# Patient Record
Sex: FEMALE | Race: WHITE | NOT HISPANIC OR LATINO | Employment: OTHER | URBAN - METROPOLITAN AREA
[De-identification: names, ages, dates, MRNs, and addresses within clinical notes are randomized per-mention and may not be internally consistent; named-entity substitution may affect disease eponyms.]

---

## 2017-01-03 DIAGNOSIS — E03.9 HYPOTHYROIDISM: ICD-10-CM

## 2017-01-03 DIAGNOSIS — R05.9 COUGH: ICD-10-CM

## 2017-01-03 DIAGNOSIS — R60.0 EDEMA OF BOTH LEGS: Primary | ICD-10-CM

## 2017-01-03 NOTE — TELEPHONE ENCOUNTER
fluticasone (FLONASE) 50 MCG/ACT nasal spray    Last Written Prescription Date: 12/2/2015  Last Fill Quantity: 16g,  # refills: 6   Last Office Visit with [facility] or  Health prescribing provider: 5/11/2016                                         Next 5 appointments (look out 90 days)     Feb 15, 2017 11:40 AM   Office Visit with Sneha Sandoval MD   [facility] ([facility] )    [address] [place] [address]  [address]  [place] [address]   [phone]                  levothyroxine (SYNTHROID, LEVOTHROID) 112 MCG tablet       Last Written Prescription Date: 9/7/2016  Last Quantity: 90, # refills: 1  Last Office Visit with [facility] or  Health prescribing provider: 5/11/2016   Next 5 appointments (look out 90 days)     Feb 15, 2017 11:40 AM   Office Visit with Sneha Sandoval MD   [facility] ([facility] )    [address] [place] [address]  [address]  [facility] [facility] [address]   [phone]                   TSH   Date Value Ref Range Status   05/11/2016 2.15 0.40 - 4.00 mU/L Final     furosemide (LASIX) 20 MG tablet      Last Written Prescription Date: 9/8/2016  Last Fill Quantity: 30, # refills: 3  Last Office Visit with [facility] or  Health prescribing provider: 5/11/2016   Next 5 appointments (look out 90 days)     Feb 15, 2017 11:40 AM   Office Visit with Sneha Sandoval MD   [facility] ([facility] )    [address] [place] [address]  [address]  [place] [address]   [phone]                   POTASSIUM   Date Value Ref Range Status   04/28/2016 3.7 mmol/L Final     CREATININE   Date Value Ref Range Status   04/28/2016 0.81 mg/dL Final     BP Readings from Last 3 Encounters:   12/16/16 142/73   12/14/16 174/82   05/11/16 134/74

## 2017-01-04 RX ORDER — LEVOTHYROXINE SODIUM 112 UG/1
112 TABLET ORAL DAILY
Qty: 90 TABLET | Refills: 0 | Status: SHIPPED | OUTPATIENT
Start: 2017-01-04 | End: 2017-06-01

## 2017-01-04 RX ORDER — FLUTICASONE PROPIONATE 50 MCG
1-2 SPRAY, SUSPENSION (ML) NASAL DAILY
Qty: 16 G | Refills: 4 | Status: SHIPPED | OUTPATIENT
Start: 2017-01-04 | End: 2018-08-08

## 2017-01-04 RX ORDER — FUROSEMIDE 20 MG
20 TABLET ORAL DAILY PRN
Qty: 90 TABLET | Refills: 0 | Status: SHIPPED | OUTPATIENT
Start: 2017-01-04 | End: 2020-07-01

## 2017-02-19 ENCOUNTER — MYC MEDICAL ADVICE (OUTPATIENT)
Dept: FAMILY MEDICINE | Facility: CLINIC | Age: 67
End: 2017-02-19

## 2017-02-22 ENCOUNTER — OFFICE VISIT (OUTPATIENT)
Dept: FAMILY MEDICINE | Facility: CLINIC | Age: 67
End: 2017-02-22
Payer: COMMERCIAL

## 2017-02-22 DIAGNOSIS — N90.4 LICHEN SCLEROSUS OF FEMALE GENITALIA: Primary | ICD-10-CM

## 2017-02-22 PROCEDURE — 99213 OFFICE O/P EST LOW 20 MIN: CPT | Performed by: FAMILY MEDICINE

## 2017-02-22 NOTE — PATIENT INSTRUCTIONS
FUTURE APPOINTMENTS  Follow up in 1 year for re-evaluation.    TOPICAL STEROID INSTRUCTIONS  Clobetasol propionate 0.05% ointment.  Continue applying prn.

## 2017-02-22 NOTE — PROGRESS NOTES
"[facility] - PRIMARY CARE SKIN    CC : Lichen sclerosus et atrophicus   SUBJECTIVE:                                                    Marysol Morrell is a 66 year old female who presents to clinic today for follow-up of vaginal lichen sclerosus et atrophicus, which first began in early 2015.     Current treatment : clobetasol propionate 0.05% ointment rarely used prn for control of white skin changes (\"a couple times a month at most\").  Response to treatment : no pain, burning nor discomfort. She only notes white skin changes.  Side effects noted : none    Personal history of skin cancer : NO.  Family history of skin cancer : NO.    Occupation : nurse.    Patient Active Problem List   Diagnosis     Constipation     HTN (hypertension), benign     Hypothyroidism     Osteopenia     Mild major depression (H)     DVT (deep venous thrombosis) (H)     Cholelithiasis     Diverticulitis     Gout     Colon polyps     Schatzki's ring     Ovarian cyst     HCD (health care directive)     GERD (gastroesophageal reflux disease)     CARDIOVASCULAR SCREENING; LDL GOAL LESS THAN 130     Lichen sclerosus of female genitalia       Past Medical History   Diagnosis Date     Cholelithiasis      Colon polyps 2001 and 2006     no polyps 2011     Constipation      Diverticulitis 2010     DVT (deep venous thrombosis) (H)      x3 (one due to OCs, 2 post op)     GERD (gastroesophageal reflux disease)      Gout      History of migraine      HTN (hypertension), benign      Hypothyroidism      hashimoto's thyroiditis     Mild major depression (H)      Osteopenia      Ovarian cyst 2011     Rectocele      Schatzki's ring 2009     EGD      Tibia/fibula fracture 2009     Vasovagal syncope 1966     Vertigo 2000    Past Surgical History   Procedure Laterality Date     C lap,surg,colectomy, partial, w/anast  2011     due to recurrent diverticulitis     Open reduction internal fixation tibia  2009     Hysterectomy, pap no longer indicated  1984 "     with bladder repair     Breast biopsy, core rt/lt  1990     Laparoscopic oophorectomy  2011     with the colon resection     Appendectomy  1954     Tonsillectomy & adenoidectomy  1958     Arthroscopy knee  1983     Excise hawthorne's neuroma foot        Social History   Substance Use Topics     Smoking status: Never Smoker     Smokeless tobacco: Never Used     Alcohol use 0.0 oz/week     0 Standard drinks or equivalent per week      Comment: 0-3 a day. mixed drinks, wine    Family History     Problem (# of Occurrences) Relation (Name,Age of Onset)    C.A.D. (1) Mother: and PE    CANCER (1) Father: gastric ca and type 1 DM           Prescription Medications as of 2/22/2017             levothyroxine (SYNTHROID/LEVOTHROID) 112 MCG tablet Take 1 tablet (112 mcg) by mouth daily    cetirizine (ZYRTEC) 10 MG tablet TAKE 1 TABLET BY MOUTH EVERY EVENING    allopurinol (ZYLOPRIM) 100 MG tablet Take 1 tablet (100 mg) by mouth daily    clobetasol (TEMOVATE) 0.05 % ointment Apply sparingly to affected area twice daily as needed.  Do not apply to face.    Cholecalciferol (VITAMIN D) 2000 UNITS tablet Take 2,000 Units by mouth daily    omeprazole 20 MG tablet Take 1 tablet (20 mg) by mouth daily Take 30-60 minutes before a meal.    losartan (COZAAR) 100 MG tablet TAKE 1 TABLET BY MOUTH DAILY    conjugated estrogens (PREMARIN) vaginal cream Place vaginally twice a week    CRANBERRY 650mg of cranberry po    furosemide (LASIX) 20 MG tablet Take 1 tablet (20 mg) by mouth daily as needed    fluticasone (FLONASE) 50 MCG/ACT spray Spray 1-2 sprays into both nostrils daily    metoprolol (TOPROL-XL) 25 MG 24 hr tablet Take 1 tablet (25 mg) by mouth daily    aspirin 81 MG tablet Take by mouth daily Reported on 2/22/2017    ibuprofen (ADVIL,MOTRIN) 400-800 mg tablet Take 400-800 mg by mouth every 6 hours as needed Reported on 2/22/2017            Allergies   Allergen Reactions     Evista [Raloxifene Hydrochloride] Other (See Comments)      Esophagus irritation      Fosamax Other (See Comments)     Esophagus irritation      Tylenol With Codeine [Acetaminophen-Codeine] GI Disturbance     Vicodin [Hydrocodone-Acetaminophen] GI Disturbance     Pen Vk [Penicillin V] Rash        ROS : 14 point review of systems was negative except the symptoms listed above in the HPI.    This document serves as a record of the services and decisions personally performed and made by Marjorie Sandoval MD. It was created on her behalf by Cristhian Glover, a trained medical scribe.  The creation of this document is based on the scribe's personal observations and the provider's statements to the medical scribe.  Cristhian Glover, February 22, 2017 11:51 AM      OBJECTIVE:                                                    GENERAL: healthy, alert and no distress  SKIN: [place] Skin Type - I.  Groin were examined. The dermatoscope was used to help evaluate pigmented lesions.  Skin Pertinent Findings:  Slight erythema on left introitus. No hypopigmentation.    Diagnostic Test Results:  none           ASSESSMENT:                                                      Encounter Diagnosis   Name Primary?     Lichen sclerosus of female genitalia Yes         PLAN:                                                    Patient Instructions   FUTURE APPOINTMENTS  Follow up in 1 year for re-evaluation.    TOPICAL STEROID INSTRUCTIONS  Clobetasol propionate 0.05% ointment.  Continue applying prn.         PROCEDURES:                                                    None.    TT : 20 minutes.  CT : 15 minutes.      The information in this document, created by the medical scribe for me, accurately reflects the services I personally performed and the decisions made by me. I have reviewed and approved this document for accuracy prior to leaving the patient care area.  Marjorie Sandoval MD February 22, 2017 11:51 AM  [facility] - PRIMARY CARE SKIN

## 2017-02-22 NOTE — MR AVS SNAPSHOT
After Visit Summary   2/22/2017    Marysol Morrell    MRN: [id number]           Patient Information     Date Of Birth          1950        Visit Information        Provider Department      2/22/2017 11:40 AM Sneha Sandoval MD [facility]        Today's Diagnoses     Lichen sclerosus of female genitalia    -  1      Care Instructions    FUTURE APPOINTMENTS  Follow up in 1 year for re-evaluation.    TOPICAL STEROID INSTRUCTIONS  Clobetasol propionate 0.05% ointment.  Continue applying prn.        Follow-ups after your visit        Follow-up notes from your care team     Return in about 1 year (around 2/22/2018).      Who to contact     If you have questions or need follow up information about today's clinic visit or your schedule please contact [facility] - PRIMARY CARE SKIN directly at [phone].  Normal or non-critical lab and imaging results will be communicated to you by MobileReactorhart, letter or phone within 4 business days after the clinic has received the results. If you do not hear from us within 7 days, please contact the clinic through MobileReactorhart or phone. If you have a critical or abnormal lab result, we will notify you by phone as soon as possible.  Submit refill requests through [employer] or call your pharmacy and they will forward the refill request to us. Please allow 3 business days for your refill to be completed.          Additional Information About Your Visit        MyChart Information     [employer] gives you secure access to your electronic health record. If you see a primary care provider, you can also send messages to your care team and make appointments. If you have questions, please call your primary care clinic.  If you do not have a primary care provider, please call [phone] and they will assist you.        Care EveryWhere ID     This is your Care EveryWhere ID. This could be used by other organizations to access your [facility]  medical records  [id number]         Blood Pressure from Last 3 Encounters:   12/16/16 142/73   12/14/16 174/82   05/11/16 134/74    Weight from Last 3 Encounters:   12/16/16 201 lb 9.6 oz (91.4 kg)   05/11/16 216 lb (98 kg)   12/02/15 218 lb (98.9 kg)              Today, you had the following     No orders found for display       Primary Care Provider Office Phone # Fax #    Vernell Sanders PA-C [phone]       [facility] [address]  [facility] [address]        Thank you!     Thank you for choosing [facility] - PRIMARY CARE SKIN  for your care. Our goal is always to provide you with excellent care. Hearing back from our patients is one way we can continue to improve our services. Please take a few minutes to complete the written survey that you may receive in the mail after your visit with us. Thank you!             Your Updated Medication List - Protect others around you: Learn how to safely use, store and throw away your medicines at www.disposemymeds.org.          This list is accurate as of: 2/22/17 11:50 AM.  Always use your most recent med list.                   Brand Name Dispense Instructions for use    allopurinol 100 MG tablet    ZYLOPRIM    90 tablet    Take 1 tablet (100 mg) by mouth daily       aspirin 81 MG tablet      Take by mouth daily Reported on 2/22/2017       cetirizine 10 MG tablet    zyrTEC    90 tablet    TAKE 1 TABLET BY MOUTH EVERY EVENING       clobetasol 0.05 % ointment    TEMOVATE    45 g    Apply sparingly to affected area twice daily as needed.  Do not apply to face.       conjugated estrogens cream    PREMARIN    30 g    Place vaginally twice a week       CRANBERRY      650mg of cranberry po       fluticasone 50 MCG/ACT spray    FLONASE    16 g    Spray 1-2 sprays into both nostrils daily       furosemide 20 MG tablet    LASIX    90 tablet    Take 1 tablet (20 mg) by mouth daily as needed       ibuprofen 400-800 mg tablet    ADVIL,MOTRIN      Take 400-800 mg by mouth every 6 hours as needed Reported on 2/22/2017       levothyroxine 112 MCG tablet    SYNTHROID/LEVOTHROID    90 tablet    Take 1 tablet (112 mcg) by mouth daily       losartan 100 MG tablet    COZAAR    90 tablet    TAKE 1 TABLET BY MOUTH DAILY       metoprolol 25 MG 24 hr tablet    TOPROL-XL    90 tablet    Take 1 tablet (25 mg) by mouth daily       omeprazole 20 MG tablet     90 tablet    Take 1 tablet (20 mg) by mouth daily Take 30-60 minutes before a meal.       vitamin D 2000 UNITS tablet     100 tablet    Take 2,000 Units by mouth daily

## 2017-03-11 ENCOUNTER — TRANSFERRED RECORDS (OUTPATIENT)
Dept: HEALTH INFORMATION MANAGEMENT | Facility: CLINIC | Age: 67
End: 2017-03-11

## 2017-04-21 ENCOUNTER — TELEPHONE (OUTPATIENT)
Dept: FAMILY MEDICINE | Facility: CLINIC | Age: 67
End: 2017-04-21

## 2017-04-21 NOTE — TELEPHONE ENCOUNTER
Reason for call:  Patient reporting a symptom    Symptom or request: loose stools for last few days used koapecktate     Duration (how long have symptoms been present):  Episode last every half hr or so    Have you been treated for this before? Yes    Additional comments:    Phone Number patient can be reached at:  Home number on file [phone] (home)    Best Time:  any    Can we leave a detailed message on this number:  YES    Call taken on 4/21/2017 at 3:38 PM by Karma Villegas

## 2017-04-26 NOTE — TELEPHONE ENCOUNTER
Spoke to patient. She states she is feeling much better now.  Last episode of diarrhea was Friday.   No further symptoms.  Isela Beltre RN

## 2017-04-27 PROBLEM — M1A.9XX0 CHRONIC GOUT WITHOUT TOPHUS, UNSPECIFIED CAUSE, UNSPECIFIED SITE: Status: ACTIVE | Noted: 2017-04-27

## 2017-06-01 ENCOUNTER — RADIANT APPOINTMENT (OUTPATIENT)
Dept: GENERAL RADIOLOGY | Facility: CLINIC | Age: 67
End: 2017-06-01
Attending: PHYSICIAN ASSISTANT
Payer: COMMERCIAL

## 2017-06-01 ENCOUNTER — OFFICE VISIT (OUTPATIENT)
Dept: FAMILY MEDICINE | Facility: CLINIC | Age: 67
End: 2017-06-01
Payer: COMMERCIAL

## 2017-06-01 VITALS
BODY MASS INDEX: 33.07 KG/M2 | HEART RATE: 62 BPM | WEIGHT: 198.5 LBS | OXYGEN SATURATION: 96 % | HEIGHT: 65 IN | TEMPERATURE: 99.2 F | SYSTOLIC BLOOD PRESSURE: 123 MMHG | DIASTOLIC BLOOD PRESSURE: 68 MMHG

## 2017-06-01 DIAGNOSIS — R60.0 BILATERAL LOWER EXTREMITY EDEMA: ICD-10-CM

## 2017-06-01 DIAGNOSIS — G89.29 CHRONIC RIGHT SHOULDER PAIN: ICD-10-CM

## 2017-06-01 DIAGNOSIS — M25.511 CHRONIC RIGHT SHOULDER PAIN: ICD-10-CM

## 2017-06-01 DIAGNOSIS — M79.671 RIGHT FOOT PAIN: ICD-10-CM

## 2017-06-01 DIAGNOSIS — Z23 NEED FOR VACCINATION: ICD-10-CM

## 2017-06-01 DIAGNOSIS — M1A.9XX0 CHRONIC GOUT WITHOUT TOPHUS, UNSPECIFIED CAUSE, UNSPECIFIED SITE: ICD-10-CM

## 2017-06-01 DIAGNOSIS — Z00.00 ENCOUNTER FOR ROUTINE ADULT HEALTH EXAMINATION WITHOUT ABNORMAL FINDINGS: Primary | ICD-10-CM

## 2017-06-01 DIAGNOSIS — E03.9 HYPOTHYROIDISM, UNSPECIFIED TYPE: ICD-10-CM

## 2017-06-01 DIAGNOSIS — I10 HTN (HYPERTENSION), BENIGN: ICD-10-CM

## 2017-06-01 PROCEDURE — G0439 PPPS, SUBSEQ VISIT: HCPCS | Performed by: PHYSICIAN ASSISTANT

## 2017-06-01 PROCEDURE — G0009 ADMIN PNEUMOCOCCAL VACCINE: HCPCS | Performed by: PHYSICIAN ASSISTANT

## 2017-06-01 PROCEDURE — 99212 OFFICE O/P EST SF 10 MIN: CPT | Mod: 25 | Performed by: PHYSICIAN ASSISTANT

## 2017-06-01 PROCEDURE — 90732 PPSV23 VACC 2 YRS+ SUBQ/IM: CPT | Performed by: PHYSICIAN ASSISTANT

## 2017-06-01 PROCEDURE — 73630 X-RAY EXAM OF FOOT: CPT | Mod: RT

## 2017-06-01 RX ORDER — ALLOPURINOL 100 MG/1
100 TABLET ORAL DAILY
Qty: 90 TABLET | Refills: 3 | Status: SHIPPED | OUTPATIENT
Start: 2017-06-01 | End: 2018-08-05

## 2017-06-01 RX ORDER — LEVOTHYROXINE SODIUM 112 UG/1
112 TABLET ORAL DAILY
Qty: 90 TABLET | Refills: 3 | Status: SHIPPED | OUTPATIENT
Start: 2017-06-01 | End: 2018-07-09

## 2017-06-01 RX ORDER — LOSARTAN POTASSIUM 100 MG/1
100 TABLET ORAL DAILY
Qty: 90 TABLET | Refills: 3 | Status: SHIPPED | OUTPATIENT
Start: 2017-06-01 | End: 2018-07-09

## 2017-06-01 NOTE — PATIENT INSTRUCTIONS

## 2017-06-01 NOTE — PROGRESS NOTES
"  SUBJECTIVE:                                                            Marysol Morrell is a 66 year old female who presents for Preventive Visit.  {PVP to remind patient that this is not necessarily a physical exam; physical exam may or may not be done:197888::\"click delete button to remove this line now\"}  {PVP to inform patient that additional E&M charge may apply, if additional problems addressed:294740::\"click delete button to remove this line now\"}  Are you in the first 12 months of your Medicare Part B coverage?  {No Yes:915481::\"No\"}    Healthy Habits:    Do you get at least three servings of calcium containing foods daily (dairy, green leafy vegetables, etc.)? {YES/NO, DAIRY INTAKE:205642::\"yes\"}    Amount of exercise or daily activities, outside of work: {AMOUNT EXERCISE:116508}    Problems taking medications regularly {Yes /No default:258674::\"No\"}    Medication side effects: {Yes /No default.:898801::\"No\"}    Have you had an eye exam in the past two years? {YESNOBLANK:674744}    Do you see a dentist twice per year? {YESNOBLANK:887128}    Do you have sleep apnea, excessive snoring or daytime drowsiness?{YESNOBLANK:205755}    {AWV Cognitive Screenin}    {Outside tests to abstract? :731534}    {additional problems to add:278229}    Reviewed and updated as needed this visit by clinical staff         Reviewed and updated as needed this visit by Provider        Social History   Substance Use Topics     Smoking status: Never Smoker     Smokeless tobacco: Never Used     Alcohol use 0.0 oz/week     0 Standard drinks or equivalent per week      Comment: 0-3 a day. mixed drinks, wine       {ETOH AUDIT:733520}    Today's PHQ-2 Score:   PHQ-2 (  [employer]) 2017   Q1: Little interest or pleasure in doing things 0 -   Q2: Feeling down, depressed or hopeless 0 -   PHQ-2 Score 0 -   Q1: Little interest or pleasure in doing things Not at all Not at all   Q2: Feeling down, depressed or hopeless " "Not at all Not at all   PHQ-2 Score 0 0     {PHQ-2 LOOK IN ASSESSMENTS :575324}  Do you feel safe in your environment - {YES/NO/NA:453960}    Do you have a Health Care Directive?: {HEALTHCARE DIRECTIVE STATUS:818273}    Current providers sharing in care for this patient include:   Patient Care Team:  Vernell Sanders PA-C as PCP - General (Internal Medicine)      Hearing impairment: {NO/YES:506965}    Ability to successfully perform activities of daily living: {YES/NO (MEDICARE):850103::\"Yes, no assistance needed\"}     Fall risk:  {Document Fall Risk in the Assessments Section of the Navigator:276062}    Home safety:  {IPPE SAFETY CONCERNS:600212::\"none identified\"}  {If any of the above assessments are answered yes, consider ordering appropriate referrals:851492::\"click delete button to remove this line now\"}    The following health maintenance items are reviewed in Epic and correct as of today:  Health Maintenance   Topic Date Due     PHQ-9 Q6 MONTHS  06/02/2016     PNEUMOCOCCAL (2 of 2 - PPSV23) 05/11/2017     DEPRESSION ACTION PLAN Q1 YR  05/11/2017     FALL RISK ASSESSMENT  05/11/2017     INFLUENZA VACCINE (SYSTEM ASSIGNED)  09/01/2017     ADVANCE DIRECTIVE PLANNING Q5 YRS  10/24/2017     TETANUS IMMUNIZATION (SYSTEM ASSIGNED)  02/11/2018     MAMMO SCREEN Q2 YR (SYSTEM ASSIGNED)  05/11/2018     LIPID SCREEN Q5 YR FEMALE (SYSTEM ASSIGNED)  05/11/2021     COLONOSCOPY Q5 YR  06/20/2021     DEXA SCAN SCREENING (SYSTEM ASSIGNED)  Completed     HEPATITIS C SCREENING  Completed         {Decision Support:284387}     ROS:  {ROS COMP:161365}    {CHRONICPROBDATA:891420}  OBJECTIVE:                                                            There were no vitals taken for this visit. Estimated body mass index is 33.81 kg/(m^2) as calculated from the following:    Height as of 12/16/16: 5' 4.75\" (1.645 m).    Weight as of 12/16/16: 201 lb 9.6 oz (91.4 kg).  EXAM:   {Exam :960015}    ASSESSMENT / PLAN:                        " "                                    {Dia Picklist:431710}    End of Life Planning:  Patient currently has an advanced directive: { :577351}    COUNSELING:  {Medicare Counselin}    {Blood Pressure - Adult Preventive:586201}    Estimated body mass index is 33.81 kg/(m^2) as calculated from the following:    Height as of 16: 5' 4.75\" (1.645 m).    Weight as of 16: 201 lb 9.6 oz (91.4 kg).  {Weight Management Plan -- Delete if patient has a normal BMI:068739}   reports that she has never smoked. She has never used smokeless tobacco.  {Tobacco Cessation -- Delete if patient is a non-smoker:224308}    Appropriate preventive services were discussed with this patient, including applicable screening as appropriate for cardiovascular disease, diabetes, osteopenia/osteoporosis, and glaucoma.  As appropriate for age/gender, discussed screening for colorectal cancer, prostate cancer, breast cancer, and cervical cancer. Checklist reviewing preventive services available has been given to the patient.    Reviewed patients plan of care and provided an AVS. The {CarePlan:555516} for Marysol meets the Care Plan requirement. This Care Plan has been established and reviewed with the {PATIENT, FAMILY MEMBER, CAREGIVER:452481}.    Counseling Resources:  ATP IV Guidelines  Pooled Cohorts Equation Calculator  Breast Cancer Risk Calculator  FRAX Risk Assessment  ICSI Preventive Guidelines  Dietary Guidelines for Americans,   USDA's MyPlate  ASA Prophylaxis  Lung CA Screening    Vernell Sanders PA-C  [facility]  "

## 2017-06-01 NOTE — PROGRESS NOTES
SUBJECTIVE:                                                            Marysol Morrell is a 66 year old female who presents for Preventive Visit.    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  Colonoscopy and dexa up to date.  She has R shoulder pain x several months that comes and goes.    Are you in the first 12 months of your Medicare coverage?  No    Physical   Annual:     Getting at least 3 servings of Calcium per day::  Yes    Bi-annual eye exam::  Yes    Dental care twice a year::  NO    Sleep apnea or symptoms of sleep apnea::  None    Diet::  Carbohydrate counting    Frequency of exercise::  2-3 days/week    Duration of exercise::  Less than 15 minutes    Taking medications regularly::  Yes    Medication side effects::  None    Additional concerns today::  YES      COGNITIVE SCREEN  1) Repeat 3 items (Banana, Sunrise, Chair)    2) Clock draw: NORMAL  3) 3 item recall: Recalls 3 objects  Results: NORMAL clock, 1-2 items recalled: COGNITIVE IMPAIRMENT LESS LIKELY    Mini-CogTM Copyright S Tala. Licensed by the author for use in [facility]; reprinted with permission (soob@[facility]). All rights reserved.        Reviewed and updated as needed this visit by clinical staff  Allergies  Meds  Carrillo Ferreira         Reviewed and updated as needed this visit by Provider  Carrillo Ferreira        Social History   Substance Use Topics     Smoking status: Never Smoker     Smokeless tobacco: Never Used     Alcohol use 0.0 oz/week     0 Standard drinks or equivalent per week      Comment: 0-3 a day. mixed drinks, wine       The patient does not drink >3 drinks per day nor >7 drinks per week.            Today's PHQ-2 Score:   PHQ-2 ( 1999 Pfizer) 5/30/2017   Q1: Little interest or pleasure in doing things Not at all   Q2:  Feeling down, depressed or hopeless Not at all   PHQ-2 Score 0       Do you feel safe in your environment - Yes    Do you have a Health Care Directive?: Yes: Advance Directive has been received and scanned.    Current providers sharing in care for this patient include:   Patient Care Team:  Vernell Sanders PA-C as PCP - General (Internal Medicine)      Hearing impairment: Yes, Difficulty following a conversation in a noisy restaurant or crowded room.    Ability to successfully perform activities of daily living: Yes, no assistance needed     Fall risk:  Fallen 2 or more times in the past year?: No  Any fall with injury in the past year?: No    Home safety:  none identified      The following health maintenance items are reviewed in Epic and correct as of today:  Health Maintenance   Topic Date Due     PHQ-9 Q6 MONTHS  06/02/2016     DEPRESSION ACTION PLAN Q1 YR  05/11/2017     FALL RISK ASSESSMENT  05/11/2017     INFLUENZA VACCINE (SYSTEM ASSIGNED)  09/01/2017     ADVANCE DIRECTIVE PLANNING Q5 YRS  10/24/2017     TETANUS IMMUNIZATION (SYSTEM ASSIGNED)  02/11/2018     MAMMO SCREEN Q2 YR (SYSTEM ASSIGNED)  05/11/2018     LIPID SCREEN Q5 YR FEMALE (SYSTEM ASSIGNED)  05/11/2021     COLONOSCOPY Q5 YR  06/20/2021     DEXA SCAN SCREENING (SYSTEM ASSIGNED)  Completed     PNEUMOCOCCAL  Completed     HEPATITIS C SCREENING  Completed       Past Medical History:   Diagnosis Date     Cholelithiasis      Colon polyps 2001 and 2006    no polyps 2011     Constipation      Diverticulitis 2010     DVT (deep venous thrombosis) (H)     x3 (one due to OCs, 2 post op)     GERD (gastroesophageal reflux disease)      Gout      History of migraine      HTN (hypertension), benign      Hypothyroidism     hashimoto's thyroiditis     Mild major depression (H)      Osteopenia      Ovarian cyst 2011     Rectocele      Schatzki's ring 2009    EGD      Tibia/fibula fracture 2009     Vasovagal syncope 1966     Vertigo 2000     Past Surgical  History:   Procedure Laterality Date     APPENDECTOMY  1954     ARTHROSCOPY KNEE  1983     BREAST BIOPSY, CORE RT/LT  1990     C LAP,SURG,COLECTOMY, PARTIAL, W/ANAST  2011    due to recurrent diverticulitis     EXCISE GARNETT'S NEUROMA FOOT       HYSTERECTOMY, PAP NO LONGER INDICATED  1984    with bladder repair     LAPAROSCOPIC OOPHORECTOMY  2011    with the colon resection     OPEN REDUCTION INTERNAL FIXATION TIBIA  2009     TONSILLECTOMY & ADENOIDECTOMY  1958     Current Outpatient Prescriptions   Medication Sig Dispense Refill     allopurinol (ZYLOPRIM) 100 MG tablet Take 1 tablet (100 mg) by mouth daily 90 tablet 3     levothyroxine (SYNTHROID/LEVOTHROID) 112 MCG tablet Take 1 tablet (112 mcg) by mouth daily 90 tablet 3     losartan (COZAAR) 100 MG tablet Take 1 tablet (100 mg) by mouth daily 90 tablet 3     furosemide (LASIX) 20 MG tablet Take 1 tablet (20 mg) by mouth daily as needed 90 tablet 0     fluticasone (FLONASE) 50 MCG/ACT spray Spray 1-2 sprays into both nostrils daily 16 g 4     cetirizine (ZYRTEC) 10 MG tablet TAKE 1 TABLET BY MOUTH EVERY EVENING 90 tablet 1     clobetasol (TEMOVATE) 0.05 % ointment Apply sparingly to affected area twice daily as needed.  Do not apply to face. 45 g 1     Cholecalciferol (VITAMIN D) 2000 UNITS tablet Take 2,000 Units by mouth daily 100 tablet 3     omeprazole 20 MG tablet Take 1 tablet (20 mg) by mouth daily Take 30-60 minutes before a meal. 90 tablet 3     conjugated estrogens (PREMARIN) vaginal cream Place vaginally twice a week 30 g 11     CRANBERRY 650mg of cranberry po       ibuprofen (ADVIL,MOTRIN) 400-800 mg tablet Take 400-800 mg by mouth every 6 hours as needed Reported on 2/22/2017         ROS:  Constitutional, HEENT, cardiovascular, pulmonary, GI, , musculoskeletal, neuro, skin, endocrine and psych systems are negative, except as otherwise noted.      OBJECTIVE:                                                            /68 (BP Location: Left arm,  "Patient Position: Chair, Cuff Size: Adult Large)  Pulse 62  Temp 99.2  F (37.3  C) (Oral)  Ht 5' 4.75\" (1.645 m)  Wt 198 lb 8 oz (90 kg)  SpO2 96%  BMI 33.29 kg/m2 Estimated body mass index is 33.29 kg/(m^2) as calculated from the following:    Height as of this encounter: 5' 4.75\" (1.645 m).    Weight as of this encounter: 198 lb 8 oz (90 kg).  EXAM:   GENERAL APPEARANCE: healthy, alert and no distress  EYES: Eyes grossly normal to inspection, PERRL and conjunctivae and sclerae normal  HENT: ear canals and TM's normal, nose and mouth without ulcers or lesions, oropharynx clear and oral mucous membranes moist  NECK: no adenopathy, no asymmetry, masses, or scars and thyroid normal to palpation  RESP: lungs clear to auscultation - no rales, rhonchi or wheezes  BREAST: normal without masses, tenderness or nipple discharge and no palpable axillary masses or adenopathy  CV: regular rate and rhythm, normal S1 S2, no S3 or S4, no murmur, click or rub, no peripheral edema and peripheral pulses strong  ABDOMEN: soft, nontender, no hepatosplenomegaly, no masses and bowel sounds normal  MS: no musculoskeletal defects are noted and gait is age appropriate without ataxia. She is tender over the lateral aspect of the R foot. There is no erythema or eccymosis.  Bilat 1+pitting pedal edema.  SKIN: no suspicious lesions or rashes  NEURO: Normal strength and tone, sensory exam grossly normal, mentation intact and speech normal  PSYCH: mentation appears normal and affect normal/bright    ASSESSMENT / PLAN:                                                            Assessment and Plan:     (Z00.00) Encounter for routine adult health examination without abnormal findings  (primary encounter diagnosis)  Comment: Mammogram is due and number is given for her schedule appt. Colonoscopy is up to date.  DEXA up to date.  Plan: CBC with platelets, Lipid Profile            (E03.9) Hypothyroidism, unspecified type  Comment: stable  Plan: " "levothyroxine (SYNTHROID/LEVOTHROID) 112 MCG         tablet, TSH with free T4 reflex            (I10) HTN (hypertension), benign  Comment: stable  Plan: Comprehensive metabolic panel, losartan         (COZAAR) 100 MG tablet        refilled    (M79.671) Right foot pain  Comment: xray to r/o fx. See ortho if pain persists.  Plan: XR Foot Right G/E 3 Views            (M1A.9XX0) Chronic gout without tophus, unspecified cause, unspecified site  Comment: no recent flares  Plan: allopurinol (ZYLOPRIM) 100 MG tablet, Uric acid            (M25.511,  G89.29) Chronic right shoulder pain  Comment:   Plan: [facility] PT, HAND, AND CHIROPRACTIC REFERRAL            (R60.0) Bilateral lower extremity edema  Comment:   Plan: she is taking lasix. Recd leg elevation and low salt diet. She has tried compression hose but they were too uncomfortable.      (Z23) Need for vaccination  Comment:   Plan: Pneumococcal vaccine 23 valent PPSV23          (Pneumovax) [95544], 1st  Administration          [47067]                End of Life Planning:  Patient currently has an advanced directive: No.  I have verified the patient's ablity to prepare an advanced directive/make health care decisions.  Literature was provided to assist patient in preparing an advanced directive.    COUNSELING:  Reviewed preventive health counseling, as reflected in patient instructions        Estimated body mass index is 33.29 kg/(m^2) as calculated from the following:    Height as of this encounter: 5' 4.75\" (1.645 m).    Weight as of this encounter: 198 lb 8 oz (90 kg).  Weight management plan: discussed   reports that she has never smoked. She has never used smokeless tobacco.      Appropriate preventive services were discussed with this patient, including applicable screening as appropriate for cardiovascular disease, diabetes, osteopenia/osteoporosis, and glaucoma.  As appropriate for age/gender, discussed screening for colorectal cancer, prostate cancer, breast cancer, and " cervical cancer. Checklist reviewing preventive services available has been given to the patient.    Reviewed patients plan of care and provided an AVS. The Basic Care Plan (routine screening as documented in Health Maintenance) for Marysol meets the Care Plan requirement. This Care Plan has been established and reviewed with the Patient.    Counseling Resources:  ATP IV Guidelines  Pooled Cohorts Equation Calculator  Breast Cancer Risk Calculator  FRAX Risk Assessment  ICSI Preventive Guidelines  Dietary Guidelines for Americans, 2010  USDA's MyPlate  ASA Prophylaxis  Lung CA Screening    Vernell Sanders PA-C  [facility]

## 2017-06-01 NOTE — MR AVS SNAPSHOT
After Visit Summary   6/1/2017    Marysol Morrell    MRN: [id number]           Patient Information     Date Of Birth          1950        Visit Information        Provider Department      6/1/2017 4:00 PM Vernell Sanders PA-C [facility]        Today's Diagnoses     Encounter for routine adult health examination without abnormal findings    -  1    Hypothyroidism, unspecified type        HTN (hypertension), benign        Chronic gout without tophus, unspecified cause, unspecified site        Need for prophylactic vaccination against Streptococcus pneumoniae (pneumococcus)        Right foot pain        Chronic right shoulder pain          Care Instructions      Preventive Health Recommendations    Female Ages 65 +    Yearly exam:     See your health care provider every year in order to  o Review health changes.   o Discuss preventive care.    o Review your medicines if your doctor has prescribed any.      You no longer need a yearly Pap test unless you've had an abnormal Pap test in the past 10 years. If you have vaginal symptoms, such as bleeding or discharge, be sure to talk with your provider about a Pap test.      Every 1 to 2 years, have a mammogram.  If you are over 69, talk with your health care provider about whether or not you want to continue having screening mammograms.      Every 10 years, have a colonoscopy. Or, have a yearly FIT test (stool test). These exams will check for colon cancer.       Have a cholesterol test every 5 years, or more often if your doctor advises it.       Have a diabetes test (fasting glucose) every three years. If you are at risk for diabetes, you should have this test more often.       At age 65, have a bone density scan (DEXA) to check for osteoporosis (brittle bone disease).    Shots:    Get a flu shot each year.    Get a tetanus shot every 10 years.    Talk to your doctor about your pneumonia vaccines. There are now two you should receive -  Pneumovax (PPSV 23) and Prevnar (PCV 13).    Talk to your doctor about the shingles vaccine.    Talk to your doctor about the hepatitis B vaccine.    Nutrition:     Eat at least 5 servings of fruits and vegetables each day.      Eat whole-grain bread, whole-wheat pasta and brown rice instead of white grains and rice.      Talk to your provider about Calcium and Vitamin D.     Lifestyle    Exercise at least 150 minutes a week (30 minutes a day, 5 days a week). This will help you control your weight and prevent disease.      Limit alcohol to one drink per day.      No smoking.       Wear sunscreen to prevent skin cancer.       See your dentist twice a year for an exam and cleaning.      See your eye doctor every 1 to 2 years to screen for conditions such as glaucoma, macular degeneration and cataracts.  Preventive Health Recommendations    Female Ages 65 +    Yearly exam:   See your health care provider every year in order to  Review health changes.   Discuss preventive care.    Review your medicines if your doctor has prescribed any.    You no longer need a yearly Pap test unless you've had an abnormal Pap test in the past 10 years. If you have vaginal symptoms, such as bleeding or discharge, be sure to talk with your provider about a Pap test.    Every 1 to 2 years, have a mammogram.  If you are over 69, talk with your health care provider about whether or not you want to continue having screening mammograms.    Every 10 years, have a colonoscopy. Or, have a yearly FIT test (stool test). These exams will check for colon cancer.     Have a cholesterol test every 5 years, or more often if your doctor advises it.     Have a diabetes test (fasting glucose) every three years. If you are at risk for diabetes, you should have this test more often.     At age 65, have a bone density scan (DEXA) to check for osteoporosis (brittle bone disease).    Shots:  Get a flu shot each year.  Get a tetanus shot every 10 years.  Talk to  your doctor about your pneumonia vaccines. There are now two you should receive - Pneumovax (PPSV 23) and Prevnar (PCV 13).  Talk to your doctor about the shingles vaccine.  Talk to your doctor about the hepatitis B vaccine.    Nutrition:   Eat at least 5 servings of fruits and vegetables each day.    Eat whole-grain bread, whole-wheat pasta and brown rice instead of white grains and rice.    Talk to your provider about Calcium and Vitamin D.     Lifestyle  Exercise at least 150 minutes a week (30 minutes a day, 5 days a week). This will help you control your weight and prevent disease.    Limit alcohol to one drink per day.    No smoking.     Wear sunscreen to prevent skin cancer.     See your dentist twice a year for an exam and cleaning.    See your eye doctor every 1 to 2 years to screen for conditions such as glaucoma, macular degeneration and cataracts.      Mammogram [address]  [phone]            Follow-ups after your visit        Additional Services     [facility] PT, HAND, AND CHIROPRACTIC REFERRAL       **This order will print in the [facility] Scheduling Office**    Physical Therapy, Hand Therapy and Chiropractic Care are available through:    *[facility]  *[facility]  *[facility]    Call one number to schedule at any of the above locations: [phone].    Your provider has referred you to: As Indicated:     Indication/Reason for Referral: Shoulder Pain  Onset of Illness: 6 months  Therapy Orders: Evaluate and Treat  Special Programs:   Special Request:     Chucho Rankin      Additional Comments for the Therapist or Chiropractor:     Please be aware that coverage of these services is subject to the terms and limitations of your health insurance plan.  Call member services at your health plan with any benefit or coverage questions.      Please bring the following to your appointment:    *Your personal calendar for scheduling future  appointments  *Comfortable clothing                  Your next 10 appointments already scheduled     Mar 07, 2018 11:40 AM CST   Office Visit with Sneha Sandoval MD   [facility] ([facility] )    [address] [place] [id number]   [phone]           Bring a current list of meds and any records pertaining to this visit.  For Physicals, please bring immunization records and any forms needing to be filled out.  Please arrive 10 minutes early to complete paperwork.              Future tests that were ordered for you today     Open Future Orders        Priority Expected Expires Ordered    XR Foot Right G/E 3 Views Routine 6/1/2017 6/1/2018 6/1/2017    TSH with free T4 reflex Routine 6/1/2017 7/1/2017 6/1/2017    Uric acid Routine 6/1/2017 7/1/2017 6/1/2017    Comprehensive metabolic panel Routine 6/1/2017 7/1/2017 6/1/2017    CBC with platelets Routine 6/1/2017 7/1/2017 6/1/2017    Lipid Profile Routine 6/1/2017 7/1/2017 6/1/2017            Who to contact     If you have questions or need follow up information about today's clinic visit or your schedule please contact [facility] directly at [phone].  Normal or non-critical lab and imaging results will be communicated to you by MyChart, letter or phone within 4 business days after the clinic has received the results. If you do not hear from us within 7 days, please contact the clinic through MyChart or phone. If you have a critical or abnormal lab result, we will notify you by phone as soon as possible.  Submit refill requests through [email or web address] or call your pharmacy and they will forward the refill request to us. Please allow 3 business days for your refill to be completed.          Additional Information About Your Visit        [facility] [place] Information     [email or web address] gives you secure access to your electronic health record. If you see a primary care provider, you can  "also send messages to your care team and make appointments. If you have questions, please call your primary care clinic.  If you do not have a primary care provider, please call [phone] and they will assist you.        Care EveryWhere ID     This is your Care EveryWhere ID. This could be used by other organizations to access your [facility] medical records  [id number]        Your Vitals Were     Pulse Temperature Height Pulse Oximetry BMI (Body Mass Index)       62 99.2  F (37.3  C) (Oral) 5' 4.75\" (1.645 m) 96% 33.29 kg/m2        Blood Pressure from Last 3 Encounters:   06/01/17 123/68   12/16/16 142/73   12/14/16 174/82    Weight from Last 3 Encounters:   06/01/17 198 lb 8 oz (90 kg)   12/16/16 201 lb 9.6 oz (91.4 kg)   05/11/16 216 lb (98 kg)              We Performed the Following     [facility] PT, HAND, AND CHIROPRACTIC REFERRAL     PNEUMOVAX - MEDICARE          Today's Medication Changes          These changes are accurate as of: 6/1/17  4:26 PM.  If you have any questions, ask your nurse or doctor.               These medicines have changed or have updated prescriptions.        Dose/Directions    allopurinol 100 MG tablet   Commonly known as:  ZYLOPRIM   This may have changed:  See the new instructions.   Used for:  Chronic gout without tophus, unspecified cause, unspecified site   Changed by:  Vernell Sanders PA-C        Dose:  100 mg   Take 1 tablet (100 mg) by mouth daily   Quantity:  90 tablet   Refills:  3       losartan 100 MG tablet   Commonly known as:  COZAAR   This may have changed:  See the new instructions.   Used for:  HTN (hypertension), benign   Changed by:  Vernell Sanders PA-C        Dose:  100 mg   Take 1 tablet (100 mg) by mouth daily   Quantity:  90 tablet   Refills:  3            Where to get your medicines      These medications were sent to [id number] - [place], [place] - [address] [address]  [address] [address] [address]     Phone:  [phone]     allopurinol 100 MG " tablet    levothyroxine 112 MCG tablet    losartan 100 MG tablet                Primary Care Provider Office Phone # Fax #    Vernell Sanders PA-C [phone]       [facility] [address] [facility] [address]  [facility] [address]        Thank you!     Thank you for choosing [facility]  for your care. Our goal is always to provide you with excellent care. Hearing back from our patients is one way we can continue to improve our services. Please take a few minutes to complete the written survey that you may receive in the mail after your visit with us. Thank you!             Your Updated Medication List - Protect others around you: Learn how to safely use, store and throw away your medicines at www.disposemymeds.org.          This list is accurate as of: 6/1/17  4:26 PM.  Always use your most recent med list.                   Brand Name Dispense Instructions for use    allopurinol 100 MG tablet    ZYLOPRIM    90 tablet    Take 1 tablet (100 mg) by mouth daily       cetirizine 10 MG tablet    zyrTEC    90 tablet    TAKE 1 TABLET BY MOUTH EVERY EVENING       clobetasol 0.05 % ointment    TEMOVATE    45 g    Apply sparingly to affected area twice daily as needed.  Do not apply to face.       conjugated estrogens cream    PREMARIN    30 g    Place vaginally twice a week       CRANBERRY      650mg of cranberry po       fluticasone 50 MCG/ACT spray    FLONASE    16 g    Spray 1-2 sprays into both nostrils daily       furosemide 20 MG tablet    LASIX    90 tablet    Take 1 tablet (20 mg) by mouth daily as needed       ibuprofen 400-800 mg tablet    ADVIL,MOTRIN     Take 400-800 mg by mouth every 6 hours as needed Reported on 2/22/2017       levothyroxine 112 MCG tablet    SYNTHROID/LEVOTHROID    90 tablet    Take 1 tablet (112 mcg) by mouth daily       losartan 100 MG tablet    COZAAR    90 tablet    Take 1 tablet (100 mg) by mouth daily       omeprazole 20 MG tablet     90 tablet    Take 1 tablet  (20 mg) by mouth daily Take 30-60 minutes before a meal.       vitamin D 2000 UNITS tablet     100 tablet    Take 2,000 Units by mouth daily

## 2017-06-01 NOTE — NURSING NOTE
"Chief Complaint   Patient presents with     Wellness Visit       Initial /68 (BP Location: Left arm, Patient Position: Chair, Cuff Size: Adult Large)  Pulse 62  Temp 99.2  F (37.3  C) (Oral)  Ht 5' 4.75\" (1.645 m)  Wt 198 lb 8 oz (90 kg)  SpO2 96%  BMI 33.29 kg/m2 Estimated body mass index is 33.29 kg/(m^2) as calculated from the following:    Height as of this encounter: 5' 4.75\" (1.645 m).    Weight as of this encounter: 198 lb 8 oz (90 kg).  Medication Reconciliation: complete   Roseann Ortiz      "

## 2017-06-01 NOTE — NURSING NOTE
Screening Questionnaire for Adult Immunization    Are you sick today?   No   Do you have allergies to medications, food, a vaccine component or latex?   No   Have you ever had a serious reaction after receiving a vaccination?   No   Do you have a long-term health problem with heart disease, lung disease, asthma, kidney disease, metabolic disease (e.g. diabetes), anemia, or other blood disorder?   No   Do you have cancer, leukemia, HIV/AIDS, or any other immune system problem?   No   In the past 3 months, have you taken medications that affect  your immune system, such as prednisone, other steroids, or anticancer drugs; drugs for the treatment of rheumatoid arthritis, Crohn s disease, or psoriasis; or have you had radiation treatments?   No   Have you had a seizure, or a brain or other nervous system problem?   No   During the past year, have you received a transfusion of blood or blood     products, or been given immune (gamma) globulin or antiviral drug?   No   For women: Are you pregnant or is there a chance you could become        pregnant during the next month?   No   Have you received any vaccinations in the past 4 weeks?   No     Immunization questionnaire answers were all negative.      MNVFC doesn't apply on this patient    Per orders of Vernell Sanders, injection of Pneumococcal given by Roseann Ortiz. Patient instructed to remain in clinic for 20 minutes afterwards, and to report any adverse reaction to me immediately.       Screening performed by Roseann Ortiz on 6/1/2017 at 4:44 PM.

## 2017-06-02 DIAGNOSIS — I10 HTN (HYPERTENSION), BENIGN: ICD-10-CM

## 2017-06-02 DIAGNOSIS — M1A.9XX0 CHRONIC GOUT WITHOUT TOPHUS, UNSPECIFIED CAUSE, UNSPECIFIED SITE: ICD-10-CM

## 2017-06-02 DIAGNOSIS — Z00.00 ENCOUNTER FOR ROUTINE ADULT HEALTH EXAMINATION WITHOUT ABNORMAL FINDINGS: ICD-10-CM

## 2017-06-02 DIAGNOSIS — E03.9 HYPOTHYROIDISM, UNSPECIFIED TYPE: ICD-10-CM

## 2017-06-02 LAB
ALBUMIN SERPL-MCNC: 3.7 G/DL (ref 3.4–5)
ALP SERPL-CCNC: 65 U/L (ref 40–150)
ALT SERPL W P-5'-P-CCNC: 13 U/L (ref 0–50)
ANION GAP SERPL CALCULATED.3IONS-SCNC: 8 MMOL/L (ref 3–14)
AST SERPL W P-5'-P-CCNC: 14 U/L (ref 0–45)
BILIRUB SERPL-MCNC: 0.8 MG/DL (ref 0.2–1.3)
BUN SERPL-MCNC: 13 MG/DL (ref 7–30)
CALCIUM SERPL-MCNC: 8.9 MG/DL (ref 8.5–10.1)
CHLORIDE SERPL-SCNC: 109 MMOL/L (ref 94–109)
CHOLEST SERPL-MCNC: 185 MG/DL
CO2 SERPL-SCNC: 24 MMOL/L (ref 20–32)
CREAT SERPL-MCNC: 0.78 MG/DL (ref 0.52–1.04)
ERYTHROCYTE [DISTWIDTH] IN BLOOD BY AUTOMATED COUNT: 13.1 % (ref 10–15)
GFR SERPL CREATININE-BSD FRML MDRD: 74 ML/MIN/1.7M2
GLUCOSE SERPL-MCNC: 105 MG/DL (ref 70–99)
HCT VFR BLD AUTO: 40.8 % (ref 35–47)
HDLC SERPL-MCNC: 56 MG/DL
HGB BLD-MCNC: 13.8 G/DL (ref 11.7–15.7)
LDLC SERPL CALC-MCNC: 92 MG/DL
MCH RBC QN AUTO: 32.1 PG (ref 26.5–33)
MCHC RBC AUTO-ENTMCNC: 33.8 G/DL (ref 31.5–36.5)
MCV RBC AUTO: 95 FL (ref 78–100)
NONHDLC SERPL-MCNC: 129 MG/DL
PLATELET # BLD AUTO: 247 10E9/L (ref 150–450)
POTASSIUM SERPL-SCNC: 4.1 MMOL/L (ref 3.4–5.3)
PROT SERPL-MCNC: 6.8 G/DL (ref 6.8–8.8)
RBC # BLD AUTO: 4.3 10E12/L (ref 3.8–5.2)
SODIUM SERPL-SCNC: 141 MMOL/L (ref 133–144)
TRIGL SERPL-MCNC: 185 MG/DL
TSH SERPL DL<=0.005 MIU/L-ACNC: 2.44 MU/L (ref 0.4–4)
URATE SERPL-MCNC: 5.3 MG/DL (ref 2.6–6)
WBC # BLD AUTO: 8 10E9/L (ref 4–11)

## 2017-06-02 PROCEDURE — 85027 COMPLETE CBC AUTOMATED: CPT | Performed by: PHYSICIAN ASSISTANT

## 2017-06-02 PROCEDURE — 84443 ASSAY THYROID STIM HORMONE: CPT | Performed by: PHYSICIAN ASSISTANT

## 2017-06-02 PROCEDURE — 84550 ASSAY OF BLOOD/URIC ACID: CPT | Performed by: PHYSICIAN ASSISTANT

## 2017-06-02 PROCEDURE — 80061 LIPID PANEL: CPT | Performed by: PHYSICIAN ASSISTANT

## 2017-06-02 PROCEDURE — 80053 COMPREHEN METABOLIC PANEL: CPT | Performed by: PHYSICIAN ASSISTANT

## 2017-06-02 PROCEDURE — 36415 COLL VENOUS BLD VENIPUNCTURE: CPT | Performed by: PHYSICIAN ASSISTANT

## 2017-06-02 ASSESSMENT — PATIENT HEALTH QUESTIONNAIRE - PHQ9: SUM OF ALL RESPONSES TO PHQ QUESTIONS 1-9: 1

## 2017-06-02 NOTE — PROGRESS NOTES
Marysol,    The radiologist read your foot xray as no fractures.    If you continue to have pain, I would like you to see an orthopedist.      Vernell Sanders PA-C

## 2017-06-05 NOTE — PROGRESS NOTES
It was a pleasure seeing you for your physical examination.  I wanted to get back to you with your test results.  I have enclosed a copy for your review.      I am happy to report that your CBC or complete blood count is normal with no signs of anemia, leukemia or platelet abnormalities. Your chemistry panel shows no signs of diabetes.  Your blood salts, kidney tests, liver tests, and proteins are all fine.    Your TSH (thyroid test) and uric acid levels were normal.    Your total cholesterol is 185 with the normal range being below 200.  Your HDL or good cholesterol is 56 with the normal range being above 50.  Your LDL or bad cholesterol is 92 with the normal range being below 130.  This looks fine.    eVrnell Sanders PA-C

## 2017-06-29 ENCOUNTER — TELEPHONE (OUTPATIENT)
Dept: FAMILY MEDICINE | Facility: CLINIC | Age: 67
End: 2017-06-29

## 2017-06-29 NOTE — TELEPHONE ENCOUNTER
Reason for Call:  Other     Detailed comments: lesion on tongue, just noticed it today, trouble clearing   Throat and swallowing    Phone Number Patient can be reached at: Home number on file [phone] (home)    Best Time: anytime    Can we leave a detailed message on this number? YES    Call taken on 6/29/2017 at 2:32 PM by Zoe Quintanilla

## 2017-06-29 NOTE — TELEPHONE ENCOUNTER
"PCP:    You saw the Pt on 6/1 for full Px.     Pt reports that she has \"bumps\" on the back of her tongue   Pt noticed these bumps about 2 weeks ago.   Pt states she couldn't see anything, but could feel the bumps with her fingers.   Right before she noticed her symptoms, Pt states the Right side of her throat that felt swollen and tender. Symptoms lasted 5 days.   When the swelling and tender feeling resolved, the Pt started noticing the \"bumps\".    Pt would like your feedback on this prior to scheduling. Pt is leaving for out of town tomorrow early in the morning.     Ana Goodson RN       "

## 2017-06-29 NOTE — TELEPHONE ENCOUNTER
There are bumps on the back of the tongue that pts can find by accident called lingual tonsils  They are normal  Not sure if that is what she is feeling  If she feels fine can just have me check these when back in town.

## 2017-06-29 NOTE — TELEPHONE ENCOUNTER
I called patient and spoke with her  Relayed Vernell Sanders's message below.   She states that it almost feels like food is stuck in the back of throat.  A little sore throat.  Tenderness on right side of neck before the small bumps started to happened  Offered ibuprofen and salt water gargles to see if that will help.   Advised to schedule appointment if still having issue when she returns from trip  Advised that if while on trip, it starts to become very bothersome to point of difficulty to swallow to seek urgent care where she will be.   Patient agrees    Kyra Valles RN

## 2017-07-15 DIAGNOSIS — K21.9 GERD (GASTROESOPHAGEAL REFLUX DISEASE): Primary | ICD-10-CM

## 2017-07-15 NOTE — TELEPHONE ENCOUNTER
Pending Prescriptions:                       Disp   Refills    omeprazole (PRILOSEC) 20 MG CR capsule [P*90 cap*0            Sig: TAKE ONE CAPSULE BY MOUTH ONE TIME DAILY 30-60           MINUTES BEFORE MEAL          Last Written Prescription Date:  6/13/16  Last Fill Quantity: 90,   # refills: 3  Last Office Visit with FMASHANTI, P or [facility] prescribing provider: 6/1/17 Marilyn Barker, RT(R)

## 2017-07-19 ENCOUNTER — OFFICE VISIT (OUTPATIENT)
Dept: FAMILY MEDICINE | Facility: CLINIC | Age: 67
End: 2017-07-19
Payer: COMMERCIAL

## 2017-07-19 VITALS
RESPIRATION RATE: 18 BRPM | SYSTOLIC BLOOD PRESSURE: 143 MMHG | HEIGHT: 65 IN | WEIGHT: 200 LBS | HEART RATE: 67 BPM | DIASTOLIC BLOOD PRESSURE: 57 MMHG | OXYGEN SATURATION: 96 % | TEMPERATURE: 98.2 F | BODY MASS INDEX: 33.32 KG/M2

## 2017-07-19 DIAGNOSIS — Z01.818 PREOP GENERAL PHYSICAL EXAM: Primary | ICD-10-CM

## 2017-07-19 DIAGNOSIS — R22.0 NODULE OF TONGUE: ICD-10-CM

## 2017-07-19 LAB — HGB BLD-MCNC: 13.2 G/DL (ref 11.7–15.7)

## 2017-07-19 PROCEDURE — 99214 OFFICE O/P EST MOD 30 MIN: CPT | Performed by: NURSE PRACTITIONER

## 2017-07-19 PROCEDURE — 85018 HEMOGLOBIN: CPT | Performed by: NURSE PRACTITIONER

## 2017-07-19 PROCEDURE — 93000 ELECTROCARDIOGRAM COMPLETE: CPT | Performed by: NURSE PRACTITIONER

## 2017-07-19 PROCEDURE — 36415 COLL VENOUS BLD VENIPUNCTURE: CPT | Performed by: NURSE PRACTITIONER

## 2017-07-19 RX ORDER — PREDNISONE 20 MG/1
TABLET ORAL
Refills: 0 | COMMUNITY
Start: 2017-07-19 | End: 2018-02-19

## 2017-07-19 RX ORDER — CHLORHEXIDINE GLUCONATE ORAL RINSE 1.2 MG/ML
SOLUTION DENTAL
Refills: 0 | Status: ON HOLD | COMMUNITY
Start: 2017-07-13 | End: 2017-07-24

## 2017-07-19 RX ORDER — AZITHROMYCIN 250 MG/1
250 TABLET, FILM COATED ORAL DAILY
Refills: 0 | Status: ON HOLD | COMMUNITY
Start: 2017-07-19 | End: 2017-08-04

## 2017-07-19 NOTE — PROGRESS NOTES
[place] [address] [address] [facility] [address]  [phone]  Dept: [phone]    PRE-OP EVALUATION:  Today's date: 2017    Marysol Morrell (: 1950) presents for pre-operative evaluation assessment as requested by Dr. Yang.  She requires evaluation and anesthesia risk assessment prior to undergoing surgery/procedure for treatment of Laryngoscopy With Biopsy of tongue nodule .  Proposed procedure: Laryngoscopy with Biopsy    Date of Surgery/ Procedure: 2017  Time of Surgery/ Procedure: 12:00  Hospital/Surgical Facility: [facility]  Primary Physician: Vernell Sanders  Type of Anesthesia Anticipated: General    Patient has a Health Care Directive or Living Will:  NO    1. NO - Do you have a history of heart attack, stroke, stent, bypass or surgery on an artery in the head, neck, heart or legs?  2. YES - DO YOU EVER HAVE ANY PAIN OR DISCOMFORT IN YOUR CHEST? Very rare. Had workup at the time. Also with dehydration  3. NO - Do you have a history of  Heart Failure?  4. NO - Are you troubled by shortness of breath when: walking on the level, up a slight hill or at night?  5. YES - DO YOU CURRENTLY HAVE A COLD, BRONCHITIS OR OTHER RESPIRATORY INFECTION? Current sore throat that is being investigated   6. NO - Do you have a cough, shortness of breath or wheezing?  7. NO - Do you sometimes get pains in the calves of your legs when you walk?  8. YES - DO YOU OR ANYONE IN YOUR FAMILY HAVE PREVIOUS HISTORY OF BLOOD CLOTS? Personal when she was on OCs and then after surgery, last . Mom also had PE after surgery.  9. NO - Do you or does anyone in your family have a serious bleeding problem such as prolonged bleeding following surgeries or cuts?  10. NO - Have you ever had problems with anemia or been told to take iron pills?  11. NO - Have you had any abnormal blood loss such as black, tarry or bloody stools, or abnormal vaginal bleeding?  12. NO - Have you ever had a blood  transfusion?  13. NO - Have you or any of your relatives ever had problems with anesthesia?  14. NO - Do you have sleep apnea, excessive snoring or daytime drowsiness?  15. NO - Do you have any prosthetic heart valves?  16. NO - Do you have prosthetic joints?  17. NO - Is there any chance that you may be pregnant?        HPI:                                                      Brief HPI related to upcoming procedure: tongue nodules. Has difficulty with swallowing and also change of voice.        See problem list for active medical problems.  Problems all longstanding and stable, except as noted/documented.  See ROS for pertinent symptoms related to these conditions.                                                                                                      MEDICAL HISTORY:                                                    Patient Active Problem List    Diagnosis Date Noted     Hypothyroidism, unspecified type 06/01/2017     Priority: Medium     Chronic gout without tophus, unspecified cause, unspecified site 04/27/2017     Priority: Medium     Lichen sclerosus of female genitalia 02/22/2017     Priority: Medium     HCD (health care directive) 08/15/2012     Priority: Medium     Discussed advance care planning with patient; information given to patient to review. Vernell Leonard LPN 8/15/2012          CARDIOVASCULAR SCREENING; LDL GOAL LESS THAN 130 08/15/2012     Priority: Medium     Constipation      Priority: Medium     HTN (hypertension), benign      Priority: Medium     Osteopenia      Priority: Medium     Mild major depression (H)      Priority: Medium     DVT (deep venous thrombosis) (H)      Priority: Medium     x3 (one due to OCs, 2 post op)       Cholelithiasis      Priority: Medium     IMO update changed this record. Please review for accuracy       Diverticulitis      Priority: Medium     Gout      Priority: Medium     Colon polyps      Priority: Medium     Schatzki's ring      Priority: Medium      EGD        Ovarian cyst      Priority: Medium     GERD (gastroesophageal reflux disease)      Priority: Medium      Past Medical History:   Diagnosis Date     Cholelithiasis      Colon polyps 2001 and 2006    no polyps 2011     Constipation      Diverticulitis 2010     DVT (deep venous thrombosis) (H)     x3 (one due to OCs, 2 post op)     GERD (gastroesophageal reflux disease)      Gout      History of migraine      HTN (hypertension), benign      Hypothyroidism     hashimoto's thyroiditis     Mild major depression (H)      Osteopenia      Ovarian cyst 2011     Rectocele      Schatzki's ring 2009    EGD      Tibia/fibula fracture 2009     Vasovagal syncope 1966     Vertigo 2000     Past Surgical History:   Procedure Laterality Date     APPENDECTOMY  1954     ARTHROSCOPY KNEE  1983     BREAST BIOPSY, CORE RT/LT  1990     C LAP,SURG,COLECTOMY, PARTIAL, W/ANAST  2011    due to recurrent diverticulitis     EXCISE GARNETT'S NEUROMA FOOT       HYSTERECTOMY, PAP NO LONGER INDICATED  1984    with bladder repair     LAPAROSCOPIC OOPHORECTOMY  2011    with the colon resection     OPEN REDUCTION INTERNAL FIXATION TIBIA  2009     TONSILLECTOMY & ADENOIDECTOMY  1958     Current Outpatient Prescriptions   Medication Sig Dispense Refill     azithromycin (ZITHROMAX) 250 MG tablet   0     predniSONE (DELTASONE) 20 MG tablet   0     chlorhexidine (PERIDEX) 0.12 % solution   0     omeprazole (PRILOSEC) 20 MG CR capsule TAKE ONE CAPSULE BY MOUTH ONE TIME DAILY 30-60 MINUTES BEFORE MEAL 90 capsule 3     allopurinol (ZYLOPRIM) 100 MG tablet Take 1 tablet (100 mg) by mouth daily 90 tablet 3     levothyroxine (SYNTHROID/LEVOTHROID) 112 MCG tablet Take 1 tablet (112 mcg) by mouth daily 90 tablet 3     losartan (COZAAR) 100 MG tablet Take 1 tablet (100 mg) by mouth daily 90 tablet 3     furosemide (LASIX) 20 MG tablet Take 1 tablet (20 mg) by mouth daily as needed 90 tablet 0     fluticasone (FLONASE) 50 MCG/ACT spray Spray 1-2 sprays  "into both nostrils daily 16 g 4     cetirizine (ZYRTEC) 10 MG tablet TAKE 1 TABLET BY MOUTH EVERY EVENING 90 tablet 1     clobetasol (TEMOVATE) 0.05 % ointment Apply sparingly to affected area twice daily as needed.  Do not apply to face. 45 g 1     Cholecalciferol (VITAMIN D) 2000 UNITS tablet Take 2,000 Units by mouth daily 100 tablet 3     omeprazole 20 MG tablet Take 1 tablet (20 mg) by mouth daily Take 30-60 minutes before a meal. 90 tablet 3     conjugated estrogens (PREMARIN) vaginal cream Place vaginally twice a week 30 g 11     CRANBERRY 650mg of cranberry po       ibuprofen (ADVIL,MOTRIN) 400-800 mg tablet Take 400-800 mg by mouth every 6 hours as needed Reported on 2/22/2017       OTC products: None, except as noted above    Allergies   Allergen Reactions     Evista [Raloxifene Hydrochloride] Other (See Comments)     Esophagus irritation      Fosamax Other (See Comments)     Esophagus irritation      Tylenol With Codeine [Acetaminophen-Codeine] GI Disturbance     Vicodin [Hydrocodone-Acetaminophen] GI Disturbance     Pen Vk [Penicillin V] Rash      Latex Allergy: NO    Social History   Substance Use Topics     Smoking status: Never Smoker     Smokeless tobacco: Never Used     Alcohol use 0.0 oz/week     0 Standard drinks or equivalent per week      Comment: 0-3 a day. mixed drinks, wine     History   Drug Use No       REVIEW OF SYSTEMS:                                                    Constitutional, neuro, ENT, endocrine, pulmonary, cardiac, gastrointestinal, genitourinary, musculoskeletal, integument and psychiatric systems are negative, except as otherwise noted.      EXAM:                                                    /57 (BP Location: Right arm, Patient Position: Chair, Cuff Size: Adult Large)  Pulse 67  Temp 98.2  F (36.8  C) (Tympanic)  Resp 18  Ht 5' 4.75\" (1.645 m)  Wt 200 lb (90.7 kg)  SpO2 96%  BMI 33.54 kg/m2    GENERAL APPEARANCE: healthy, alert and no distress     EYES: " EOMI, PERRL     HENT: ear canals and TM's normal and nose and mouth without ulcers or lesions     NECK: no adenopathy, no asymmetry, masses, or scars and thyroid normal to palpation     RESP: lungs clear to auscultation - no rales, rhonchi or wheezes     CV: regular rates and rhythm, normal S1 S2, no S3 or S4 and no murmur, click or rub     MS: extremities normal- no gross deformities noted, no evidence of inflammation in joints, FROM in all extremities.     SKIN: no suspicious lesions or rashes     NEURO: Normal strength and tone, sensory exam grossly normal, mentation intact and speech normal     PSYCH: mentation appears normal. and affect normal/bright     LYMPHATICS: No axillary, cervical, or supraclavicular nodes    DIAGNOSTICS:                                                      EKG: appears normal, NSR, incomplete bundle, unchanged from previous tracings  Hemoglobin (indicated for history of anemia or procedure with significant blood loss such as tonsillectomy, major intraperitoneal surgery, vascular surgery, major spine surgery, total joint replacement)  Labs Resulted Today:   Results for orders placed or performed in visit on 07/19/17   Hemoglobin   Result Value Ref Range    Hemoglobin 13.2 11.7 - 15.7 g/dL         Recent Labs   Lab Test  06/02/17   1158  05/11/16   1126 04/28/16 04/27/16   HGB  13.8  15.3   --    --    PLT  247  255   --    --    NA  141   --    --   142   POTASSIUM  4.1   --   3.7  3.3   CR  0.78   --   0.81  0.92        IMPRESSION:                                                    Reason for surgery/procedure: tongue nodule  Diagnosis/reason for consult: medical preop    The proposed surgical procedure is considered INTERMEDIATE risk.    REVISED CARDIAC RISK INDEX  The patient has the following serious cardiovascular risks for perioperative complications such as (MI, PE, VFib and 3  AV Block):  No serious cardiac risks  INTERPRETATION: 0 risks: Class I (very low risk - 0.4%  complication rate)    The patient has the following additional risks for perioperative complications:  No identified additional risks      ICD-10-CM    1. Preop general physical exam Z01.818 azithromycin (ZITHROMAX) 250 MG tablet     predniSONE (DELTASONE) 20 MG tablet     chlorhexidine (PERIDEX) 0.12 % solution     EKG 12-lead complete w/read - Clinics     Hemoglobin   2. Nodule of tongue R22.0        RECOMMENDATIONS:                                                          --Patient is to take all scheduled medications on the day of surgery EXCEPT for modifications listed below.  She will not take Lasix morning of surgery   No advil from now until surgery       APPROVAL GIVEN to proceed with proposed procedure, without further diagnostic evaluation       Signed Electronically by: ROXI Terrazas CNP    Copy of this evaluation report is provided to requesting physician.    [facility] Preop Guidelines

## 2017-07-19 NOTE — MR AVS SNAPSHOT
After Visit Summary   7/19/2017    Marysol Morrell    MRN: [id number]           Patient Information     Date Of Birth          1950        Visit Information        Provider Department      7/19/2017 1:00 PM Juana Baldwin APRN CNP [facility]        Today's Diagnoses     Preop general physical exam    -  1    Nodule of tongue          Care Instructions      Before Your Surgery      Call your surgeon if there is any change in your health. This includes signs of a cold or flu (such as a sore throat, runny nose, cough, rash or fever).    Do not smoke, drink alcohol or take over the counter medicine (unless your surgeon or primary care doctor tells you to) for the 24 hours before and after surgery.    If you take prescribed drugs: Follow your doctor s orders about which medicines to take and which to stop until after surgery.    Eating and drinking prior to surgery: follow the instructions from your surgeon    Take a shower or bath the night before surgery. Use the soap your surgeon gave you to gently clean your skin. If you do not have soap from your surgeon, use your regular soap. Do not shave or scrub the surgery site.  Wear clean pajamas and have clean sheets on your bed.           Follow-ups after your visit        Your next 10 appointments already scheduled     Jul 24, 2017   Procedure with Vernell Yang MD   [facility] (--)    [address], [address]  [facility] [address]   [phone]            Mar 07, 2018 11:40 AM CST   Office Visit with Sneha Sandoval MD   [facility] ([facility] )    [address] [place] [address]  [address]  [facility] [address]   [phone]           Bring a current list of meds and any records pertaining to this visit.  For Physicals, please bring immunization records and any forms needing to be filled out.  Please arrive 10 minutes early to complete  "paperwork.              Who to contact     If you have questions or need follow up information about today's clinic visit or your schedule please contact [facility] directly at [phone].  Normal or non-critical lab and imaging results will be communicated to you by MyChart, letter or phone within 4 business days after the clinic has received the results. If you do not hear from us within 7 days, please contact the clinic through MyChart or phone. If you have a critical or abnormal lab result, we will notify you by phone as soon as possible.  Submit refill requests through [employer] or call your pharmacy and they will forward the refill request to us. Please allow 3 business days for your refill to be completed.          Additional Information About Your Visit        [facility] Information     [employer] gives you secure access to your electronic health record. If you see a primary care provider, you can also send messages to your care team and make appointments. If you have questions, please call your primary care clinic.  If you do not have a primary care provider, please call [phone] and they will assist you.        Care EveryWhere ID     This is your Care EveryWhere ID. This could be used by other organizations to access your [facility] medical records  [id number]        Your Vitals Were     Pulse Temperature Respirations Height Pulse Oximetry BMI (Body Mass Index)    67 98.2  F (36.8  C) (Tympanic) 18 5' 4.75\" (1.645 m) 96% 33.54 kg/m2       Blood Pressure from Last 3 Encounters:   07/19/17 143/57   06/01/17 123/68   12/16/16 142/73    Weight from Last 3 Encounters:   07/19/17 200 lb (90.7 kg)   06/01/17 198 lb 8 oz (90 kg)   12/16/16 201 lb 9.6 oz (91.4 kg)              We Performed the Following     EKG 12-lead complete w/read - Clinics     Hemoglobin        Primary Care Provider Office Phone # Fax #    Vernell Sanders PA-C [phone]       [facility] [address] [facility] [address]  [facility] [address]        Equal Access to Services     [facility] : Hadii jose a ku flakito Hodge, waalkada luqadaha, qaybta kaluisbhavani jonesnatashabhavani, lynda raygoza hayxochitl monacorosarioadeola sanchez. So [facility] [phone].    ATENCIÓN: Si habla español, tiene a cabrera disposición servicios gratuitos de asistencia lingüística. Joseame al [phone].    We comply with applicable federal civil rights laws and [place] laws. We do not discriminate on the basis of race, color, national origin, age, disability sex, sexual orientation or gender identity.            Thank you!     Thank you for choosing [facility]  for your care. Our goal is always to provide you with excellent care. Hearing back from our patients is one way we can continue to improve our services. Please take a few minutes to complete the written survey that you may receive in the mail after your visit with us. Thank you!             Your Updated Medication List - Protect others around you: Learn how to safely use, store and throw away your medicines at www.disposemymeds.org.          This list is accurate as of: 7/19/17  4:38 PM.  Always use your most recent med list.                   Brand Name Dispense Instructions for use Diagnosis    allopurinol 100 MG tablet    ZYLOPRIM    90 tablet    Take 1 tablet (100 mg) by mouth daily    Chronic gout without tophus, unspecified cause, unspecified site       azithromycin 250 MG tablet    ZITHROMAX      Preop general physical exam       cetirizine 10 MG tablet    zyrTEC    90 tablet    TAKE 1 TABLET BY MOUTH EVERY EVENING    Chronic rhinitis       chlorhexidine 0.12 % solution    PERIDEX      Preop general physical exam       clobetasol 0.05 % ointment    TEMOVATE    45 g    Apply sparingly to affected area twice daily as needed.  Do not apply to face.    Lichen sclerosus et atrophicus       conjugated estrogens cream    PREMARIN    30 g    Place vaginally twice a week    Asymptomatic postmenopausal status  (age-related) (natural)       CRANBERRY      650mg of cranberry po        fluticasone 50 MCG/ACT spray    FLONASE    16 g    Spray 1-2 sprays into both nostrils daily    Cough       furosemide 20 MG tablet    LASIX    90 tablet    Take 1 tablet (20 mg) by mouth daily as needed    Edema of both legs       ibuprofen 400-800 mg tablet    ADVIL,MOTRIN     Take 400-800 mg by mouth every 6 hours as needed Reported on 2/22/2017        levothyroxine 112 MCG tablet    SYNTHROID/LEVOTHROID    90 tablet    Take 1 tablet (112 mcg) by mouth daily    Hypothyroidism, unspecified type       losartan 100 MG tablet    COZAAR    90 tablet    Take 1 tablet (100 mg) by mouth daily    HTN (hypertension), benign       * omeprazole 20 MG tablet     90 tablet    Take 1 tablet (20 mg) by mouth daily Take 30-60 minutes before a meal.    GERD (gastroesophageal reflux disease)       * omeprazole 20 MG CR capsule    priLOSEC    90 capsule    TAKE ONE CAPSULE BY MOUTH ONE TIME DAILY 30-60 MINUTES BEFORE MEAL    GERD (gastroesophageal reflux disease)       predniSONE 20 MG tablet    DELTASONE      Preop general physical exam       vitamin D 2000 UNITS tablet     100 tablet    Take 2,000 Units by mouth daily    Vitamin D deficiency, unspecified       * Notice:  This list has 2 medication(s) that are the same as other medications prescribed for you. Read the directions carefully, and ask your doctor or other care provider to review them with you.

## 2017-07-19 NOTE — NURSING NOTE
"Chief Complaint   Patient presents with     Pre-Op Exam       Initial /57 (BP Location: Right arm, Patient Position: Chair, Cuff Size: Adult Large)  Pulse 67  Temp 98.2  F (36.8  C) (Tympanic)  Resp 18  Ht 5' 4.75\" (1.645 m)  Wt 200 lb (90.7 kg)  SpO2 96%  BMI 33.54 kg/m2 Estimated body mass index is 33.54 kg/(m^2) as calculated from the following:    Height as of this encounter: 5' 4.75\" (1.645 m).    Weight as of this encounter: 200 lb (90.7 kg).  Medication Reconciliation: complete   Rebeka Mendieta CMA (AAMA)      "

## 2017-07-20 NOTE — H&P (VIEW-ONLY)
[place] [address] [address] [facility] [address]  [phone]  Dept: [phone]    PRE-OP EVALUATION:  Today's date: 2017    Marysol Morrell (: 1950) presents for pre-operative evaluation assessment as requested by Dr. Yang.  She requires evaluation and anesthesia risk assessment prior to undergoing surgery/procedure for treatment of Laryngoscopy With Biopsy of tongue nodule .  Proposed procedure: Laryngoscopy with Biopsy    Date of Surgery/ Procedure: 2017  Time of Surgery/ Procedure: 12:00  Hospital/Surgical Facility: [facility]  Primary Physician: Vernell Sanders  Type of Anesthesia Anticipated: General    Patient has a Health Care Directive or Living Will:  NO    1. NO - Do you have a history of heart attack, stroke, stent, bypass or surgery on an artery in the head, neck, heart or legs?  2. YES - DO YOU EVER HAVE ANY PAIN OR DISCOMFORT IN YOUR CHEST? Very rare. Had workup at the time. Also with dehydration  3. NO - Do you have a history of  Heart Failure?  4. NO - Are you troubled by shortness of breath when: walking on the level, up a slight hill or at night?  5. YES - DO YOU CURRENTLY HAVE A COLD, BRONCHITIS OR OTHER RESPIRATORY INFECTION? Current sore throat that is being investigated   6. NO - Do you have a cough, shortness of breath or wheezing?  7. NO - Do you sometimes get pains in the calves of your legs when you walk?  8. YES - DO YOU OR ANYONE IN YOUR FAMILY HAVE PREVIOUS HISTORY OF BLOOD CLOTS? Personal when she was on OCs and then after surgery, last . Mom also had PE after surgery.  9. NO - Do you or does anyone in your family have a serious bleeding problem such as prolonged bleeding following surgeries or cuts?  10. NO - Have you ever had problems with anemia or been told to take iron pills?  11. NO - Have you had any abnormal blood loss such as black, tarry or bloody stools, or abnormal vaginal bleeding?  12. NO - Have you ever had a blood  transfusion?  13. NO - Have you or any of your relatives ever had problems with anesthesia?  14. NO - Do you have sleep apnea, excessive snoring or daytime drowsiness?  15. NO - Do you have any prosthetic heart valves?  16. NO - Do you have prosthetic joints?  17. NO - Is there any chance that you may be pregnant?        HPI:                                                      Brief HPI related to upcoming procedure: tongue nodules. Has difficulty with swallowing and also change of voice.        See problem list for active medical problems.  Problems all longstanding and stable, except as noted/documented.  See ROS for pertinent symptoms related to these conditions.                                                                                                      MEDICAL HISTORY:                                                    Patient Active Problem List    Diagnosis Date Noted     Hypothyroidism, unspecified type 06/01/2017     Priority: Medium     Chronic gout without tophus, unspecified cause, unspecified site 04/27/2017     Priority: Medium     Lichen sclerosus of female genitalia 02/22/2017     Priority: Medium     HCD (health care directive) 08/15/2012     Priority: Medium     Discussed advance care planning with patient; information given to patient to review. Vernell Leonard LPN 8/15/2012          CARDIOVASCULAR SCREENING; LDL GOAL LESS THAN 130 08/15/2012     Priority: Medium     Constipation      Priority: Medium     HTN (hypertension), benign      Priority: Medium     Osteopenia      Priority: Medium     Mild major depression (H)      Priority: Medium     DVT (deep venous thrombosis) (H)      Priority: Medium     x3 (one due to OCs, 2 post op)       Cholelithiasis      Priority: Medium     IMO update changed this record. Please review for accuracy       Diverticulitis      Priority: Medium     Gout      Priority: Medium     Colon polyps      Priority: Medium     Schatzki's ring      Priority: Medium      EGD        Ovarian cyst      Priority: Medium     GERD (gastroesophageal reflux disease)      Priority: Medium      Past Medical History:   Diagnosis Date     Cholelithiasis      Colon polyps 2001 and 2006    no polyps 2011     Constipation      Diverticulitis 2010     DVT (deep venous thrombosis) (H)     x3 (one due to OCs, 2 post op)     GERD (gastroesophageal reflux disease)      Gout      History of migraine      HTN (hypertension), benign      Hypothyroidism     hashimoto's thyroiditis     Mild major depression (H)      Osteopenia      Ovarian cyst 2011     Rectocele      Schatzki's ring 2009    EGD      Tibia/fibula fracture 2009     Vasovagal syncope 1966     Vertigo 2000     Past Surgical History:   Procedure Laterality Date     APPENDECTOMY  1954     ARTHROSCOPY KNEE  1983     BREAST BIOPSY, CORE RT/LT  1990     C LAP,SURG,COLECTOMY, PARTIAL, W/ANAST  2011    due to recurrent diverticulitis     EXCISE GARNETT'S NEUROMA FOOT       HYSTERECTOMY, PAP NO LONGER INDICATED  1984    with bladder repair     LAPAROSCOPIC OOPHORECTOMY  2011    with the colon resection     OPEN REDUCTION INTERNAL FIXATION TIBIA  2009     TONSILLECTOMY & ADENOIDECTOMY  1958     Current Outpatient Prescriptions   Medication Sig Dispense Refill     azithromycin (ZITHROMAX) 250 MG tablet   0     predniSONE (DELTASONE) 20 MG tablet   0     chlorhexidine (PERIDEX) 0.12 % solution   0     omeprazole (PRILOSEC) 20 MG CR capsule TAKE ONE CAPSULE BY MOUTH ONE TIME DAILY 30-60 MINUTES BEFORE MEAL 90 capsule 3     allopurinol (ZYLOPRIM) 100 MG tablet Take 1 tablet (100 mg) by mouth daily 90 tablet 3     levothyroxine (SYNTHROID/LEVOTHROID) 112 MCG tablet Take 1 tablet (112 mcg) by mouth daily 90 tablet 3     losartan (COZAAR) 100 MG tablet Take 1 tablet (100 mg) by mouth daily 90 tablet 3     furosemide (LASIX) 20 MG tablet Take 1 tablet (20 mg) by mouth daily as needed 90 tablet 0     fluticasone (FLONASE) 50 MCG/ACT spray Spray 1-2 sprays  "into both nostrils daily 16 g 4     cetirizine (ZYRTEC) 10 MG tablet TAKE 1 TABLET BY MOUTH EVERY EVENING 90 tablet 1     clobetasol (TEMOVATE) 0.05 % ointment Apply sparingly to affected area twice daily as needed.  Do not apply to face. 45 g 1     Cholecalciferol (VITAMIN D) 2000 UNITS tablet Take 2,000 Units by mouth daily 100 tablet 3     omeprazole 20 MG tablet Take 1 tablet (20 mg) by mouth daily Take 30-60 minutes before a meal. 90 tablet 3     conjugated estrogens (PREMARIN) vaginal cream Place vaginally twice a week 30 g 11     CRANBERRY 650mg of cranberry po       ibuprofen (ADVIL,MOTRIN) 400-800 mg tablet Take 400-800 mg by mouth every 6 hours as needed Reported on 2/22/2017       OTC products: None, except as noted above    Allergies   Allergen Reactions     Evista [Raloxifene Hydrochloride] Other (See Comments)     Esophagus irritation      Fosamax Other (See Comments)     Esophagus irritation      Tylenol With Codeine [Acetaminophen-Codeine] GI Disturbance     Vicodin [Hydrocodone-Acetaminophen] GI Disturbance     Pen Vk [Penicillin V] Rash      Latex Allergy: NO    Social History   Substance Use Topics     Smoking status: Never Smoker     Smokeless tobacco: Never Used     Alcohol use 0.0 oz/week     0 Standard drinks or equivalent per week      Comment: 0-3 a day. mixed drinks, wine     History   Drug Use No       REVIEW OF SYSTEMS:                                                    Constitutional, neuro, ENT, endocrine, pulmonary, cardiac, gastrointestinal, genitourinary, musculoskeletal, integument and psychiatric systems are negative, except as otherwise noted.      EXAM:                                                    /57 (BP Location: Right arm, Patient Position: Chair, Cuff Size: Adult Large)  Pulse 67  Temp 98.2  F (36.8  C) (Tympanic)  Resp 18  Ht 5' 4.75\" (1.645 m)  Wt 200 lb (90.7 kg)  SpO2 96%  BMI 33.54 kg/m2    GENERAL APPEARANCE: healthy, alert and no distress     EYES: " EOMI, PERRL     HENT: ear canals and TM's normal and nose and mouth without ulcers or lesions     NECK: no adenopathy, no asymmetry, masses, or scars and thyroid normal to palpation     RESP: lungs clear to auscultation - no rales, rhonchi or wheezes     CV: regular rates and rhythm, normal S1 S2, no S3 or S4 and no murmur, click or rub     MS: extremities normal- no gross deformities noted, no evidence of inflammation in joints, FROM in all extremities.     SKIN: no suspicious lesions or rashes     NEURO: Normal strength and tone, sensory exam grossly normal, mentation intact and speech normal     PSYCH: mentation appears normal. and affect normal/bright     LYMPHATICS: No axillary, cervical, or supraclavicular nodes    DIAGNOSTICS:                                                      EKG: appears normal, NSR, incomplete bundle, unchanged from previous tracings  Hemoglobin (indicated for history of anemia or procedure with significant blood loss such as tonsillectomy, major intraperitoneal surgery, vascular surgery, major spine surgery, total joint replacement)  Labs Resulted Today:   Results for orders placed or performed in visit on 07/19/17   Hemoglobin   Result Value Ref Range    Hemoglobin 13.2 11.7 - 15.7 g/dL         Recent Labs   Lab Test  06/02/17   1158  05/11/16   1126 04/28/16 04/27/16   HGB  13.8  15.3   --    --    PLT  247  255   --    --    NA  141   --    --   142   POTASSIUM  4.1   --   3.7  3.3   CR  0.78   --   0.81  0.92        IMPRESSION:                                                    Reason for surgery/procedure: tongue nodule  Diagnosis/reason for consult: medical preop    The proposed surgical procedure is considered INTERMEDIATE risk.    REVISED CARDIAC RISK INDEX  The patient has the following serious cardiovascular risks for perioperative complications such as (MI, PE, VFib and 3  AV Block):  No serious cardiac risks  INTERPRETATION: 0 risks: Class I (very low risk - 0.4%  complication rate)    The patient has the following additional risks for perioperative complications:  No identified additional risks      ICD-10-CM    1. Preop general physical exam Z01.818 azithromycin (ZITHROMAX) 250 MG tablet     predniSONE (DELTASONE) 20 MG tablet     chlorhexidine (PERIDEX) 0.12 % solution     EKG 12-lead complete w/read - Clinics     Hemoglobin   2. Nodule of tongue R22.0        RECOMMENDATIONS:                                                          --Patient is to take all scheduled medications on the day of surgery EXCEPT for modifications listed below.  She will not take Lasix morning of surgery   No advil from now until surgery       APPROVAL GIVEN to proceed with proposed procedure, without further diagnostic evaluation       Signed Electronically by: ROXI Terrazas CNP    Copy of this evaluation report is provided to requesting physician.    [facility] Preop Guidelines

## 2017-07-24 ENCOUNTER — ANESTHESIA EVENT (OUTPATIENT)
Dept: SURGERY | Facility: CLINIC | Age: 67
End: 2017-07-24
Payer: MEDICARE

## 2017-07-24 ENCOUNTER — ANESTHESIA (OUTPATIENT)
Dept: SURGERY | Facility: CLINIC | Age: 67
End: 2017-07-24
Payer: MEDICARE

## 2017-07-24 ENCOUNTER — HOSPITAL ENCOUNTER (OUTPATIENT)
Facility: CLINIC | Age: 67
Discharge: HOME | End: 2017-07-24
Attending: OTOLARYNGOLOGY | Admitting: OTOLARYNGOLOGY
Payer: MEDICARE

## 2017-07-24 ENCOUNTER — SURGERY (OUTPATIENT)
Age: 67
End: 2017-07-24

## 2017-07-24 VITALS
HEIGHT: 65 IN | RESPIRATION RATE: 16 BRPM | OXYGEN SATURATION: 97 % | BODY MASS INDEX: 32.61 KG/M2 | SYSTOLIC BLOOD PRESSURE: 142 MMHG | DIASTOLIC BLOOD PRESSURE: 100 MMHG | TEMPERATURE: 97 F | WEIGHT: 195.7 LBS

## 2017-07-24 DIAGNOSIS — Z98.890 HISTORY OF LARYNGOSCOPY: Primary | ICD-10-CM

## 2017-07-24 LAB — POTASSIUM SERPL-SCNC: 3.3 MMOL/L (ref 3.4–5.3)

## 2017-07-24 PROCEDURE — 36415 COLL VENOUS BLD VENIPUNCTURE: CPT | Performed by: ANESTHESIOLOGY

## 2017-07-24 PROCEDURE — 71000012 ZZH RECOVERY PHASE 1 LEVEL 1 FIRST HR: Performed by: OTOLARYNGOLOGY

## 2017-07-24 PROCEDURE — 88305 TISSUE EXAM BY PATHOLOGIST: CPT | Performed by: OTOLARYNGOLOGY

## 2017-07-24 PROCEDURE — 88185 FLOWCYTOMETRY/TC ADD-ON: CPT | Performed by: OTOLARYNGOLOGY

## 2017-07-24 PROCEDURE — 88365 INSITU HYBRIDIZATION (FISH): CPT | Performed by: OTOLARYNGOLOGY

## 2017-07-24 PROCEDURE — 88365 INSITU HYBRIDIZATION (FISH): CPT | Mod: 26 | Performed by: OTOLARYNGOLOGY

## 2017-07-24 PROCEDURE — 84132 ASSAY OF SERUM POTASSIUM: CPT | Performed by: ANESTHESIOLOGY

## 2017-07-24 PROCEDURE — S0077 INJECTION, CLINDAMYCIN PHOSP: HCPCS | Performed by: OTOLARYNGOLOGY

## 2017-07-24 PROCEDURE — 88341 IMHCHEM/IMCYTCHM EA ADD ANTB: CPT | Performed by: OTOLARYNGOLOGY

## 2017-07-24 PROCEDURE — 25000125 ZZHC RX 250: Performed by: OTOLARYNGOLOGY

## 2017-07-24 PROCEDURE — 25000125 ZZHC RX 250: Performed by: NURSE ANESTHETIST, CERTIFIED REGISTERED

## 2017-07-24 PROCEDURE — 36000056 ZZH SURGERY LEVEL 3 1ST 30 MIN: Performed by: OTOLARYNGOLOGY

## 2017-07-24 PROCEDURE — 88305 TISSUE EXAM BY PATHOLOGIST: CPT | Mod: 26,76 | Performed by: OTOLARYNGOLOGY

## 2017-07-24 PROCEDURE — 40001004 ZZHCL STATISTIC FLOW INT 9-15 ABY TC 88188: Performed by: OTOLARYNGOLOGY

## 2017-07-24 PROCEDURE — 25000128 H RX IP 250 OP 636: Performed by: OTOLARYNGOLOGY

## 2017-07-24 PROCEDURE — 71000013 ZZH RECOVERY PHASE 1 LEVEL 1 EA ADDTL HR: Performed by: OTOLARYNGOLOGY

## 2017-07-24 PROCEDURE — 88342 IMHCHEM/IMCYTCHM 1ST ANTB: CPT | Performed by: OTOLARYNGOLOGY

## 2017-07-24 PROCEDURE — 25000132 ZZH RX MED GY IP 250 OP 250 PS 637: Mod: GY | Performed by: OTOLARYNGOLOGY

## 2017-07-24 PROCEDURE — 88342 IMHCHEM/IMCYTCHM 1ST ANTB: CPT | Mod: 26,76 | Performed by: OTOLARYNGOLOGY

## 2017-07-24 PROCEDURE — 37000009 ZZH ANESTHESIA TECHNICAL FEE, EACH ADDTL 15 MIN: Performed by: OTOLARYNGOLOGY

## 2017-07-24 PROCEDURE — 88161 CYTOPATH SMEAR OTHER SOURCE: CPT | Mod: 26,XU | Performed by: OTOLARYNGOLOGY

## 2017-07-24 PROCEDURE — 88341 IMHCHEM/IMCYTCHM EA ADD ANTB: CPT | Mod: 26,76 | Performed by: OTOLARYNGOLOGY

## 2017-07-24 PROCEDURE — 71000027 ZZH RECOVERY PHASE 2 EACH 15 MINS: Performed by: OTOLARYNGOLOGY

## 2017-07-24 PROCEDURE — 00000159 ZZHCL STATISTIC H-SEND OUTS PREP: Performed by: OTOLARYNGOLOGY

## 2017-07-24 PROCEDURE — 40000170 ZZH STATISTIC PRE-PROCEDURE ASSESSMENT II: Performed by: OTOLARYNGOLOGY

## 2017-07-24 PROCEDURE — 36000058 ZZH SURGERY LEVEL 3 EA 15 ADDTL MIN: Performed by: OTOLARYNGOLOGY

## 2017-07-24 PROCEDURE — 25000132 ZZH RX MED GY IP 250 OP 250 PS 637: Mod: GY | Performed by: ANESTHESIOLOGY

## 2017-07-24 PROCEDURE — 88161 CYTOPATH SMEAR OTHER SOURCE: CPT | Mod: XU | Performed by: OTOLARYNGOLOGY

## 2017-07-24 PROCEDURE — 25000128 H RX IP 250 OP 636: Performed by: ANESTHESIOLOGY

## 2017-07-24 PROCEDURE — A9270 NON-COVERED ITEM OR SERVICE: HCPCS | Mod: GY | Performed by: OTOLARYNGOLOGY

## 2017-07-24 PROCEDURE — 37000008 ZZH ANESTHESIA TECHNICAL FEE, 1ST 30 MIN: Performed by: OTOLARYNGOLOGY

## 2017-07-24 PROCEDURE — 88184 FLOWCYTOMETRY/ TC 1 MARKER: CPT | Performed by: OTOLARYNGOLOGY

## 2017-07-24 PROCEDURE — 25000566 ZZH SEVOFLURANE, EA 15 MIN: Performed by: OTOLARYNGOLOGY

## 2017-07-24 PROCEDURE — 25000128 H RX IP 250 OP 636: Performed by: NURSE ANESTHETIST, CERTIFIED REGISTERED

## 2017-07-24 RX ORDER — OXYCODONE AND ACETAMINOPHEN 5; 325 MG/1; MG/1
1-2 TABLET ORAL EVERY 4 HOURS PRN
Qty: 15 TABLET | Refills: 0 | Status: ON HOLD | OUTPATIENT
Start: 2017-07-24 | End: 2017-08-04

## 2017-07-24 RX ORDER — SODIUM CHLORIDE, SODIUM LACTATE, POTASSIUM CHLORIDE, CALCIUM CHLORIDE 600; 310; 30; 20 MG/100ML; MG/100ML; MG/100ML; MG/100ML
INJECTION, SOLUTION INTRAVENOUS CONTINUOUS
Status: DISCONTINUED | OUTPATIENT
Start: 2017-07-24 | End: 2017-07-24 | Stop reason: HOSPADM

## 2017-07-24 RX ORDER — LIDOCAINE HYDROCHLORIDE 20 MG/ML
INJECTION, SOLUTION INFILTRATION; PERINEURAL PRN
Status: DISCONTINUED | OUTPATIENT
Start: 2017-07-24 | End: 2017-07-24

## 2017-07-24 RX ORDER — CLINDAMYCIN PHOSPHATE 900 MG/50ML
900 INJECTION, SOLUTION INTRAVENOUS
Status: COMPLETED | OUTPATIENT
Start: 2017-07-24 | End: 2017-07-24

## 2017-07-24 RX ORDER — NALOXONE HYDROCHLORIDE 0.4 MG/ML
.1-.4 INJECTION, SOLUTION INTRAMUSCULAR; INTRAVENOUS; SUBCUTANEOUS
Status: DISCONTINUED | OUTPATIENT
Start: 2017-07-24 | End: 2017-07-24 | Stop reason: HOSPADM

## 2017-07-24 RX ORDER — ONDANSETRON 4 MG/1
4 TABLET, ORALLY DISINTEGRATING ORAL EVERY 30 MIN PRN
Status: DISCONTINUED | OUTPATIENT
Start: 2017-07-24 | End: 2017-07-24 | Stop reason: HOSPADM

## 2017-07-24 RX ORDER — ONDANSETRON 2 MG/ML
INJECTION INTRAMUSCULAR; INTRAVENOUS PRN
Status: DISCONTINUED | OUTPATIENT
Start: 2017-07-24 | End: 2017-07-24

## 2017-07-24 RX ORDER — FENTANYL CITRATE 50 UG/ML
INJECTION, SOLUTION INTRAMUSCULAR; INTRAVENOUS PRN
Status: DISCONTINUED | OUTPATIENT
Start: 2017-07-24 | End: 2017-07-24

## 2017-07-24 RX ORDER — CLINDAMYCIN PHOSPHATE 900 MG/50ML
900 INJECTION, SOLUTION INTRAVENOUS SEE ADMIN INSTRUCTIONS
Status: DISCONTINUED | OUTPATIENT
Start: 2017-07-24 | End: 2017-07-24 | Stop reason: HOSPADM

## 2017-07-24 RX ORDER — FENTANYL CITRATE 50 UG/ML
25-50 INJECTION, SOLUTION INTRAMUSCULAR; INTRAVENOUS EVERY 5 MIN PRN
Status: DISCONTINUED | OUTPATIENT
Start: 2017-07-24 | End: 2017-07-24 | Stop reason: HOSPADM

## 2017-07-24 RX ORDER — FENTANYL CITRATE 50 UG/ML
25-50 INJECTION, SOLUTION INTRAMUSCULAR; INTRAVENOUS
Status: DISCONTINUED | OUTPATIENT
Start: 2017-07-24 | End: 2017-07-24 | Stop reason: HOSPADM

## 2017-07-24 RX ORDER — DEXAMETHASONE SODIUM PHOSPHATE 4 MG/ML
10 INJECTION, SOLUTION INTRA-ARTICULAR; INTRALESIONAL; INTRAMUSCULAR; INTRAVENOUS; SOFT TISSUE ONCE
Status: COMPLETED | OUTPATIENT
Start: 2017-07-24 | End: 2017-07-24

## 2017-07-24 RX ORDER — ONDANSETRON 2 MG/ML
4 INJECTION INTRAMUSCULAR; INTRAVENOUS EVERY 30 MIN PRN
Status: DISCONTINUED | OUTPATIENT
Start: 2017-07-24 | End: 2017-07-24 | Stop reason: HOSPADM

## 2017-07-24 RX ORDER — MEPERIDINE HYDROCHLORIDE 25 MG/ML
12.5 INJECTION INTRAMUSCULAR; INTRAVENOUS; SUBCUTANEOUS
Status: DISCONTINUED | OUTPATIENT
Start: 2017-07-24 | End: 2017-07-24 | Stop reason: HOSPADM

## 2017-07-24 RX ORDER — LIDOCAINE HYDROCHLORIDE AND EPINEPHRINE 10; 10 MG/ML; UG/ML
INJECTION, SOLUTION INFILTRATION; PERINEURAL
Status: DISCONTINUED
Start: 2017-07-24 | End: 2017-07-24 | Stop reason: HOSPADM

## 2017-07-24 RX ORDER — PROPOFOL 10 MG/ML
INJECTION, EMULSION INTRAVENOUS CONTINUOUS PRN
Status: DISCONTINUED | OUTPATIENT
Start: 2017-07-24 | End: 2017-07-24

## 2017-07-24 RX ORDER — SODIUM CHLORIDE, SODIUM LACTATE, POTASSIUM CHLORIDE, CALCIUM CHLORIDE 600; 310; 30; 20 MG/100ML; MG/100ML; MG/100ML; MG/100ML
INJECTION, SOLUTION INTRAVENOUS CONTINUOUS PRN
Status: DISCONTINUED | OUTPATIENT
Start: 2017-07-24 | End: 2017-07-24

## 2017-07-24 RX ORDER — OXYCODONE AND ACETAMINOPHEN 5; 325 MG/1; MG/1
1-2 TABLET ORAL
Status: COMPLETED | OUTPATIENT
Start: 2017-07-24 | End: 2017-07-24

## 2017-07-24 RX ORDER — PROPOFOL 10 MG/ML
INJECTION, EMULSION INTRAVENOUS PRN
Status: DISCONTINUED | OUTPATIENT
Start: 2017-07-24 | End: 2017-07-24

## 2017-07-24 RX ORDER — PHYSOSTIGMINE SALICYLATE 1 MG/ML
1.2 INJECTION INTRAVENOUS
Status: DISCONTINUED | OUTPATIENT
Start: 2017-07-24 | End: 2017-07-24 | Stop reason: HOSPADM

## 2017-07-24 RX ADMIN — ONDANSETRON 4 MG: 2 INJECTION INTRAMUSCULAR; INTRAVENOUS at 12:43

## 2017-07-24 RX ADMIN — DEXAMETHASONE SODIUM PHOSPHATE 10 MG: 4 INJECTION, SOLUTION INTRA-ARTICULAR; INTRALESIONAL; INTRAMUSCULAR; INTRAVENOUS; SOFT TISSUE at 12:43

## 2017-07-24 RX ADMIN — FENTANYL CITRATE 50 MCG: 50 INJECTION, SOLUTION INTRAMUSCULAR; INTRAVENOUS at 12:33

## 2017-07-24 RX ADMIN — LIDOCAINE HYDROCHLORIDE 60 MG: 20 INJECTION, SOLUTION INFILTRATION; PERINEURAL at 12:35

## 2017-07-24 RX ADMIN — MIDAZOLAM HYDROCHLORIDE 2 MG: 1 INJECTION, SOLUTION INTRAMUSCULAR; INTRAVENOUS at 12:33

## 2017-07-24 RX ADMIN — PROPOFOL 200 MCG/KG/MIN: 10 INJECTION, EMULSION INTRAVENOUS at 12:35

## 2017-07-24 RX ADMIN — SUCCINYLCHOLINE CHLORIDE 100 MG: 20 INJECTION, SOLUTION INTRAMUSCULAR; INTRAVENOUS at 12:35

## 2017-07-24 RX ADMIN — Medication 1 LOZENGE: at 14:08

## 2017-07-24 RX ADMIN — FENTANYL CITRATE 50 MCG: 50 INJECTION, SOLUTION INTRAMUSCULAR; INTRAVENOUS at 12:47

## 2017-07-24 RX ADMIN — FENTANYL CITRATE 50 MCG: 50 INJECTION, SOLUTION INTRAMUSCULAR; INTRAVENOUS at 13:27

## 2017-07-24 RX ADMIN — SODIUM CHLORIDE, POTASSIUM CHLORIDE, SODIUM LACTATE AND CALCIUM CHLORIDE: 600; 310; 30; 20 INJECTION, SOLUTION INTRAVENOUS at 12:32

## 2017-07-24 RX ADMIN — PROPOFOL 200 MG: 10 INJECTION, EMULSION INTRAVENOUS at 12:35

## 2017-07-24 RX ADMIN — COCAINE HYDROCHLORIDE 5 ML: 40 SOLUTION TOPICAL at 13:03

## 2017-07-24 RX ADMIN — PROPOFOL 50 MG: 10 INJECTION, EMULSION INTRAVENOUS at 12:47

## 2017-07-24 RX ADMIN — PROPOFOL 50 MG: 10 INJECTION, EMULSION INTRAVENOUS at 12:43

## 2017-07-24 RX ADMIN — OXYCODONE HYDROCHLORIDE AND ACETAMINOPHEN 1 TABLET: 5; 325 TABLET ORAL at 13:55

## 2017-07-24 RX ADMIN — CLINDAMYCIN PHOSPHATE 900 MG: 18 INJECTION, SOLUTION INTRAVENOUS at 12:40

## 2017-07-24 NOTE — ANESTHESIA POSTPROCEDURE EVALUATION
Patient: Marysol Morrell    Procedure(s):  DIRECT LARYNGOSCOPY WITH TONGUE BIOPSIES - Wound Class: II-Clean Contaminated    Diagnosis:TONGUE LESION  Diagnosis Additional Information: No value filed.    Anesthesia Type:  General, RSI, ETT    Note:  Anesthesia Post Evaluation    Patient location during evaluation: PACU  Patient participation: Able to fully participate in evaluation  Level of consciousness: awake  Pain management: adequate  Airway patency: patent  Cardiovascular status: acceptable  Respiratory status: acceptable  Hydration status: acceptable  PONV: none     Anesthetic complications: None          Last vitals:  Vitals:    07/24/17 1345 07/24/17 1400 07/24/17 1415   BP: 129/67 128/69 122/66   Resp: 12 14 14   Temp:   36.1  C (97  F)   SpO2: 97% 96% 95%         Electronically Signed By: Brant Rodgers MD  July 24, 2017  2:40 PM

## 2017-07-24 NOTE — IP AVS SNAPSHOT
[facility] Day [facility]    [address]    [place] [address]    Phone:  [phone]    Fax:  [phone]                                       After Visit Summary   7/24/2017    Marysol Morrell    MRN: [id number]           After Visit Summary Signature Page     I have received my discharge instructions, and my questions have been answered. I have discussed any challenges I see with this plan with the nurse or doctor.    ..........................................................................................................................................  Patient/Patient Representative Signature      ..........................................................................................................................................  Patient Representative Print Name and Relationship to Patient    ..................................................               ................................................  Date                                            Time    ..........................................................................................................................................  Reviewed by Signature/Title    ...................................................              ..............................................  Date                                                            Time

## 2017-07-24 NOTE — DISCHARGE INSTRUCTIONS
Same Day Surgery Discharge Instructions for  Sedation and General Anesthesia       It's not unusual to feel dizzy, light-headed or faint for up to 24 hours after surgery or while taking pain medication.  If you have these symptoms: sit for a few minutes before standing and have someone assist you when you get up to walk or use the bathroom.      You should rest and relax for the next 24 hours. We recommend you make arrangements to have an adult stay with you for at least 24 hours after your discharge.  Avoid hazardous and strenuous activity.      DO NOT DRIVE any vehicle or operate mechanical equipment for 24 hours following the end of your surgery.  Even though you may feel normal, your reactions may be affected by the medication you have received.      Do not drink alcoholic beverages for 24 hours following surgery.       Slowly progress to your regular diet as you feel able. It's not unusual to feel nauseated and/or vomit after receiving anesthesia.  If you develop these symptoms, drink clear liquids (apple juice, ginger ale, broth, 7-up, etc. ) until you feel better.  If your nausea and vomiting persists for 24 hours, please notify your surgeon.        All narcotic pain medications, along with inactivity and anesthesia, can cause constipation. Drinking plenty of liquids and increasing fiber intake will help.      For any questions of a medical nature, call your surgeon.      Do not make important decisions for 24 hours.      If you had general anesthesia, you may have a sore throat for a couple of days related to the breathing tube used during surgery.  You may use Cepacol lozenges to help with this discomfort.  If it worsens or if you develop a fever, contact your surgeon.       If you feel your pain is not well managed with the pain medications prescribed by your surgeon, please contact your surgeon's office to let them know so they can address your concerns.     Reasons to contact your surgeon:    1. Signs of  possible infection: swelling, warmth,or foul drainage.   2. Elevated temperature.  3. Pain not controlled with pain medication and/or rest.   4. Uncontrolled nausea or vomiting.  5. Any questions or concerns.      **If you have questions or concerns about your procedure,  call Dr. Yang at [phone]**

## 2017-07-24 NOTE — ANESTHESIA CARE TRANSFER NOTE
Patient: Marysol Morrell    Procedure(s):  DIRECT LARYNGOSCOPY WITH TONGUE BIOPSIES - Wound Class: II-Clean Contaminated    Diagnosis: TONGUE LESION  Diagnosis Additional Information: No value filed.    Anesthesia Type:   General, RSI, ETT     Note:  Airway :Face Mask  Patient transferred to:PACU        Vitals: (Last set prior to Anesthesia Care Transfer)    CRNA VITALS  7/24/2017 1247 - 7/24/2017 1322      7/24/2017             Pulse: 73    SpO2: 98 %    Resp Rate (set):                 Electronically Signed By: ROXI Casey CRNA  July 24, 2017  1:22 PM

## 2017-07-24 NOTE — IP AVS SNAPSHOT
MRN:[id number]                      After Visit Summary   7/24/2017    Marysol Morrell    MRN: [id number]           Thank you!     Thank you for choosing [facility] for your care. Our goal is always to provide you with excellent care. Hearing back from our patients is one way we can continue to improve our services. Please take a few minutes to complete the written survey that you may receive in the mail after you visit with us. Thank you!        Patient Information     Date Of Birth          1950        About your hospital stay     You were admitted on:  July 24, 2017 [facility]    You were discharged on:  July 24, 2017       Who to Call     For medical emergencies, please call 911.  For non-urgent questions about your medical care, please call your primary care provider or clinic, [phone]  For questions related to your surgery, please call your surgery clinic        Attending Provider     Provider Vernell Block MD Otolaryngology       Primary Care Provider Office Phone # Fax #    Vernell Sanders PA-C [phone]      After Care Instructions     Diet Instructions       soft diet            Discharge Instructions - Lifting restrictions       No vigorous physical activity until seen at Post-op follow up appointment            No driving or operating machinery       until the day after procedure                  Your next 10 appointments already scheduled     Mar 07, 2018 11:40 AM CST   Office Visit with Sneha Sandoval MD   [facility] ([facility] )    [address] [place] [id number] [phone]           Bring a current list of meds and any records pertaining to this visit.  For Physicals, please bring immunization records and any forms needing to be filled out.  Please arrive 10 minutes early to complete  paperwork.              Further instructions from your care team       Same Day Surgery Discharge Instructions for  Sedation and General Anesthesia       It's not unusual to feel dizzy, light-headed or faint for up to 24 hours after surgery or while taking pain medication.  If you have these symptoms: sit for a few minutes before standing and have someone assist you when you get up to walk or use the bathroom.      You should rest and relax for the next 24 hours. We recommend you make arrangements to have an adult stay with you for at least 24 hours after your discharge.  Avoid hazardous and strenuous activity.      DO NOT DRIVE any vehicle or operate mechanical equipment for 24 hours following the end of your surgery.  Even though you may feel normal, your reactions may be affected by the medication you have received.      Do not drink alcoholic beverages for 24 hours following surgery.       Slowly progress to your regular diet as you feel able. It's not unusual to feel nauseated and/or vomit after receiving anesthesia.  If you develop these symptoms, drink clear liquids (apple juice, ginger ale, broth, 7-up, etc. ) until you feel better.  If your nausea and vomiting persists for 24 hours, please notify your surgeon.        All narcotic pain medications, along with inactivity and anesthesia, can cause constipation. Drinking plenty of liquids and increasing fiber intake will help.      For any questions of a medical nature, call your surgeon.      Do not make important decisions for 24 hours.      If you had general anesthesia, you may have a sore throat for a couple of days related to the breathing tube used during surgery.  You may use Cepacol lozenges to help with this discomfort.  If it worsens or if you develop a fever, contact your surgeon.       If you feel your pain is not well managed with the pain medications prescribed by your surgeon, please contact your surgeon's office to let them know so they can  "address your concerns.     Reasons to contact your surgeon:    1. Signs of possible infection: swelling, warmth,or foul drainage.   2. Elevated temperature.  3. Pain not controlled with pain medication and/or rest.   4. Uncontrolled nausea or vomiting.  5. Any questions or concerns.      **If you have questions or concerns about your procedure,  call Dr. Yang at [phone]**      Pending Results     Date and Time Order Name Status Description    7/24/2017 1249 Surgical pathology exam In process             Admission Information     Date & Time Provider Department Dept. Phone    7/24/2017 Vernell Yang MD [facility] Same [facility] [phone]      Your Vitals Were     Blood Pressure Temperature Respirations Height Weight Pulse Oximetry    129/67 97.2  F (36.2  C) (Temporal) 12 1.651 m (5' 5\") 88.8 kg (195 lb 11.2 oz) 97%    BMI (Body Mass Index)                   32.57 kg/m2           [employer] Information     [employer] gives you secure access to your electronic health record. If you see a primary care provider, you can also send messages to your care team and make appointments. If you have questions, please call your primary care clinic.  If you do not have a primary care provider, please call [phone] and they will assist you.        Care EveryWhere ID     This is your Care EveryWhere ID. This could be used by other organizations to access your [place] medical records  [id number]        Equal Access to Services     KERRIE BERNARD : Hadii jose a fraga Soabdifatah, waaxda luqadaha, qaybta kaalmada karenyada, lynda sanchez. So [facility] [place] [facility] [phone].    ATENCIÓN: Si habla español, tiene a cabrera disposición servicios gratuitos de asistencia lingüística. Llame al [phone].    We comply with applicable federal civil rights laws and [place] laws. We do not discriminate on the basis of race, color, national origin, age, disability sex, sexual orientation or gender " identity.               Review of your medicines      START taking        Dose / Directions    oxyCODONE-acetaminophen 5-325 MG per tablet   Commonly known as:  PERCOCET   Used for:  History of laryngoscopy   Notes to Patient:  ONE TABLET @ 1:55PM        Dose:  1-2 tablet   Take 1-2 tablets by mouth every 4 hours as needed for pain (moderate to severe)   Quantity:  15 tablet   Refills:  0         CONTINUE these medicines which may have CHANGED, or have new prescriptions. If we are uncertain of the size of tablets/capsules you have at home, strength may be listed as something that might have changed.        Dose / Directions    cetirizine 10 MG tablet   Commonly known as:  zyrTEC   This may have changed:  See the new instructions.   Used for:  Chronic rhinitis        TAKE 1 TABLET BY MOUTH EVERY EVENING   Quantity:  90 tablet   Refills:  1         CONTINUE these medicines which have NOT CHANGED        Dose / Directions    allopurinol 100 MG tablet   Commonly known as:  ZYLOPRIM   Used for:  Chronic gout without tophus, unspecified cause, unspecified site        Dose:  100 mg   Take 1 tablet (100 mg) by mouth daily   Quantity:  90 tablet   Refills:  3       azithromycin 250 MG tablet   Commonly known as:  ZITHROMAX   Used for:  Preop general physical exam        Dose:  250 mg   250 mg daily   Refills:  0       clobetasol 0.05 % ointment   Commonly known as:  TEMOVATE   Used for:  Lichen sclerosus et atrophicus        Apply sparingly to affected area twice daily as needed.  Do not apply to face.   Quantity:  45 g   Refills:  1       conjugated estrogens cream   Commonly known as:  PREMARIN   Used for:  Asymptomatic postmenopausal status (age-related) (natural)        Place vaginally twice a week   Quantity:  30 g   Refills:  11       CRANBERRY        650mg of cranberry po   Refills:  0       fluticasone 50 MCG/ACT spray   Commonly known as:  FLONASE   Used for:  Cough        Dose:  1-2 spray   Spray 1-2 sprays into  both nostrils daily   Quantity:  16 g   Refills:  4       furosemide 20 MG tablet   Commonly known as:  LASIX   Used for:  Edema of both legs        Dose:  20 mg   Take 1 tablet (20 mg) by mouth daily as needed   Quantity:  90 tablet   Refills:  0       ibuprofen 400-800 mg tablet   Commonly known as:  ADVIL,MOTRIN        Dose:  400-800 mg   Take 400-800 mg by mouth every 6 hours as needed Reported on 2/22/2017   Refills:  0       levothyroxine 112 MCG tablet   Commonly known as:  SYNTHROID/LEVOTHROID   Used for:  Hypothyroidism, unspecified type        Dose:  112 mcg   Take 1 tablet (112 mcg) by mouth daily   Quantity:  90 tablet   Refills:  3       losartan 100 MG tablet   Commonly known as:  COZAAR   Used for:  HTN (hypertension), benign        Dose:  100 mg   Take 1 tablet (100 mg) by mouth daily   Quantity:  90 tablet   Refills:  3       omeprazole 20 MG CR capsule   Commonly known as:  priLOSEC   Used for:  GERD (gastroesophageal reflux disease)        TAKE ONE CAPSULE BY MOUTH ONE TIME DAILY 30-60 MINUTES BEFORE MEAL   Quantity:  90 capsule   Refills:  3       predniSONE 20 MG tablet   Commonly known as:  DELTASONE   Used for:  Preop general physical exam        Refills:  0       vitamin D 2000 UNITS tablet   Used for:  Vitamin D deficiency, unspecified        Dose:  2000 Units   Take 2,000 Units by mouth daily   Quantity:  100 tablet   Refills:  3            Where to get your medicines      Some of these will need a paper prescription and others can be bought over the counter. Ask your nurse if you have questions.     Bring a paper prescription for each of these medications     oxyCODONE-acetaminophen 5-325 MG per tablet                Protect others around you: Learn how to safely use, store and throw away your medicines at www.disposemymeds.org.             Medication List: This is a list of all your medications and when to take them. Check marks below indicate your daily home schedule. Keep this list as a  reference.      Medications           Morning Afternoon Evening Bedtime As Needed    allopurinol 100 MG tablet   Commonly known as:  ZYLOPRIM   Take 1 tablet (100 mg) by mouth daily                                azithromycin 250 MG tablet   Commonly known as:  ZITHROMAX   250 mg daily                                cetirizine 10 MG tablet   Commonly known as:  zyrTEC   TAKE 1 TABLET BY MOUTH EVERY EVENING                                clobetasol 0.05 % ointment   Commonly known as:  TEMOVATE   Apply sparingly to affected area twice daily as needed.  Do not apply to face.                                conjugated estrogens cream   Commonly known as:  PREMARIN   Place vaginally twice a week                                CRANBERRY   650mg of cranberry po                                fluticasone 50 MCG/ACT spray   Commonly known as:  FLONASE   Spray 1-2 sprays into both nostrils daily                                furosemide 20 MG tablet   Commonly known as:  LASIX   Take 1 tablet (20 mg) by mouth daily as needed                                ibuprofen 400-800 mg tablet   Commonly known as:  ADVIL,MOTRIN   Take 400-800 mg by mouth every 6 hours as needed Reported on 2/22/2017                                levothyroxine 112 MCG tablet   Commonly known as:  SYNTHROID/LEVOTHROID   Take 1 tablet (112 mcg) by mouth daily                                losartan 100 MG tablet   Commonly known as:  COZAAR   Take 1 tablet (100 mg) by mouth daily                                omeprazole 20 MG CR capsule   Commonly known as:  priLOSEC   TAKE ONE CAPSULE BY MOUTH ONE TIME DAILY 30-60 MINUTES BEFORE MEAL                                oxyCODONE-acetaminophen 5-325 MG per tablet   Commonly known as:  PERCOCET   Take 1-2 tablets by mouth every 4 hours as needed for pain (moderate to severe)   Last time this was given:  1 tablet on 7/24/2017  1:55 PM   Notes to Patient:  ONE TABLET @ 1:55PM                                 predniSONE 20 MG tablet   Commonly known as:  DELTASONE                                vitamin D 2000 UNITS tablet   Take 2,000 Units by mouth daily

## 2017-07-24 NOTE — BRIEF OP NOTE
[facility] Brief Operative Note    Pre-operative diagnosis: TONGUE LESION   Post-operative diagnosis left lateral oral tongue lesion, tongue base lesions     Procedure: Procedure(s):  DIRECT LARYNGOSCOPY WITH TONGUE BIOPSIES - Wound Class: II-Clean Contaminated   Surgeon(s): Surgeon(s) and Role:     * Vernell Yang MD - Primary   Estimated blood loss: 3 mL    Specimens:   ID Type Source Tests Collected by Time Destination   A : Left Lateral Oral Tongue Biopsy Tissue Tongue SURGICAL PATHOLOGY EXAM Vernell Yang MD 7/24/2017 12:49 PM    B : Base of Tongue Biopsies Tissue Tongue SURGICAL PATHOLOGY EXAM Vernell Yang MD 7/24/2017 12:49 PM       Findings: 1 cm exophytic lesion of left lateral posterior oral tongue,  Symmetrical lobular tongue base swelling obliterating the vallecula

## 2017-07-24 NOTE — ANESTHESIA PREPROCEDURE EVALUATION
Anesthesia Evaluation     . Pt has had prior anesthetic.     No history of anesthetic complications          ROS/MED HX    ENT/Pulmonary:     (+)other ENT (pain and dysphagia secondary to tongue mass; better since started prednisone)- , . .    Neurologic:       Cardiovascular:     (+) hypertension----. : . . . :. .       METS/Exercise Tolerance:     Hematologic:         Musculoskeletal:         GI/Hepatic:     (+) GERD Asymptomatic on medication,       Renal/Genitourinary:         Endo:      (-) obesity   Psychiatric:         Infectious Disease:         Malignancy:         Other:                     Physical Exam  Normal systems: cardiovascular and pulmonary    Airway   Mallampati: I  TM distance: <3 FB  Neck ROM: full    Dental     Cardiovascular       Pulmonary                     Anesthesia Plan      History & Physical Review  History and physical reviewed and following examination; no interval change.    ASA Status:  2 .        Plan for General, RSI and ETT with Intravenous and Propofol induction. Maintenance will be TIVA.    PONV prophylaxis:  Ondansetron (or other 5HT-3) and Dexamethasone or Solumedrol  Additional equipment: Videolaryngoscope 6.5 ETT (oral)      Postoperative Care  Postoperative pain management:  IV analgesics and Oral pain medications.      Consents  Anesthetic plan, risks, benefits and alternatives discussed with:  Patient..                          .  DPreop diagnosis: TONGUE LESION  Procedure(s):  LARYNGOSCOPY WITH BIOPSY(IES)  Allergies   Allergen Reactions     Evista [Raloxifene Hydrochloride] Other (See Comments)     Esophagus irritation      Fosamax Other (See Comments)     Esophagus irritation      Tylenol With Codeine [Acetaminophen-Codeine] GI Disturbance     Vicodin [Hydrocodone-Acetaminophen] GI Disturbance     Can Take oxycodone     Pen Vk [Penicillin V] Rash       No current facility-administered medications on file prior to encounter.   Current Outpatient Prescriptions on  File Prior to Encounter:  azithromycin (ZITHROMAX) 250 MG tablet 250 mg daily    predniSONE (DELTASONE) 20 MG tablet    omeprazole (PRILOSEC) 20 MG CR capsule TAKE ONE CAPSULE BY MOUTH ONE TIME DAILY 30-60 MINUTES BEFORE MEAL   allopurinol (ZYLOPRIM) 100 MG tablet Take 1 tablet (100 mg) by mouth daily   levothyroxine (SYNTHROID/LEVOTHROID) 112 MCG tablet Take 1 tablet (112 mcg) by mouth daily   losartan (COZAAR) 100 MG tablet Take 1 tablet (100 mg) by mouth daily   furosemide (LASIX) 20 MG tablet Take 1 tablet (20 mg) by mouth daily as needed   fluticasone (FLONASE) 50 MCG/ACT spray Spray 1-2 sprays into both nostrils daily   cetirizine (ZYRTEC) 10 MG tablet TAKE 1 TABLET BY MOUTH EVERY EVENING (Patient taking differently: TAKE 1 TABLET BY MOUTH EVERY day)   clobetasol (TEMOVATE) 0.05 % ointment Apply sparingly to affected area twice daily as needed.  Do not apply to face.   Cholecalciferol (VITAMIN D) 2000 UNITS tablet Take 2,000 Units by mouth daily   conjugated estrogens (PREMARIN) vaginal cream Place vaginally twice a week   CRANBERRY 650mg of cranberry po   ibuprofen (ADVIL,MOTRIN) 400-800 mg tablet Take 400-800 mg by mouth every 6 hours as needed Reported on 2/22/2017     Hemoglobin   Date Value Ref Range Status   07/19/2017 13.2 11.7 - 15.7 g/dL Final     Potassium   Date Value Ref Range Status   06/02/2017 4.1 3.4 - 5.3 mmol/L Final

## 2017-07-25 LAB
COPATH REPORT: NORMAL
COPATH REPORT: NORMAL

## 2017-07-25 NOTE — OP NOTE
Surgeon / Clinician: Vernell Yang MD    DATE OF PROCEDURE:  07/24/2017    Preoperative Diagnosis:  Tongue lesions.    Postoperative Diagnosis:  Tongue lesions.    Procedure:  Direct laryngoscopy with biopsy of left posterolateral oral tongue lesion and tongue base lesions.      Surgeon:  Vernell Yang MD    ANESTHESIA:  Endotracheal anesthesia.    Estimated blood loss:  3 mL    Complications:  None    Findings:  A 1 cm exophytic left posterolateral oral tongue base lesion, bilateral lobular tongue base fullness obliterating the vallecula.    Indications for the Procedure:  Marysol is a pleasant 66-year-old female who has had a feeling of swelling in her throat with left posterior throat and tongue pain and a sense that food sticks with swallowing.  She has a general feeling of throat obstruction for the past month.  She has tried omeprazole and Zyrtec with no improvement.  She denies any fevers or chills.  Examination in clinic shows an exophytic 1 cm tender lesion of her left posterior lateral tongue.  Laryngoscopic exam shows lobular swelling of her lingual tonsillary tissue bilaterally with obliteration of the vallecula.  She has some fullness or adenopathy in her left upper neck.  She is scheduled for direct laryngoscopy and biopsy in preparation of the procedure and oral antibiotic and prednisone are prescribed.      Procedure:  The patient was taken to the operating room where she was placed in supine position.  General endotracheal anesthesia was administered, the table was turned and head and shoulder roll were placed.  Mouth guard for upper dentition was placed and the direct laryngoscope was gently inserted in her oral cavity.  A 2 x 1 cm exophytic lesion of her left posterolateral oral tongue was identified and was biopsied with tissue sent fresh.  This hook was advanced to the level of her lingual tonsil where diffuse lobular fullness of the tongue base was noted and multiple biopsies were taken.  Her  vallecula was obliterated by the tongue base swelling.  The laryngeal surface of the vallecula appeared normal and her endolarynx was normal in appearance.  She had mild left upper neck fullness but no discretely palpable adenopathy.  Biopsies were sent to pathology fresh.  Cocaine soaked pledgets were applied to the biopsy areas and hemostasis was achieved in this manner.  The laryngoscope was withdrawn and he was returned to the care of the anesthesiology team after an oral airway was placed.  She was in stable condition.          Vernell Yang MD    D:  07/24/2017 14:20 T:  07/25/2017 06:06  Document:  [id number] LP\KT

## 2017-07-26 LAB — COPATH REPORT: NORMAL

## 2017-07-28 ENCOUNTER — TRANSFERRED RECORDS (OUTPATIENT)
Dept: HEALTH INFORMATION MANAGEMENT | Facility: CLINIC | Age: 67
End: 2017-07-28

## 2017-07-31 ENCOUNTER — HOSPITAL ENCOUNTER (OUTPATIENT)
Facility: CLINIC | Age: 67
Discharge: HOME | End: 2017-07-31
Attending: PATHOLOGY | Admitting: PATHOLOGY
Payer: MEDICARE

## 2017-07-31 VITALS
HEIGHT: 65 IN | RESPIRATION RATE: 9 BRPM | SYSTOLIC BLOOD PRESSURE: 109 MMHG | BODY MASS INDEX: 32.61 KG/M2 | DIASTOLIC BLOOD PRESSURE: 65 MMHG | OXYGEN SATURATION: 95 % | WEIGHT: 195.7 LBS

## 2017-07-31 LAB
BASOPHILS # BLD AUTO: 0.1 10E9/L (ref 0–0.2)
BASOPHILS NFR BLD AUTO: 0.7 %
DIFFERENTIAL METHOD BLD: NORMAL
EOSINOPHIL # BLD AUTO: 0.2 10E9/L (ref 0–0.7)
EOSINOPHIL NFR BLD AUTO: 2.9 %
ERYTHROCYTE [DISTWIDTH] IN BLOOD BY AUTOMATED COUNT: 12.9 % (ref 10–15)
HCT VFR BLD AUTO: 41 % (ref 35–47)
HGB BLD-MCNC: 13.9 G/DL (ref 11.7–15.7)
IMM GRANULOCYTES # BLD: 0 10E9/L (ref 0–0.4)
IMM GRANULOCYTES NFR BLD: 0.3 %
LYMPHOCYTES # BLD AUTO: 2.8 10E9/L (ref 0.8–5.3)
LYMPHOCYTES NFR BLD AUTO: 36 %
MCH RBC QN AUTO: 31.5 PG (ref 26.5–33)
MCHC RBC AUTO-ENTMCNC: 33.9 G/DL (ref 31.5–36.5)
MCV RBC AUTO: 93 FL (ref 78–100)
MONOCYTES # BLD AUTO: 1.1 10E9/L (ref 0–1.3)
MONOCYTES NFR BLD AUTO: 14.7 %
NEUTROPHILS # BLD AUTO: 3.5 10E9/L (ref 1.6–8.3)
NEUTROPHILS NFR BLD AUTO: 45.4 %
PLATELET # BLD AUTO: 276 10E9/L (ref 150–450)
RBC # BLD AUTO: 4.41 10E12/L (ref 3.8–5.2)
RETICS # AUTO: 108 10E9/L (ref 25–95)
RETICS/RBC NFR AUTO: 2.5 % (ref 0.5–2)
WBC # BLD AUTO: 7.7 10E9/L (ref 4–11)

## 2017-07-31 PROCEDURE — 88313 SPECIAL STAINS GROUP 2: CPT | Mod: 26 | Performed by: INTERNAL MEDICINE

## 2017-07-31 PROCEDURE — 88313 SPECIAL STAINS GROUP 2: CPT | Performed by: INTERNAL MEDICINE

## 2017-07-31 PROCEDURE — 40000424 ZZHCL STATISTIC BONE MARROW CORE PERF TC 38221: Performed by: INTERNAL MEDICINE

## 2017-07-31 PROCEDURE — 40001004 ZZHCL STATISTIC FLOW INT 9-15 ABY TC 88188: Performed by: PATHOLOGY

## 2017-07-31 PROCEDURE — G0364 BONE MARROW ASPIRATE &BIOPSY: HCPCS | Performed by: INTERNAL MEDICINE

## 2017-07-31 PROCEDURE — 88305 TISSUE EXAM BY PATHOLOGIST: CPT | Mod: 26,76 | Performed by: INTERNAL MEDICINE

## 2017-07-31 PROCEDURE — 88311 DECALCIFY TISSUE: CPT | Performed by: INTERNAL MEDICINE

## 2017-07-31 PROCEDURE — 88311 DECALCIFY TISSUE: CPT | Mod: 26 | Performed by: INTERNAL MEDICINE

## 2017-07-31 PROCEDURE — 88280 CHROMOSOME KARYOTYPE STUDY: CPT | Performed by: INTERNAL MEDICINE

## 2017-07-31 PROCEDURE — 88237 TISSUE CULTURE BONE MARROW: CPT | Performed by: INTERNAL MEDICINE

## 2017-07-31 PROCEDURE — 38221 DX BONE MARROW BIOPSIES: CPT | Performed by: PATHOLOGY

## 2017-07-31 PROCEDURE — 99152 MOD SED SAME PHYS/QHP 5/>YRS: CPT | Performed by: PATHOLOGY

## 2017-07-31 PROCEDURE — 88182 CELL MARKER STUDY: CPT | Performed by: INTERNAL MEDICINE

## 2017-07-31 PROCEDURE — 85060 BLOOD SMEAR INTERPRETATION: CPT | Performed by: INTERNAL MEDICINE

## 2017-07-31 PROCEDURE — 00000058 ZZHCL STATISTIC BONE MARROW ASP PERF TC 38220: Performed by: INTERNAL MEDICINE

## 2017-07-31 PROCEDURE — 38221 DX BONE MARROW BIOPSIES: CPT | Performed by: INTERNAL MEDICINE

## 2017-07-31 PROCEDURE — 25000128 H RX IP 250 OP 636: Performed by: SURGERY

## 2017-07-31 PROCEDURE — 85025 COMPLETE CBC W/AUTO DIFF WBC: CPT | Performed by: PATHOLOGY

## 2017-07-31 PROCEDURE — 85097 BONE MARROW INTERPRETATION: CPT | Performed by: INTERNAL MEDICINE

## 2017-07-31 PROCEDURE — 40000795 ZZHCL STATISTIC DNA PROCESS AND HOLD: Performed by: PATHOLOGY

## 2017-07-31 PROCEDURE — 88264 CHROMOSOME ANALYSIS 20-25: CPT | Performed by: INTERNAL MEDICINE

## 2017-07-31 PROCEDURE — 88185 FLOWCYTOMETRY/TC ADD-ON: CPT | Performed by: PATHOLOGY

## 2017-07-31 PROCEDURE — G0364 BONE MARROW ASPIRATE &BIOPSY: HCPCS | Performed by: PATHOLOGY

## 2017-07-31 PROCEDURE — 85045 AUTOMATED RETICULOCYTE COUNT: CPT | Performed by: PATHOLOGY

## 2017-07-31 PROCEDURE — 88184 FLOWCYTOMETRY/ TC 1 MARKER: CPT | Performed by: PATHOLOGY

## 2017-07-31 PROCEDURE — 88305 TISSUE EXAM BY PATHOLOGIST: CPT | Performed by: INTERNAL MEDICINE

## 2017-07-31 PROCEDURE — 40000948 ZZHCL STATISTIC BONE MARROW ASP TC 85097: Performed by: INTERNAL MEDICINE

## 2017-07-31 PROCEDURE — 88305 TISSUE EXAM BY PATHOLOGIST: CPT | Mod: 26 | Performed by: INTERNAL MEDICINE

## 2017-07-31 PROCEDURE — 00000159 ZZHCL STATISTIC H-SEND OUTS PREP: Performed by: INTERNAL MEDICINE

## 2017-07-31 PROCEDURE — 40000847 ZZHCL STATISTIC MORPHOLOGY W/INTERP HISTOLOGY TC 85060: Performed by: INTERNAL MEDICINE

## 2017-07-31 RX ORDER — ONDANSETRON 2 MG/ML
4 INJECTION INTRAMUSCULAR; INTRAVENOUS EVERY 30 MIN PRN
Status: CANCELLED | OUTPATIENT
Start: 2017-07-31

## 2017-07-31 RX ORDER — NALOXONE HYDROCHLORIDE 0.4 MG/ML
.1-.4 INJECTION, SOLUTION INTRAMUSCULAR; INTRAVENOUS; SUBCUTANEOUS
Status: DISCONTINUED | OUTPATIENT
Start: 2017-07-31 | End: 2017-07-31 | Stop reason: HOSPADM

## 2017-07-31 RX ORDER — ONDANSETRON 4 MG/1
4 TABLET, ORALLY DISINTEGRATING ORAL EVERY 30 MIN PRN
Status: CANCELLED | OUTPATIENT
Start: 2017-07-31

## 2017-07-31 RX ORDER — FENTANYL CITRATE 50 UG/ML
25-50 INJECTION, SOLUTION INTRAMUSCULAR; INTRAVENOUS
Status: DISCONTINUED | OUTPATIENT
Start: 2017-07-31 | End: 2017-07-31 | Stop reason: HOSPADM

## 2017-07-31 RX ORDER — FENTANYL CITRATE 50 UG/ML
25 INJECTION, SOLUTION INTRAMUSCULAR; INTRAVENOUS EVERY 5 MIN PRN
Status: DISCONTINUED | OUTPATIENT
Start: 2017-07-31 | End: 2017-07-31 | Stop reason: HOSPADM

## 2017-07-31 RX ORDER — FLUMAZENIL 0.1 MG/ML
0.2 INJECTION, SOLUTION INTRAVENOUS
Status: DISCONTINUED | OUTPATIENT
Start: 2017-07-31 | End: 2017-07-31 | Stop reason: HOSPADM

## 2017-07-31 RX ORDER — FENTANYL CITRATE 50 UG/ML
INJECTION, SOLUTION INTRAMUSCULAR; INTRAVENOUS PRN
Status: DISCONTINUED | OUTPATIENT
Start: 2017-07-31 | End: 2017-07-31 | Stop reason: HOSPADM

## 2017-07-31 RX ORDER — ONDANSETRON 2 MG/ML
INJECTION INTRAMUSCULAR; INTRAVENOUS PRN
Status: DISCONTINUED | OUTPATIENT
Start: 2017-07-31 | End: 2017-07-31 | Stop reason: HOSPADM

## 2017-07-31 NOTE — PROCEDURES
The patient with a recent diagnosis of diffuse large b cell lymphoma was positively identified and informed consent was obtained (see the completed Affirmation of Consent for Bone Marrow Aspiration and/or Biopsy Procedure(s) form in the patient's chart). The patient was placed in the prone position and the bony landmarks of the pelvis were identified. Medical staff reconfirmed the patient's name, date of birth and procedure. The skin over the posterior iliac crest was scrubbed and draped in a sterile fashion. The local area of the procedure was anesthetized with a total of 20 mL of 1% Lidocaine and a small incision was made.  The patient did receive conscious sedation.    Trephine bone marrow core(s) was/were obtained from the left posterior iliac crest. Bone marrow aspirate was obtained from the left posterior iliac crest for: morphology with possible immunophenotyping and/or cytogenetics and molecular diagnostics    Direct pressure was applied to the biopsy site with sterile gauze. The biopsy site was cleaned with alcohol and a sterile dressing was placed over the biopsy incision using a pressure bandage. The patient was then placed in the supine position to maintain pressure on the biopsy site. Post-procedure wound care instructions, including routine dressing instructions and analgesia, were given to the patient. The procedure was completed without complication.

## 2017-08-01 LAB
COPATH REPORT: NORMAL
COPATH REPORT: NORMAL

## 2017-08-02 LAB
COPATH REPORT: NORMAL
EJECTION FRACTION: 63

## 2017-08-03 ENCOUNTER — TRANSFERRED RECORDS (OUTPATIENT)
Dept: HEALTH INFORMATION MANAGEMENT | Facility: CLINIC | Age: 67
End: 2017-08-03

## 2017-08-04 ENCOUNTER — HOSPITAL ENCOUNTER (OUTPATIENT)
Facility: CLINIC | Age: 67
Discharge: HOME | End: 2017-08-04
Attending: RADIOLOGY | Admitting: RADIOLOGY
Payer: MEDICARE

## 2017-08-04 ENCOUNTER — APPOINTMENT (OUTPATIENT)
Dept: INTERVENTIONAL RADIOLOGY/VASCULAR | Facility: CLINIC | Age: 67
End: 2017-08-04
Attending: INTERNAL MEDICINE
Payer: MEDICARE

## 2017-08-04 VITALS
RESPIRATION RATE: 18 BRPM | TEMPERATURE: 97 F | HEART RATE: 60 BPM | DIASTOLIC BLOOD PRESSURE: 46 MMHG | SYSTOLIC BLOOD PRESSURE: 130 MMHG | OXYGEN SATURATION: 97 %

## 2017-08-04 DIAGNOSIS — Z51.89 TREATMENT: ICD-10-CM

## 2017-08-04 LAB
APTT PPP: 35 SEC (ref 22–37)
ERYTHROCYTE [DISTWIDTH] IN BLOOD BY AUTOMATED COUNT: 12.8 % (ref 10–15)
HCT VFR BLD AUTO: 41.4 % (ref 35–47)
HGB BLD-MCNC: 14.6 G/DL (ref 11.7–15.7)
INR PPP: 1.38 (ref 0.86–1.14)
MCH RBC QN AUTO: 32.8 PG (ref 26.5–33)
MCHC RBC AUTO-ENTMCNC: 35.3 G/DL (ref 31.5–36.5)
MCV RBC AUTO: 93 FL (ref 78–100)
PLATELET # BLD AUTO: 249 10E9/L (ref 150–450)
RBC # BLD AUTO: 4.45 10E12/L (ref 3.8–5.2)
WBC # BLD AUTO: 8.9 10E9/L (ref 4–11)

## 2017-08-04 PROCEDURE — 25000125 ZZHC RX 250: Performed by: RADIOLOGY

## 2017-08-04 PROCEDURE — 85610 PROTHROMBIN TIME: CPT | Performed by: RADIOLOGY

## 2017-08-04 PROCEDURE — C1769 GUIDE WIRE: HCPCS

## 2017-08-04 PROCEDURE — 85730 THROMBOPLASTIN TIME PARTIAL: CPT | Performed by: RADIOLOGY

## 2017-08-04 PROCEDURE — 25000132 ZZH RX MED GY IP 250 OP 250 PS 637: Mod: GY | Performed by: NURSE PRACTITIONER

## 2017-08-04 PROCEDURE — 27210886 ZZH ACCESSORY CR5

## 2017-08-04 PROCEDURE — 27210742 ZZH CATH CR1

## 2017-08-04 PROCEDURE — 27210906 ZZH KIT CR8

## 2017-08-04 PROCEDURE — 40000854 ZZH STATISTIC SIMPLE TUBE INSERTION/CHARGE, PORT, CATH, FISTULOGRAM

## 2017-08-04 PROCEDURE — 85027 COMPLETE CBC AUTOMATED: CPT | Performed by: RADIOLOGY

## 2017-08-04 PROCEDURE — 36561 INSERT TUNNELED CV CATH: CPT | Mod: LT

## 2017-08-04 PROCEDURE — 36415 COLL VENOUS BLD VENIPUNCTURE: CPT | Performed by: RADIOLOGY

## 2017-08-04 PROCEDURE — S0077 INJECTION, CLINDAMYCIN PHOSP: HCPCS | Performed by: RADIOLOGY

## 2017-08-04 PROCEDURE — 27211193 ZZ H WOUND GLUE CR1

## 2017-08-04 PROCEDURE — 25000128 H RX IP 250 OP 636

## 2017-08-04 PROCEDURE — A9270 NON-COVERED ITEM OR SERVICE: HCPCS | Mod: GY | Performed by: NURSE PRACTITIONER

## 2017-08-04 PROCEDURE — 25000128 H RX IP 250 OP 636: Performed by: NURSE PRACTITIONER

## 2017-08-04 PROCEDURE — C1788 PORT, INDWELLING, IMP: HCPCS

## 2017-08-04 RX ORDER — HEPARIN SODIUM (PORCINE) LOCK FLUSH IV SOLN 100 UNIT/ML 100 UNIT/ML
5 SOLUTION INTRAVENOUS
Status: DISCONTINUED | OUTPATIENT
Start: 2017-08-04 | End: 2017-08-04 | Stop reason: HOSPADM

## 2017-08-04 RX ORDER — FENTANYL CITRATE 50 UG/ML
INJECTION, SOLUTION INTRAMUSCULAR; INTRAVENOUS
Status: COMPLETED
Start: 2017-08-04 | End: 2017-08-04

## 2017-08-04 RX ORDER — ONDANSETRON 2 MG/ML
4 INJECTION INTRAMUSCULAR; INTRAVENOUS ONCE
Status: COMPLETED | OUTPATIENT
Start: 2017-08-04 | End: 2017-08-04

## 2017-08-04 RX ORDER — LIDOCAINE HYDROCHLORIDE 10 MG/ML
1-30 INJECTION, SOLUTION EPIDURAL; INFILTRATION; INTRACAUDAL; PERINEURAL
Status: DISCONTINUED | OUTPATIENT
Start: 2017-08-04 | End: 2017-08-04 | Stop reason: HOSPADM

## 2017-08-04 RX ORDER — HEPARIN SODIUM 1000 [USP'U]/ML
INJECTION, SOLUTION INTRAVENOUS; SUBCUTANEOUS
Status: DISCONTINUED
Start: 2017-08-04 | End: 2017-08-04 | Stop reason: HOSPADM

## 2017-08-04 RX ORDER — ONDANSETRON 2 MG/ML
INJECTION INTRAMUSCULAR; INTRAVENOUS
Status: COMPLETED
Start: 2017-08-04 | End: 2017-08-04

## 2017-08-04 RX ORDER — FLUMAZENIL 0.1 MG/ML
0.2 INJECTION, SOLUTION INTRAVENOUS
Status: DISCONTINUED | OUTPATIENT
Start: 2017-08-04 | End: 2017-08-04 | Stop reason: HOSPADM

## 2017-08-04 RX ORDER — NALOXONE HYDROCHLORIDE 0.4 MG/ML
.1-.4 INJECTION, SOLUTION INTRAMUSCULAR; INTRAVENOUS; SUBCUTANEOUS
Status: DISCONTINUED | OUTPATIENT
Start: 2017-08-04 | End: 2017-08-04 | Stop reason: HOSPADM

## 2017-08-04 RX ORDER — HEPARIN SODIUM,PORCINE 10 UNIT/ML
5 VIAL (ML) INTRAVENOUS EVERY 24 HOURS
Status: DISCONTINUED | OUTPATIENT
Start: 2017-08-04 | End: 2017-08-04 | Stop reason: HOSPADM

## 2017-08-04 RX ORDER — FENTANYL CITRATE 50 UG/ML
25-50 INJECTION, SOLUTION INTRAMUSCULAR; INTRAVENOUS EVERY 5 MIN PRN
Status: DISCONTINUED | OUTPATIENT
Start: 2017-08-04 | End: 2017-08-04 | Stop reason: HOSPADM

## 2017-08-04 RX ORDER — FENTANYL CITRATE 50 UG/ML
INJECTION, SOLUTION INTRAMUSCULAR; INTRAVENOUS
Status: DISCONTINUED
Start: 2017-08-04 | End: 2017-08-04 | Stop reason: HOSPADM

## 2017-08-04 RX ORDER — LIDOCAINE 40 MG/G
CREAM TOPICAL
Status: DISCONTINUED | OUTPATIENT
Start: 2017-08-04 | End: 2017-08-04 | Stop reason: HOSPADM

## 2017-08-04 RX ORDER — CLINDAMYCIN PHOSPHATE 900 MG/50ML
900 INJECTION, SOLUTION INTRAVENOUS
Status: COMPLETED | OUTPATIENT
Start: 2017-08-04 | End: 2017-08-04

## 2017-08-04 RX ORDER — ONDANSETRON 2 MG/ML
INJECTION INTRAMUSCULAR; INTRAVENOUS
Status: DISCONTINUED
Start: 2017-08-04 | End: 2017-08-04 | Stop reason: HOSPADM

## 2017-08-04 RX ORDER — ACETAMINOPHEN 325 MG/1
650-975 TABLET ORAL
Status: DISCONTINUED | OUTPATIENT
Start: 2017-08-04 | End: 2017-08-04 | Stop reason: HOSPADM

## 2017-08-04 RX ADMIN — FENTANYL CITRATE 50 MCG: 50 INJECTION, SOLUTION INTRAMUSCULAR; INTRAVENOUS at 10:16

## 2017-08-04 RX ADMIN — CLINDAMYCIN PHOSPHATE 900 MG: 18 INJECTION, SOLUTION INTRAVENOUS at 09:57

## 2017-08-04 RX ADMIN — FENTANYL CITRATE 25 MCG: 50 INJECTION, SOLUTION INTRAMUSCULAR; INTRAVENOUS at 10:28

## 2017-08-04 RX ADMIN — HEPARIN SODIUM (PORCINE) LOCK FLUSH IV SOLN 100 UNIT/ML 5 ML: 100 SOLUTION at 10:46

## 2017-08-04 RX ADMIN — ONDANSETRON 4 MG: 2 SOLUTION INTRAMUSCULAR; INTRAVENOUS at 10:12

## 2017-08-04 RX ADMIN — FENTANYL CITRATE 25 MCG: 50 INJECTION, SOLUTION INTRAMUSCULAR; INTRAVENOUS at 10:24

## 2017-08-04 RX ADMIN — MIDAZOLAM HYDROCHLORIDE 0.5 MG: 1 INJECTION, SOLUTION INTRAMUSCULAR; INTRAVENOUS at 10:31

## 2017-08-04 RX ADMIN — ACETAMINOPHEN 650 MG: 325 TABLET, FILM COATED ORAL at 11:37

## 2017-08-04 RX ADMIN — ONDANSETRON 4 MG: 2 INJECTION INTRAMUSCULAR; INTRAVENOUS at 10:12

## 2017-08-04 RX ADMIN — FENTANYL CITRATE 25 MCG: 50 INJECTION, SOLUTION INTRAMUSCULAR; INTRAVENOUS at 10:32

## 2017-08-04 RX ADMIN — MIDAZOLAM HYDROCHLORIDE 1 MG: 1 INJECTION, SOLUTION INTRAMUSCULAR; INTRAVENOUS at 10:16

## 2017-08-04 NOTE — IP AVS SNAPSHOT
MRN:[id number]                      After Visit Summary   8/4/2017    Marysol Morrell    MRN: [id number]           Visit Information        Department      8/4/2017  8:41 AM [facility]          Review of your medicines      UNREVIEWED medicines. Ask your doctor about these medicines        Dose / Directions    allopurinol 100 MG tablet   Commonly known as:  ZYLOPRIM   Used for:  Chronic gout without tophus, unspecified cause, unspecified site        Dose:  100 mg   Take 1 tablet (100 mg) by mouth daily   Quantity:  90 tablet   Refills:  3       cetirizine 10 MG tablet   Commonly known as:  zyrTEC   Used for:  Chronic rhinitis        TAKE 1 TABLET BY MOUTH EVERY EVENING   Quantity:  90 tablet   Refills:  1       clobetasol 0.05 % ointment   Commonly known as:  TEMOVATE   Used for:  Lichen sclerosus et atrophicus        Apply sparingly to affected area twice daily as needed.  Do not apply to face.   Quantity:  45 g   Refills:  1       conjugated estrogens cream   Commonly known as:  PREMARIN   Used for:  Asymptomatic postmenopausal status (age-related) (natural)        Place vaginally twice a week   Quantity:  30 g   Refills:  11       CRANBERRY        650mg of cranberry po   Refills:  0       fluticasone 50 MCG/ACT spray   Commonly known as:  FLONASE   Used for:  Cough        Dose:  1-2 spray   Spray 1-2 sprays into both nostrils daily   Quantity:  16 g   Refills:  4       furosemide 20 MG tablet   Commonly known as:  LASIX   Used for:  Edema of both legs        Dose:  20 mg   Take 1 tablet (20 mg) by mouth daily as needed   Quantity:  90 tablet   Refills:  0       ibuprofen 400-800 mg tablet   Commonly known as:  ADVIL,MOTRIN        Dose:  400-800 mg   Take 400-800 mg by mouth every 6 hours as needed Reported on 2/22/2017   Refills:  0       levothyroxine 112 MCG tablet   Commonly known as:  SYNTHROID/LEVOTHROID   Used for:  Hypothyroidism, unspecified type        Dose:  112  mcg   Take 1 tablet (112 mcg) by mouth daily   Quantity:  90 tablet   Refills:  3       losartan 100 MG tablet   Commonly known as:  COZAAR   Used for:  HTN (hypertension), benign        Dose:  100 mg   Take 1 tablet (100 mg) by mouth daily   Quantity:  90 tablet   Refills:  3       omeprazole 20 MG CR capsule   Commonly known as:  priLOSEC   Used for:  GERD (gastroesophageal reflux disease)        TAKE ONE CAPSULE BY MOUTH ONE TIME DAILY 30-60 MINUTES BEFORE MEAL   Quantity:  90 capsule   Refills:  3       predniSONE 20 MG tablet   Commonly known as:  DELTASONE   Used for:  Preop general physical exam        Refills:  0       vitamin D 2000 UNITS tablet   Used for:  Vitamin D deficiency, unspecified        Dose:  2000 Units   Take 2,000 Units by mouth daily   Quantity:  100 tablet   Refills:  3                Protect others around you: Learn how to safely use, store and throw away your medicines at www.disposemymeds.org.         Follow-ups after your visit        Your next 10 appointments already scheduled     Mar 07, 2018 11:40 AM CST   Office Visit with Sneha Sandoval MD   [facility] ([facility] )    [address] [place] [id number] [phone]           Bring a current list of meds and any records pertaining to this visit. For Physicals, please bring immunization records and any forms needing to be filled out. Please arrive 10 minutes early to complete paperwork.               Care Instructions        Further instructions from your care team         Port Insertion Discharge Instructions     After you go home:      Have an adult stay with you for the first 6 hours    You may resume your normal diet       For 24 hours - due to the sedation you received:    Relax and take it easy    Do NOT make any important or legal decisions    Do NOT drive or operate machines at home or at work    Do NOT drink alcohol    Care of  Puncture Sites:      Keep the dressings on your sites clean & dry for 3 days. Change it only if it gets wet or dirty.    You may shower after the dressing comes off in 3 days    Do not remove the small white strips of tape, if present. Allow them to fall off on their own.     You may cover the wound with a bandaid after the dressing is removed if needed for comfort      Activity       Avoid heavy lifting (greater than 10 pounds) or the overuse of your shoulder for 3 days    Bleeding:      If you start bleeding from the incision sites in your chest or neck - or have swelling in your neck, sit down and press on the site for 5-10 minutes.     If bleeding has not stopped after 10 minutes, call your provider.        Call 911 right away if you have heavy bleeding or bleeding that does not stop.      Medicines:      You may resume all medications    Resume your Warfarin/Coumadin at your regular dose today. Follow up with your provider to have your INR rechecked    Resume your Platelet Inhibitors and Aspirin tomorrow at your regular dose    For minor pain, you may take Acetaminophen (Tylenol) or Ibuprofen (Advil)    Call the provider who ordered this procedure if:      You have swelling in your neck or over your port site    The incision area is red, swollen, hot or tender    You have chills or a fever greater than 101 F (38 C)    Any questions or concerns    Call  911 or go to the Emergency Room if you have:      Severe chest pain or trouble breathing    Bleeding that you cannot control    Additional Information:      Your port may be accessed right away.     You will need to have your port flushed every 30 days or after each use.      If you have questions call:          [facility] Radiology Dept @ [phone]      The provider who performed your procedure was _________________.       Additional Information About Your Visit        [employer] Information     [employer] gives you secure access to your electronic health  record. If you see a primary care provider, you can also send messages to your care team and make appointments. If you have questions, please call your primary care clinic.  If you do not have a primary care provider, please call [phone] and they will assist you.        Care EveryWhere ID     This is your Care EveryWhere ID. This could be used by other organizations to access your [facility] medical records  [id number]        Your Vitals Were     Blood Pressure Pulse Temperature Respirations Pulse Oximetry       120/63 (BP Location: Left arm) 60 97  F (36.1  C) (Oral) 17 98%        Primary Care Provider Office Phone # Fax #    Vernell Sanders PA-C [phone]      Equal Access to Services     KERRIE BERNARD : Hadii jose a velazquez hadasho Socecilali, waaxda luqadaha, qaybta kaalmada adeegyada, lynda das . So [facility] [phone].    ATENCIÓN: Si habla español, tiene a cabrera disposición servicios gratuitos de asistencia lingüística. Ll[facility] [phone].    We comply with applicable federal civil rights laws and [place] laws. We do not discriminate on the basis of race, color, national origin, age, disability sex, sexual orientation or gender identity.            Thank you!     Thank you for choosing [facility] for your care. Our goal is always to provide you with excellent care. Hearing back from our patients is one way we can continue to improve our services. Please take a few minutes to complete the written survey that you may receive in the mail after you visit with us. Thank you!             Medication List: This is a list of all your medications and when to take them. Check marks below indicate your daily home schedule. Keep this list as a reference.      Medications           Morning Afternoon Evening Bedtime As Needed    allopurinol 100 MG tablet   Commonly known as:  ZYLOPRIM   Take 1 tablet (100 mg) by mouth daily                                cetirizine 10 MG tablet    Commonly known as:  zyrTEC   TAKE 1 TABLET BY MOUTH EVERY EVENING                                clobetasol 0.05 % ointment   Commonly known as:  TEMOVATE   Apply sparingly to affected area twice daily as needed.  Do not apply to face.                                conjugated estrogens cream   Commonly known as:  PREMARIN   Place vaginally twice a week                                CRANBERRY   650mg of cranberry po                                fluticasone 50 MCG/ACT spray   Commonly known as:  FLONASE   Spray 1-2 sprays into both nostrils daily                                furosemide 20 MG tablet   Commonly known as:  LASIX   Take 1 tablet (20 mg) by mouth daily as needed                                ibuprofen 400-800 mg tablet   Commonly known as:  ADVIL,MOTRIN   Take 400-800 mg by mouth every 6 hours as needed Reported on 2/22/2017                                levothyroxine 112 MCG tablet   Commonly known as:  SYNTHROID/LEVOTHROID   Take 1 tablet (112 mcg) by mouth daily                                losartan 100 MG tablet   Commonly known as:  COZAAR   Take 1 tablet (100 mg) by mouth daily                                omeprazole 20 MG CR capsule   Commonly known as:  priLOSEC   TAKE ONE CAPSULE BY MOUTH ONE TIME DAILY 30-60 MINUTES BEFORE MEAL                                predniSONE 20 MG tablet   Commonly known as:  DELTASONE                                vitamin D 2000 UNITS tablet   Take 2,000 Units by mouth daily

## 2017-08-04 NOTE — IR NOTE
Interventional Radiology Intra-procedural Nursing Note     Patient Name:  Marysol Morrell    Medical Record Number: [id number]  Today's Date:  August 4, 2017  Start Time: 1015  End of procedure time: 1046  Procedure: right chest port  Report given to: care suites RN  Time pt departs:  1055  :      Notes:   Patient into IR# 2 at 1010.    Informed consent obtained by ANNALISA oClon MD.    Neuro assessment completed, only neuro deficit identified is all WDL.    Patient prepped and draped in supine position with 2% Chlorhexidine.  VSS.  Monitor reads NSR with rate 60's.    MD first needle stick time was 1024.   Debrief was completed by ANNALISA Colon MD.     Patient received 3mg Versed and 125mcg fentanyl for sedation during procedure. The last dose was given at 1032.    Dressing applied by DORITA Costello RN. Site is clean, dry and intact.  The patient tolerated the procedure well.   Neuro assessment remains unchanged at procedure end.        Roman Pena RN

## 2017-08-04 NOTE — DISCHARGE INSTRUCTIONS
Port Insertion Discharge Instructions     After you go home:      Have an adult stay with you for the first 6 hours    You may resume your normal diet       For 24 hours - due to the sedation you received:    Relax and take it easy    Do NOT make any important or legal decisions    Do NOT drive or operate machines at home or at work    Do NOT drink alcohol    Care of Puncture Sites:      Keep the dressings on your sites clean & dry for 3 days. Change it only if it gets wet or dirty.    You may shower after the dressing comes off in 3 days    Do not remove the small white strips of tape, if present. Allow them to fall off on their own.     You may cover the wound with a bandaid after the dressing is removed if needed for comfort      Activity       Avoid heavy lifting (greater than 10 pounds) or the overuse of your shoulder for 3 days    Bleeding:      If you start bleeding from the incision sites in your chest or neck - or have swelling in your neck, sit down and press on the site for 5-10 minutes.     If bleeding has not stopped after 10 minutes, call your provider.        Call 911 right away if you have heavy bleeding or bleeding that does not stop.      Medicines:      You may resume all medications    Resume your Warfarin/Coumadin at your regular dose today. Follow up with your provider to have your INR rechecked    Resume your Platelet Inhibitors and Aspirin tomorrow at your regular dose    For minor pain, you may take Acetaminophen (Tylenol) or Ibuprofen (Advil)    Call the provider who ordered this procedure if:      You have swelling in your neck or over your port site    The incision area is red, swollen, hot or tender    You have chills or a fever greater than 101 F (38 C)    Any questions or concerns    Call  911 or go to the Emergency Room if you have:      Severe chest pain or trouble breathing    Bleeding that you cannot control    Additional Information:      Your port may be accessed right away.      You will need to have your port flushed every 30 days or after each use.      If you have questions call:          [facility] [place] Radiology Dept @ [phone]      The provider who performed your procedure was _________________.

## 2017-08-04 NOTE — PROCEDURES
RADIOLOGY PROCEDURE NOTE  Patient name: Marysol Morrell  MRN: [id number]  : 1950    Pre-procedure diagnosis: lymphoma  Post-procedure diagnosis: Same    Procedure Date/Time: 2017  11:31 AM  Procedure: port placement  Estimated blood loss: None  Specimen(s) collected with description: none  The patient tolerated the procedure well with no immediate complications.  Significant findings:none    See imaging dictation for procedural details.    Provider name: Otis Colon  Assistant(s):None

## 2017-08-04 NOTE — PROGRESS NOTES
Pt back from procedure to room about 1100, report from brent BENITEZ. Pt has pain at port site started at 3, then up to 5, pt given 2 tylenol po. Pt given water and refused food as she is planning to go out to eat. Port site is CDI with drsg, no bleed or hematoma. Pt up to walk and tolerated well. Used restroom and able to void. Reviewed discharge instructions with pt and sister and copy given. Iv d/c'd. Discharged via wheel chair to sister.

## 2017-08-04 NOTE — IP AVS SNAPSHOT
[place] [address]    [place] [address]    Phone:  [phone]                                       After Visit Summary   8/4/2017    Marysol Morrell    MRN: [id number]           After Visit Summary Signature Page     I have received my discharge instructions, and my questions have been answered. I have discussed any challenges I see with this plan with the nurse or doctor.    ..........................................................................................................................................  Patient/Patient Representative Signature      ..........................................................................................................................................  Patient Representative Print Name and Relationship to Patient    ..................................................               ................................................  Date                                            Time    ..........................................................................................................................................  Reviewed by Signature/Title    ...................................................              ..............................................  Date                                                            Time

## 2017-08-04 NOTE — PROGRESS NOTES
Interventional Radiology Pre-Procedure Sedation Assessment   Time of Assessment: 9:53 AM    Expected Level: Moderate Sedation    Indication: Sedation is required for the following type of Procedure: Port a catheter placement    Sedation and procedural consent: Risks, benefits and alternatives were discussed with Patient and Relative Sister, Dr Colon    YOKO Intake: Appropriately NPO for procedure    ASA Class: Class 2 - MILD SYSTEMIC DISEASE, NO ACUTE PROBLEMS, NO FUNCTIONAL LIMITATIONS.    Mallampati: Grade 1:  Soft palate, uvula, tonsillar pillars, and posterior pharyngeal wall visible    Lungs: Lungs Clear with good breath sounds bilaterally    Heart: Normal heart sounds and rate    History and physical reviewed and no updates needed. I have reviewed the lab findings, diagnostic data, medications, and the plan for sedation. I have determined this patient to be an appropriate candidate for the planned sedation and procedure and have reassessed the patient IMMEDIATELY PRIOR to sedation and procedure.    Nancy Watkins, APRN CNP

## 2017-08-07 ENCOUNTER — TRANSFERRED RECORDS (OUTPATIENT)
Dept: HEALTH INFORMATION MANAGEMENT | Facility: CLINIC | Age: 67
End: 2017-08-07

## 2017-08-14 ENCOUNTER — TRANSFERRED RECORDS (OUTPATIENT)
Dept: HEALTH INFORMATION MANAGEMENT | Facility: CLINIC | Age: 67
End: 2017-08-14

## 2017-08-15 LAB — COPATH REPORT: NORMAL

## 2017-09-05 ENCOUNTER — TRANSFERRED RECORDS (OUTPATIENT)
Dept: HEALTH INFORMATION MANAGEMENT | Facility: CLINIC | Age: 67
End: 2017-09-05

## 2017-09-17 ENCOUNTER — TRANSFERRED RECORDS (OUTPATIENT)
Dept: HEALTH INFORMATION MANAGEMENT | Facility: CLINIC | Age: 67
End: 2017-09-17

## 2017-09-26 ENCOUNTER — TRANSFERRED RECORDS (OUTPATIENT)
Dept: HEALTH INFORMATION MANAGEMENT | Facility: CLINIC | Age: 67
End: 2017-09-26

## 2017-10-17 ENCOUNTER — TRANSFERRED RECORDS (OUTPATIENT)
Dept: HEALTH INFORMATION MANAGEMENT | Facility: CLINIC | Age: 67
End: 2017-10-17

## 2017-10-25 ENCOUNTER — TRANSFERRED RECORDS (OUTPATIENT)
Dept: HEALTH INFORMATION MANAGEMENT | Facility: CLINIC | Age: 67
End: 2017-10-25

## 2017-10-25 ENCOUNTER — HOSPITAL ENCOUNTER (INPATIENT)
Facility: CLINIC | Age: 67
LOS: 3 days | Discharge: HOME | DRG: 299 | End: 2017-10-28
Attending: INTERNAL MEDICINE | Admitting: INTERNAL MEDICINE
Payer: MEDICARE

## 2017-10-25 PROBLEM — O22.30 DVT (DEEP VEIN THROMBOSIS) IN PREGNANCY: Status: ACTIVE | Noted: 2017-10-25

## 2017-10-25 LAB
ANION GAP SERPL CALCULATED.3IONS-SCNC: 5 MMOL/L (ref 3–14)
BUN SERPL-MCNC: 9 MG/DL (ref 7–30)
CALCIUM SERPL-MCNC: 9.1 MG/DL (ref 8.5–10.1)
CHLORIDE SERPL-SCNC: 107 MMOL/L (ref 94–109)
CO2 SERPL-SCNC: 29 MMOL/L (ref 20–32)
CREAT SERPL-MCNC: 0.67 MG/DL (ref 0.52–1.04)
ERYTHROCYTE [DISTWIDTH] IN BLOOD BY AUTOMATED COUNT: 12.8 % (ref 10–15)
GFR SERPL CREATININE-BSD FRML MDRD: 87 ML/MIN/1.7M2
GLUCOSE SERPL-MCNC: 93 MG/DL (ref 70–99)
HCT VFR BLD AUTO: 32.8 % (ref 35–47)
HGB BLD-MCNC: 11.6 G/DL (ref 11.7–15.7)
MCH RBC QN AUTO: 33 PG (ref 26.5–33)
MCHC RBC AUTO-ENTMCNC: 35.4 G/DL (ref 31.5–36.5)
MCV RBC AUTO: 93 FL (ref 78–100)
PLATELET # BLD AUTO: 186 10E9/L (ref 150–450)
POTASSIUM SERPL-SCNC: 4 MMOL/L (ref 3.4–5.3)
RBC # BLD AUTO: 3.51 10E12/L (ref 3.8–5.2)
SODIUM SERPL-SCNC: 141 MMOL/L (ref 133–144)
WBC # BLD AUTO: 4.2 10E9/L (ref 4–11)

## 2017-10-25 PROCEDURE — 85027 COMPLETE CBC AUTOMATED: CPT | Performed by: INTERNAL MEDICINE

## 2017-10-25 PROCEDURE — 25000132 ZZH RX MED GY IP 250 OP 250 PS 637: Mod: GY | Performed by: NURSE PRACTITIONER

## 2017-10-25 PROCEDURE — 80048 BASIC METABOLIC PNL TOTAL CA: CPT | Performed by: INTERNAL MEDICINE

## 2017-10-25 PROCEDURE — 25000128 H RX IP 250 OP 636: Performed by: NURSE PRACTITIONER

## 2017-10-25 PROCEDURE — 40000257 ZZH STATISTIC CONSULT NO CHARGE VASC ACCESS

## 2017-10-25 PROCEDURE — 36415 COLL VENOUS BLD VENIPUNCTURE: CPT | Performed by: INTERNAL MEDICINE

## 2017-10-25 PROCEDURE — 12000000 ZZH R&B MED SURG/OB

## 2017-10-25 PROCEDURE — A9270 NON-COVERED ITEM OR SERVICE: HCPCS | Mod: GY | Performed by: NURSE PRACTITIONER

## 2017-10-25 RX ORDER — ALLOPURINOL 100 MG/1
100 TABLET ORAL DAILY
Status: DISCONTINUED | OUTPATIENT
Start: 2017-10-26 | End: 2017-10-28 | Stop reason: HOSPADM

## 2017-10-25 RX ORDER — CETIRIZINE HYDROCHLORIDE 10 MG/1
10 TABLET ORAL DAILY
Status: DISCONTINUED | OUTPATIENT
Start: 2017-10-26 | End: 2017-10-28 | Stop reason: HOSPADM

## 2017-10-25 RX ORDER — LORAZEPAM 0.5 MG/1
0.5 TABLET ORAL AT BEDTIME
Status: DISCONTINUED | OUTPATIENT
Start: 2017-10-25 | End: 2017-10-28 | Stop reason: HOSPADM

## 2017-10-25 RX ORDER — AMOXICILLIN 250 MG
1-2 CAPSULE ORAL 2 TIMES DAILY
Status: DISCONTINUED | OUTPATIENT
Start: 2017-10-25 | End: 2017-10-28 | Stop reason: HOSPADM

## 2017-10-25 RX ORDER — OXYCODONE AND ACETAMINOPHEN 5; 325 MG/1; MG/1
1 TABLET ORAL PRN
COMMUNITY
End: 2018-08-08

## 2017-10-25 RX ORDER — FLUTICASONE PROPIONATE 50 MCG
1-2 SPRAY, SUSPENSION (ML) NASAL DAILY PRN
Status: DISCONTINUED | OUTPATIENT
Start: 2017-10-25 | End: 2017-10-28 | Stop reason: HOSPADM

## 2017-10-25 RX ORDER — ACETAMINOPHEN 325 MG/1
650 TABLET ORAL EVERY 4 HOURS PRN
Status: DISCONTINUED | OUTPATIENT
Start: 2017-10-25 | End: 2017-10-28 | Stop reason: HOSPADM

## 2017-10-25 RX ORDER — LEVOTHYROXINE SODIUM 112 UG/1
112 TABLET ORAL DAILY
Status: DISCONTINUED | OUTPATIENT
Start: 2017-10-26 | End: 2017-10-28 | Stop reason: HOSPADM

## 2017-10-25 RX ORDER — CLOBETASOL PROPIONATE 0.5 MG/G
OINTMENT TOPICAL 2 TIMES DAILY PRN
Status: DISCONTINUED | OUTPATIENT
Start: 2017-10-25 | End: 2017-10-28 | Stop reason: HOSPADM

## 2017-10-25 RX ORDER — NALOXONE HYDROCHLORIDE 0.4 MG/ML
.1-.4 INJECTION, SOLUTION INTRAMUSCULAR; INTRAVENOUS; SUBCUTANEOUS
Status: DISCONTINUED | OUTPATIENT
Start: 2017-10-25 | End: 2017-10-28 | Stop reason: HOSPADM

## 2017-10-25 RX ORDER — OXYCODONE AND ACETAMINOPHEN 5; 325 MG/1; MG/1
1 TABLET ORAL EVERY 4 HOURS PRN
Status: DISCONTINUED | OUTPATIENT
Start: 2017-10-25 | End: 2017-10-28 | Stop reason: HOSPADM

## 2017-10-25 RX ORDER — LOSARTAN POTASSIUM 100 MG/1
100 TABLET ORAL DAILY
Status: DISCONTINUED | OUTPATIENT
Start: 2017-10-26 | End: 2017-10-28 | Stop reason: HOSPADM

## 2017-10-25 RX ADMIN — OMEPRAZOLE 20 MG: 20 CAPSULE, DELAYED RELEASE ORAL at 19:34

## 2017-10-25 RX ADMIN — Medication 5400 UNITS: at 19:31

## 2017-10-25 RX ADMIN — OXYCODONE HYDROCHLORIDE AND ACETAMINOPHEN 1 TABLET: 5; 325 TABLET ORAL at 21:53

## 2017-10-25 RX ADMIN — HEPARIN SODIUM 1200 UNITS/HR: 10000 INJECTION, SOLUTION INTRAVENOUS at 19:32

## 2017-10-25 NOTE — IP AVS SNAPSHOT
[address]    [place] [address]    Phone:  [phone]                                       After Visit Summary   10/25/2017    Marysol Morrell    MRN: [id number]           After Visit Summary Signature Page     I have received my discharge instructions, and my questions have been answered. I have discussed any challenges I see with this plan with the nurse or doctor.    ..........................................................................................................................................  Patient/Patient Representative Signature      ..........................................................................................................................................  Patient Representative Print Name and Relationship to Patient    ..................................................               ................................................  Date                                            Time    ..........................................................................................................................................  Reviewed by Signature/Title    ...................................................              ..............................................  Date                                                            Time

## 2017-10-25 NOTE — IP AVS SNAPSHOT
MRN:[id number]                      After Visit Summary   10/25/2017    Marysol Morrell    MRN: [id number]           Thank you!     Thank you for choosing [facility] for your care. Our goal is always to provide you with excellent care. Hearing back from our patients is one way we can continue to improve our services. Please take a few minutes to complete the written survey that you may receive in the mail after you visit with us. Thank you!        Patient Information     Date Of Birth          1950        Designated Caregiver       Most Recent Value    Caregiver    Will someone help with your care after discharge? no      About your hospital stay     You were admitted on:  October 25, 2017 You last received care in the:  [place] [address] [facility]    You were discharged on:  October 28, 2017        Reason for your hospital stay       PE and DVT- home on anticoagulation                  Who to Call     For medical emergencies, please call 911.  For non-urgent questions about your medical care, please call your primary care provider or clinic, [phone]          Attending Provider     Provider Specialty    Mitchell Wilkins MD Oncology       Primary Care Provider Office Phone # Fax #    Vernell Sanders PA-C [phone]      Follow-up Appointments     Follow-up and recommended labs and tests        Factor and INR in [facility] office- Wednesday                  Your next 10 appointments already scheduled     Oct 31, 2017  2:00 PM CDT   Office Visit with Vernell Sanders PA-C   [facility]    [address] [id number] [phone]           Bring a current list of meds and any records pertaining to this visit. For Physicals, please bring immunization records and any forms needing to be filled out. Please arrive 10 minutes early to complete paperwork.            Mar 07, 2018 11:40 AM CST   Office Visit with Sneha Garcia  "MD Jaime   [facility] ([facility] )    [address]  [address]  [place] [address] [phone]           Bring a current list of meds and any records pertaining to this visit. For Physicals, please bring immunization records and any forms needing to be filled out. Please arrive 10 minutes early to complete paperwork.              Further instructions from your care team       A follow-up appointment was scheduled for you [see above].  It is very important to go to it--bring these papers and your insurance card with you.  At the visit, talk about your hospital stay.  Tell your doctor how you feel.  Show your new list of medications.  Your doctor will update records, make sure you are still doing OK, and decide if any tests or medication changes are needed.      Contact for \"[facility]\"    ([facility])  Phone :[phone]  Address: [address], [place] [address]          Pending Results     Date and Time Order Name Status Description    10/27/2017 0808 IR Port Check Right Preliminary             Statement of Approval     Ordered          10/28/17 7707  I have reviewed and agree with all the recommendations and orders detailed in this document.  EFFECTIVE NOW     Comments:  Scripts sent by Dr Wilkins  - warfarin 5mg PO daily to start tonight.  Lovenox 80mg SC BID ( next dose this evening)  - Call Dr Wilknis for monitoring   Approved and electronically signed by:  Charline Cramer MD           10/28/17 7760  I have reviewed and agree with all the recommendations and orders detailed in this document.  EFFECTIVE NOW     Approved and electronically signed by:  Charline Cramer MD             Admission Information     Date & Time Provider Department Dept. Phone    10/25/2017 Mitchell Wilkins MD [place] [address] [facility] [phone]      Your Vitals Were     Blood Pressure Pulse Temperature Respirations Height Weight    " "125/66 64 95.7  F (35.4  C) (Oral) 18 1.626 m (5' 4\") 88.5 kg (195 lb)    Pulse Oximetry BMI (Body Mass Index)                99% 33.47 kg/m2          [facility] Information     [facility] gives you secure access to your electronic health record. If you see a primary care provider, you can also send messages to your care team and make appointments. If you have questions, please call your primary care clinic.  If you do not have a primary care provider, please call [phone] and they will assist you.        Care EveryWhere ID     This is your Care EveryWhere ID. This could be used by other organizations to access your [place] medical records  [id number]        Equal Access to Services     KERRIE BERNARD : Nasim Hodge, chantel castro, fred correia, lynda sanchez. So [facility] [phone].    ATENCIÓN: Si habla español, tiene a cabrera disposición servicios gratuitos de asistencia lingüística. [facility] [phone].    We comply with applicable federal civil rights laws and [place] laws. We do not discriminate on the basis of race, color, national origin, age, disability, sex, sexual orientation, or gender identity.               Review of your medicines      CONTINUE these medicines which may have CHANGED, or have new prescriptions. If we are uncertain of the size of tablets/capsules you have at home, strength may be listed as something that might have changed.        Dose / Directions    cetirizine 10 MG tablet   Commonly known as:  zyrTEC   This may have changed:  See the new instructions.   Used for:  Chronic rhinitis        TAKE 1 TABLET BY MOUTH EVERY EVENING   Quantity:  90 tablet   Refills:  1       fluticasone 50 MCG/ACT spray   Commonly known as:  FLONASE   This may have changed:    - when to take this  - reasons to take this   Used for:  Cough        Dose:  1-2 spray   Spray 1-2 sprays into both nostrils daily   Quantity:  16 g   Refills:  4         CONTINUE " these medicines which have NOT CHANGED        Dose / Directions    ACETAMINOPHEN PO        Dose:  325-650 mg   Take 325-650 mg by mouth as needed for pain   Refills:  0       allopurinol 100 MG tablet   Commonly known as:  ZYLOPRIM   Used for:  Chronic gout without tophus, unspecified cause, unspecified site        Dose:  100 mg   Take 1 tablet (100 mg) by mouth daily   Quantity:  90 tablet   Refills:  3       clobetasol 0.05 % ointment   Commonly known as:  TEMOVATE   Used for:  Lichen sclerosus et atrophicus        Apply sparingly to affected area twice daily as needed.  Do not apply to face.   Quantity:  45 g   Refills:  1       CRANBERRY        daily 650mg of cranberry po   Refills:  0       furosemide 20 MG tablet   Commonly known as:  LASIX   Used for:  Edema of both legs        Dose:  20 mg   Take 1 tablet (20 mg) by mouth daily as needed   Quantity:  90 tablet   Refills:  0       levothyroxine 112 MCG tablet   Commonly known as:  SYNTHROID/LEVOTHROID   Used for:  Hypothyroidism, unspecified type        Dose:  112 mcg   Take 1 tablet (112 mcg) by mouth daily   Quantity:  90 tablet   Refills:  3       losartan 100 MG tablet   Commonly known as:  COZAAR   Used for:  HTN (hypertension), benign        Dose:  100 mg   Take 1 tablet (100 mg) by mouth daily   Quantity:  90 tablet   Refills:  3       OMEPRAZOLE PO        Dose:  20 mg   Take 20 mg by mouth 2 times daily (before meals)   Refills:  0       oxyCODONE-acetaminophen 5-325 MG per tablet   Commonly known as:  PERCOCET        Dose:  1 tablet   Take 1 tablet by mouth as needed for moderate to severe pain   Refills:  0       predniSONE 20 MG tablet   Commonly known as:  DELTASONE   Used for:  Preop general physical exam        Takes 100 mg daily x 5 days with chemo cycle   Refills:  0         STOP taking     conjugated estrogens cream   Commonly known as:  PREMARIN           ibuprofen 400-800 mg tablet   Commonly known as:  ADVIL,MOTRIN           vitamin D 2000  UNITS tablet                    Protect others around you: Learn how to safely use, store and throw away your medicines at www.disposemymeds.org.             Medication List: This is a list of all your medications and when to take them. Check marks below indicate your daily home schedule. Keep this list as a reference.      Medications           Morning Afternoon Evening Bedtime As Needed    ACETAMINOPHEN PO   Take 325-650 mg by mouth as needed for pain                                allopurinol 100 MG tablet   Commonly known as:  ZYLOPRIM   Take 1 tablet (100 mg) by mouth daily   Last time this was given:  100 mg on 10/28/2017  8:05 AM                                cetirizine 10 MG tablet   Commonly known as:  zyrTEC   TAKE 1 TABLET BY MOUTH EVERY EVENING   Last time this was given:  10 mg on 10/28/2017  8:05 AM                                clobetasol 0.05 % ointment   Commonly known as:  TEMOVATE   Apply sparingly to affected area twice daily as needed.  Do not apply to face.                                CRANBERRY   daily 650mg of cranberry po                                fluticasone 50 MCG/ACT spray   Commonly known as:  FLONASE   Spray 1-2 sprays into both nostrils daily                                furosemide 20 MG tablet   Commonly known as:  LASIX   Take 1 tablet (20 mg) by mouth daily as needed                                levothyroxine 112 MCG tablet   Commonly known as:  SYNTHROID/LEVOTHROID   Take 1 tablet (112 mcg) by mouth daily   Last time this was given:  112 mcg on 10/28/2017  6:02 AM                                losartan 100 MG tablet   Commonly known as:  COZAAR   Take 1 tablet (100 mg) by mouth daily   Last time this was given:  100 mg on 10/28/2017  8:05 AM                                OMEPRAZOLE PO   Take 20 mg by mouth 2 times daily (before meals)   Last time this was given:  20 mg on 10/28/2017  8:05 AM                                oxyCODONE-acetaminophen 5-325 MG per tablet    Commonly known as:  PERCOCET   Take 1 tablet by mouth as needed for moderate to severe pain   Last time this was given:  1 tablet on 10/27/2017  9:36 PM                                predniSONE 20 MG tablet   Commonly known as:  DELTASONE   Takes 100 mg daily x 5 days with chemo cycle

## 2017-10-25 NOTE — PHARMACY-ADMISSION MEDICATION HISTORY
Admission medication history interview status for the 10/25/2017  admission is complete. See EPIC admission navigator for prior to admission medications     Medication history source reliability:Good    Actions taken by pharmacist (provider contacted, etc):Interviewed pt     Additional medication history information not noted on PTA med list :None    Medication reconciliation/reorder completed by provider prior to medication history? Yes    Time spent in this activity: 15 minutes    Prior to Admission medications    Medication Sig Last Dose Taking? Auth Provider   OMEPRAZOLE PO Take 20 mg by mouth 2 times daily (before meals) 10/25/2017 at AM Yes Unknown, Entered By History   ACETAMINOPHEN PO Take 325-650 mg by mouth as needed for pain  Yes Unknown, Entered By History   oxyCODONE-acetaminophen (PERCOCET) 5-325 MG per tablet Take 1 tablet by mouth as needed for moderate to severe pain  Yes Unknown, Entered By History   allopurinol (ZYLOPRIM) 100 MG tablet Take 1 tablet (100 mg) by mouth daily 10/25/2017 at Unknown time Yes Vernell Sanders PA-C   levothyroxine (SYNTHROID/LEVOTHROID) 112 MCG tablet Take 1 tablet (112 mcg) by mouth daily 10/25/2017 at Unknown time Yes Vernell Sanders PA-C   losartan (COZAAR) 100 MG tablet Take 1 tablet (100 mg) by mouth daily 10/25/2017 at Unknown time Yes Vernell Sanders PA-C   furosemide (LASIX) 20 MG tablet Take 1 tablet (20 mg) by mouth daily as needed Past Week at Unknown time Yes Vernell Sanders PA-C   fluticasone (FLONASE) 50 MCG/ACT spray Spray 1-2 sprays into both nostrils daily  Patient taking differently: Spray 1-2 sprays into both nostrils daily as needed  PRN Yes Vernell Sanders PA-C   cetirizine (ZYRTEC) 10 MG tablet TAKE 1 TABLET BY MOUTH EVERY EVENING  Patient taking differently: TAKE 1 TABLET BY MOUTH EVERY day 10/25/2017 at Unknown time Yes Vernell Sanders PA-C   clobetasol (TEMOVATE) 0.05 % ointment Apply sparingly to affected area twice daily as needed.   Do not apply to face. PRN Yes Sneha Sandoval MD   Cholecalciferol (VITAMIN D) 2000 UNITS tablet Take 2,000 Units by mouth daily 10/25/2017 at Unknown time Yes Vernell Sanders PA-C   conjugated estrogens (PREMARIN) vaginal cream Place vaginally twice a week PRN at takes PRN Yes Vernell Sanders PA-C   CRANBERRY daily 650mg of cranberry po 10/25/2017 at Unknown time Yes Reported, Patient   ibuprofen (ADVIL,MOTRIN) 400-800 mg tablet Take 400-800 mg by mouth every 6 hours as needed Reported on 2/22/2017 PRN Yes Reported, Patient   predniSONE (DELTASONE) 20 MG tablet Takes 100 mg daily x 5 days with chemo cycle  at not currently taking  Reported, Patient

## 2017-10-25 NOTE — H&P
IDENTIFYING DATA:   CD20 positive diffuse large B-cell lymphoma, germinal center type  Right common femoral vein DVT  HISTORY OF PRESENT ILLNESS:  Marysol presents to clinic today after developing an abrupt onset of right leg pain that started in the past 24 hours. She talked to Dr. Echeverria last night on the phone and he recommended that she either go to the ER or get an US of the right leg today with follow up. She opted for the later. The US today shows a DVT in the right common femoral vein. I am seeing her to discuss anticoagulation.  Her last chemotherapy, R CHOP, was given 10/17/17. During this week she feels increasingly weak. She was out about yesterday; felt dizzy and lightheaded and nearly had a fall. She has been doing a pretty good job about eating and drinking. No nausea or vomiting. No diarrhea or constipation. No fevers or chills. No bleeding.   She does have stairs at her home and this pain is making it difficult to navigate.  She is not short of breath. No chest pain. She does have a prior history of DVT more than 30 years ago felt to be related to oral contraceptive pills. She was on Coumadin back then. She remember is tolerating it.  Today we discussed options of outpatient treatment versus inpatient treatment, she favors the latter.  ONCOLOGY HISTORY:   She reported food getting stuck at the back of her throat end of June 2017. She also noted swallowing difficulty with a feeling of lumps at the back of her tongue. She was seen by Dr. Vernell Yang with [facility] on 7/19/2017 and was found to have an indurated area about 2 cm ×1 cm on the left side of anterior two thirds of her tongue. She was also found to have multiple left anterior neck nodes. Flexible laryngoscopy in the office demonstrated extensive lobular swelling of the tongue base bilaterally obliterating the vallecula. She subsequently had a direct laryngoscopy on 7/24/2017 that demonstrated a 2 cm x 1 cm exophytic lesion  of left posterolateral oral tongue that was biopsied. She was also noted to have diffuse lobular fullness of the tongue base and multiple biopsies were obtained. Her vallecula was obliterated by the tongue base swelling. The laryngeal surface of the vallecula appeared normal and the endolarynx was normal in appearance. Pathology of left lateral oral tongue and base of tongue was reported as CD20 positive diffuse large B-cell lymphoma, germinal center type. Flow cytometry studies showed light chain restricted CD10 positive lymphoma. Immunoperoxidase stains showed strong positive CD20 staining as well as CD10 staining with approximately 70% of cells staining for the proliferation marker Ki-67. The histologic features as well as proliferation markers were felt to not suggest a double hit lymphoma so FISH testing for those markers was not pursued.  PET/CT on 8/2/2017 demonstrated hypermetabolic soft tissue thickening at the base of the tongue with bilateral abisai metastasis at this level with a single right hypermetabolic node and multiple left-sided hypermetabolic nodes at this level. Hypermetabolic activity concerning for malignancy corresponding to 0.8 cm right thyroid nodule, hypermetabolic nonenlarged right axillary node is nonspecific, this node has a small fatty hilum 1.2 cm in size. Its CT appearance is not concerning and this does not demonstrate increased FDG uptake. No adenopathy or hypermetabolic activity in the abdomen or pelvis. Cholelithiasis.  Bone marrow studies 7/31/2017 showed no evidence of involvement with B-cell lymphoma.    She has had diverticulitis for which she had sigmoid resection in 2011 and at the same time has had bilateral salpingo-oophorectomy for ovarian cyst. Pathology did not demonstrate malignancy.  She received cycle 1 of R-CHOP on 8/7/2017 for her limited stage, nonbulky DLBCL.  PET/CT following 3 cycles of chemotherapy on 10/12/2017 demonstrated resolution of enlarged and  hypermetabolic lymph nodes of the neck and resolution of elevated metabolic activity at the base of the tongue indicative of a favorable response to the chemotherapy she received so far.  REVIEW OF SYSTEMS:   A 14 point review of systems was performed and was negative with exception to pertinent positives as outlined in the history of present illness.  PAST MEDICAL HISTORY:   1. Essential hypertension  2. Gout  3. Hashimoto's thyroiditis  4. Osteoarthritis  5. History of diverticulitis status post sigmoid resection in 2011  6. Prior DVT, 30 years ago, felt to be related to OCP  PAST SURGICAL HISTORY:   1. Hysterectomy 1984  2. History of sigmoid resection on 9/9/2011  3. History of bilateral salpingo-oophorectomy on 9/9/2011  4. Appendectomy 1954  5. Tonsillectomy 1958  6. Vaginal deliveries in 1972 and 1976  7. Knee arthroscopy 1983  8. Vaginal hysterectomy for menorrhagia in 1984 along with bladder repair for urinary incontinence  9. 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 Colonoscopy in 2011 demonstrated diverticulosis  FAMILY HISTORY:   Family history is significant for a diagnosis of signet ring gastric cancer in her father diagnosed at the age of 69 and a diagnosis of lymphoma in her maternal nephew diagnosed in his mid 30s.  SOCIAL HISTORY:   She is  and is retired. She used to work as a registered nurse. She used to smoke about less than a pack a day for about 3 years but quit smoking in 1990. She did report drinking the rum with coke about 1-2 times per day.  MEDICATIONS:   1. Cetirizine 10 mg daily  2. Losartan 100 mg daily  3. Cranberry concentrate 1 tablet daily  4. Furosemide 20 mg daily as needed  5. Levothyroxine 112 mcg daily  6. Tylenol 1000 mg daily as needed  7. Vitamin D 2000 units daily  8. Allopurinol 100 mg daily  9. Omeprazole 20 mg twice daily  10. Trazodone 50 mg at bedtime as needed for insomnia  11. Prochlorperazine 10 mg every 6 hours as needed for nausea  12. Ondansetron 8  mg every 8 hours as needed for nausea  13. Prednisone 100 mg daily x 5 days with chemotherapy  ALLERGIES:   Reported being allergic to penicillin which causes a rash. Also reported seasonal allergies and being allergic to mold.  PHYSICAL EXAM:  VITALS: Blood pressure 148/72, heart rate 68, temperature 99.3, oxygen 98% on room air and weight 196 pounds.  GENERAL: Pleasant, in no acute distress.  HEENT: Normocephalic, atraumatic. PERRL. Sclera are white. Conjunctivae are clear. Oropharynx is clear without stomatitis or sores.   NECK: Supple. No adenopathy. Trachea is midline.  CARDIAC: Regular rate and rhythm, S1, S2. No murmurs.   RESPIRATORY: Nonlabored. Lungs are clear to auscultation bilaterally.  ABDOMEN: Soft and nontender. Bowel sounds are present. No hepatosplenomegaly or appreciable masses.  EXTREMITIES: Bilateral lower extremity edema- right > left. Moves extremities without difficulty.  DERM: No petechiae, rashes, or bruising.  NEURO: The patient is alert and oriented.  IMAGING:  US right lower extremity 10/25/17  Impression:   1. Positive, non-occlusive thrombus in the right common femoral vein  2. Superficial thrombus in a varicosity in the mid-right calf  ASSESSMENT/PLAN:   1. Right leg DVT  Marysol has a new DVT in the right lower extremity. I suspect this is related to her cancer and chemotherapy being in a prothrombogenic state. I will defer hypercoagulable work up at this time. She does have a remote history of DVT while on oral contraceptive pills 30 years ago. At that time she was treated with Coumadin. Today we discussed the options of treatment, both inpatient and outpatient. Since she feels a week after chemotherapy and has had dizzy spells and near falls, she feels safer being hospitalized. I will start her on IV heparin. We will start warfarin as well. I will ask pharmacy to dose. I would anticipate her being in the hospital for a few days. For her pain, which is not controlled with  acetaminophen, I will write for oxycodone/acetaminophen.  2. Stage II (limited stage, nonbulky) lingual diffuse large B-cell lymphoma, germinal center type  She has now had 4 cycles of chemotherapy, with the last being given on 10/17/2017. I will get blood counts and she is in her sarah. She has not had thrombocytopenia postchemotherapy that we know of.  3. Weakness  This is likely the cumulative effects of chemotherapy. Assessment by physical therapy would be reasonable. She has been living at home and has been managing well. Appetite and intake are stable. She is on antihypertensive therapy. I will follow up on renal function and electrolytes.    4. Gout  She can continue allopurinol  5. Hashimoto's thyroiditis  She will remain on thyroid supplementation.  6. Hypertension  Continue Cozaar with hold parameters  7. Code Status  Full Code  Chano DIANE, CNP

## 2017-10-26 LAB
INR PPP: 0.96 (ref 0.86–1.14)
LMWH PPP CHRO-ACNC: 0.52 IU/ML
LMWH PPP CHRO-ACNC: 0.63 IU/ML

## 2017-10-26 PROCEDURE — 25000132 ZZH RX MED GY IP 250 OP 250 PS 637: Mod: GY | Performed by: INTERNAL MEDICINE

## 2017-10-26 PROCEDURE — 36415 COLL VENOUS BLD VENIPUNCTURE: CPT | Performed by: INTERNAL MEDICINE

## 2017-10-26 PROCEDURE — 25000128 H RX IP 250 OP 636: Performed by: INTERNAL MEDICINE

## 2017-10-26 PROCEDURE — 85610 PROTHROMBIN TIME: CPT | Performed by: NURSE PRACTITIONER

## 2017-10-26 PROCEDURE — 12000000 ZZH R&B MED SURG/OB

## 2017-10-26 PROCEDURE — A9270 NON-COVERED ITEM OR SERVICE: HCPCS | Mod: GY | Performed by: NURSE PRACTITIONER

## 2017-10-26 PROCEDURE — 85520 HEPARIN ASSAY: CPT | Performed by: INTERNAL MEDICINE

## 2017-10-26 PROCEDURE — 25000132 ZZH RX MED GY IP 250 OP 250 PS 637: Mod: GY | Performed by: NURSE PRACTITIONER

## 2017-10-26 PROCEDURE — A9270 NON-COVERED ITEM OR SERVICE: HCPCS | Mod: GY | Performed by: INTERNAL MEDICINE

## 2017-10-26 RX ORDER — WARFARIN SODIUM 5 MG/1
5 TABLET ORAL
Status: DISCONTINUED | OUTPATIENT
Start: 2017-10-26 | End: 2017-10-26

## 2017-10-26 RX ADMIN — CETIRIZINE HYDROCHLORIDE 10 MG: 10 TABLET, FILM COATED ORAL at 09:15

## 2017-10-26 RX ADMIN — LOSARTAN POTASSIUM 100 MG: 100 TABLET, FILM COATED ORAL at 09:15

## 2017-10-26 RX ADMIN — LORAZEPAM 0.5 MG: 0.5 TABLET ORAL at 01:54

## 2017-10-26 RX ADMIN — ALLOPURINOL 100 MG: 100 TABLET ORAL at 09:15

## 2017-10-26 RX ADMIN — OMEPRAZOLE 20 MG: 20 CAPSULE, DELAYED RELEASE ORAL at 16:20

## 2017-10-26 RX ADMIN — LORAZEPAM 0.5 MG: 0.5 TABLET ORAL at 21:58

## 2017-10-26 RX ADMIN — OXYCODONE HYDROCHLORIDE AND ACETAMINOPHEN 1 TABLET: 5; 325 TABLET ORAL at 21:58

## 2017-10-26 RX ADMIN — SENNOSIDES AND DOCUSATE SODIUM 1 TABLET: 8.6; 5 TABLET ORAL at 21:58

## 2017-10-26 RX ADMIN — LEVOTHYROXINE SODIUM 112 MCG: 112 TABLET ORAL at 07:07

## 2017-10-26 RX ADMIN — OXYCODONE HYDROCHLORIDE AND ACETAMINOPHEN 1 TABLET: 5; 325 TABLET ORAL at 01:55

## 2017-10-26 RX ADMIN — VITAMIN D, TAB 1000IU (100/BT) 2000 UNITS: 25 TAB at 09:15

## 2017-10-26 RX ADMIN — OMEPRAZOLE 20 MG: 20 CAPSULE, DELAYED RELEASE ORAL at 07:07

## 2017-10-26 RX ADMIN — HEPARIN SODIUM 1200 UNITS/HR: 10000 INJECTION, SOLUTION INTRAVENOUS at 09:14

## 2017-10-26 RX ADMIN — OXYCODONE HYDROCHLORIDE AND ACETAMINOPHEN 1 TABLET: 5; 325 TABLET ORAL at 13:20

## 2017-10-26 NOTE — PHARMACY-ANTICOAGULATION SERVICE
Clinical Pharmacy - Warfarin Dosing Consult     Pharmacy has been consulted to manage this patient s warfarin therapy.  Indication: DVT/ PE Treatment  Therapy Goal: INR 2-3  Warfarin Prior to Admission: No    INR   Date Value Ref Range Status   10/26/2017 0.96 0.86 - 1.14 Final   08/04/2017 1.38 (H) 0.86 - 1.14 Final       Recommend warfarin 5 mg today.  Pharmacy will monitor Marysol Morrell daily and order warfarin doses to achieve specified goal.      Please contact pharmacy as soon as possible if the warfarin needs to be held for a procedure or if the warfarin goals change.

## 2017-10-26 NOTE — PLAN OF CARE
Problem: Patient Care Overview  Goal: Plan of Care/Patient Progress Review  Outcome: No Change  A/Ox4, VSS. C/o pain in R leg, percocet given x1. BLE trace edema, RLE pulses palpable.  Hep gtt @ 12 mL/hr, next 10A in AM. Coumadin DC'd and Xarelto tomorrow.  Up independently, calls appropriately. Continue to monitor.

## 2017-10-26 NOTE — PROGRESS NOTES
[facility]          Assessment and Plan:   1. Lingual Stage II non-bulky DLBCL, germinal center type  -diagnosed July 2019  -received 4 cycles of R-CHOP with cycle 4 given on 10/17/2017  -PET/CT following 3 cycles of chemotherapy on 10/12/2017 demonstrated resolution of enlarged and hypermetabolic lymph nodes of the neck and resolution of elevated metabolic activity at the base of the tongue indicative of a favorable response to the chemotherapy she received so far  -plan is to give a total of 6 cycles of chemotherapy.  Next cycle of chemotherapy due on 11/7/2017.    2. Unprovoked proximal right lower extremity DVT  -prior h/o right distal DVT in the setting of OCP pill use (>30 years ago)  -now presented with unprovoked right lower extremity DVT  -no persistent lymphadenopathy on her recent PET/CT 10/12/2017.  Would consider this an unprovoked event  -given unprovoked nature of the proximal symptomatic DVT, needs long term anticoagulation, possibly life long provided bleeding risk remains unchanged   -currently on heparin gtt and warfarin   -pharmacy to dose warfarin   -will continue on heparin gtt while she is here.  Plan to transition her to lovenox + warfarin bridge prior to discharge    3. Generalized weakness  -still feels weak from recent chemotherapy.   -labs look ok  -continue supportive care    Full code  Disposition: anticipate discharge tomorrow with plan for follow up as outpatient early next week for INR check.    Addendum 10/26/2017 at 10:25 am:    I had a detailed discussion about options for anticoagulation yesterday.  She has a low bleeding risk.  Recent PET/CT showed almost resolution of her lymphoma.  She is a good candidate for eitherone of the newer DOAs or warfarin.  She told me yesterday she prefers to be on warfarin given expense.  However she has made a few inquires and would like to continue xarelto long term.  As such I will d/c warfarin.  Will continue heparin  "gtt for today.  Will plan to start her on xarelto tomorrow.  She is comfortable with the plan.    Mitchell Wilkins MD               Interval History:   Denied pain right leg although reported not ambulated much.  Encouraged her to ambulate.  Denied fevers.  Reported feeling weak from recent chemotherapy.  Denied light headedness, dizziness.              Review of Systems:   As per subjective, otherwise 5 systems reviewed and negative.           Physical Exam:   Blood pressure 151/69, pulse 65, temperature 97.1  F (36.2  C), temperature source Oral, resp. rate 16, height 1.626 m (5' 4\"), weight 88.5 kg (195 lb), SpO2 98 %, not currently breastfeeding.      Vital Sign Ranges  Temperature Temp  Av.8  F (36.6  C)  Min: 97.1  F (36.2  C)  Max: 98.5  F (36.9  C)   Blood pressure Systolic (24hrs), Av , Min:151 , Max:160        Diastolic (24hrs), Av, Min:67, Max:71      Pulse Pulse  Av.5  Min: 60  Max: 65   Respirations Resp  Av  Min: 16  Max: 16   Pulse oximetry SpO2  Av.7 %  Min: 97 %  Max: 98 %       No intake or output data in the 24 hours ending 10/26/17 0744    Constitutional:   No acute distress.   Skin:   No rashes, petechiae, or ecchymoses.   HEENT:   Normocephalic, atraumatic. Oropharynx clear with no mucosal lesions or thrush.   Neck:   Supple.   Lungs:   Clear to auscultation bilaterally.   Cardiovascular:   Regular rate and rhythm with no murmurs, rubs, or gallops.   Abdomen:   Soft, nontender, nondistended with no palpable hepatosplenomegaly.   Extremities:   Right thigh not tense or swollen.  Right dorsalis pedis and posterior tibial pulsations well felt   Neurological:   CN II-XII grossly intact. No focal motor or sensory deficits.            Medications:     No current outpatient prescriptions on file.                Data:     Results for orders placed or performed during the hospital encounter of 10/25/17 (from the past 24 hour(s))   CBC with platelets   Result Value Ref Range "    WBC 4.2 4.0 - 11.0 10e9/L    RBC Count 3.51 (L) 3.8 - 5.2 10e12/L    Hemoglobin 11.6 (L) 11.7 - 15.7 g/dL    Hematocrit 32.8 (L) 35.0 - 47.0 %    MCV 93 78 - 100 fl    MCH 33.0 26.5 - 33.0 pg    MCHC 35.4 31.5 - 36.5 g/dL    RDW 12.8 10.0 - 15.0 %    Platelet Count 186 150 - 450 10e9/L   Basic metabolic panel   Result Value Ref Range    Sodium 141 133 - 144 mmol/L    Potassium 4.0 3.4 - 5.3 mmol/L    Chloride 107 94 - 109 mmol/L    Carbon Dioxide 29 20 - 32 mmol/L    Anion Gap 5 3 - 14 mmol/L    Glucose 93 70 - 99 mg/dL    Urea Nitrogen 9 7 - 30 mg/dL    Creatinine 0.67 0.52 - 1.04 mg/dL    GFR Estimate 87 >60 mL/min/1.7m2    GFR Estimate If Black >90 >60 mL/min/1.7m2    Calcium 9.1 8.5 - 10.1 mg/dL   Heparin 10a Level   Result Value Ref Range    Heparin 10A Level 0.63 IU/mL   INR   Result Value Ref Range    INR 0.96 0.86 - 1.14   Heparin Xa (10a) Level   Result Value Ref Range    Heparin 10A Level 0.52 IU/mL

## 2017-10-26 NOTE — PLAN OF CARE
Problem: Patient Care Overview  Goal: Plan of Care/Patient Progress Review  Outcome: No Change  A/Ox4, VSS. C/o pain in R leg, gave prn percocet x 1 w/good relief. BLE w/1+ edema, pedal pulses + in both. Hep gtt @ 12 mL/hr. Up independently. Continue to monitor.

## 2017-10-26 NOTE — PLAN OF CARE
Problem: Patient Care Overview  Goal: Plan of Care/Patient Progress Review  Outcome: No Change  A/Ox4. VSS. Reports 8/10 pain to right calf; PRN percocet given x1. RLE tender, +1 edema BLE. US positive for right femoral DVT. R/L pedal pulses palpable. Port accessed, positive blood return noted, heparin gtt infusing at 12 mL/hr. Up independent. Regular diet. No other complaints. Will continue to monitor.

## 2017-10-26 NOTE — PHARMACY
Discussed w/pharmacist re: hep 10a level of 0.63 @ 0130, no change to current rate of 12 mL/hr and ok for next rechk w/AM labs.

## 2017-10-26 NOTE — PLAN OF CARE
Problem: Patient Care Overview  Goal: Plan of Care/Patient Progress Review  Outcome: Improving   A/Ox4, VSS. C/o pain in R leg, percocet given x1. BLE trace edema, RLE pulses palpable.  Hep gtt @ 12 mL/hr, next 10A in AM. Coumadin DC'd and Xarelto to start tomorrow. Up independently, calls appropriately.

## 2017-10-26 NOTE — PROGRESS NOTES
Right port feels as if it is tipping forward.   Upper notch, very superficial and large.   Other two notches do not present until superficial notch is pushed back and down.   ? Need for dye study, or other exam, to identify port position.

## 2017-10-26 NOTE — PROGRESS NOTES
Called [facility] to request a insurance check coverage for Xarelto.  Per Jany, in d/c Pharmacy.  A test run on pt's insurance was done for Xarelto 15 mg BID #60 and total cost would be $479.07.  Per Jany-pt hasn't met her deductible so for 1 month, pt would need to pay $400.00 to meet deductible and then $79.07 for co-pay.  Jany did state that there is a coupon the pt could use for 1 month free of Xarelto.  Met with pt to discuss payment for medication.  Anticipate d/c on 10/27 on Xarelto.    Addendum:  Per Oncology, pt will be discharging on Lovenox & Coumadin.  Called d/c pharmacy to have pt's insurance checked for Lovenox.  Per d/c pharmacy, pt's co-pay would be $12.00 for Lovenox.

## 2017-10-27 ENCOUNTER — APPOINTMENT (OUTPATIENT)
Dept: CT IMAGING | Facility: CLINIC | Age: 67
DRG: 299 | End: 2017-10-27
Attending: INTERNAL MEDICINE
Payer: MEDICARE

## 2017-10-27 ENCOUNTER — APPOINTMENT (OUTPATIENT)
Dept: INTERVENTIONAL RADIOLOGY/VASCULAR | Facility: CLINIC | Age: 67
DRG: 299 | End: 2017-10-27
Attending: INTERNAL MEDICINE
Payer: MEDICARE

## 2017-10-27 LAB
LMWH PPP CHRO-ACNC: 0.44 IU/ML
RADIOLOGIST FLAGS: ABNORMAL

## 2017-10-27 PROCEDURE — 25000128 H RX IP 250 OP 636: Performed by: RADIOLOGY

## 2017-10-27 PROCEDURE — A9270 NON-COVERED ITEM OR SERVICE: HCPCS | Mod: GY | Performed by: NURSE PRACTITIONER

## 2017-10-27 PROCEDURE — 25000132 ZZH RX MED GY IP 250 OP 250 PS 637: Mod: GY | Performed by: NURSE PRACTITIONER

## 2017-10-27 PROCEDURE — 85520 HEPARIN ASSAY: CPT | Performed by: INTERNAL MEDICINE

## 2017-10-27 PROCEDURE — 25000128 H RX IP 250 OP 636: Performed by: INTERNAL MEDICINE

## 2017-10-27 PROCEDURE — 25000132 ZZH RX MED GY IP 250 OP 250 PS 637: Mod: GY | Performed by: INTERNAL MEDICINE

## 2017-10-27 PROCEDURE — A9270 NON-COVERED ITEM OR SERVICE: HCPCS | Mod: GY | Performed by: INTERNAL MEDICINE

## 2017-10-27 PROCEDURE — 36598 INJ W/FLUOR EVAL CV DEVICE: CPT | Mod: RT

## 2017-10-27 PROCEDURE — 25000128 H RX IP 250 OP 636: Performed by: NURSE PRACTITIONER

## 2017-10-27 PROCEDURE — 25000125 ZZHC RX 250: Performed by: INTERNAL MEDICINE

## 2017-10-27 PROCEDURE — 36415 COLL VENOUS BLD VENIPUNCTURE: CPT | Performed by: INTERNAL MEDICINE

## 2017-10-27 PROCEDURE — 12000000 ZZH R&B MED SURG/OB

## 2017-10-27 PROCEDURE — 71260 CT THORAX DX C+: CPT

## 2017-10-27 RX ORDER — WARFARIN SODIUM 5 MG/1
5 TABLET ORAL
Status: COMPLETED | OUTPATIENT
Start: 2017-10-28 | End: 2017-10-28

## 2017-10-27 RX ORDER — HEPARIN SODIUM,PORCINE 10 UNIT/ML
5-10 VIAL (ML) INTRAVENOUS
Status: DISCONTINUED | OUTPATIENT
Start: 2017-10-27 | End: 2017-10-28 | Stop reason: HOSPADM

## 2017-10-27 RX ORDER — ONDANSETRON 4 MG/1
4 TABLET, ORALLY DISINTEGRATING ORAL EVERY 6 HOURS PRN
Status: DISCONTINUED | OUTPATIENT
Start: 2017-10-27 | End: 2017-10-27

## 2017-10-27 RX ORDER — IOPAMIDOL 755 MG/ML
71 INJECTION, SOLUTION INTRAVASCULAR ONCE
Status: COMPLETED | OUTPATIENT
Start: 2017-10-27 | End: 2017-10-27

## 2017-10-27 RX ORDER — HEPARIN SODIUM,PORCINE 10 UNIT/ML
5-10 VIAL (ML) INTRAVENOUS EVERY 24 HOURS
Status: DISCONTINUED | OUTPATIENT
Start: 2017-10-27 | End: 2017-10-28 | Stop reason: HOSPADM

## 2017-10-27 RX ORDER — IOPAMIDOL 612 MG/ML
50 INJECTION, SOLUTION INTRAVASCULAR ONCE
Status: COMPLETED | OUTPATIENT
Start: 2017-10-27 | End: 2017-10-27

## 2017-10-27 RX ORDER — SODIUM CHLORIDE 9 MG/ML
INJECTION, SOLUTION INTRAVENOUS CONTINUOUS
Status: DISCONTINUED | OUTPATIENT
Start: 2017-10-27 | End: 2017-10-27

## 2017-10-27 RX ORDER — HEPARIN SODIUM (PORCINE) LOCK FLUSH IV SOLN 100 UNIT/ML 100 UNIT/ML
5 SOLUTION INTRAVENOUS
Status: DISCONTINUED | OUTPATIENT
Start: 2017-10-27 | End: 2017-10-28 | Stop reason: HOSPADM

## 2017-10-27 RX ORDER — PROCHLORPERAZINE MALEATE 5 MG
5 TABLET ORAL EVERY 6 HOURS PRN
Status: DISCONTINUED | OUTPATIENT
Start: 2017-10-27 | End: 2017-10-28 | Stop reason: HOSPADM

## 2017-10-27 RX ORDER — ONDANSETRON 4 MG/1
4 TABLET, FILM COATED ORAL EVERY 6 HOURS PRN
Status: DISCONTINUED | OUTPATIENT
Start: 2017-10-27 | End: 2017-10-28 | Stop reason: HOSPADM

## 2017-10-27 RX ORDER — HEPARIN SODIUM (PORCINE) LOCK FLUSH IV SOLN 100 UNIT/ML 100 UNIT/ML
SOLUTION INTRAVENOUS
Status: COMPLETED
Start: 2017-10-27 | End: 2017-10-28

## 2017-10-27 RX ADMIN — LOSARTAN POTASSIUM 100 MG: 100 TABLET, FILM COATED ORAL at 08:26

## 2017-10-27 RX ADMIN — OXYCODONE HYDROCHLORIDE AND ACETAMINOPHEN 1 TABLET: 5; 325 TABLET ORAL at 05:46

## 2017-10-27 RX ADMIN — SODIUM CHLORIDE 95 ML: 9 INJECTION, SOLUTION INTRAVENOUS at 08:53

## 2017-10-27 RX ADMIN — ONDANSETRON 4 MG: 4 TABLET, FILM COATED ORAL at 11:21

## 2017-10-27 RX ADMIN — CETIRIZINE HYDROCHLORIDE 10 MG: 10 TABLET, FILM COATED ORAL at 08:26

## 2017-10-27 RX ADMIN — RIVAROXABAN 15 MG: 15 TABLET, FILM COATED ORAL at 08:26

## 2017-10-27 RX ADMIN — OMEPRAZOLE 20 MG: 20 CAPSULE, DELAYED RELEASE ORAL at 06:43

## 2017-10-27 RX ADMIN — LORAZEPAM 0.5 MG: 0.5 TABLET ORAL at 21:36

## 2017-10-27 RX ADMIN — OXYCODONE HYDROCHLORIDE AND ACETAMINOPHEN 1 TABLET: 5; 325 TABLET ORAL at 21:36

## 2017-10-27 RX ADMIN — SENNOSIDES AND DOCUSATE SODIUM 1 TABLET: 8.6; 5 TABLET ORAL at 21:36

## 2017-10-27 RX ADMIN — ENOXAPARIN SODIUM 90 MG: 100 INJECTION SUBCUTANEOUS at 21:38

## 2017-10-27 RX ADMIN — ALLOPURINOL 100 MG: 100 TABLET ORAL at 08:26

## 2017-10-27 RX ADMIN — VITAMIN D, TAB 1000IU (100/BT) 2000 UNITS: 25 TAB at 08:26

## 2017-10-27 RX ADMIN — IOPAMIDOL 71 ML: 755 INJECTION, SOLUTION INTRAVENOUS at 08:53

## 2017-10-27 RX ADMIN — OXYCODONE HYDROCHLORIDE AND ACETAMINOPHEN 1 TABLET: 5; 325 TABLET ORAL at 11:27

## 2017-10-27 RX ADMIN — SODIUM CHLORIDE, PRESERVATIVE FREE 5 ML: 5 INJECTION INTRAVENOUS at 15:48

## 2017-10-27 RX ADMIN — SODIUM CHLORIDE: 9 INJECTION, SOLUTION INTRAVENOUS at 09:49

## 2017-10-27 RX ADMIN — OMEPRAZOLE 20 MG: 20 CAPSULE, DELAYED RELEASE ORAL at 16:38

## 2017-10-27 RX ADMIN — LEVOTHYROXINE SODIUM 112 MCG: 112 TABLET ORAL at 06:43

## 2017-10-27 RX ADMIN — IOPAMIDOL 10 ML: 612 INJECTION, SOLUTION INTRAVENOUS at 16:10

## 2017-10-27 RX ADMIN — HEPARIN SODIUM 1200 UNITS/HR: 10000 INJECTION, SOLUTION INTRAVENOUS at 05:51

## 2017-10-27 NOTE — PROVIDER NOTIFICATION
MD Notification    Notified Person:  MD    Notified Persons Name: Dr. Wilkins     Notification Date/Time: 10/27/17    Notification Interaction:  Talked with Physician    Purpose of Notification: pt received her first dose of xarelto this AM and is still currently on her heparin drip. Pt is due to have her port checked this afternoon.     Orders Received: ok to continue heparin with only one dose of xarelto     Comments:

## 2017-10-27 NOTE — PLAN OF CARE
Problem: Patient Care Overview  Goal: Plan of Care/Patient Progress Review  Outcome: Improving  A/Ox4, up ind in room, VSS. C/o new pain in L lower ribcage w/inhalation, MD notified, monitor if worsens, but pt appeared to sleep t/o the night. In morning, had some L shoulder pain, prn percocet given. BLE trace edema, RLE pulses palpable.  Hep gtt @ 12 mL/hr, next 10A this AM. Xeralto to start today. Plan is to discharge home today.

## 2017-10-27 NOTE — PROGRESS NOTES
[facility] Note          Assessment and Plan:   1. Lingual Stage II non-bulky DLBCL, germinal center type  -diagnosed July 2019  -received 4 cycles of R-CHOP with cycle 4 given on 10/17/2017  -PET/CT following 3 cycles of chemotherapy on 10/12/2017 demonstrated resolution of enlarged and hypermetabolic lymph nodes of the neck and resolution of elevated metabolic activity at the base of the tongue indicative of a favorable response to the chemotherapy she received so far  -plan is to give a total of 6 cycles of chemotherapy.  Next cycle of chemotherapy due on 11/7/2017.    2. Unprovoked proximal right lower extremity DVT/PE  -prior h/o right distal DVT in the setting of OCP pill use (>30 years ago)  -now presented with unprovoked right common femoral DVT per venous doppler on 10/25/2017  -CT chest 10/27/2017 showed peripheral LLL pulmonary emboli  -no persistent lymphadenopathy on her recent PET/CT 10/12/2017.  Would consider this an unprovoked event  -given unprovoked nature of the proximal thrombotic event, needs long term anticoagulation, possibly lifelong provided bleeding risk remains unchanged   -received dose of xarelto at 9am this morning  -she is scheduled for port check by IR this afternoon   -will initiate lovenox 1mg/kg s/c bid starting today at 9pm  -will start warfarin 5mg daily beginning tomorrow  -explained to her that I would like to continue her on warfarin (and not xarelto) until she is found to be negative for lupus anticoagulant.    -RN to illustrate lovenox shot administration today evening    -plan to discharge her tomorrow (barring new issues) evening on lovenox and warfarin.  She will follow up in clinic 11/1/2017 for INR/chromogenic factor X check.    3. Port malposition  -vascular team felt port may be tipping forward  -IR to check port position this afternoon    Full code  Disposition: home when ready, possibly tomorrow     If no new issues, anticipate discharge  "tomorrow evening on lovenox and warfarin.  Will make warfarin dose adjustments based on INR and chromogenic factor X levels until she is definitively found to be negative for lupus anticoagulant.  She is comfortable with the plan.  E-prescriptions for lovenox and warfarin sent to her [facility] pharmacy and are ready to be picked up.      Mitchell Wilkins MD               Interval History:   Reported pleuritic chest pain.  Denied SOB. Denied lightheadedness, dizziness.              Review of Systems:   As per subjective, otherwise 5 systems reviewed and negative.           Physical Exam:   Blood pressure 119/61, pulse 64, temperature 98.2  F (36.8  C), temperature source Oral, resp. rate 16, height 1.626 m (5' 4\"), weight 88.5 kg (195 lb), SpO2 97 %, not currently breastfeeding.      Vital Sign Ranges  Temperature Temp  Av.8  F (36.6  C)  Min: 97.1  F (36.2  C)  Max: 98.5  F (36.9  C)   Blood pressure Systolic (24hrs), Av , Min:151 , Max:160        Diastolic (24hrs), Av, Min:67, Max:71      Pulse Pulse  Av.5  Min: 60  Max: 65   Respirations Resp  Av  Min: 16  Max: 16   Pulse oximetry SpO2  Av.7 %  Min: 97 %  Max: 98 %       No intake or output data in the 24 hours ending 10/26/17 0744    Constitutional:   No acute distress.   Skin:   No rashes, petechiae, or ecchymoses.   HEENT:   Normocephalic, atraumatic. Oropharynx clear with no mucosal lesions or thrush.   Neck:   Supple.   Lungs:   Clear to auscultation bilaterally.   Cardiovascular:   Regular rate and rhythm with no murmurs, rubs, or gallops.   Abdomen:   Soft, nontender, nondistended with no palpable hepatosplenomegaly.   Extremities:   Right thigh not tense or swollen.  Right dorsalis pedis and posterior tibial pulsations well felt   Neurological:   CN II-XII grossly intact. No focal motor or sensory deficits.            Medications:     No current outpatient prescriptions on file.                Data:     Results for orders placed or " performed during the hospital encounter of 10/25/17 (from the past 24 hour(s))   Heparin Xa (10a) Level   Result Value Ref Range    Heparin 10A Level 0.44 IU/mL   CT Chest Pulmonary Embolism w Contrast   Result Value Ref Range    Radiologist flags Pulmonary embolism (AA)     Narrative    CT CHEST AND PULMONARY EMBOLISM ANGIOGRAM  10/27/2017 8:59 AM     HISTORY: Recent DVT, now with left-sided pleuritic chest pain.    COMPARISON: None.    TECHNIQUE: Volumetric helical acquisition of CT images of the chest  from the lung apices to the kidneys were acquired after the  administration of  71 mL Isovue-370  IV contrast. Radiation dose for  this scan was reduced using automated exposure control, adjustment of  the mA and/or kV according to patient size, or iterative  reconstruction technique.    FINDINGS: There are peripheral pulmonary emboli in the left lower  lobe. Some mosaic attenuation is noted which may be related to air  trapping and/or groundglass infiltrates. Trace pleural fluid on the  left. The heart is generous in size. Thoracic aorta is atherosclerotic  without evidence of dissection or aneurysm. No right pleural or  pericardial effusion.  There is no pneumothorax. Adrenal glands are  normal. Remainder of the visualized upper abdomen is unremarkable.      Impression    IMPRESSION:   1. Positive study for pulmonary embolus.  2. No thoracic aortic dissection or aneurysm.     [Critical Result: Pulmonary embolism]    Finding was identified on 10/27/2017 9:03 AM.     Milka the nurse for Dr. CARTER CAM was contacted by me at  10/27/2017 9:08 AM and verbalized understanding of the critical  finding.     JEREMIAH RANDOLPH MD

## 2017-10-27 NOTE — PLAN OF CARE
Problem: Patient Care Overview  Goal: Plan of Care/Patient Progress Review  Outcome: No Change  pt a&o. Up ad joel. C/o pain in left shoulder and side. Percocet given with some relief. Tolerating regular diet. CT of chest this shift, please see results. Plan is to begin Lovenox this evening and coumadin tomorrow. Possible d/c home tomorrow. Pt calls appropriately, will continue to monitor and support.

## 2017-10-27 NOTE — PHARMACY
Contacted Dr. Wilkins regarding Rivaroxaban x1 10/27 am, currently running Heparin infusion, and Enoxaparin to start 10/27.    Pt got Rivaroxaban this am and Heparin is typically turned off at the time Rivaroxaban is given.  Requested order to d/c Heparin and start Enoxaparin tonight after procedure. Dr. Wilkins agreed and orders were modified.

## 2017-10-27 NOTE — PLAN OF CARE
Problem: Patient Care Overview  Goal: Plan of Care/Patient Progress Review  Outcome: No Change  A/Ox4, VSS. C/o pain in R leg, percocet given x1 @ HS.  Ativan given at HS for sleep. BLE trace edema, RLE pulses palpable.  Hep gtt @ 12 mL/hr, next 10A in AM. Xeralto to start tomorrow.  Port maybe turned on side per VAT note.  Blood return noted. May want IR to look at port before discharge?  Plan is to discharge home tomorrow.

## 2017-10-28 VITALS
WEIGHT: 195 LBS | DIASTOLIC BLOOD PRESSURE: 66 MMHG | BODY MASS INDEX: 33.29 KG/M2 | OXYGEN SATURATION: 99 % | SYSTOLIC BLOOD PRESSURE: 125 MMHG | HEIGHT: 64 IN | TEMPERATURE: 95.7 F | HEART RATE: 64 BPM | RESPIRATION RATE: 18 BRPM

## 2017-10-28 LAB
ERYTHROCYTE [DISTWIDTH] IN BLOOD BY AUTOMATED COUNT: 12.7 % (ref 10–15)
FACT X ACT/NOR PPP CHRO: 64 % (ref 70–130)
HCT VFR BLD AUTO: 31 % (ref 35–47)
HGB BLD-MCNC: 10.7 G/DL (ref 11.7–15.7)
INR PPP: 1.11 (ref 0.86–1.14)
MCH RBC QN AUTO: 32.6 PG (ref 26.5–33)
MCHC RBC AUTO-ENTMCNC: 34.5 G/DL (ref 31.5–36.5)
MCV RBC AUTO: 95 FL (ref 78–100)
PLATELET # BLD AUTO: 174 10E9/L (ref 150–450)
RBC # BLD AUTO: 3.28 10E12/L (ref 3.8–5.2)
WBC # BLD AUTO: 1.3 10E9/L (ref 4–11)

## 2017-10-28 PROCEDURE — A9270 NON-COVERED ITEM OR SERVICE: HCPCS | Mod: GY | Performed by: NURSE PRACTITIONER

## 2017-10-28 PROCEDURE — A9270 NON-COVERED ITEM OR SERVICE: HCPCS | Mod: GY | Performed by: INTERNAL MEDICINE

## 2017-10-28 PROCEDURE — 85610 PROTHROMBIN TIME: CPT | Performed by: INTERNAL MEDICINE

## 2017-10-28 PROCEDURE — 25000128 H RX IP 250 OP 636: Performed by: INTERNAL MEDICINE

## 2017-10-28 PROCEDURE — 25000132 ZZH RX MED GY IP 250 OP 250 PS 637: Mod: GY | Performed by: INTERNAL MEDICINE

## 2017-10-28 PROCEDURE — 25000128 H RX IP 250 OP 636

## 2017-10-28 PROCEDURE — 85260 CLOT FACTOR X STUART-POWER: CPT | Performed by: INTERNAL MEDICINE

## 2017-10-28 PROCEDURE — 25000132 ZZH RX MED GY IP 250 OP 250 PS 637: Mod: GY | Performed by: NURSE PRACTITIONER

## 2017-10-28 PROCEDURE — 85027 COMPLETE CBC AUTOMATED: CPT | Performed by: INTERNAL MEDICINE

## 2017-10-28 RX ADMIN — CETIRIZINE HYDROCHLORIDE 10 MG: 10 TABLET, FILM COATED ORAL at 08:05

## 2017-10-28 RX ADMIN — ALLOPURINOL 100 MG: 100 TABLET ORAL at 08:05

## 2017-10-28 RX ADMIN — ENOXAPARIN SODIUM 90 MG: 100 INJECTION SUBCUTANEOUS at 08:11

## 2017-10-28 RX ADMIN — WARFARIN SODIUM 5 MG: 5 TABLET ORAL at 06:02

## 2017-10-28 RX ADMIN — SODIUM CHLORIDE, PRESERVATIVE FREE 5 ML: 5 INJECTION INTRAVENOUS at 06:03

## 2017-10-28 RX ADMIN — OMEPRAZOLE 20 MG: 20 CAPSULE, DELAYED RELEASE ORAL at 08:05

## 2017-10-28 RX ADMIN — HEPARIN SODIUM (PORCINE) LOCK FLUSH IV SOLN 100 UNIT/ML 5 ML: 100 SOLUTION at 13:41

## 2017-10-28 RX ADMIN — LOSARTAN POTASSIUM 100 MG: 100 TABLET, FILM COATED ORAL at 08:05

## 2017-10-28 RX ADMIN — VITAMIN D, TAB 1000IU (100/BT) 2000 UNITS: 25 TAB at 08:05

## 2017-10-28 RX ADMIN — SODIUM CHLORIDE, PRESERVATIVE FREE 5 ML: 5 INJECTION INTRAVENOUS at 13:41

## 2017-10-28 RX ADMIN — LEVOTHYROXINE SODIUM 112 MCG: 112 TABLET ORAL at 06:02

## 2017-10-28 NOTE — PLAN OF CARE
Problem: Patient Care Overview  Goal: Plan of Care/Patient Progress Review  Outcome: Improving  A/O.  VSS.  Denies pain.  Port HL, good blood return.  IR assessed yesterday, see results.  Coumadin started this am.  Up independently.  Possible d/c home today on lovenox and coumadin.  Continue to monitor.

## 2017-10-28 NOTE — PLAN OF CARE
A&Ox4, VSS, Pt was pleasant throughout shift. Showered and brushed teeth in AM up independently. Pt port deaccessed and hep locked. Plan is to discharge home with Lovenox and warfarin therapies. 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 Discharge instructions given to pt. Pt's  will be here around 15:30 to transport pt home.

## 2017-10-28 NOTE — DISCHARGE SUMMARY
PRIMARY CARE PHYSICIAN:  Dr. Mitchell Wilkins and RIGOBERTO Conner.       DATE OF ADMISSION:  10/25/2017.       DATE OF DISCHARGE:  10/28/2017.       PRINCIPAL DIAGNOSES:  Deep vein thrombosis and pulmonary embolism; pulmonary embolism is without cor pulmonale; deep venous thrombosis is noted in the right common femoral vein( RLE)- presumed from malignancy, has not been immobile.     SECONDARY DIAGNOSES:  Diffuse large B-cell lymphoma of germinal center type noted in the tongue region.  Other secondary diagnosis essential hypertension, gout, Hashimoto's thyroiditis, and previous history of deep venous thrombosis 30 years ago felt to be from birth control pills.        Other aspects of history are documented in the H&P.      DISCHARGE MEDICATIONS:   1.  Enoxaparin 1 mg/kg subq b.i.d.- 80mg  SC BID  2.  Warfarin 5 mg that was ordered by Dr. Wilkins at the time of discharge.        Other discharge medications include tetracycline, losartan, cranberry  , furosemide, levothyroxine, trazodone, prochlorpemazine, prednisone as with chemotherapy.  I have instructed her to stop her estrogen ointment.      BRIEF HOSPITAL COURSE:  Marysol Morrell is a 66-year-old patient admitted from our clinic due to concern for new deep vein thrombosis that was noted in the right calf; subsequent to that, pulmonary embolism was also noted on the CT scan.  She was treated with anticoagulation with enoxaparin and with warfarin.  Which was not initiated in the hospitalization, but enoxaparin was.      PLAN:    1.  Discharge to home.   2.  Will continue warfarin and enoxaparin.   3.  The patient does have a PCP appointment on 10/31/2017, Dr. Wilkins also would like factor X followed.   4.  She will call Dr. Wilkins to ensure when he would like her to come; she thinks she needs to follow up on Tuesday or Wednesday of next week.        PHYSICAL EXAMINATION: The patient was also examined on the day of discharge, her vital signs were stable.   CARDIOVASCULAR:   Regular rate and rhythm.   RESPIRATORY:  Chest clear to auscultation.   ABDOMEN:  Soft, nontender.   EXTREMITIES:  Minimal edema.      PLAN:  Discharge to home with followup with Dr. Wilkins for anticoagulation, follow up on her lupus anticoagulant testing and resuming chemotherapy.         KEIRA BENOIT MD             D: 10/28/2017 11:08   T: 10/28/2017 14:08   MT: #[id number]      Name:     BREA CAIN   MRN:      [id number]        Account:        [id number]   :      1950           Admit Date:     [id number]                                  Discharge Date:       Document: [id number]       cc: LAKISHA Wilkins MD

## 2017-10-28 NOTE — PROGRESS NOTES
Patient discharged home. All DC instructions provided and pt verbalized understanding. Pt wheeled out in WC with CNA and transported home with  in personal vehicle

## 2017-10-28 NOTE — DISCHARGE INSTRUCTIONS
"A follow-up appointment was scheduled for you [see above].  It is very important to go to it--bring these papers and your insurance card with you.  At the visit, talk about your hospital stay.  Tell your doctor how you feel.  Show your new list of medications.  Your doctor will update records, make sure you are still doing OK, and decide if any tests or medication changes are needed.      Contact for \"[facility]\"    ([facility])  Phone :[phone]  Address: [address] [facility] [id number]        "

## 2017-10-28 NOTE — PROGRESS NOTES
Care Coordination:    Per notes, pt will d/c on Lovenox & Coumadin.  Anticipate need for INR check and PCP visit.  First available PCP appointment scheduled, Tues 10/31/17 at 2pm with INR.  Will monitor d/c orders.    Laure Webster RN, BSN  [facility] Care Coordinator   Mobile Phone: [phone]

## 2017-10-30 ENCOUNTER — TELEPHONE (OUTPATIENT)
Dept: FAMILY MEDICINE | Facility: CLINIC | Age: 67
End: 2017-10-30

## 2017-10-30 NOTE — TELEPHONE ENCOUNTER
Chief Complaint: Dvt, Non Hodgkins Lymphoma , Dvt (Deep Vein Thrombosis) In Pregnancy (H),10/28/17,ED/IP 1/1  [phone] (home) none (work)

## 2017-10-30 NOTE — TELEPHONE ENCOUNTER
"ED/Discharge Protocol    \"Hi, my name is Micaela Weiss, a registered nurse, and I am calling on behalf of Vernell Sanders's office at [place].  I am calling to follow up and see how things are going for you after your recent visit.\"    \"I see that you were in the (ER/UC/IP) on 10/25/2017-10/28/2017.    How are you doing now that you are home?\" patient states she is doing well    Is patient experiencing symptoms that may require a hospital visit?  No    Discharge Instructions    \"Let's review your discharge instructions.  What is/are the follow-up recommendations?  Pt. Response: F/U with PCP and oncology     \"Were you instructed to make a follow-up appointment?\"  Pt. Response: Yes.  Has appointment been made?  Patient was scheduled to see PCP tomorrow - states her WBC count is low and she's fatigued. Has not seen Vernell Sanders since getting cancer. Said she would just prefer to followup with oncology for now. Will call us back at a later time to see Vernell Sanders in a few [facility]     \"When you see the provider, I would recommend that you bring your discharge instructions with you.    Medications    \"How many new medications are you on since your hospitalization/ED visit?\"    Warfarin and Lovenox - being managed by oncologist  \"How many of your current medicines changed (dose, timing, name, etc.) while you were in the hospital/ED visit?\"   0-1  \"Do you have questions about your medications?\"   No  \"Were you newly diagnosed with heart failure, COPD, diabetes or did you have a heart attack?\"   No  For patients on insulin: \"Did you start on insulin in the hospital or did you have your insulin dose changed?\"   No    Medication reconciliation completed? No    Was MTM referral placed (*Make sure to put transitions as reason for referral)?   No    Call Summary    \"Do you have any questions or concerns about your condition or care plan at the moment?\"    No  Triage nurse advice given: Keep f/u with oncology and at some point f/u with " "PCP    Patient was in ER twice in the past year (assess appropriateness of ER visits.)      \"If you have questions or things don't continue to improve, we encourage you contact us through the main clinic number,  [phone].  Even if the clinic is not open, triage nurses are available 24/7 to help you.     We would like you to know that our clinic has extended hours (provide information).  We also have urgent care (provide details on closest location and hours/contact info)\"      \"Thank you for your time and take care!\"    Micaela DALEY, RN    PLAN:    1.  Discharge to home.   2.  Will continue warfarin and enoxaparin.   3.  The patient does have a PCP appointment on 10/31/2017, Dr. Wilkins also would like factor X followed.   4.  She will call Dr. Wilkins to ensure when he would like her to come; she thinks she needs to follow up on Tuesday or Wednesday of next week.         "

## 2017-11-08 DIAGNOSIS — J31.0 CHRONIC RHINITIS: ICD-10-CM

## 2017-11-09 RX ORDER — CETIRIZINE HYDROCHLORIDE 10 MG/1
TABLET ORAL
Qty: 90 TABLET | Refills: 1 | Status: SHIPPED | OUTPATIENT
Start: 2017-11-09 | End: 2018-07-09

## 2017-11-09 NOTE — TELEPHONE ENCOUNTER
Prescription approved per [facility] [place].  Micaela DALEY RN

## 2017-11-15 ENCOUNTER — TRANSFERRED RECORDS (OUTPATIENT)
Dept: HEALTH INFORMATION MANAGEMENT | Facility: CLINIC | Age: 67
End: 2017-11-15

## 2017-12-06 ENCOUNTER — TRANSFERRED RECORDS (OUTPATIENT)
Dept: HEALTH INFORMATION MANAGEMENT | Facility: CLINIC | Age: 67
End: 2017-12-06

## 2018-01-24 ENCOUNTER — TRANSFERRED RECORDS (OUTPATIENT)
Dept: HEALTH INFORMATION MANAGEMENT | Facility: CLINIC | Age: 68
End: 2018-01-24

## 2018-02-02 ENCOUNTER — APPOINTMENT (OUTPATIENT)
Dept: INTERVENTIONAL RADIOLOGY/VASCULAR | Facility: CLINIC | Age: 68
End: 2018-02-02
Attending: INTERNAL MEDICINE
Payer: MEDICARE

## 2018-02-02 ENCOUNTER — HOSPITAL ENCOUNTER (OUTPATIENT)
Facility: CLINIC | Age: 68
Discharge: HOME | End: 2018-02-02
Attending: RADIOLOGY | Admitting: RADIOLOGY
Payer: MEDICARE

## 2018-02-02 VITALS
WEIGHT: 194 LBS | HEART RATE: 61 BPM | OXYGEN SATURATION: 99 % | RESPIRATION RATE: 16 BRPM | TEMPERATURE: 97 F | SYSTOLIC BLOOD PRESSURE: 122 MMHG | DIASTOLIC BLOOD PRESSURE: 62 MMHG | BODY MASS INDEX: 32.32 KG/M2 | HEIGHT: 65 IN

## 2018-02-02 DIAGNOSIS — Z51.89 TREATMENT: ICD-10-CM

## 2018-02-02 LAB
APTT PPP: 31 SEC (ref 22–37)
ERYTHROCYTE [DISTWIDTH] IN BLOOD BY AUTOMATED COUNT: 12.9 % (ref 10–15)
HCT VFR BLD AUTO: 34.1 % (ref 35–47)
HGB BLD-MCNC: 11.7 G/DL (ref 11.7–15.7)
INR PPP: 0.95 (ref 0.86–1.14)
MCH RBC QN AUTO: 32.1 PG (ref 26.5–33)
MCHC RBC AUTO-ENTMCNC: 34.3 G/DL (ref 31.5–36.5)
MCV RBC AUTO: 93 FL (ref 78–100)
PLATELET # BLD AUTO: 193 10E9/L (ref 150–450)
RBC # BLD AUTO: 3.65 10E12/L (ref 3.8–5.2)
WBC # BLD AUTO: 4.4 10E9/L (ref 4–11)

## 2018-02-02 PROCEDURE — 85610 PROTHROMBIN TIME: CPT | Performed by: RADIOLOGY

## 2018-02-02 PROCEDURE — 25000125 ZZHC RX 250: Performed by: RADIOLOGY

## 2018-02-02 PROCEDURE — 25000128 H RX IP 250 OP 636: Performed by: RADIOLOGY

## 2018-02-02 PROCEDURE — 99152 MOD SED SAME PHYS/QHP 5/>YRS: CPT

## 2018-02-02 PROCEDURE — 85730 THROMBOPLASTIN TIME PARTIAL: CPT | Performed by: RADIOLOGY

## 2018-02-02 PROCEDURE — 85027 COMPLETE CBC AUTOMATED: CPT | Performed by: RADIOLOGY

## 2018-02-02 PROCEDURE — 27211193 ZZ H WOUND GLUE CR1

## 2018-02-02 PROCEDURE — 40000863 ZZH STATISTIC RADIOLOGY XRAY, US, CT, MAR, NM

## 2018-02-02 PROCEDURE — 25000128 H RX IP 250 OP 636: Performed by: NURSE PRACTITIONER

## 2018-02-02 RX ORDER — LIDOCAINE HYDROCHLORIDE 10 MG/ML
1-30 INJECTION, SOLUTION EPIDURAL; INFILTRATION; INTRACAUDAL; PERINEURAL
Status: COMPLETED | OUTPATIENT
Start: 2018-02-02 | End: 2018-02-02

## 2018-02-02 RX ORDER — FLUMAZENIL 0.1 MG/ML
0.2 INJECTION, SOLUTION INTRAVENOUS
Status: DISCONTINUED | OUTPATIENT
Start: 2018-02-02 | End: 2018-02-02 | Stop reason: HOSPADM

## 2018-02-02 RX ORDER — NALOXONE HYDROCHLORIDE 0.4 MG/ML
.1-.4 INJECTION, SOLUTION INTRAMUSCULAR; INTRAVENOUS; SUBCUTANEOUS
Status: DISCONTINUED | OUTPATIENT
Start: 2018-02-02 | End: 2018-02-02 | Stop reason: HOSPADM

## 2018-02-02 RX ORDER — LIDOCAINE HYDROCHLORIDE 10 MG/ML
1-30 INJECTION, SOLUTION EPIDURAL; INFILTRATION; INTRACAUDAL; PERINEURAL
Status: DISCONTINUED | OUTPATIENT
Start: 2018-02-02 | End: 2018-02-02 | Stop reason: HOSPADM

## 2018-02-02 RX ORDER — ONDANSETRON 2 MG/ML
4 INJECTION INTRAMUSCULAR; INTRAVENOUS ONCE
Status: COMPLETED | OUTPATIENT
Start: 2018-02-02 | End: 2018-02-02

## 2018-02-02 RX ORDER — ONDANSETRON 2 MG/ML
INJECTION INTRAMUSCULAR; INTRAVENOUS
Status: DISCONTINUED
Start: 2018-02-02 | End: 2018-02-02 | Stop reason: HOSPADM

## 2018-02-02 RX ORDER — FENTANYL CITRATE 50 UG/ML
INJECTION, SOLUTION INTRAMUSCULAR; INTRAVENOUS
Status: DISCONTINUED
Start: 2018-02-02 | End: 2018-02-02 | Stop reason: HOSPADM

## 2018-02-02 RX ORDER — CLINDAMYCIN PHOSPHATE 900 MG/50ML
900 INJECTION, SOLUTION INTRAVENOUS
Status: COMPLETED | OUTPATIENT
Start: 2018-02-02 | End: 2018-02-02

## 2018-02-02 RX ORDER — FENTANYL CITRATE 50 UG/ML
25-50 INJECTION, SOLUTION INTRAMUSCULAR; INTRAVENOUS EVERY 5 MIN PRN
Status: DISCONTINUED | OUTPATIENT
Start: 2018-02-02 | End: 2018-02-02 | Stop reason: HOSPADM

## 2018-02-02 RX ORDER — LIDOCAINE 40 MG/G
CREAM TOPICAL
Status: DISCONTINUED | OUTPATIENT
Start: 2018-02-02 | End: 2018-02-02 | Stop reason: HOSPADM

## 2018-02-02 RX ORDER — LIDOCAINE HYDROCHLORIDE 10 MG/ML
INJECTION, SOLUTION INFILTRATION; PERINEURAL
Status: DISCONTINUED
Start: 2018-02-02 | End: 2018-02-02 | Stop reason: HOSPADM

## 2018-02-02 RX ORDER — ACETAMINOPHEN 325 MG/1
650-975 TABLET ORAL
Status: DISCONTINUED | OUTPATIENT
Start: 2018-02-02 | End: 2018-02-02 | Stop reason: HOSPADM

## 2018-02-02 RX ADMIN — LIDOCAINE HYDROCHLORIDE 80 MG: 10 INJECTION, SOLUTION INFILTRATION; PERINEURAL at 12:24

## 2018-02-02 RX ADMIN — MIDAZOLAM HYDROCHLORIDE 1 MG: 1 INJECTION, SOLUTION INTRAMUSCULAR; INTRAVENOUS at 12:10

## 2018-02-02 RX ADMIN — FENTANYL CITRATE 50 MCG: 50 INJECTION, SOLUTION INTRAMUSCULAR; INTRAVENOUS at 11:55

## 2018-02-02 RX ADMIN — FENTANYL CITRATE 50 MCG: 50 INJECTION, SOLUTION INTRAMUSCULAR; INTRAVENOUS at 12:10

## 2018-02-02 RX ADMIN — ONDANSETRON 4 MG: 2 SOLUTION INTRAMUSCULAR; INTRAVENOUS at 11:54

## 2018-02-02 RX ADMIN — CLINDAMYCIN PHOSPHATE 900 MG: 18 INJECTION, SOLUTION INTRAVENOUS at 11:31

## 2018-02-02 RX ADMIN — MIDAZOLAM HYDROCHLORIDE 1 MG: 1 INJECTION, SOLUTION INTRAMUSCULAR; INTRAVENOUS at 11:55

## 2018-02-02 NOTE — PROGRESS NOTES
VSS, pt denies pain, right upper chest incision c/d/i with dressing , pt discharged to home with daughter.

## 2018-02-02 NOTE — DISCHARGE INSTRUCTIONS
Port Removal Discharge Instructions     After you go home:      Have an adult stay with you for the first 6 hours    You may resume your normal diet       For 24 hours - due to the sedation you received:    Relax and take it easy    Do NOT make any important or legal decisions    Do NOT drive or operate machines at home or at work    Do NOT drink alcohol    Care of Puncture Site:      Keep the dressings on your site clean & dry for 3 days. Change it only if it gets wet or dirty.    You may shower after the dressing comes off in 3 days    Do not remove the small white strips of tape, if present. Allow them to fall off on their own.     You may cover the wound with a bandaid after the dressing is removed if needed for comfort      Activity       Avoid heavy lifting (greater than 10 pounds) or the overuse of your shoulder for 3 days    Bleeding:      If you start bleeding from the incision site in your chest or have swelling in your neck, sit down and press on the site for 5-10 minutes.     If bleeding has not stopped after 10 minutes, call your provider.        Call 911 right away if you have heavy bleeding or bleeding that does not stop.      Medicines:      You may resume all medications    Resume your Warfarin/Coumadin at your regular dose today. Follow up with your provider to have your INR rechecked    Resume your Platelet Inhibitors and Aspirin tomorrow at your regular dose    For minor pain, you may take Acetaminophen (Tylenol) or Ibuprofen (Advil)          Call the provider who ordered this procedure if:      You have swelling in your neck or over your port site    The incision area is red, swollen, hot or tender    You have chills or a fever greater than 101 F (38 C)    Any questions or concerns    Call  911 or go to the Emergency Room if you have:      Severe chest pain or trouble breathing    Bleeding that you cannot control    If you have questions call:          [facility] Radiology Dept @  [phone]    The provider who performed your procedure was __DR. Mckeon_______________.

## 2018-02-02 NOTE — IP AVS SNAPSHOT
[place] [address]    [place] [address]    Phone:  [phone]                                       After Visit Summary   2/2/2018    Marysol Morrell    MRN: [id number]           After Visit Summary Signature Page     I have received my discharge instructions, and my questions have been answered. I have discussed any challenges I see with this plan with the nurse or doctor.    ..........................................................................................................................................  Patient/Patient Representative Signature      ..........................................................................................................................................  Patient Representative Print Name and Relationship to Patient    ..................................................               ................................................  Date                                            Time    ..........................................................................................................................................  Reviewed by Signature/Title    ...................................................              ..............................................  Date                                                            Time

## 2018-02-02 NOTE — PLAN OF CARE
AVSS, pt denies pain, drowsy but arousable to voice. R incision covered, dressing dry and clean. Tolerating PO. Daughter supportive at bedside, discharge instructions reviewed.

## 2018-02-02 NOTE — PROGRESS NOTES
Interventional Radiology Intra-procedural Nursing Note    Patient Name: Marysol Morrell  Medical Record Number: [id number]  Today's Date: February 2, 2018    Start Time: 1155  End of procedure time: 1219  Procedure: Port a catheter removal  Report given to: JACKELIN Ingram  Time pt departs:  1227       Other Notes: Successful right IJ port removal. Of note, pre-existing port not in [facility] EPIC flowsheets or under Implants. VSS on 2L NC. Nausea from sedation, pretreated with Ondansetron 4mg. Versed 2 mg and Fentanyl 100  mcg given. Right chest wall sutured, dermabond and steri-strip applied. Site covered with 2x2 gauze and tegaderm applied. Site CDI, surrounding skin natural pink, soft, flat, and dry. Bedside report given to [facility], RN.     CELSO OJEDA

## 2018-02-02 NOTE — PROCEDURES
RADIOLOGY PROCEDURE NOTE  Patient name: Marysol Morrell  MRN: [id number]  : 1950    Pre-procedure diagnosis: Port no longer needed.  Post-procedure diagnosis: Same    Procedure Date/Time: 2018  12:20 PM    Estimated blood loss: None  Specimen(s) collected with description: none  The patient tolerated the procedure well with no immediate complications.  Significant findings:none    See imaging dictation for procedural details.    Provider name: Maya Mckeon  Assistant(s):None

## 2018-02-02 NOTE — PROGRESS NOTES
Interventional Radiology Pre-Procedure Sedation Assessment   Time of Assessment: 11:01 AM    Expected Level: Moderate Sedation    Indication: Sedation is required for the following type of Procedure: Right port a catheter removal     Sedation and procedural consent: Risks, benefits and alternatives were discussed with Patient and Relative Daughter, Dr Mckeon    PO Intake: Appropriately NPO for procedure    ASA Class: Class 2 - MILD SYSTEMIC DISEASE, NO ACUTE PROBLEMS, NO FUNCTIONAL LIMITATIONS.    Mallampati: Grade 1:  Soft palate, uvula, tonsillar pillars, and posterior pharyngeal wall visible    Lungs: Lungs Clear with good breath sounds bilaterally    Heart: Normal heart sounds and rate    History and physical reviewed and no updates needed. I have reviewed the lab findings, diagnostic data, medications, and the plan for sedation. I have determined this patient to be an appropriate candidate for the planned sedation and procedure and have reassessed the patient IMMEDIATELY PRIOR to sedation and procedure.    Nancy Watkins, APRN CNP

## 2018-02-02 NOTE — IP AVS SNAPSHOT
MRN:[id number]                      After Visit Summary   2/2/2018    Marysol Morrell    MRN: [id number]           Visit Information        Department      2/2/2018 10:25 AM [facility]          Review of your medicines      UNREVIEWED medicines. Ask your doctor about these medicines        Dose / Directions    ACETAMINOPHEN PO        Dose:  325-650 mg   Take 325-650 mg by mouth as needed for pain   Refills:  0       allopurinol 100 MG tablet   Commonly known as:  ZYLOPRIM   Used for:  Chronic gout without tophus, unspecified cause, unspecified site        Dose:  100 mg   Take 1 tablet (100 mg) by mouth daily   Quantity:  90 tablet   Refills:  3       cetirizine 10 MG tablet   Commonly known as:  zyrTEC   Used for:  Chronic rhinitis        TAKE ONE TABLET BY MOUTH ONE TIME DAILY IN THE P.M.   Quantity:  90 tablet   Refills:  1       clobetasol 0.05 % ointment   Commonly known as:  TEMOVATE   Used for:  Lichen sclerosus et atrophicus        Apply sparingly to affected area twice daily as needed.  Do not apply to face.   Quantity:  45 g   Refills:  1       CRANBERRY        daily 650mg of cranberry po   Refills:  0       fluticasone 50 MCG/ACT spray   Commonly known as:  FLONASE   Used for:  Cough        Dose:  1-2 spray   Spray 1-2 sprays into both nostrils daily   Quantity:  16 g   Refills:  4       furosemide 20 MG tablet   Commonly known as:  LASIX   Used for:  Edema of both legs        Dose:  20 mg   Take 1 tablet (20 mg) by mouth daily as needed   Quantity:  90 tablet   Refills:  0       levothyroxine 112 MCG tablet   Commonly known as:  SYNTHROID/LEVOTHROID   Used for:  Hypothyroidism, unspecified type        Dose:  112 mcg   Take 1 tablet (112 mcg) by mouth daily   Quantity:  90 tablet   Refills:  3       losartan 100 MG tablet   Commonly known as:  COZAAR   Used for:  HTN (hypertension), benign        Dose:  100 mg   Take 1 tablet (100 mg) by mouth daily   Quantity:  90  tablet   Refills:  3       OMEPRAZOLE PO        Dose:  20 mg   Take 20 mg by mouth 2 times daily (before meals)   Refills:  0       oxyCODONE-acetaminophen 5-325 MG per tablet   Commonly known as:  PERCOCET        Dose:  1 tablet   Take 1 tablet by mouth as needed for moderate to severe pain   Refills:  0       predniSONE 20 MG tablet   Commonly known as:  DELTASONE   Used for:  Preop general physical exam        Takes 100 mg daily x 5 days with chemo cycle   Refills:  0                Protect others around you: Learn how to safely use, store and throw away your medicines at www.disposemymeds.org.         Follow-ups after your visit        Your next 10 appointments already scheduled     Mar 07, 2018 11:40 AM CST   [place] Visit with Sneha Sandoval [place]   [facility] [place]    [address] [place] [id number] [phone]           Bring a current list of meds and any records pertaining to this visit. For Physicals, please bring immunization records and any forms needing to be filled out. Please arrive 10 minutes early to complete paperwork.               Care Instructions        Further instructions from your care team         Port Removal Discharge Instructions     After you go home:      Have an adult stay with you for the first 6 hours    You may resume your normal diet       For 24 hours - due to the sedation you received:    Relax and take it easy    Do NOT make any important or legal decisions    Do NOT drive or operate machines at home or at work    Do NOT drink alcohol    Care of Puncture Site:      Keep the dressings on your site clean & dry for 3 days. Change it only if it gets wet or dirty.    You may shower after the dressing comes off in 3 days    Do not remove the small white strips of tape, if present. Allow them to fall off on their own.     You may cover the wound with a bandaid after the dressing is removed if needed for  comfort      Activity       Avoid heavy lifting (greater than 10 pounds) or the overuse of your shoulder for 3 days    Bleeding:      If you start bleeding from the incision site in your chest or have swelling in your neck, sit down and press on the site for 5-10 minutes.     If bleeding has not stopped after 10 minutes, call your provider.        Call 911 right away if you have heavy bleeding or bleeding that does not stop.      Medicines:      You may resume all medications    Resume your Warfarin/Coumadin at your regular dose today. Follow up with your provider to have your INR rechecked    Resume your Platelet Inhibitors and Aspirin tomorrow at your regular dose    For minor pain, you may take Acetaminophen (Tylenol) or Ibuprofen (Advil)          Call the provider who ordered this procedure if:      You have swelling in your neck or over your port site    The incision area is red, swollen, hot or tender    You have chills or a fever greater than 101 F (38 C)    Any questions or concerns    Call  911 or go to the Emergency Room if you have:      Severe chest pain or trouble breathing    Bleeding that you cannot control    If you have questions call:          [facility] Radiology Dept @ [phone]    The provider who performed your procedure was __DR. Mckeon_______________.     Additional Information About Your Visit        [employer] Information     [employer] gives you secure access to your electronic health record. If you see a primary care provider, you can also send messages to your care team and make appointments. If you have questions, please call your primary care clinic.  If you do not have a primary care provider, please call [phone] and they will assist you.        Care EveryWhere ID     This is your Care EveryWhere ID. This could be used by other organizations to access your [place] medical records  [id number]        Your Vitals Were     Blood Pressure Pulse Temperature Respirations  "Height Weight    147/67 (BP Location: Left arm) 61 97  F (36.1  C) (Oral) 16 1.651 m (5' 5\") 88 kg (194 lb)    Pulse Oximetry BMI (Body Mass Index)                99% 32.28 kg/m2           Primary Care Provider Office Phone # Fax #    Vernell Sanders PA-C [phone]      Equal Access to Services     KERRIE BERNARD : Hadii aad ku hadasho Soomaali, waaxda luqadaha, qaybta kaalmada adeegyada, waxay idiin hayzeenatn adeeulalio hdz laStephaniexochitl . So [facility] [phone].    ATENCIÓN: Si habla esplety, tiene a cabrera disposición servicios gratuitos de asistencia lingüística. Llame al [phone].    We comply with applicable federal civil rights laws and [place] laws. We do not discriminate on the basis of race, color, national origin, age, disability, sex, sexual orientation, or gender identity.            Thank you!     Thank you for choosing [facility] for your care. Our goal is always to provide you with excellent care. Hearing back from our patients is one way we can continue to improve our services. Please take a few minutes to complete the written survey that you may receive in the mail after you visit with us. Thank you!             Medication List: This is a list of all your medications and when to take them. Check marks below indicate your daily home schedule. Keep this list as a reference.      Medications           Morning Afternoon Evening Bedtime As Needed    ACETAMINOPHEN PO   Take 325-650 mg by mouth as needed for pain                                allopurinol 100 MG tablet   Commonly known as:  ZYLOPRIM   Take 1 tablet (100 mg) by mouth daily                                cetirizine 10 MG tablet   Commonly known as:  zyrTEC   TAKE ONE TABLET BY MOUTH ONE TIME DAILY IN THE P.M.                                clobetasol 0.05 % ointment   Commonly known as:  TEMOVATE   Apply sparingly to affected area twice daily as needed.  Do not apply to face.                                CRANBERRY   daily 650mg of " cranberry po                                fluticasone 50 MCG/ACT spray   Commonly known as:  FLONASE   Spray 1-2 sprays into both nostrils daily                                furosemide 20 MG tablet   Commonly known as:  LASIX   Take 1 tablet (20 mg) by mouth daily as needed                                levothyroxine 112 MCG tablet   Commonly known as:  SYNTHROID/LEVOTHROID   Take 1 tablet (112 mcg) by mouth daily                                losartan 100 MG tablet   Commonly known as:  COZAAR   Take 1 tablet (100 mg) by mouth daily                                OMEPRAZOLE PO   Take 20 mg by mouth 2 times daily (before meals)                                oxyCODONE-acetaminophen 5-325 MG per tablet   Commonly known as:  PERCOCET   Take 1 tablet by mouth as needed for moderate to severe pain                                predniSONE 20 MG tablet   Commonly known as:  DELTASONE   Takes 100 mg daily x 5 days with chemo cycle

## 2018-02-12 ENCOUNTER — TRANSFERRED RECORDS (OUTPATIENT)
Dept: HEALTH INFORMATION MANAGEMENT | Facility: CLINIC | Age: 68
End: 2018-02-12

## 2018-02-16 ENCOUNTER — TRANSFERRED RECORDS (OUTPATIENT)
Dept: HEALTH INFORMATION MANAGEMENT | Facility: CLINIC | Age: 68
End: 2018-02-16

## 2018-02-19 ENCOUNTER — TELEPHONE (OUTPATIENT)
Dept: FAMILY MEDICINE | Facility: CLINIC | Age: 68
End: 2018-02-19

## 2018-02-19 DIAGNOSIS — M62.81 GENERALIZED MUSCLE WEAKNESS: Primary | ICD-10-CM

## 2018-02-19 NOTE — TELEPHONE ENCOUNTER
Reason for Call:  Other call back    Detailed comments: The patient wants a call back to recommend a Neurologist   She also wants to talk about this in detail    Phone Number Patient can be reached at: Home number on file [phone] (home)    Best Time: anytime    Can we leave a detailed message on this number? YES    Call taken on 2/19/2018 at 3:19 PM by Michelle Gomez

## 2018-02-19 NOTE — TELEPHONE ENCOUNTER
Spoke with patient. Looking for referral for Neurologist . Patient thinks steroid related muscle weakness.     Dr. Wilkins told pt to see Neurologist but does know any in the area. Was told to check with you     Pt is done with Chemo treatments but is still seeing Dr. Wilkins    I offered for Pt to schedule OV with you since has not seen you since June 2017. Patient declined for now but is willing to schedule visit if you feel appropriate    Pt asking for call back directly from you    Please advise

## 2018-03-15 ENCOUNTER — OFFICE VISIT (OUTPATIENT)
Dept: FAMILY MEDICINE | Facility: CLINIC | Age: 68
End: 2018-03-15
Payer: COMMERCIAL

## 2018-03-15 VITALS — SYSTOLIC BLOOD PRESSURE: 127 MMHG | OXYGEN SATURATION: 98 % | DIASTOLIC BLOOD PRESSURE: 76 MMHG | HEART RATE: 56 BPM

## 2018-03-15 DIAGNOSIS — L82.1 SEBORRHEIC KERATOSES: ICD-10-CM

## 2018-03-15 DIAGNOSIS — L81.4 LENTIGINES: ICD-10-CM

## 2018-03-15 DIAGNOSIS — L90.0 LICHEN SCLEROSUS: Primary | ICD-10-CM

## 2018-03-15 DIAGNOSIS — D18.01 CHERRY ANGIOMA: ICD-10-CM

## 2018-03-15 DIAGNOSIS — D22.9 MULTIPLE NEVI: ICD-10-CM

## 2018-03-15 PROCEDURE — 99214 OFFICE O/P EST MOD 30 MIN: CPT | Performed by: PHYSICIAN ASSISTANT

## 2018-03-15 NOTE — MR AVS SNAPSHOT
After Visit Summary   3/15/2018    Marysol Morrell    MRN: [id number]           Patient Information     Date Of Birth          1950        Visit Information        Provider Department      3/15/2018 11:20 AM Maya Resendez PA-C [facility]        Care Instructions    Proper skin care from [facility]:    -Eliminate harsh soaps as they strip the natural oils from the skin, often resulting in dry itchy skin ( i.e. Dial, Zest, Azeri Spring)  -Use mild soaps such as Cetaphil or Dove Sensitive Skin in the shower. You do not need to use soap on arms, legs, and trunk every time you shower unless visibly soiled.   -Avoid hot or cold showers.  -After showering, lightly dry off and apply moisturizing within 2-3 minutes. This will help trap moisture in the skin.   -Aggressive use of a moisturizer at least 1-2 times a day to the entire body (including -Vanicream, Cetaphil, Aquaphor or Cerave) and moisturize hands after every washing.  -We recommend using moisturizers that come in a tub that needs to be scooped out, not a pump. This has more of an oil base. It will hold moisture in your skin much better than a water base moisturizer. The above recommended are non-pore clogging.                     Follow-ups after your visit        Your next 10 appointments already scheduled     Mar 15, 2018 11:20 AM CDT   Office Visit with Maya Resendez PA-C   [facility] ([address] [phone] [phone]           Bring a current list of meds and any records pertaining to this visit. For Physicals, please bring immunization records and any forms needing to be filled out. Please arrive 10 minutes early to complete paperwork.              Who to contact     If you have questions or need follow up information about today's clinic visit or your schedule please contact [facility]  [facility] directly at [phone].  Normal or non-critical lab and imaging results will be communicated to you by MyChart, letter or phone within 4 business days after the clinic has received the results. If you do not hear from us within 7 days, please contact the clinic through [employer] or phone. If you have a critical or abnormal lab result, we will notify you by phone as soon as possible.  Submit refill requests through [employer] or call your pharmacy and they will forward the refill request to us. Please allow 3 business days for your refill to be completed.          Additional Information About Your Visit        [employer] Information     [employer] gives you secure access to your electronic health record. If you see a primary care provider, you can also send messages to your care team and make appointments. If you have questions, please call your primary care clinic.  If you do not have a primary care provider, please call [phone] and they will assist you.        Care EveryWhere ID     This is your Care EveryWhere ID. This could be used by other organizations to access your [place] medical records  [id number]        Your Vitals Were     Pulse Pulse Oximetry                56 98%           Blood Pressure from Last 3 Encounters:   03/15/18 127/76   02/02/18 122/62   10/28/17 125/66    Weight from Last 3 Encounters:   02/02/18 194 lb (88 kg)   10/25/17 195 lb (88.5 kg)   07/31/17 195 lb 11.2 oz (88.8 kg)              Today, you had the following     No orders found for display         Today's Medication Changes          These changes are accurate as of 3/15/18 10:47 AM.  If you have any questions, ask your nurse or doctor.               These medicines have changed or have updated prescriptions.        Dose/Directions    fluticasone 50 MCG/ACT spray   Commonly known as:  FLONASE   This may have changed:    - when to take this  - reasons to take this   Used for:  Cough        Dose:  1-2 spray   Spray 1-2 sprays  into both nostrils daily   Quantity:  16 g   Refills:  4                Primary Care Provider Office Phone # Fax #    Vernell Sanders PA-C [phone] [address]  [place] [address]        Equal Access to Services     APOLLONAIDA MARCO ANTONIO : Hadii aad ku hadasho Soomaali, waaxda luqadaha, qaybta kaalmada adeegyada, waxay idiin hayaan adeeg khrosariosh la'aan ah. So [facility] [phone].    ATENCIÓN: Si habla español, tiene a cabrera disposición servicios gratuitos de asistencia lingüística. Eitan al [phone].    We comply with applicable federal civil rights laws and [place] laws. We do not discriminate on the basis of race, color, national origin, age, disability, sex, sexual orientation, or gender identity.            Thank you!     [facility]  [facility]. Our goal is always to provide you with excellent care. Hearing back from our patients is one way we can continue to improve our services. Please take a few minutes to complete the written survey that you may receive in the mail after your visit with us. Thank you!             Your Updated Medication List - Protect others around you: Learn how to safely use, store and throw away your medicines at www.disposemymeds.org.          This list is accurate as of 3/15/18 10:47 AM.  Always use your most recent med list.                   Brand Name Dispense Instructions for use Diagnosis    ACETAMINOPHEN PO      Take 325-650 mg by mouth as needed for pain        allopurinol 100 MG tablet    ZYLOPRIM    90 tablet    Take 1 tablet (100 mg) by mouth daily    Chronic gout without tophus, unspecified cause, unspecified site       cetirizine 10 MG tablet    zyrTEC    90 tablet    TAKE ONE TABLET BY MOUTH ONE TIME DAILY IN THE P.M.    Chronic rhinitis       clobetasol 0.05 % ointment    TEMOVATE    45 g    Apply sparingly to affected area twice daily as needed.  Do not apply to face.    Lichen sclerosus et atrophicus        CRANBERRY      daily 650mg of cranberry po        fluticasone 50 MCG/ACT spray    FLONASE    16 g    Spray 1-2 sprays into both nostrils daily    Cough       furosemide 20 MG tablet    LASIX    90 tablet    Take 1 tablet (20 mg) by mouth daily as needed    Edema of both legs       levothyroxine 112 MCG tablet    SYNTHROID/LEVOTHROID    90 tablet    Take 1 tablet (112 mcg) by mouth daily    Hypothyroidism, unspecified type       losartan 100 MG tablet    COZAAR    90 tablet    Take 1 tablet (100 mg) by mouth daily    HTN (hypertension), benign       OMEPRAZOLE PO      Take 20 mg by mouth daily        oxyCODONE-acetaminophen 5-325 MG per tablet    PERCOCET     Take 1 tablet by mouth as needed for moderate to severe pain

## 2018-03-15 NOTE — PATIENT INSTRUCTIONS
Proper skin care from [facility]:    -Eliminate harsh soaps as they strip the natural oils from the skin, often resulting in dry itchy skin ( i.e. Dial, Zest, Nini Spring)  -Use mild soaps such as Cetaphil or Dove Sensitive Skin in the shower. You do not need to use soap on arms, legs, and trunk every time you shower unless visibly soiled.   -Avoid hot or cold showers.  -After showering, lightly dry off and apply moisturizing within 2-3 minutes. This will help trap moisture in the skin.   -Aggressive use of a moisturizer at least 1-2 times a day to the entire body (including -Vanicream, Cetaphil, Aquaphor or Cerave) and moisturize hands after every washing.  -We recommend using moisturizers that come in a tub that needs to be scooped out, not a pump. This has more of an oil base. It will hold moisture in your skin much better than a water base moisturizer. The above recommended are non-pore clogging.

## 2018-03-15 NOTE — PROGRESS NOTES
HPI:  Marysol Morrell is a 67 year old year old female patient here today for recheck vaginal lichen sclerosis.   Duration: 3 years  Symptoms:  Mild tenderness     Previous treatments: Clobetasol BID tapering to Clobetasol Qweek with good improvement     Alleviating/aggravating factors: none    Associated symptoms: none  Additional findings:Feels area is well controlled.  Pt recently dx with lymphoma. Would like a full body exam today. Has a few spots on back that irritate her. No treatments tried.  Patient has no other skin complaints today.  Remainder of the HPI, Meds, PMH, Allergies, FH, and SH was reviewed in chart.    Past Medical History:   Diagnosis Date     Arthritis      Cancer (H) 2017    lymphoma     Cholelithiasis      Colon polyps 2001 and 2006    no polyps 2011     Constipation      Diverticulitis 2010     DVT (deep venous thrombosis) (H)     x3 (one due to OCs, 2 post op)     GERD (gastroesophageal reflux disease)      Gout      History of migraine      HTN (hypertension), benign      Hypothyroidism     hashimoto's thyroiditis     Mild major depression (H)      Osteopenia      Ovarian cyst 2011     Rectocele      Schatzki's ring 2009    EGD      Tibia/fibula fracture 2009     Vasovagal syncope 1966     Vertigo 2000       Past Surgical History:   Procedure Laterality Date     APPENDECTOMY  1954     ARTHROSCOPY KNEE  1983     BONE MARROW BIOPSY, BONE SPECIMEN, NEEDLE/TROCAR N/A 7/31/2017    Procedure: BIOPSY BONE MARROW;  UNILATERAL BONE MARROW BIPOSY  ;  Surgeon: Jamil Mccarthy MD;  Location:  GI     BREAST BIOPSY, CORE RT/LT  1990     C LAP,SURG,COLECTOMY, PARTIAL, W/ANAST  2011    due to recurrent diverticulitis     EXCISE GARNETT'S NEUROMA FOOT       HYSTERECTOMY, PAP NO LONGER INDICATED  1984    with bladder repair     LAPAROSCOPIC OOPHORECTOMY  2011    with the colon resection     LARYNGOSCOPY WITH BIOPSY(IES) N/A 7/24/2017    Procedure: LARYNGOSCOPY WITH BIOPSY(IES);  DIRECT  LARYNGOSCOPY WITH TONGUE BIOPSIES;  Surgeon: Vernell Yang MD;  Location: [facility]     OPEN REDUCTION INTERNAL FIXATION TIBIA 2009     TONSILLECTOMY & ADENOIDECTOMY  1958        Family History   Problem Relation Age of Onset     C.A.D. Mother      and PE     CANCER Father 65     gastric ca       Social History     Social History     Marital status:      Spouse name: N/A     Number of children: N/A     Years of education: N/A     Occupational History     Not on file.     Social History Main Topics     Smoking status: Former Smoker     Types: Cigarettes     Quit date: 7/31/2000     Smokeless tobacco: Never Used      Comment: More of a social smoker     Alcohol use 0.0 oz/week     0 Standard drinks or equivalent per week      Comment: 0-3 a day. 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  90 tablet 1     OMEPRAZOLE PO Take 20 mg by mouth daily        ACETAMINOPHEN PO Take 325-650 mg by mouth as needed for pain       oxyCODONE-acetaminophen (PERCOCET) 5-325 MG per tablet Take 1 tablet by mouth as needed for moderate to severe pain       allopurinol (ZYLOPRIM) 100 MG tablet Take 1 tablet (100 mg) by mouth daily 90 tablet 3     levothyroxine (SYNTHROID/LEVOTHROID) 112 MCG tablet Take 1 tablet (112 mcg) by mouth daily 90 tablet 3     losartan (COZAAR) 100 MG tablet Take 1 tablet (100 mg) by mouth daily 90 tablet 3     furosemide (LASIX) 20 MG tablet Take 1 tablet (20 mg) by mouth daily as needed 90 tablet 0     fluticasone (FLONASE) 50 MCG/ACT spray Spray 1-2 sprays into both nostrils daily (Patient taking differently: Spray 1-2 sprays into both nostrils daily as  needed ) 16 g 4     clobetasol (TEMOVATE) 0.05 % ointment Apply sparingly to affected area twice daily as needed.  Do not apply to face. 45 g 1     CRANBERRY daily 650mg of cranberry po       No facility-administered encounter medications on file as of 3/15/2018.        Review Of Systems:  Skin: As above  Eyes: negative  Ears/Nose/Throat: negative  Respiratory: No shortness of breath, dyspnea on exertion, cough, or hemoptysis  Cardiovascular: negative  Gastrointestinal: negative  Genitourinary: negative  Musculoskeletal: negative  Neurologic: negative  Psychiatric: negative  Hematologic/Lymphatic/Immunologic: negative  Endocrine: negative      Objective:     /76  Pulse 56  SpO2 98%  Eyes: Conjunctivae/lids: Normal   ENT: Lips:  Normal  MSK: Normal  Cardiovascular: Peripheral edema none  Pulm: Breathing Normal  Neuro/Psych: Orientation: Normal; Mood/Affect: Normal, NAD, WDWN  Following areas examined: Scalp, face, eyelids, lips, neck, chest, abdomen, back, buttocks, groin, vulva and R&L upper and lower extremities.      Findings:    1) Red smooth well-defined macules on trunk and extremities.  2) Brown, stuck-on scaly appearing papules on trunk and extremities.  3) Well circumscribed macules with symmetric color distribution on trunk and extremities.  4) Leong WD smooth macules on face, neck, trunk, and extremities.  5) Mil erythema, mild hypopigmentation and mild hyperpigmentation rowan-introitus        Assessment and Plan:  1) Cherry angiomas on trunk and extremities.  2) Seborrheic keratoses on trunk and extremities.  3) Benign nevi on trunk and extremities.  4) Lentigines on face, neck, trunk, and extremities.  I discussed the specifics of tumor, prognosis, and genetics of benign lesions.  I explained that treatment of these lesions would be purely cosmetic and not medically neccessary.  I discussed with patient different removal options including excision, cryotherapy, cautery and /or laser.      5) Lichen  sclerosus  Apply Clobetasol BID x 2-3 weeks tapering to maintenance dose 2x week. Disc importance of treating area.     Follow up in 2 months

## 2018-03-15 NOTE — LETTER
3/15/2018         RE: Marysol Morrell  [address] [place] [address]        Dear Colleague,    Thank you for referring your patient, Marysol Morrell, to the [facility]. Please see a copy of my visit note below.    HPI:  Marysol Morrell is a 67 year old year old female patient here today for recheck vaginal lichen sclerosis.   Duration: 3 years  Symptoms:  Mild tenderness     Previous treatments: Clobetasol BID tapering to Clobetasol Qweek with good improvement     Alleviating/aggravating factors: none    Associated symptoms: none  Additional findings:Feels area is well controlled.  Pt recently dx with lymphoma. Would like a full body exam today. Has a few spots on back that irritate her. No treatments tried.  Patient has no other skin complaints today.  Remainder of the HPI, Meds, PMH, Allergies, FH, and SH was reviewed in chart.    Past Medical History:   Diagnosis Date     Arthritis      Cancer (H) 2017    lymphoma     Cholelithiasis      Colon polyps 2001 and 2006    no polyps 2011     Constipation      Diverticulitis 2010     DVT (deep venous thrombosis) (H)     x3 (one due to OCs, 2 post op)     GERD (gastroesophageal reflux disease)      Gout      History of migraine      HTN (hypertension), benign      Hypothyroidism     hashimoto's thyroiditis     Mild major depression (H)      Osteopenia      Ovarian cyst 2011     Rectocele      Schatzki's ring 2009    EGD      Tibia/fibula fracture 2009     Vasovagal syncope 1966     Vertigo 2000       Past Surgical History:   Procedure Laterality Date     APPENDECTOMY  1954     ARTHROSCOPY KNEE  1983     BONE MARROW BIOPSY, BONE SPECIMEN, NEEDLE/TROCAR N/A 7/31/2017    Procedure: BIOPSY BONE MARROW;  UNILATERAL BONE MARROW BIPOSY  ;  Surgeon: Jamil Mccarthy MD;  Location:  GI     BREAST BIOPSY, CORE RT/LT  1990     C LAP,SURG,COLECTOMY, PARTIAL, W/ANAST  2011    due to recurrent diverticulitis     EXCISE MARIOLA'S  NEUROMA FOOT       HYSTERECTOMY, PAP NO LONGER INDICATED  1984    with bladder repair     LAPAROSCOPIC OOPHORECTOMY  2011    with the colon resection     LARYNGOSCOPY WITH BIOPSY(IES) N/A 7/24/2017    Procedure: LARYNGOSCOPY WITH BIOPSY(IES);  DIRECT LARYNGOSCOPY WITH TONGUE BIOPSIES;  Surgeon: Vernell Yang MD;  Location: [facility]     OPEN REDUCTION INTERNAL FIXATION TIBIA  2009     TONSILLECTOMY & ADENOIDECTOMY  1958        Family History   Problem Relation Age of Onset     C.A.D. Mother      and PE     CANCER Father 65     gastric ca       Social History     Social History     Marital status:      Spouse name: N/A     Number of children: N/A     Years of education: N/A     Occupational History     Not on file.     Social History Main Topics     Smoking status: Former Smoker     Types: Cigarettes     Quit date: 7/31/2000     Smokeless tobacco: Never Used      Comment: More of a social smoker     Alcohol use 0.0 oz/week     0 Standard drinks or equivalent per week      Comment: 0-3 a day. 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  90 tablet 1     OMEPRAZOLE PO Take 20 mg by mouth daily        ACETAMINOPHEN PO Take 325-650 mg by mouth as needed for pain       oxyCODONE-acetaminophen (PERCOCET) 5-325 MG per tablet Take 1 tablet by mouth as needed for moderate to severe pain       allopurinol (ZYLOPRIM) 100 MG tablet Take 1 tablet (100 mg) by mouth daily 90 tablet 3     levothyroxine (SYNTHROID/LEVOTHROID) 112 MCG tablet Take 1 tablet (112 mcg) by mouth daily 90 tablet 3     losartan (COZAAR) 100 MG tablet Take 1 tablet (100 mg) by mouth daily 90 tablet 3      furosemide (LASIX) 20 MG tablet Take 1 tablet (20 mg) by mouth daily as needed 90 tablet 0     fluticasone (FLONASE) 50 MCG/ACT spray Spray 1-2 sprays into both nostrils daily (Patient taking differently: Spray 1-2 sprays into both nostrils daily as needed ) 16 g 4     clobetasol (TEMOVATE) 0.05 % ointment Apply sparingly to affected area twice daily as needed.  Do not apply to face. 45 g 1     CRANBERRY daily 650mg of cranberry po       No facility-administered encounter medications on file as of 3/15/2018.        Review Of Systems:  Skin: As above  Eyes: negative  Ears/Nose/Throat: negative  Respiratory: No shortness of breath, dyspnea on exertion, cough, or hemoptysis  Cardiovascular: negative  Gastrointestinal: negative  Genitourinary: negative  Musculoskeletal: negative  Neurologic: negative  Psychiatric: negative  Hematologic/Lymphatic/Immunologic: negative  Endocrine: negative      Objective:     /76  Pulse 56  SpO2 98%  Eyes: Conjunctivae/lids: Normal   ENT: Lips:  Normal  MSK: Normal  Cardiovascular: Peripheral edema none  Pulm: Breathing Normal  Neuro/Psych: Orientation: Normal; Mood/Affect: Normal, NAD, WDWN  Following areas examined: Scalp, face, eyelids, lips, neck, chest, abdomen, back, buttocks, groin, vulva and R&L upper and lower extremities.      Findings:    1) Red smooth well-defined macules on trunk and extremities.  2) Brown, stuck-on scaly appearing papules on trunk and extremities.  3) Well circumscribed macules with symmetric color distribution on trunk and extremities.  4) Leong WD smooth macules on face, neck, trunk, and extremities.  5) Mil erythema, mild hypopigmentation and mild hyperpigmentation rowan-introitus        Assessment and Plan:  1) Cherry angiomas on trunk and extremities.  2) Seborrheic keratoses on trunk and extremities.  3) Benign nevi on trunk and extremities.  4) Lentigines on face, neck, trunk, and extremities.  I discussed the specifics of tumor, prognosis,  and genetics of benign lesions.  I explained that treatment of these lesions would be purely cosmetic and not medically neccessary.  I discussed with patient different removal options including excision, cryotherapy, cautery and /or laser.      5) Lichen sclerosus  Apply Clobetasol BID x 2-3 weeks tapering to maintenance dose 2x week. Disc importance of treating area.     Follow up in 2 months      Again, thank you for allowing me to participate in the care of your patient.        Sincerely,        Maya Resendez PA-C

## 2018-03-25 DIAGNOSIS — E03.9 HYPOTHYROIDISM, UNSPECIFIED TYPE: ICD-10-CM

## 2018-03-26 RX ORDER — LEVOTHYROXINE SODIUM 112 UG/1
TABLET ORAL
Start: 2018-03-26

## 2018-03-26 NOTE — TELEPHONE ENCOUNTER
"levothyroxine (SYNTHROID/LEVOTHROID) 112 MCG tablet 90 tablet 3 6/1/2017     Last Written Prescription Date:  6/1/17  Last Fill Quantity: 90,  # refills: 3   Last office visit: 3/15/2018 with prescribing provider:  dacia Allison Office Visit:  none  Requested Prescriptions   Pending Prescriptions Disp Refills     levothyroxine (SYNTHROID/LEVOTHROID) 112 MCG tablet [Pharmacy Med Name: Levothyroxine Sodium Oral Tablet 112 MCG] 90 tablet 2     Sig: TAKE ONE TABLET BY MOUTH ONE TIME DAILY    Thyroid Protocol Passed    3/25/2018  7:01 AM       Passed - Patient is 12 years or older       Passed - Recent (12 mo) or future (30 days) visit within the authorizing provider's specialty    Patient had office visit in the last 12 months or has a visit in the next 30 days with authorizing provider or within the authorizing provider's specialty.  See \"Patient Info\" tab in inbasket, or \"Choose Columns\" in Meds & Orders section of the refill encounter.           Passed - Normal TSH on file in past 12 months    Recent Labs   Lab Test  06/02/17   1158   TSH  2.44             Passed - No active pregnancy on record    If patient is pregnant or has had a positive pregnancy test, please check TSH.         Passed - No positive pregnancy test in past 12 months    If patient is pregnant or has had a positive pregnancy test, please check TSH.          PHQ-9 SCORE 2/25/2015 12/2/2015 6/1/2017   Total Score 5 - -   Total Score - 1 1     "

## 2018-04-06 DIAGNOSIS — E55.9 VITAMIN D DEFICIENCY: Primary | ICD-10-CM

## 2018-04-06 NOTE — TELEPHONE ENCOUNTER
Cholecalciferol (VITAMIN D) 2000 UNITS tablet (Discontinued) 100 tablet 3 6/22/2016 10/28/2017     Last Written Prescription Date:  06/21/16  Last Fill Quantity: 100 tablet,  # refills: 3   Last office visit: 3/15/2018 with prescribing provider:  Mal   Future Office Visit:  unknown      Routing refill request to provider for review/approval because:  Drug not active on patient's medication list

## 2018-04-08 RX ORDER — ACETAMINOPHEN 160 MG
TABLET,DISINTEGRATING ORAL
Qty: 100 CAPSULE | Refills: 0 | Status: SHIPPED | OUTPATIENT
Start: 2018-04-08 | End: 2018-07-09

## 2018-04-08 NOTE — TELEPHONE ENCOUNTER
Routing refill request to provider for review/approval because:  Vitamin D was discontinued.    ERIK Brooks, RN, PHN

## 2018-04-25 ENCOUNTER — TRANSFERRED RECORDS (OUTPATIENT)
Dept: HEALTH INFORMATION MANAGEMENT | Facility: CLINIC | Age: 68
End: 2018-04-25

## 2018-07-16 ENCOUNTER — TRANSFERRED RECORDS (OUTPATIENT)
Dept: HEALTH INFORMATION MANAGEMENT | Facility: CLINIC | Age: 68
End: 2018-07-16

## 2018-08-05 DIAGNOSIS — M1A.9XX0 CHRONIC GOUT WITHOUT TOPHUS, UNSPECIFIED CAUSE, UNSPECIFIED SITE: ICD-10-CM

## 2018-08-06 NOTE — TELEPHONE ENCOUNTER
"allopurinol (ZYLOPRIM) 100 MG tablet 90 tablet 3 6/1/2017         Last Written Prescription Date:  06/01/2017  Last Fill Quantity: 90,  # refills: 3   Last office visit: 06/01/2017 with prescribing provider:  Vernell Sanders   Future Office Visit: 08/08/2018  Next 5 appointments (look out 90 days)     Aug 08, 2018 12:00 PM CDT   PHYS[facility] with Vernell Sanders PA-C   [facility]    [address] [facility] [address] [phone]                 Requested Prescriptions   Pending Prescriptions Disp Refills     allopurinol (ZYLOPRIM) 100 MG tablet [Pharmacy Med Name: Allopurinol Oral Tablet 100 MG] 90 tablet 2     Sig: TAKE ONE TABLET BY MOUTH ONE TIME DAILY    Gout Agents Protocol Failed    8/5/2018  9:12 PM       Failed - ALT on file in past 12 months    Recent Labs   Lab Test  06/02/17   1158   ALT  13            Failed - Has Uric Acid on file in past 12 months and value is less than 6    Recent Labs   Lab Test  06/02/17   1158   URIC  5.3     If level is 6mg/dL or greater, ok to refill one time and refer to provider.          Passed - CBC on file in past 12 months    Recent Labs   Lab Test  02/02/18   1115   WBC  4.4   RBC  3.65*   HGB  11.7   HCT  34.1*   PLT  193       For [facility] ONLY: [id number] = WBC, [id number] = RBC         Passed - Recent (12 mo) or future (30 days) visit within the authorizing provider's specialty    Patient had office visit in the last 12 months or has a visit in the next 30 days with authorizing provider or within the authorizing provider's specialty.  See \"Patient Info\" tab in inbasket, or \"Choose Columns\" in Meds & Orders section of the refill encounter.           Passed - Patient is age 18 or older       Passed - No active pregnancy on record       Passed - Normal serum creatinine on file in the past 12 months    Recent Labs   Lab Test  10/25/17   1830   CR  0.67            Passed - No positive pregnancy test in past year          "

## 2018-08-07 RX ORDER — ALLOPURINOL 100 MG/1
TABLET ORAL
Qty: 30 TABLET | Refills: 0 | Status: SHIPPED | OUTPATIENT
Start: 2018-08-07 | End: 2018-08-08

## 2018-08-07 NOTE — TELEPHONE ENCOUNTER
Medication is being filled for 1 time refill only due to:  has apt scheduled tomorrow. Carlyn Thompson RN

## 2018-08-08 ENCOUNTER — OFFICE VISIT (OUTPATIENT)
Dept: FAMILY MEDICINE | Facility: CLINIC | Age: 68
End: 2018-08-08
Payer: COMMERCIAL

## 2018-08-08 VITALS
WEIGHT: 184.6 LBS | BODY MASS INDEX: 30.75 KG/M2 | DIASTOLIC BLOOD PRESSURE: 78 MMHG | TEMPERATURE: 97.6 F | HEART RATE: 55 BPM | HEIGHT: 65 IN | OXYGEN SATURATION: 99 % | SYSTOLIC BLOOD PRESSURE: 140 MMHG

## 2018-08-08 DIAGNOSIS — A60.04 HERPES SIMPLEX VULVOVAGINITIS: ICD-10-CM

## 2018-08-08 DIAGNOSIS — I10 HTN (HYPERTENSION), BENIGN: ICD-10-CM

## 2018-08-08 DIAGNOSIS — G62.9 SENSORY NEUROPATHY: ICD-10-CM

## 2018-08-08 DIAGNOSIS — I82.499 DEEP VEIN THROMBOSIS (DVT) OF OTHER VEIN OF LOWER EXTREMITY, UNSPECIFIED CHRONICITY, UNSPECIFIED LATERALITY (H): ICD-10-CM

## 2018-08-08 DIAGNOSIS — C83.31 DIFFUSE LARGE B-CELL LYMPHOMA OF LYMPH NODES OF HEAD (H): ICD-10-CM

## 2018-08-08 DIAGNOSIS — Z00.00 ENCOUNTER FOR ROUTINE ADULT HEALTH EXAMINATION WITHOUT ABNORMAL FINDINGS: Primary | ICD-10-CM

## 2018-08-08 DIAGNOSIS — Z87.39 HISTORY OF GOUT: ICD-10-CM

## 2018-08-08 DIAGNOSIS — N95.2 ATROPHIC VAGINITIS: ICD-10-CM

## 2018-08-08 DIAGNOSIS — Z23 NEED FOR VACCINATION: ICD-10-CM

## 2018-08-08 DIAGNOSIS — Z13.6 CARDIOVASCULAR SCREENING; LDL GOAL LESS THAN 130: ICD-10-CM

## 2018-08-08 DIAGNOSIS — L90.0 LICHEN SCLEROSUS ET ATROPHICUS: ICD-10-CM

## 2018-08-08 DIAGNOSIS — E03.9 HYPOTHYROIDISM, UNSPECIFIED TYPE: ICD-10-CM

## 2018-08-08 PROCEDURE — 84443 ASSAY THYROID STIM HORMONE: CPT | Performed by: PHYSICIAN ASSISTANT

## 2018-08-08 PROCEDURE — 84550 ASSAY OF BLOOD/URIC ACID: CPT | Performed by: PHYSICIAN ASSISTANT

## 2018-08-08 PROCEDURE — 80061 LIPID PANEL: CPT | Performed by: PHYSICIAN ASSISTANT

## 2018-08-08 PROCEDURE — 80053 COMPREHEN METABOLIC PANEL: CPT | Performed by: PHYSICIAN ASSISTANT

## 2018-08-08 PROCEDURE — 90471 IMMUNIZATION ADMIN: CPT | Performed by: PHYSICIAN ASSISTANT

## 2018-08-08 PROCEDURE — 99397 PER PM REEVAL EST PAT 65+ YR: CPT | Mod: 25 | Performed by: PHYSICIAN ASSISTANT

## 2018-08-08 PROCEDURE — 36415 COLL VENOUS BLD VENIPUNCTURE: CPT | Performed by: PHYSICIAN ASSISTANT

## 2018-08-08 PROCEDURE — 90714 TD VACC NO PRESV 7 YRS+ IM: CPT | Performed by: PHYSICIAN ASSISTANT

## 2018-08-08 RX ORDER — LOSARTAN POTASSIUM 100 MG/1
100 TABLET ORAL DAILY
Qty: 90 TABLET | Refills: 3 | Status: SHIPPED | OUTPATIENT
Start: 2018-08-08 | End: 2019-08-30

## 2018-08-08 RX ORDER — VALACYCLOVIR HYDROCHLORIDE 500 MG/1
500 TABLET, FILM COATED ORAL 2 TIMES DAILY
Qty: 6 TABLET | Refills: 3 | Status: SHIPPED | OUTPATIENT
Start: 2018-08-08 | End: 2019-05-30

## 2018-08-08 RX ORDER — ALLOPURINOL 100 MG/1
100 TABLET ORAL DAILY
Qty: 90 TABLET | Refills: 3 | Status: SHIPPED | OUTPATIENT
Start: 2018-08-08 | End: 2019-09-17

## 2018-08-08 RX ORDER — LEVOTHYROXINE SODIUM 112 UG/1
112 TABLET ORAL DAILY
Qty: 90 TABLET | Refills: 3 | Status: SHIPPED | OUTPATIENT
Start: 2018-08-08 | End: 2019-08-30

## 2018-08-08 NOTE — PROGRESS NOTES
SUBJECTIVE:   Marysol Morrell is a 67 year old female who presents for Preventive Visit.    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  No    Healthy Habits:    Do you get at least three servings of calcium containing foods daily (dairy, green leafy vegetables, etc.)? yes    Amount of exercise or daily activities, outside of work: 0 day(s) per week    Problems taking medications regularly No    Medication side effects: No    Have you had an eye exam in the past two years? no    Do you see a dentist twice per year? yes    Do you have sleep apnea, excessive snoring or daytime drowsiness?no      Ability to successfully perform activities of daily living: Yes, no assistance needed    Home safety:  none identified     Hearing impairment: Yes,     Fall risk:  Fallen 2 or more times in the past year?: No  Any fall with injury in the past year?: No        Reviewed and updated as needed this visit by clinical staff  Tobacco  Allergies  Meds  Soc Hx        Reviewed and updated as needed this visit by Provider        Social History   Substance Use Topics     Smoking status: Former Smoker     Types: Cigarettes     Quit date: 7/31/2000     Smokeless tobacco: Never Used      Comment: More of a social smoker     Alcohol use 0.0 oz/week     0 Standard drinks or equivalent per week      Comment: 0-3 a day. mixed drinks, wine       If you drink alcohol do you typically have >3 drinks per day or >7 drinks per week? Yes - AUDIT SCORE:     No flowsheet data found.                        Today's PHQ-2 Score:   PHQ-2 ( 1999 Pf[employer]) 6/1/2017 6/1/2017   Q1: Little interest or pleasure in doing things 0  0   Q2: Feeling down, depressed or hopeless 0 0   PHQ-2 Score 0 0   Q1: Little interest or pleasure in doing things - -   Q2: Feeling down, depressed or hopeless - -   PHQ-2 Score - -       Do you feel safe in your environment - Yes    Do you have a Health Care Directive?: No: Advance care planning was reviewed with patient; patient declined at this time.    Current providers sharing in care for this patient include:   Patient Care Team:  Vernell Sanders PA-C as PCP - General (Internal Medicine)    The following health maintenance items are reviewed in Epic and correct as of today:  Health Maintenance   Topic Date Due     DEPRESSION ACTION PLAN Q1 YR  05/11/2017     ADVANCE DIRECTIVE PLANNING Q5 YRS  10/24/2017     PHQ-9 Q6 MONTHS  12/01/2017     TETANUS IMMUNIZATION (SYSTEM ASSIGNED)  02/11/2018     MAMMO SCREEN Q2 YR (SYSTEM ASSIGNED)  05/11/2018     FALL RISK ASSESSMENT  06/01/2018     INFLUENZA VACCINE (1) 09/01/2018     COLONOSCOPY Q5 YR  06/20/2021     LIPID SCREEN Q5 YR FEMALE (SYSTEM ASSIGNED)  06/02/2022     DEXA SCAN SCREENING (SYSTEM ASSIGNED)  Completed     PNEUMOCOCCAL  Completed     HEPATITIS C SCREENING  Completed     Past Medical History:   Diagnosis Date     Arthritis      Cancer (H) 2017    lymphoma     Cholelithiasis      Colon polyps 2001 and 2006    no polyps 2011     Constipation      Diffuse large B cell lymphoma (H) 2017    tongue, followed by Dr. Wilkins at [facility] and [facility]     Diverticulitis 2010     DVT (deep venous thrombosis) (H)     x3 (one due to OCs, 2 post op)     Genital herpes      GERD (gastroesophageal reflux disease)      Gout      History of migraine      HTN (hypertension), benign      Hypothyroidism     hashimoto's thyroiditis     Lichen sclerosus et atrophicus      Mild major depression (H)      Osteopenia      Ovarian cyst 2011     Rectocele      Schatzki's ring 2009    EGD      Tibia/fibula fracture 2009     Vasovagal syncope 1966     Vertigo 2000     Current  "Outpatient Prescriptions   Medication Sig Dispense Refill     ACETAMINOPHEN PO Take 325-650 mg by mouth as needed for pain       allopurinol (ZYLOPRIM) 100 MG tablet TAKE ONE TABLET BY MOUTH ONE TIME DAILY  30 tablet 0     cetirizine (ZYRTEC) 10 MG tablet TAKE ONE TABLET BY MOUTH ONE TIME DAILY IN THE P.M. 90 tablet 3     Cholecalciferol (VITAMIN D3) 2000 units CAPS TAKE ONE CAPSULE BY MOUTH ONE TIME DAILY 30 capsule 0     clobetasol (TEMOVATE) 0.05 % ointment Apply sparingly to affected area twice daily as needed.  Do not apply to face. 45 g 1     CRANBERRY daily 650mg of cranberry po       furosemide (LASIX) 20 MG tablet Take 1 tablet (20 mg) by mouth daily as needed 90 tablet 0     levothyroxine (SYNTHROID/LEVOTHROID) 112 MCG tablet TAKE ONE TABLET BY MOUTH ONE TIME DAILY  30 tablet 0     losartan (COZAAR) 100 MG tablet TAKE ONE TABLET BY MOUTH ONE TIME DAILY  30 tablet 0     OMEPRAZOLE PO Take 20 mg by mouth daily          ROS:  Constitutional, HEENT, cardiovascular, pulmonary, GI, , musculoskeletal, neuro, skin, endocrine and psych systems are negative, except as otherwise noted.    OBJECTIVE:   Pulse 55  Temp 97.6  F (36.4  C) (Oral)  Ht 5' 5\" (1.651 m)  Wt 184 lb 9.6 oz (83.7 kg)  SpO2 99%  Breastfeeding? No  BMI 30.72 kg/m2 Estimated body mass index is 30.72 kg/(m^2) as calculated from the following:    Height as of this encounter: 5' 5\" (1.651 m).    Weight as of this encounter: 184 lb 9.6 oz (83.7 kg).  EXAM:   GENERAL APPEARANCE: healthy, alert and no distress  EYES: Eyes grossly normal to inspection, PERRL and conjunctivae and sclerae normal  HENT: ear canals and TM's normal, nose and mouth without ulcers or lesions, oropharynx clear and oral mucous membranes moist  NECK: no adenopathy, no asymmetry, masses, or scars and thyroid normal to palpation  RESP: lungs clear to auscultation - no rales, rhonchi or wheezes  BREAST: normal without masses, tenderness or nipple discharge and no palpable " axillary masses or adenopathy  CV: regular rate and rhythm, normal S1 S2, no S3 or S4, no murmur, click or rub, no peripheral edema and peripheral pulses strong  ABDOMEN: soft, nontender, no hepatosplenomegaly, no masses and bowel sounds normal  MS: no musculoskeletal defects are noted and gait is age appropriate without ataxia  SKIN: no suspicious lesions or rashes  NEURO: Normal strength and tone, sensory exam grossly normal, mentation intact and speech normal  PSYCH: mentation appears normal and affect normal/bright        ASSESSMENT / PLAN:   Assessment and Plan:     (Z00.00) Encounter for routine adult health examination without abnormal findings  (primary encounter diagnosis)  Comment: CBC checked at [facility] so not repeated today.  Recd annual mammogram.  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    (I05.971) Deep vein thrombosis (DVT) of other vein of lower extremity, unspecified chronicity, unspecified laterality (H)  Comment:   Plan: has hx of multiple DVTs so on long term anticoag with xarelto. No bleeding concerns.    (I10) HTN (hypertension), benign  Comment: borderline today but lower at home. Check more reading and send these via ChipX.  Plan: losartan (COZAAR) 100 MG tablet, Comprehensive         metabolic panel            (Z13.6) CARDIOVASCULAR SCREENING; LDL GOAL LESS THAN 130  Comment:   Plan: lipids    (E03.9) Hypothyroidism, unspecified type  Comment:   Plan: levothyroxine (SYNTHROID/LEVOTHROID) 112 MCG         tablet, TSH with free T4 reflex            (Z87.39) History of gout  Comment: taking allopurinol 100mg every day.  Plan: Uric acid            (A60.04) Herpes simplex vulvovaginitis  Comment: recurrent genital herpes  Plan: valACYclovir (VALTREX) 500 MG tablet        Bid x 3 days if has another outbreak    (N95.2) Atrophic vaginitis  Comment:   Plan:  "conjugated estrogens (PREMARIN) cream        Twice per week    (L90.0) Lichen sclerosus et atrophicus  Comment:   Plan: using steroid crm prn    (G62.9) Sensory neuropathy  Comment:   Plan: followed by Dr. Brenner          End of Life Planning:  Patient currently has an advanced directive: No.  I have verified the patient's ablity to prepare an advanced directive/make health care decisions.  Literature was provided to assist patient in preparing an advanced directive.    COUNSELING:  Reviewed preventive health counseling, as reflected in patient instructions    BP Readings from Last 1 Encounters:   03/15/18 127/76     Estimated body mass index is 30.72 kg/(m^2) as calculated from the following:    Height as of this encounter: 5' 5\" (1.651 m).    Weight as of this encounter: 184 lb 9.6 oz (83.7 kg).           reports that she quit smoking about 18 years ago. Her smoking use included Cigarettes. She has never used smokeless tobacco.      Appropriate preventive services were discussed with this patient, including applicable screening as appropriate for cardiovascular disease, diabetes, osteopenia/osteoporosis, and glaucoma.  As appropriate for age/gender, discussed screening for colorectal cancer, prostate cancer, breast cancer, and cervical cancer. Checklist reviewing preventive services available has been given to the patient.    Reviewed patients plan of care and provided an AVS. The Basic Care Plan (routine screening as documented in Health Maintenance) for Marysol meets the Care Plan requirement. This Care Plan has been established and reviewed with the Patient.    Counseling Resources:  ATP IV Guidelines  Pooled Cohorts Equation Calculator  Breast Cancer Risk Calculator  FRAX Risk Assessment  ICSI Preventive Guidelines  Dietary Guidelines for Americans, 2010  USDA's MyPlate  ASA Prophylaxis  Lung CA Screening    Vernell Sanders PA-C  [facility]  "

## 2018-08-08 NOTE — MR AVS SNAPSHOT
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 (H)        HTN (hypertension), benign        CARDIOVASCULAR SCREENING; LDL GOAL LESS THAN 130        Hypothyroidism, unspecified type        History of gout        Chronic gout without tophus, unspecified cause, unspecified site        Deep vein thrombosis (DVT) of other vein of lower extremity, unspecified chronicity, unspecified laterality (H)        Herpes simplex vulvovaginitis        Atrophic vaginitis        Lichen sclerosus et atrophicus          Care Instructions      Preventive Health Recommendations    Female Ages 65 +    Yearly exam:     See your health care provider every year in order to  o Review health changes.   o Discuss preventive care.    o Review your medicines if your doctor has prescribed any.      You no longer need a yearly Pap test unless you've had an abnormal Pap test in the past 10 years. If you have vaginal symptoms, such as bleeding or discharge, be sure to talk with your provider about a Pap test.      Every 1 to 2 years, have a mammogram.  If you are over 69, talk with your health care provider about whether or not you want to continue having screening mammograms.      Every 10 years, have a colonoscopy. Or, have a yearly FIT test (stool test). These exams will check for colon cancer.       Have a cholesterol test every 5 years, or more often if your doctor advises it.       Have a diabetes test (fasting glucose) every three years. If you are at risk for diabetes, you should have this test more often.       At age 65, have a bone density scan (DEXA) to  check for osteoporosis (brittle bone disease).    Shots:    Get a flu shot each year.    Get a tetanus shot every 10 years.    Talk to your doctor about your pneumonia vaccines. There are now two you should receive - Pneumovax (PPSV 23) and Prevnar (PCV 13).    Talk to your pharmacist about the shingles vaccine.    Talk to your doctor about the hepatitis B vaccine.    Nutrition:     Eat at least 5 servings of fruits and vegetables each day.      Eat whole-grain bread, whole-wheat pasta and brown rice instead of white grains and rice.      Get adequate Calcium and Vitamin D.     Lifestyle    Exercise at least 150 minutes a week (30 minutes a day, 5 days a week). This will help you control your weight and prevent disease.      Limit alcohol to one drink per day.      No smoking.       Wear sunscreen to prevent skin cancer.       See your dentist twice a year for an exam and cleaning.      See your eye doctor every 1 to 2 years to screen for conditions such as glaucoma, macular degeneration and cataracts.    Shingrex is the new shingles vaccine, check with insurance and if covered, get at pharmacy          Follow-ups after your visit        Who to contact     If you have questions or need follow up information about today's clinic visit or your schedule please contact [facility] directly at [phone].  Normal or non-critical lab and imaging results will be communicated to you by MorphoSyshart, letter or phone within 4 business days after the clinic has received the results. If you do not hear from us within 7 days, please contact the clinic through MorphoSyshart or phone. If you have a critical or abnormal lab result, we will notify you by phone as soon as possible.  Submit refill requests through DataFlyte or call your pharmacy and they will forward the refill request to us. Please allow 3 business days for your refill to be completed.          Additional Information About Your Visit        MyChart Information      "MyClean gives you secure access to your electronic health record. If you see a primary care provider, you can also send messages to your care team and make appointments. If you have questions, please call your primary care clinic.  If you do not have a primary care provider, please call [phone] and they will assist you.        Care EveryWhere ID     This is your Care EveryWhere ID. This could be used by other organizations to access your [place] medical records  [id number]        Your Vitals Were     Pulse Temperature Height Pulse Oximetry Breastfeeding? BMI (Body Mass Index)    55 97.6  F (36.4  C) (Oral) 5' 5\" (1.651 m) 99% No 30.72 kg/m2       Blood Pressure from Last 3 Encounters:   03/15/18 127/76   02/02/18 122/62   10/28/17 125/66    Weight from Last 3 Encounters:   08/08/18 184 lb 9.6 oz (83.7 kg)   02/02/18 194 lb (88 kg)   10/25/17 195 lb (88.5 kg)              We Performed the Following     Comprehensive metabolic panel     Lipid Profile     TSH with free T4 reflex     Uric acid          Today's Medication Changes          These changes are accurate as of 8/8/18 12:37 PM.  If you have any questions, ask your nurse or doctor.               Start taking these medicines.        Dose/Directions    conjugated estrogens cream   Commonly known as:  PREMARIN   Used for:  Atrophic vaginitis   Started by:  Vernell Sanders PA-C        Dose:  0.5 g   Start taking on:  8/9/2018   Place 0.5 g vaginally twice a week   Quantity:  30 g   Refills:  12       valACYclovir 500 MG tablet   Commonly known as:  VALTREX   Used for:  Herpes simplex vulvovaginitis   Started by:  Vernell Sanders PA-C        Dose:  500 mg   Take 1 tablet (500 mg) by mouth 2 times daily   Quantity:  6 tablet   Refills:  3         These medicines have changed or have updated prescriptions.        Dose/Directions    allopurinol 100 MG tablet   Commonly known as:  ZYLOPRIM   This may have changed:  See the new instructions.   Used for:  " Chronic gout without tophus, unspecified cause, unspecified site   Changed by:  Vernell Sanders PA-C        Dose:  100 mg   Take 1 tablet (100 mg) by mouth daily   Quantity:  90 tablet   Refills:  3       levothyroxine 112 MCG tablet   Commonly known as:  SYNTHROID/LEVOTHROID   This may have changed:  See the new instructions.   Used for:  Hypothyroidism, unspecified type   Changed by:  Vernell Sanders PA-C        Dose:  112 mcg   Take 1 tablet (112 mcg) by mouth daily   Quantity:  90 tablet   Refills:  3       losartan 100 MG tablet   Commonly known as:  COZAAR   This may have changed:  See the new instructions.   Used for:  HTN (hypertension), benign   Changed by:  Vernell Sanders PA-C        Dose:  100 mg   Take 1 tablet (100 mg) by mouth daily   Quantity:  90 tablet   Refills:  3         Stop taking these medicines if you haven't already. Please contact your care team if you have questions.     fluticasone 50 MCG/ACT spray   Commonly known as:  FLONASE   Stopped by:  Vernell Sanders PA-C           oxyCODONE-acetaminophen 5-325 MG per tablet   Commonly known as:  PERCOCET   Stopped by:  Vernell Sanders PA-C                Where to get your medicines      These medications were sent to [id number] - [place], [place] - [address] [address]  [id number] [place] [address]     Phone:  [phone]     allopurinol 100 MG tablet    conjugated estrogens cream    levothyroxine 112 MCG tablet    losartan 100 MG tablet    valACYclovir 500 MG tablet                Primary Care Provider Office Phone # Fax #    Vernell Sanders PA-C [phone] [address]  [place] [address]        Equal Access to Services     [facility] : Hadii jose a gasparo Soabdifatah, waaxda luqadaha, qaybta kaalmada adeegyada, lynda sanchez. So [facility] [phone].    ATENCIÓN: Si habla español, tiene a cabrera disposición servicios gratuitos de asistencia lingüística. Llame al  [phone].    We comply with applicable federal civil rights laws and [place] laws. We do not discriminate on the basis of race, color, national origin, age, disability, sex, sexual orientation, or gender identity.            Thank you!     Thank you for choosing [facility]  for your care. Our goal is always to provide you with excellent care. Hearing back from our patients is one way we can continue to improve our services. Please take a few minutes to complete the written survey that you may receive in the mail after your visit with us. Thank you!             Your Updated Medication List - Protect others around you: Learn how to safely use, store and throw away your medicines at www.disposemymeds.org.          This list is accurate as of 8/8/18 12:37 PM.  Always use your most recent med list.                   Brand Name Dispense Instructions for use Diagnosis    ACETAMINOPHEN PO      Take 325-650 mg by mouth as needed for pain        allopurinol 100 MG tablet    ZYLOPRIM    90 tablet    Take 1 tablet (100 mg) by mouth daily    Chronic gout without tophus, unspecified cause, unspecified site       cetirizine 10 MG tablet    zyrTEC    90 tablet    TAKE ONE TABLET BY MOUTH ONE TIME DAILY IN THE P.M.    Chronic rhinitis, unspecified type       clobetasol 0.05 % ointment    TEMOVATE    45 g    Apply sparingly to affected area twice daily as needed.  Do not apply to face.    Lichen sclerosus et atrophicus       conjugated estrogens cream   Start taking on:  8/9/2018    PREMARIN    30 g    Place 0.5 g vaginally twice a week    Atrophic vaginitis       CRANBERRY      daily 650mg of cranberry po        furosemide 20 MG tablet    LASIX    90 tablet    Take 1 tablet (20 mg) by mouth daily as needed    Edema of both legs       levothyroxine 112 MCG tablet    SYNTHROID/LEVOTHROID    90 tablet    Take 1 tablet (112 mcg) by mouth daily    Hypothyroidism, unspecified type       losartan 100 MG tablet    COZAAR     90 tablet    Take 1 tablet (100 mg) by mouth daily    HTN (hypertension), benign       OMEPRAZOLE PO      Take 20 mg by mouth daily        valACYclovir 500 MG tablet    VALTREX    6 tablet    Take 1 tablet (500 mg) by mouth 2 times daily    Herpes simplex vulvovaginitis       vitamin D3 2000 units Caps     30 capsule    TAKE ONE CAPSULE BY MOUTH ONE TIME DAILY    Vitamin D deficiency       XARELTO 20 MG Tabs tablet   Generic drug:  rivaroxaban ANTICOAGULANT      Take 1 tablet (20 mg) by mouth daily (with dinner)

## 2018-08-08 NOTE — NURSING NOTE
Screening Questionnaire for Adult Immunization    Are you sick today?   No   Do you have allergies to medications, food, a vaccine component or latex?   Yes   Have you ever had a serious reaction after receiving a vaccination?   No   Do you have a long-term health problem with heart disease, lung disease, asthma, kidney disease, metabolic disease (e.g. diabetes), anemia, or other blood disorder?   Yes   Do you have cancer, leukemia, HIV/AIDS, or any other immune system problem?   No   In the past 3 months, have you taken medications that affect  your immune system, such as prednisone, other steroids, or anticancer drugs; drugs for the treatment of rheumatoid arthritis, Crohn s disease, or psoriasis; or have you had radiation treatments?   No   Have you had a seizure, or a brain or other nervous system problem?   No   During the past year, have you received a transfusion of blood or blood     products, or been given immune (gamma) globulin or antiviral drug?   No   For women: Are you pregnant or is there a chance you could become        pregnant during the next month?   No   Have you received any vaccinations in the past 4 weeks?   No     Immunization questionnaire was positive for at least one answer.  Notified PCP.        Per orders of Vernell Sanders, injection of td given by Isa Garcia. Patient instructed to remain in clinic for 15 minutes afterwards, and to report any adverse reaction to me immediately.  Prior to injection verified patient identity using patient's name and date of birth.  Due to injection administration, patient instructed to remain in clinic for 15 minutes  afterwards, and to report any adverse reaction to me immediately.         Screening performed by Ias Garcia on 8/8/2018 at 1:03 PM.

## 2018-08-08 NOTE — PATIENT INSTRUCTIONS

## 2018-08-09 ENCOUNTER — TRANSFERRED RECORDS (OUTPATIENT)
Dept: HEALTH INFORMATION MANAGEMENT | Facility: CLINIC | Age: 68
End: 2018-08-09

## 2018-08-09 LAB
ALBUMIN SERPL-MCNC: 3.7 G/DL (ref 3.4–5)
ALP SERPL-CCNC: 79 U/L (ref 40–150)
ALT SERPL W P-5'-P-CCNC: 14 U/L (ref 0–50)
ANION GAP SERPL CALCULATED.3IONS-SCNC: 8 MMOL/L (ref 3–14)
AST SERPL W P-5'-P-CCNC: 12 U/L (ref 0–45)
BILIRUB SERPL-MCNC: 0.7 MG/DL (ref 0.2–1.3)
BUN SERPL-MCNC: 13 MG/DL (ref 7–30)
CALCIUM SERPL-MCNC: 9.1 MG/DL (ref 8.5–10.1)
CHLORIDE SERPL-SCNC: 107 MMOL/L (ref 94–109)
CHOLEST SERPL-MCNC: 212 MG/DL
CO2 SERPL-SCNC: 27 MMOL/L (ref 20–32)
CREAT SERPL-MCNC: 0.75 MG/DL (ref 0.52–1.04)
GFR SERPL CREATININE-BSD FRML MDRD: 77 ML/MIN/1.7M2
GLUCOSE SERPL-MCNC: 98 MG/DL (ref 70–99)
HDLC SERPL-MCNC: 60 MG/DL
LDLC SERPL CALC-MCNC: 117 MG/DL
NONHDLC SERPL-MCNC: 152 MG/DL
POTASSIUM SERPL-SCNC: 4.6 MMOL/L (ref 3.4–5.3)
PROT SERPL-MCNC: 6.7 G/DL (ref 6.8–8.8)
SODIUM SERPL-SCNC: 142 MMOL/L (ref 133–144)
TRIGL SERPL-MCNC: 177 MG/DL
TSH SERPL DL<=0.005 MIU/L-ACNC: 1.43 MU/L (ref 0.4–4)
URATE SERPL-MCNC: 4.6 MG/DL (ref 2.6–6)

## 2018-08-09 NOTE — PROGRESS NOTES
It was a pleasure seeing you for your physical examination.  I wanted to get back to you with your test results.  I have enclosed a copy for your review.      I am happy to report that your blood salts, kidney tests, liver tests, and proteins are all fine.    Your TSH (thyroid test) and uric acid levels are normal.    Your total cholesterol is 212 with the normal range being below 200.  Your HDL or good cholesterol is 60 with the normal range being above 50.  Your LDL or bad cholesterol is 117 with the normal range being below 130.  Looks good.    Great it see you looking so well.    Vernell Sanders PA-C

## 2018-08-13 ENCOUNTER — TRANSFERRED RECORDS (OUTPATIENT)
Dept: HEALTH INFORMATION MANAGEMENT | Facility: CLINIC | Age: 68
End: 2018-08-13

## 2018-08-13 ENCOUNTER — HOSPITAL ENCOUNTER (OUTPATIENT)
Dept: MAMMOGRAPHY | Facility: CLINIC | Age: 68
Discharge: HOME | End: 2018-08-13
Attending: PHYSICIAN ASSISTANT | Admitting: PHYSICIAN ASSISTANT
Payer: MEDICARE

## 2018-08-13 DIAGNOSIS — Z12.31 VISIT FOR SCREENING MAMMOGRAM: ICD-10-CM

## 2018-08-13 PROCEDURE — 77067 SCR MAMMO BI INCL CAD: CPT

## 2018-08-25 ENCOUNTER — TRANSFERRED RECORDS (OUTPATIENT)
Dept: HEALTH INFORMATION MANAGEMENT | Facility: CLINIC | Age: 68
End: 2018-08-25

## 2018-08-27 ENCOUNTER — TRANSFERRED RECORDS (OUTPATIENT)
Dept: HEALTH INFORMATION MANAGEMENT | Facility: CLINIC | Age: 68
End: 2018-08-27

## 2018-10-18 DIAGNOSIS — L90.0 LICHEN SCLEROSUS ET ATROPHICUS: ICD-10-CM

## 2018-10-18 NOTE — TELEPHONE ENCOUNTER
Name of caller: Marysol  Relationship of Patient: Self    Reason for Call: PT called to request a refill of her clobetasol (TEMOVATE) 0.05 % ointment, she stated her pharmacy does not have Dr. Sandoval listed as the prescribing provider, but she she stated that she is and her pharmacy needs us to call in the refill.     Best phone number to reach pt at is: [phone]  Ok to leave a message with medical info? Yes    Pharmacy preferred (if calling for a refill): [facility] in [place]  Patient Representative - [facility]

## 2018-10-18 NOTE — TELEPHONE ENCOUNTER
Last Written Prescription Date:  8/17/16  Last Fill Quantity: 45gr,  # refills: 1   Last office visit: 8/8/2018 with prescribing provider:     Future Office Visit:    Requested Prescriptions   Pending Prescriptions Disp Refills     clobetasol (TEMOVATE) 0.05 % ointment 45 g 1     Sig: Apply sparingly to affected area twice daily as needed.  Do not apply to face.    There is no refill protocol information for this order

## 2018-10-19 RX ORDER — CLOBETASOL PROPIONATE 0.5 MG/G
OINTMENT TOPICAL
Qty: 45 G | Refills: 1 | Status: SHIPPED | OUTPATIENT
Start: 2018-10-19 | End: 2020-01-23

## 2018-10-19 NOTE — TELEPHONE ENCOUNTER
patient was seen by  Maya Resendez PA-C   ok to uyen VegaRN BSN  [facility]  [phone]

## 2018-11-12 ENCOUNTER — TRANSFERRED RECORDS (OUTPATIENT)
Dept: HEALTH INFORMATION MANAGEMENT | Facility: CLINIC | Age: 68
End: 2018-11-12

## 2018-12-23 ENCOUNTER — NURSE TRIAGE (OUTPATIENT)
Dept: NURSING | Facility: CLINIC | Age: 68
End: 2018-12-23

## 2018-12-23 DIAGNOSIS — K21.9 GASTROESOPHAGEAL REFLUX DISEASE, ESOPHAGITIS PRESENCE NOT SPECIFIED: ICD-10-CM

## 2018-12-23 NOTE — TELEPHONE ENCOUNTER
Clinic Action Needed: Yes. Please call patient at [phone].     Reason for Call: Patient calling requesting . States medication was initially prescribed by Dr. Sanders and then the medication was being handled by Mitchell Wilkins MD with [facility]. States Dr. Wilkins told patient, the medication should be prescribed by her primary care provider thus why she is requesting it now.     Routed to: [facility] Nurse Pool.    Ronal Herrera RN  [facility]

## 2018-12-23 NOTE — TELEPHONE ENCOUNTER
Clinic Action Needed: Yes. Please call patient at [phone].     Reason for Call: Patient calling requesting . States medication was initially prescribed by Dr. Sanders and then the medication was being handled by Mitchell Wilkins MD with [facility]. States Dr. Wilkins told patient, the medication should be prescribed by her primary care provider thus why she is requesting it now.     Routed to: [facility] Nurse Pool.    Ronal Herrera RN  [facility]           Reason for Disposition    Caller requesting a NON-URGENT new prescription or refill and triager unable to refill per unit policy    Protocols used: MEDICATION QUESTION CALL-ADULT-

## 2018-12-24 NOTE — TELEPHONE ENCOUNTER
Patient called back, she needs a refill on the omeprazole.  Oncology prescribed it when she was on chemo, but now saying it should go through PCP.  Please sign if appropriate.   Deonna Grimes MA

## 2019-02-07 ENCOUNTER — TELEPHONE (OUTPATIENT)
Dept: FAMILY MEDICINE | Facility: CLINIC | Age: 69
End: 2019-02-07

## 2019-02-07 ENCOUNTER — TRANSFERRED RECORDS (OUTPATIENT)
Dept: HEALTH INFORMATION MANAGEMENT | Facility: CLINIC | Age: 69
End: 2019-02-07

## 2019-02-07 NOTE — TELEPHONE ENCOUNTER
I called back and LM. Okay to switch to coumadin if she prefers (Dx: unprovoked DVT/PE).  Needs lifelong anticoagulation.

## 2019-02-07 NOTE — TELEPHONE ENCOUNTER
Called pt to discuss- no answer, left VM to call back    PCP,   Would you like an OV to discuss changing medication?    Thank you,  Hunter CAMPBELL RN

## 2019-02-07 NOTE — TELEPHONE ENCOUNTER
Reason for Call:  Other Medication question      Detailed comments: The patient says her Xarelto is getting very expensive and she is considering doing home monitoring and switching to Warfarin  Please call her to discuss    Phone Number Patient can be reached at: Home number on file [phone] (home)    Best Time: anytime    Can we leave a detailed message on this number? YES    Call taken on 2/7/2019 at 1:42 PM by Michelle Gomez

## 2019-03-18 ENCOUNTER — TRANSFERRED RECORDS (OUTPATIENT)
Dept: HEALTH INFORMATION MANAGEMENT | Facility: CLINIC | Age: 69
End: 2019-03-18

## 2019-03-21 ENCOUNTER — TRANSFERRED RECORDS (OUTPATIENT)
Dept: HEALTH INFORMATION MANAGEMENT | Facility: CLINIC | Age: 69
End: 2019-03-21

## 2019-03-21 ENCOUNTER — TELEPHONE (OUTPATIENT)
Dept: FAMILY MEDICINE | Facility: CLINIC | Age: 69
End: 2019-03-21

## 2019-03-21 NOTE — TELEPHONE ENCOUNTER
"Bad cough, chest tightness, short of breath   Not really coughing up anything - almost does but can't   Sometimes \"gurgly\"   Granddaughter has influenza  Nasal swab was initially negative on her granddaughter - was told could be a false positive  Given Tamiflu   Denies feeling suffocated   Increased effort with breathing   A little wheezing   Feels like congestion   Denies left chest pain   Denies feeling dizzy/lightheaded   Headache? Yes, started Monday, worse Tuesday - a little better today   Body aches? Yes  Fever was higher last night, fever is gone now   Tough to speak in full sentences   Has had PEs and pneumonia in the past but this is \"different\"     Advised pt go back to UC for symptoms, but if any worsening, lightheaded/dizzy, or really struggling to breathe then needs to go to ER    Pt agreeable to this    Laure RDZ RN    "

## 2019-03-21 NOTE — TELEPHONE ENCOUNTER
Reason for call:  Patient reporting a symptom    Symptom or request:   Pt called and reported that she has had a chest cold. She says she went to  on Monday, and that the rapid flu test was  negative. However, she is having a hard time breathing. She states that the breathing is getting worse rather than better.     Duration (how long have symptoms been present): Pt states that the cold symptoms started on Sunday night but that the difficulties breathing have been getting gradually worse.     Have you been treated for this before? Yes    Phone Number patient can be reached at:  Home number on file [phone] (home)    Best Time:  Any    Can we leave a detailed message on this number:  YES    Call taken on 3/21/2019 at 4:01 PM by Laura Juan

## 2019-03-31 ENCOUNTER — TRANSFERRED RECORDS (OUTPATIENT)
Dept: HEALTH INFORMATION MANAGEMENT | Facility: CLINIC | Age: 69
End: 2019-03-31

## 2019-05-08 ENCOUNTER — TRANSFERRED RECORDS (OUTPATIENT)
Dept: HEALTH INFORMATION MANAGEMENT | Facility: CLINIC | Age: 69
End: 2019-05-08

## 2019-05-30 DIAGNOSIS — A60.04 HERPES SIMPLEX VULVOVAGINITIS: ICD-10-CM

## 2019-05-30 NOTE — TELEPHONE ENCOUNTER
"Pending Prescriptions:                       Disp   Refills    valACYclovir (VALTREX) 500 MG tablet [Pha*6 tabl*0            Sig: TAKE 1 TAB BY MOUTH 2 TIMES DAILY    Last Written Prescription Date:  8/8/18  Last Fill Quantity: 6,  # refills: 3   Last office visit: 8/8/2018 with prescribing provider:     Future Office Visit:    Requested Prescriptions   Pending Prescriptions Disp Refills     valACYclovir (VALTREX) 500 MG tablet [Pharmacy Med Name: VALACYCLOVIR  MG TABLET] 6 tablet 0     Sig: TAKE 1 TAB BY MOUTH 2 TIMES DAILY       Antivirals for Herpes Protocol Passed - 5/30/2019  4:25 PM        Passed - Patient is age 12 or older        Passed - Recent (12 mo) or future (30 days) visit within the authorizing provider's specialty     Patient had office visit in the last 12 months or has a visit in the next 30 days with authorizing provider or within the authorizing provider's specialty.  See \"Patient Info\" tab in inbasket, or \"Choose Columns\" in Meds & Orders section of the refill encounter.              Passed - Medication is active on med list        Passed - Normal serum creatinine on file in past 12 months     Recent Labs   Lab Test 08/08/18  1303   CR 0.75               "

## 2019-06-03 RX ORDER — VALACYCLOVIR HYDROCHLORIDE 500 MG/1
TABLET, FILM COATED ORAL
Qty: 6 TABLET | Refills: 0 | Status: SHIPPED | OUTPATIENT
Start: 2019-06-03 | End: 2019-06-18

## 2019-06-03 NOTE — TELEPHONE ENCOUNTER
Prescription approved per [facility] Refill Protocol.    Due for annual physical in August.    Carlyn Thompson RN

## 2019-08-01 ENCOUNTER — TRANSFERRED RECORDS (OUTPATIENT)
Dept: HEALTH INFORMATION MANAGEMENT | Facility: CLINIC | Age: 69
End: 2019-08-01

## 2019-08-17 ENCOUNTER — TELEPHONE (OUTPATIENT)
Dept: FAMILY MEDICINE | Facility: CLINIC | Age: 69
End: 2019-08-17

## 2019-08-17 DIAGNOSIS — E55.9 VITAMIN D DEFICIENCY: ICD-10-CM

## 2019-08-17 NOTE — TELEPHONE ENCOUNTER
"Last Written Prescription Date:  8/14/18  Last Fill Quantity: 90 capsule,  # refills: 3   Last office visit: 8/8/2018 with prescribing provider:  Marilyn   Future Office Visit:      Requested Prescriptions   Pending Prescriptions Disp Refills     Cholecalciferol (VITAMIN D3) 2000 units CAPS [Pharmacy Med Name: VITAMIN D3 2,000 UNIT SOFTGEL] 90 capsule 0     Sig: TAKE 1 CAPSULE BY MOUTH EVERY DAY       Vitamin Supplements (Adult) Protocol Failed - 8/17/2019 10:23 AM        Failed - Recent (12 mo) or future (30 days) visit within the authorizing provider's specialty     Patient had office visit in the last 12 months or has a visit in the next 30 days with authorizing provider or within the authorizing provider's specialty.  See \"Patient Info\" tab in inbasket, or \"Choose Columns\" in Meds & Orders section of the refill encounter.              Passed - High dose Vitamin D not ordered        Passed - Medication is active on med list          "

## 2019-08-19 RX ORDER — ACETAMINOPHEN 160 MG
TABLET,DISINTEGRATING ORAL
Qty: 30 CAPSULE | Refills: 0 | Status: SHIPPED | OUTPATIENT
Start: 2019-08-19 | End: 2019-09-22

## 2019-08-19 NOTE — TELEPHONE ENCOUNTER
Medication filled 1 time as pt is due for a follow-up in clinic. , Please reach out to patient to schedule appointment. thank you    Laure RDZ RN

## 2019-08-30 DIAGNOSIS — E03.9 HYPOTHYROIDISM, UNSPECIFIED TYPE: ICD-10-CM

## 2019-08-30 DIAGNOSIS — I10 HTN (HYPERTENSION), BENIGN: ICD-10-CM

## 2019-08-30 RX ORDER — LEVOTHYROXINE SODIUM 112 UG/1
TABLET ORAL
Qty: 30 TABLET | Refills: 0 | Status: SHIPPED | OUTPATIENT
Start: 2019-08-30 | End: 2019-09-17

## 2019-08-30 RX ORDER — LOSARTAN POTASSIUM 100 MG/1
TABLET ORAL
Qty: 30 TABLET | Refills: 0 | Status: SHIPPED | OUTPATIENT
Start: 2019-08-30 | End: 2019-09-17

## 2019-08-30 NOTE — TELEPHONE ENCOUNTER
"Requested Prescriptions   Pending Prescriptions Disp Refills     losartan (COZAAR) 100 MG tablet [Pharmacy Med Name: LOSARTAN POTASSIUM 100 MG TAB] 90 tablet 0     Sig: TAKE 1 TABLET BY MOUTH EVERY DAY  Last Written Prescription Date:  8/8/18  Last Fill Quantity: 90 tab,  # refills: 3   Last office visit: 8/8/2018 with prescribing provider:  Marilyn Allison Office Visit:         Angiotensin-II Receptors Failed - 8/30/2019  1:15 AM        Failed - Last blood pressure under 140/90 in past 12 months     BP Readings from Last 3 Encounters:   08/08/18 140/78   03/15/18 127/76   02/02/18 122/62                 Failed - Recent (12 mo) or future (30 days) visit within the authorizing provider's specialty     Patient had office visit in the last 12 months or has a visit in the next 30 days with authorizing provider or within the authorizing provider's specialty.  See \"Patient Info\" tab in inbasket, or \"Choose Columns\" in Meds & Orders section of the refill encounter.              Failed - Normal serum creatinine on file in past 12 months     Recent Labs   Lab Test 08/08/18  1303   CR 0.75             Failed - Normal serum potassium on file in past 12 months     Recent Labs   Lab Test 08/08/18  1303   POTASSIUM 4.6                    Passed - Medication is active on med list        Passed - Patient is age 18 or older        Passed - No active pregnancy on record        Passed - No positive pregnancy test in past 12 months        levothyroxine (SYNTHROID/LEVOTHROID) 112 MCG tablet [Pharmacy Med Name: LEVOTHYROXINE 112 MCG TABLET] 90 tablet 0     Sig: TAKE 1 TABLET BY MOUTH EVERY DAY  Last Written Prescription Date:  8/8/18  Last Fill Quantity: 90 tab,  # refills: 3   Last office visit: 8/8/2018 with prescribing provider:  Marilyn Allison Office Visit:         Thyroid Protocol Failed - 8/30/2019  1:15 AM        Failed - Recent (12 mo) or future (30 days) visit within the authorizing provider's specialty     Patient had office visit " "in the last 12 months or has a visit in the next 30 days with authorizing provider or within the authorizing provider's specialty.  See \"Patient Info\" tab in inbasket, or \"Choose Columns\" in Meds & Orders section of the refill encounter.              Failed - Normal TSH on file in past 12 months     Recent Labs   Lab Test 08/08/18  1303   TSH 1.43              Passed - Patient is 12 years or older        Passed - Medication is active on med list        Passed - No active pregnancy on record     If patient is pregnant or has had a positive pregnancy test, please check TSH.          Passed - No positive pregnancy test in past 12 months     If patient is pregnant or has had a positive pregnancy test, please check TSH.             "

## 2019-08-30 NOTE — TELEPHONE ENCOUNTER
A 30 day supply is given, patient is due for an office visit.  Please call to  assist the patient in scheduling an appointment.  CLINT Jerome, RN  [facility]

## 2019-09-17 ENCOUNTER — OFFICE VISIT (OUTPATIENT)
Dept: FAMILY MEDICINE | Facility: CLINIC | Age: 69
End: 2019-09-17
Payer: MEDICARE

## 2019-09-17 VITALS
HEIGHT: 65 IN | DIASTOLIC BLOOD PRESSURE: 70 MMHG | TEMPERATURE: 97.7 F | SYSTOLIC BLOOD PRESSURE: 124 MMHG | BODY MASS INDEX: 32.15 KG/M2 | HEART RATE: 72 BPM | OXYGEN SATURATION: 99 % | WEIGHT: 193 LBS

## 2019-09-17 DIAGNOSIS — Z87.39 HISTORY OF GOUT: Primary | ICD-10-CM

## 2019-09-17 DIAGNOSIS — Z78.0 POSTMENOPAUSAL STATUS: ICD-10-CM

## 2019-09-17 DIAGNOSIS — K21.9 GASTROESOPHAGEAL REFLUX DISEASE, ESOPHAGITIS PRESENCE NOT SPECIFIED: ICD-10-CM

## 2019-09-17 DIAGNOSIS — C83.31 DIFFUSE LARGE B-CELL LYMPHOMA OF LYMPH NODES OF HEAD (H): ICD-10-CM

## 2019-09-17 DIAGNOSIS — A60.04 HERPES SIMPLEX VULVOVAGINITIS: ICD-10-CM

## 2019-09-17 DIAGNOSIS — I82.499 DEEP VEIN THROMBOSIS (DVT) OF OTHER VEIN OF LOWER EXTREMITY, UNSPECIFIED CHRONICITY, UNSPECIFIED LATERALITY (H): ICD-10-CM

## 2019-09-17 DIAGNOSIS — Z00.00 ENCOUNTER FOR MEDICARE ANNUAL WELLNESS EXAM: Primary | ICD-10-CM

## 2019-09-17 DIAGNOSIS — E03.9 HYPOTHYROIDISM, UNSPECIFIED TYPE: ICD-10-CM

## 2019-09-17 DIAGNOSIS — I10 HTN (HYPERTENSION), BENIGN: ICD-10-CM

## 2019-09-17 DIAGNOSIS — Z13.6 CARDIOVASCULAR SCREENING; LDL GOAL LESS THAN 130: ICD-10-CM

## 2019-09-17 LAB
ERYTHROCYTE [DISTWIDTH] IN BLOOD BY AUTOMATED COUNT: 12.9 % (ref 10–15)
HCT VFR BLD AUTO: 41.1 % (ref 35–47)
HGB BLD-MCNC: 14.1 G/DL (ref 11.7–15.7)
MCH RBC QN AUTO: 33.9 PG (ref 26.5–33)
MCHC RBC AUTO-ENTMCNC: 34.3 G/DL (ref 31.5–36.5)
MCV RBC AUTO: 99 FL (ref 78–100)
PLATELET # BLD AUTO: 230 10E9/L (ref 150–450)
RBC # BLD AUTO: 4.16 10E12/L (ref 3.8–5.2)
WBC # BLD AUTO: 6.2 10E9/L (ref 4–11)

## 2019-09-17 PROCEDURE — 80061 LIPID PANEL: CPT | Performed by: PHYSICIAN ASSISTANT

## 2019-09-17 PROCEDURE — 84443 ASSAY THYROID STIM HORMONE: CPT | Performed by: PHYSICIAN ASSISTANT

## 2019-09-17 PROCEDURE — 36415 COLL VENOUS BLD VENIPUNCTURE: CPT | Performed by: PHYSICIAN ASSISTANT

## 2019-09-17 PROCEDURE — 80053 COMPREHEN METABOLIC PANEL: CPT | Performed by: PHYSICIAN ASSISTANT

## 2019-09-17 PROCEDURE — G0439 PPPS, SUBSEQ VISIT: HCPCS | Performed by: PHYSICIAN ASSISTANT

## 2019-09-17 PROCEDURE — 85027 COMPLETE CBC AUTOMATED: CPT | Performed by: PHYSICIAN ASSISTANT

## 2019-09-17 RX ORDER — LOSARTAN POTASSIUM AND HYDROCHLOROTHIAZIDE 25; 100 MG/1; MG/1
1 TABLET ORAL DAILY
Qty: 90 TABLET | Refills: 3 | Status: SHIPPED | OUTPATIENT
Start: 2019-09-17 | End: 2020-07-01 | Stop reason: DRUGHIGH

## 2019-09-17 RX ORDER — VALACYCLOVIR HYDROCHLORIDE 500 MG/1
TABLET, FILM COATED ORAL
Qty: 6 TABLET | Refills: 3 | Status: SHIPPED | OUTPATIENT
Start: 2019-09-17 | End: 2020-01-07

## 2019-09-17 RX ORDER — LEVOTHYROXINE SODIUM 112 UG/1
112 TABLET ORAL DAILY
Qty: 90 TABLET | Refills: 3 | Status: SHIPPED | OUTPATIENT
Start: 2019-09-17 | End: 2020-10-08

## 2019-09-17 ASSESSMENT — ACTIVITIES OF DAILY LIVING (ADL): CURRENT_FUNCTION: NO ASSISTANCE NEEDED

## 2019-09-17 ASSESSMENT — MIFFLIN-ST. JEOR: SCORE: 1393.38

## 2019-09-17 NOTE — PROGRESS NOTES
"SUBJECTIVE:   Marysol Morrell is a 68 year old female who presents for Preventive Visit.    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 Brenner in neuro  She is still seeing JU[facility] (Dr. Wilkins Q 3months) for one more more visit, then it will be Q6months.  She is on lifelong anticoagulation with xarelto due to hx of unprovoked PE.  Wonder if Eliquis is a better option and may check with Dr. Wilkins    Her home BPs running 130-140s/80-rarely 90  She is taking losartan 100mg every day.  She uses lasix a few times per month for edema.     Pt has hx of genital herpes and having outbreaks so takes valtrex prn.  She is not tracking         Are you in the first 12 months of your Medicare coverage?  No    Healthy Habits:     In general, how would you rate your overall health?  Fair    Frequency of exercise:  None    Duration of exercise:  Less than 15 minutes    Do you usually eat at least 4 servings of fruit and vegetables a day, include whole grains    & fiber and avoid regularly eating high fat or \"junk\" foods?  No (2-3 servings but avoids junk food )    Taking medications regularly:  Yes    Barriers to taking medications:  None    Medication side effects:  Other (lasix , cramps in feet at night )    Ability to successfully perform activities of daily living:  No assistance needed    Home Safety:  No safety concerns identified    Hearing Impairment:  Difficulty understanding soft or whispered speech, difficulty following a conversation in a noisy restaurant or crowded room and need to ask people to speak up or repeat themselves    In the past 6 months, have you been bothered by leaking of urine? Yes    In general, how would you rate your overall mental or emotional health?  Good      PHQ-2 Total Score: 0    Additional concerns today:  No    Do you feel safe in your environment? Yes    Do you have a Health Care Directive? No: Advance care planning reviewed with " patient; information given to patient to review.      Fall risk  Fallen 2 or more times in the past year?: No  Any fall with injury in the past year?: No    Cognitive Screening   1) Repeat 3 items (Leader, Season, Table)    2) Clock draw: NORMAL  3) 3 item recall: Recalls 3 objects  Results: 3 items recalled: COGNITIVE IMPAIRMENT LESS LIKELY    Mini-CogTM Copyright GERSON Edgar. Licensed by the author for use in [facility]; reprinted with permission (soob@[facility]). All rights reserved.      Do you have sleep apnea, excessive snoring or daytime drowsiness?: no    Reviewed and updated as needed this visit by clinical staff  Allergies         Reviewed and updated as needed this visit by Provider        Social History     Tobacco Use     Smoking status: Former Smoker     Types: Cigarettes     Last attempt to quit: 2000     Years since quittin.1     Smokeless tobacco: Never Used     Tobacco comment: More of a social smoker   Substance Use Topics     Alcohol use: Yes     Alcohol/week: 0.0 oz     Comment: 0-3 a day. mixed drinks, wine           Current providers sharing in care for this patient include:   Patient Care Team:  Vernell Sanders PA-C as PCP - General (Internal Medicine)  Vernell Sanders PA-C as Assigned PCP    The following health maintenance items are reviewed in Epic and correct as of today:  Health Maintenance   Topic Date Due     ZOSTER IMMUNIZATION (2 of 3) 2012     ADVANCE CARE PLANNING  10/24/2017     PHQ-2  2019     MEDICARE ANNUAL WELLNESS VISIT  2019     FALL RISK ASSESSMENT  2019     INFLUENZA VACCINE (1) 2019     MAMMO SCREENING  2020     COLONOSCOPY  2021     LIPID  2023     DTAP/TDAP/TD IMMUNIZATION (3 - Td) 2028     DEXA  Completed     HEPATITIS C SCREENING  Completed     PNEUMOCOCCAL IMMUNIZATION 65+ HIGH/HIGHEST RISK  Completed     IPV IMMUNIZATION  Aged Out     MENINGITIS IMMUNIZATION  Aged Out       Past Medical  History:   Diagnosis Date     Arthritis      Cancer (H) 2017    lymphoma     Cholelithiasis      Colon polyps 2001 and 2006    no polyps 2011     Constipation      Diffuse large B cell lymphoma (H) 2017    tongue, followed by Dr. Wilkins at [facility] and [facility]     Diverticulitis 2010     DVT (deep venous thrombosis) (H)     x3 (one due to OCs, 2 post op)     Genital herpes      GERD (gastroesophageal reflux disease)      Gout      History of depression      History of migraine      HTN (hypertension), benign      Hypothyroidism     hashimoto's thyroiditis     Lichen sclerosus et atrophicus      Osteopenia      Ovarian cyst 2011     Rectocele      Schatzki's ring 2009    EGD      Tibia/fibula fracture 2009     Vasovagal syncope 1966     Vertigo 2000     Past Surgical History:   Procedure Laterality Date     APPENDECTOMY  1954     ARTHROSCOPY KNEE  1983     BONE MARROW BIOPSY, BONE SPECIMEN, NEEDLE/TROCAR N/A 7/31/2017    Procedure: BIOPSY BONE MARROW;  UNILATERAL BONE MARROW BIPOSY  ;  Surgeon: Jamil Mccarthy MD;  Location:  GI     BREAST BIOPSY, CORE RT/LT  1990     C LAP,SURG,COLECTOMY, PARTIAL, W/ANAST  2011    due to recurrent diverticulitis     EXCISE GARNETT'S NEUROMA FOOT       HYSTERECTOMY, PAP NO LONGER INDICATED  1984    with bladder repair     LAPAROSCOPIC OOPHORECTOMY  2011    with the colon resection     LARYNGOSCOPY WITH BIOPSY(IES) N/A 7/24/2017    Procedure: LARYNGOSCOPY WITH BIOPSY(IES);  DIRECT LARYNGOSCOPY WITH TONGUE BIOPSIES;  Surgeon: Vernell Yang MD;  Location:  SD     OPEN REDUCTION INTERNAL FIXATION TIBIA  2009     TONSILLECTOMY & ADENOIDECTOMY  1958     Current Outpatient Medications   Medication Sig Dispense Refill     levothyroxine (SYNTHROID/LEVOTHROID) 112 MCG tablet Take 1 tablet (112 mcg) by mouth daily 90 tablet 3     losartan-hydrochlorothiazide (HYZAAR) 100-25 MG tablet Take 1 tablet by mouth daily 90 tablet 3     omeprazole (PRILOSEC) 20 MG DR capsule Take 1  "capsule (20 mg) by mouth daily 90 capsule 3     valACYclovir (VALTREX) 500 MG tablet TAKE 1 TABLET BY MOUTH TWICE A DAY 6 tablet 3     ACETAMINOPHEN PO Take 325-650 mg by mouth as needed for pain       allopurinol (ZYLOPRIM) 100 MG tablet Take 1 tablet (100 mg) by mouth daily 90 tablet 3     cetirizine (ZYRTEC) 10 MG tablet TAKE ONE TABLET BY MOUTH ONE TIME DAILY IN THE P.M. 90 tablet 3     Cholecalciferol (VITAMIN D3) 2000 units CAPS TAKE 1 CAPSULE BY MOUTH EVERY DAY 30 capsule 0     clobetasol (TEMOVATE) 0.05 % ointment Apply sparingly to affected area twice daily as needed.  Do not apply to face. 45 g 1     conjugated estrogens (PREMARIN) cream Place 0.5 g vaginally twice a week 30 g 12     furosemide (LASIX) 20 MG tablet Take 1 tablet (20 mg) by mouth daily as needed 90 tablet 0     rivaroxaban ANTICOAGULANT (XARELTO) 20 MG TABS tablet Take 1 tablet (20 mg) by mouth daily (with dinner)           Review of Systems  Constitutional, HEENT, cardiovascular, pulmonary, GI, , musculoskeletal, neuro, skin, endocrine and psych systems are negative, except as otherwise noted.    OBJECTIVE:   /70 (BP Location: Right arm, Patient Position: Chair, Cuff Size: Adult Large)   Pulse 72   Temp 97.7  F (36.5  C) (Tympanic)   Ht 1.638 m (5' 4.5\")   Wt 87.5 kg (193 lb)   SpO2 99%   BMI 32.62 kg/m   Estimated body mass index is 32.62 kg/m  as calculated from the following:    Height as of this encounter: 1.638 m (5' 4.5\").    Weight as of this encounter: 87.5 kg (193 lb).  Physical Exam  GENERAL APPEARANCE: healthy, alert and no distress  EYES: Eyes grossly normal to inspection, PERRL and conjunctivae and sclerae normal  HENT: ear canals and TM's normal, nose and mouth without ulcers or lesions, oropharynx clear and oral mucous membranes moist  NECK: no adenopathy, no asymmetry, masses, or scars and thyroid normal to palpation  RESP: lungs clear to auscultation - no rales, rhonchi or wheezes  BREAST: normal without " masses, tenderness or nipple discharge and no palpable axillary masses or adenopathy  CV: regular rate and rhythm, normal S1 S2, no S3 or S4, no murmur, click or rub, no peripheral edema and peripheral pulses strong  ABDOMEN: soft, nontender, no hepatosplenomegaly, no masses and bowel sounds normal  MS: no musculoskeletal defects are noted and gait is age appropriate without ataxia  SKIN: no suspicious lesions or rashes  NEURO: Normal strength and tone, sensory exam grossly normal, mentation intact and speech normal  PSYCH: mentation appears normal and affect normal/bright        ASSESSMENT / PLAN:   Assessment and Plan:     (Z00.00) Encounter for Medicare annual wellness exam  (primary encounter diagnosis)  Comment: recd mammogram and bone density today.  Discussed shingrex. Colonoscopy is up to date.  Plan: CBC with platelets. Discussed Shingrex.            (C83.31) Diffuse large B-cell lymphoma of lymph nodes of head (H)  Comment:   Plan: followed by Dr Wilkins at [facility] Q 3 months and no signs of recurrence.    (I82.499) Deep vein thrombosis (DVT) of other vein of lower extremity, unspecified chronicity, unspecified laterality (H)  Comment:   Plan: pt had DVT/PE unprovoked so on chronic anticoag    (Z78.0) Postmenopausal status  Comment:   Plan: DX Hip/Pelvis/Spine            (E03.9) Hypothyroidism, unspecified type  Comment:   Plan: TSH with free T4 reflex, levothyroxine         (SYNTHROID/LEVOTHROID) 112 MCG tablet            (I10) HTN (hypertension), benign  Comment: not at goal. discontinue losartan and switch to hyzaar. She only takes lasix a few times per month and may not need this at all with this diuretic. Follow-up HTN one month with BP readings.  Plan: losartan-hydrochlorothiazide (HYZAAR) 100-25 MG        tablet, Comprehensive metabolic panel            (Z13.6) CARDIOVASCULAR SCREENING; LDL GOAL LESS THAN 130  Comment:   Plan: Lipid Profile            (K21.9) Gastroesophageal reflux disease, esophagitis  "presence not specified  Comment:   Plan: omeprazole (PRILOSEC) 20 MG DR capsule        refilled    (A60.04) Herpes simplex vulvovaginitis  Comment:   Plan: valACYclovir (VALTREX) 500 MG tablet        Refilled for outbreaks. Consider suppressive therapy if getting many outbreaks        End of Life Planning:  Patient currently has an advanced directive: No.  I have verified the patient's ablity to prepare an advanced directive/make health care decisions.  Literature was provided to assist patient in preparing an advanced directive.    COUNSELING:  Reviewed preventive health counseling, as reflected in patient instructions    Estimated body mass index is 32.62 kg/m  as calculated from the following:    Height as of this encounter: 1.638 m (5' 4.5\").    Weight as of this encounter: 87.5 kg (193 lb).         reports that she quit smoking about 19 years ago. Her smoking use included cigarettes. She has never used smokeless tobacco.      Appropriate preventive services were discussed with this patient, including applicable screening as appropriate for cardiovascular disease, diabetes, osteopenia/osteoporosis, and glaucoma.  As appropriate for age/gender, discussed screening for colorectal cancer, prostate cancer, breast cancer, and cervical cancer. Checklist reviewing preventive services available has been given to the patient.    Reviewed patients plan of care and provided an AVS. The Basic Care Plan (routine screening as documented in Health Maintenance) for Marysol meets the Care Plan requirement. This Care Plan has been established and reviewed with the Patient.    Counseling Resources:  ATP IV Guidelines  Pooled Cohorts Equation Calculator  Breast Cancer Risk Calculator  FRAX Risk Assessment  ICSI Preventive Guidelines  Dietary Guidelines for Americans, 2010  [employer]'s MyPlate  ASA Prophylaxis  Lung CA Screening    Vernell Sanders PA-C  [facility]    Identified Health Risks:  "

## 2019-09-17 NOTE — TELEPHONE ENCOUNTER
"Last Written Prescription Date:  8/08/18  Last Fill Quantity: 90 tablet,  # refills: 3   Last office visit: 9/17/2019 with prescribing provider:  Marilyn   Future Office Visit:      Requested Prescriptions   Pending Prescriptions Disp Refills     allopurinol (ZYLOPRIM) 100 MG tablet [Pharmacy Med Name: ALLOPURINOL 100 MG TABLET] 90 tablet 1     Sig: TAKE 1 TABLET BY MOUTH EVERY DAY       Gout Agents Protocol Failed - 9/17/2019  1:37 AM        Failed - CBC on file in past 12 months     Recent Labs   Lab Test 02/02/18  1115   WBC 4.4   RBC 3.65*   HGB 11.7   HCT 34.1*                    Failed - ALT on file in past 12 months     Recent Labs   Lab Test 08/08/18  1303   ALT 14             Failed - Has Uric Acid on file in past 12 months and value is less than 6     Recent Labs   Lab Test 08/08/18  1303   URIC 4.6     If level is 6mg/dL or greater, ok to refill one time and refer to provider.           Failed - Normal serum creatinine on file in the past 12 months     Recent Labs   Lab Test 08/08/18  1303   CR 0.75             Passed - Recent (12 mo) or future (30 days) visit within the authorizing provider's specialty     Patient had office visit in the last 12 months or has a visit in the next 30 days with authorizing provider or within the authorizing provider's specialty.  See \"Patient Info\" tab in inbasket, or \"Choose Columns\" in Meds & Orders section of the refill encounter.              Passed - Medication is active on med list        Passed - Patient is age 18 or older        Passed - No active pregnancy on record        Passed - No positive pregnancy test in past year          "

## 2019-09-17 NOTE — PATIENT INSTRUCTIONS
Mammogram [address] and bone density  [phone]    Check insurance regarding Shingrex (new shingles vaccine)

## 2019-09-18 LAB
ALBUMIN SERPL-MCNC: 3.6 G/DL (ref 3.4–5)
ALP SERPL-CCNC: 83 U/L (ref 40–150)
ALT SERPL W P-5'-P-CCNC: 14 U/L (ref 0–50)
ANION GAP SERPL CALCULATED.3IONS-SCNC: 7 MMOL/L (ref 3–14)
AST SERPL W P-5'-P-CCNC: 15 U/L (ref 0–45)
BILIRUB SERPL-MCNC: 0.7 MG/DL (ref 0.2–1.3)
BUN SERPL-MCNC: 11 MG/DL (ref 7–30)
CALCIUM SERPL-MCNC: 9 MG/DL (ref 8.5–10.1)
CHLORIDE SERPL-SCNC: 108 MMOL/L (ref 94–109)
CHOLEST SERPL-MCNC: 216 MG/DL
CO2 SERPL-SCNC: 26 MMOL/L (ref 20–32)
CREAT SERPL-MCNC: 0.87 MG/DL (ref 0.52–1.04)
GFR SERPL CREATININE-BSD FRML MDRD: 68 ML/MIN/{1.73_M2}
GLUCOSE SERPL-MCNC: 94 MG/DL (ref 70–99)
HDLC SERPL-MCNC: 58 MG/DL
LDLC SERPL CALC-MCNC: 122 MG/DL
NONHDLC SERPL-MCNC: 158 MG/DL
POTASSIUM SERPL-SCNC: 4.1 MMOL/L (ref 3.4–5.3)
PROT SERPL-MCNC: 6.7 G/DL (ref 6.8–8.8)
SODIUM SERPL-SCNC: 141 MMOL/L (ref 133–144)
TRIGL SERPL-MCNC: 179 MG/DL
TSH SERPL DL<=0.005 MIU/L-ACNC: 0.88 MU/L (ref 0.4–4)

## 2019-09-18 RX ORDER — ALLOPURINOL 100 MG/1
TABLET ORAL
Qty: 90 TABLET | Refills: 3 | Status: SHIPPED | OUTPATIENT
Start: 2019-09-18 | End: 2020-10-08

## 2019-09-18 NOTE — TELEPHONE ENCOUNTER
Routing refill request to provider for review/approval because:    Gout Agents Protocol Failed9/18 2:26 PM   Has Uric Acid on file in past 12 months and value is less than 6     Uric Acid   Date Value Ref Range Status   08/08/2018 4.6 2.6 - 6.0 mg/dL Final       Brenda PIERSON RN,BSN

## 2019-09-20 ENCOUNTER — TRANSFERRED RECORDS (OUTPATIENT)
Dept: HEALTH INFORMATION MANAGEMENT | Facility: CLINIC | Age: 69
End: 2019-09-20

## 2019-09-22 DIAGNOSIS — E55.9 VITAMIN D DEFICIENCY: ICD-10-CM

## 2019-09-22 DIAGNOSIS — I10 HTN (HYPERTENSION), BENIGN: ICD-10-CM

## 2019-09-23 ENCOUNTER — HOSPITAL ENCOUNTER (OUTPATIENT)
Dept: MAMMOGRAPHY | Facility: CLINIC | Age: 69
Discharge: HOME | End: 2019-09-23
Attending: PHYSICIAN ASSISTANT | Admitting: PHYSICIAN ASSISTANT
Payer: MEDICARE

## 2019-09-23 ENCOUNTER — HOSPITAL ENCOUNTER (OUTPATIENT)
Dept: BONE DENSITY | Facility: CLINIC | Age: 69
End: 2019-09-23
Attending: PHYSICIAN ASSISTANT
Payer: MEDICARE

## 2019-09-23 DIAGNOSIS — Z78.0 POSTMENOPAUSAL STATUS: ICD-10-CM

## 2019-09-23 DIAGNOSIS — Z12.31 VISIT FOR SCREENING MAMMOGRAM: ICD-10-CM

## 2019-09-23 PROCEDURE — 77080 DXA BONE DENSITY AXIAL: CPT

## 2019-09-23 PROCEDURE — 77063 BREAST TOMOSYNTHESIS BI: CPT

## 2019-09-24 ENCOUNTER — TRANSFERRED RECORDS (OUTPATIENT)
Dept: HEALTH INFORMATION MANAGEMENT | Facility: CLINIC | Age: 69
End: 2019-09-24

## 2019-09-24 RX ORDER — LOSARTAN POTASSIUM 100 MG/1
TABLET ORAL
Qty: 30 TABLET | Refills: 0 | OUTPATIENT
Start: 2019-09-24

## 2019-09-24 RX ORDER — ACETAMINOPHEN 160 MG
2000 TABLET,DISINTEGRATING ORAL DAILY
Qty: 90 CAPSULE | Refills: 3 | Status: SHIPPED | OUTPATIENT
Start: 2019-09-24 | End: 2023-04-20

## 2019-09-24 NOTE — TELEPHONE ENCOUNTER
"Requested Prescriptions   Pending Prescriptions Disp Refills     losartan (COZAAR) 100 MG tablet [Pharmacy Med Name: LOSARTAN POTASSIUM 100 MG TAB] 30 tablet 0     Sig: TAKE 1 TABLET BY MOUTH EVERY DAY, NEEDS APPT   Last Written Prescription Date:  8/30/2019  Last Fill Quantity: 30,  # refills: 0   Last office visit: 9/17/2019 with prescribing provider:  Marilyn Allison Office Visit:        Angiotensin-II Receptors Failed - 9/22/2019 10:31 AM        Failed - Medication is active on med list        Passed - Last blood pressure under 140/90 in past 12 months     BP Readings from Last 3 Encounters:   09/17/19 124/70   08/08/18 140/78   03/15/18 127/76                 Passed - Recent (12 mo) or future (30 days) visit within the authorizing provider's specialty     Patient had office visit in the last 12 months or has a visit in the next 30 days with authorizing provider or within the authorizing provider's specialty.  See \"Patient Info\" tab in inbasket, or \"Choose Columns\" in Meds & Orders section of the refill encounter.              Passed - Patient is age 18 or older        Passed - No active pregnancy on record        Passed - Normal serum creatinine on file in past 12 months     Recent Labs   Lab Test 09/17/19  1223   CR 0.87             Passed - Normal serum potassium on file in past 12 months     Recent Labs   Lab Test 09/17/19  1223   POTASSIUM 4.1                    Passed - No positive pregnancy test in past 12 months        Cholecalciferol (VITAMIN D3) 2000 units CAPS [Pharmacy Med Name: VITAMIN D3 2,000 UNIT SOFTGEL] 30 capsule 0     Sig: TAKE 1 CAPSULE BY MOUTH EVERY DAY. DUE FOR APPT   Last Written Prescription Date:  8/19/2019  Last Fill Quantity: 30,  # refills: 0   Last office visit: 9/17/2019 with prescribing provider:  Marilyn   Future Office Visit:        Vitamin Supplements (Adult) Protocol Passed - 9/22/2019 10:31 AM        Passed - High dose Vitamin D not ordered        Passed - Recent (12 mo) or future " "(30 days) visit within the authorizing provider's specialty     Patient had office visit in the last 12 months or has a visit in the next 30 days with authorizing provider or within the authorizing provider's specialty.  See \"Patient Info\" tab in inbasket, or \"Choose Columns\" in Meds & Orders section of the refill encounter.              Passed - Medication is active on med list        Denied.  Change in therapy.  Vitamin D refilled.  ERIK JeromeN, RN  [employer] [facility]      "

## 2019-09-25 ENCOUNTER — TELEPHONE (OUTPATIENT)
Dept: FAMILY MEDICINE | Facility: CLINIC | Age: 69
End: 2019-09-25

## 2019-09-25 DIAGNOSIS — I10 HTN (HYPERTENSION), BENIGN: Primary | ICD-10-CM

## 2019-09-25 RX ORDER — LOSARTAN POTASSIUM 100 MG/1
100 TABLET ORAL DAILY
Qty: 90 TABLET | Refills: 1 | Status: SHIPPED | OUTPATIENT
Start: 2019-09-25 | End: 2020-01-07

## 2019-09-25 RX ORDER — HYDROCHLOROTHIAZIDE 25 MG/1
25 TABLET ORAL DAILY
Qty: 90 TABLET | Refills: 1 | Status: SHIPPED | OUTPATIENT
Start: 2019-09-25 | End: 2020-01-07

## 2019-09-25 NOTE — TELEPHONE ENCOUNTER
losartan-hydrochlorothiazide (HYZAAR) 100-25 MG tablet 90 tablet 3 9/17/2019  --   Sig - Route: Take 1 tablet by mouth daily - Oral       Fax from pharmacy  [facility] is on backorder.  Please send separate Rx's please.    LOV 9-17-19 [email or web address].RT Kevin (R)

## 2019-09-26 NOTE — RESULT ENCOUNTER NOTE
Marysol,    Your bone density does show thinning of the bones and I'd like you to make an appointment to come in to discuss different treatment options we have for this.    Vernell Sanders PA-C   Biopsy Type: H and E

## 2019-10-03 ENCOUNTER — TRANSFERRED RECORDS (OUTPATIENT)
Dept: HEALTH INFORMATION MANAGEMENT | Facility: CLINIC | Age: 69
End: 2019-10-03

## 2019-10-03 ENCOUNTER — HEALTH MAINTENANCE LETTER (OUTPATIENT)
Age: 69
End: 2019-10-03

## 2019-10-04 ENCOUNTER — HOSPITAL LABORATORY (OUTPATIENT)
Dept: OTHER | Facility: CLINIC | Age: 69
End: 2019-10-04

## 2019-10-06 LAB
BACTERIA SPEC CULT: ABNORMAL
Lab: ABNORMAL
SPECIMEN SOURCE: ABNORMAL

## 2019-12-20 ENCOUNTER — TRANSFERRED RECORDS (OUTPATIENT)
Dept: HEALTH INFORMATION MANAGEMENT | Facility: CLINIC | Age: 69
End: 2019-12-20

## 2020-01-06 DIAGNOSIS — A60.04 HERPES SIMPLEX VULVOVAGINITIS: ICD-10-CM

## 2020-01-06 DIAGNOSIS — I10 HTN (HYPERTENSION), BENIGN: ICD-10-CM

## 2020-01-07 RX ORDER — LOSARTAN POTASSIUM 100 MG/1
100 TABLET ORAL DAILY
Qty: 90 TABLET | Refills: 0 | Status: SHIPPED | OUTPATIENT
Start: 2020-01-07 | End: 2020-05-04

## 2020-01-07 RX ORDER — VALACYCLOVIR HYDROCHLORIDE 500 MG/1
TABLET, FILM COATED ORAL
Qty: 6 TABLET | Refills: 3 | Status: SHIPPED | OUTPATIENT
Start: 2020-01-07 | End: 2021-02-18

## 2020-01-07 RX ORDER — HYDROCHLOROTHIAZIDE 25 MG/1
25 TABLET ORAL DAILY
Qty: 90 TABLET | Refills: 0 | Status: SHIPPED | OUTPATIENT
Start: 2020-01-07 | End: 2020-11-10

## 2020-01-23 ENCOUNTER — OFFICE VISIT (OUTPATIENT)
Dept: FAMILY MEDICINE | Facility: CLINIC | Age: 70
End: 2020-01-23
Payer: MEDICARE

## 2020-01-23 VITALS — SYSTOLIC BLOOD PRESSURE: 122 MMHG | DIASTOLIC BLOOD PRESSURE: 78 MMHG

## 2020-01-23 DIAGNOSIS — L90.0 LICHEN SCLEROSUS ET ATROPHICUS: ICD-10-CM

## 2020-01-23 PROCEDURE — 99213 OFFICE O/P EST LOW 20 MIN: CPT | Performed by: FAMILY MEDICINE

## 2020-01-23 RX ORDER — CLOBETASOL PROPIONATE 0.5 MG/G
OINTMENT TOPICAL
Qty: 45 G | Refills: 1 | Status: SHIPPED | OUTPATIENT
Start: 2020-01-23 | End: 2021-06-29

## 2020-01-23 NOTE — LETTER
1/23/2020         RE: Marysol Morrell  [address] [place] [address]        Dear Colleague,    Thank you for referring your patient, Marysol Morrell, to the [facility]. Please see a copy of my visit note below.    [facility] - PRIMARY CARE SKIN    CC: lichen sclerosus  SUBJECTIVE:   Marysol Morrell is a(n) 69 year old female who presents to clinic today for follow-up of chronic lichen sclerosus et atrophicus which first began in early 2015. Pt denies any bleeding, itchiness, pain, nor suspicious lesions. Pt reports current treatment plan is helping. Pt is running low on medication and requests refill.     Personal Medical History  Skin Cancer: NO  Other: lichen sclerosus et atrophicus.    Family Medical History  Skin Cancer: NO    Occupation: retired, previously was a nurse (indoor).    Refer to electronic medical record (EMR) for past medical history and medications.    INTEGUMENTARY/SKIN: NEGATIVE for worrisome rashes, moles or lesions  ROS: 14 point review of systems was negative except the symptoms listed above in the HPI.    This document serves as a record of the services and decisions personally performed and made by Sneha Sandoval MD and was created by Cristhian Glover, a trained medical scribe, based on personal observations and provider statements to the medical scribe.  January 23, 2020 11:59 AM   Cristhian Glover    OBJECTIVE:   GENERAL: healthy, alert and no distress.  HEENT: PERRL. Conjunctiva, sclera clear.  SKIN: Patton Skin Type - I.  Genitalia was examined. The dermatoscope was used to help evaluate pigmented lesions.  Skin Pertinent Findings:  Right labia: slight patch of hypopigmentation    Perineum: a patch of hypopigmentation    No suspicious lesions. No skin atrophy.    Diagnostic Test Results:  none     ASSESSMENT:     Encounter Diagnosis   Name Primary?     Lichen sclerosus et atrophicus      .    PLAN:   Patient Instructions   FUTURE  APPOINTMENTS  Follow up in 1 year(s).    Continue applying clobetasol 0.05% ointment every other day.    If you find that it is too costly, then have the pharmacy call Dr. Sandoval, and we may change the topical steroid to augmented betamethasone 0.05% ointment.      TT: 20 minutes.  CT: 15 minutes.    The information in this document, created by the medical scribe for me, accurately reflects the services I personally performed and the decisions made by me. I have reviewed and approved this document for accuracy prior to leaving the patient care area.  January 23, 2020 11:59 AM  Sneha Sandoval MD  [facility]      Again, thank you for allowing me to participate in the care of your patient.        Sincerely,        Sneha Sandoval MD

## 2020-01-23 NOTE — PATIENT INSTRUCTIONS
FUTURE APPOINTMENTS  Follow up in 1 year(s).    Continue applying clobetasol 0.05% ointment every other day.    If you find that it is too costly, then have the pharmacy call Dr. Sandoval, and we may change the topical steroid to augmented betamethasone 0.05% ointment.

## 2020-01-23 NOTE — PROGRESS NOTES
[facility] - PRIMARY CARE SKIN    CC: lichen sclerosus  SUBJECTIVE:   Marysol Morrell is a(n) 69 year old female who presents to clinic today for follow-up of chronic lichen sclerosus et atrophicus which first began in early 2015. Pt denies any bleeding, itchiness, pain, nor suspicious lesions. Pt reports current treatment plan is helping. Pt is running low on medication and requests refill.     Personal Medical History  Skin Cancer: NO  Other: lichen sclerosus et atrophicus.    Family Medical History  Skin Cancer: NO    Occupation: retired, previously was a nurse (indoor).    Refer to electronic medical record (EMR) for past medical history and medications.    INTEGUMENTARY/SKIN: NEGATIVE for worrisome rashes, moles or lesions  ROS: 14 point review of systems was negative except the symptoms listed above in the HPI.    This document serves as a record of the services and decisions personally performed and made by Sneha aSndoval MD and was created by Cristhian Glover, a trained medical scribe, based on personal observations and provider statements to the medical scribe.  January 23, 2020 11:59 AM   Cristhian Glover    OBJECTIVE:   GENERAL: healthy, alert and no distress.  HEENT: PERRL. Conjunctiva, sclera clear.  SKIN: Patton Skin Type - I.  Genitalia was examined. The dermatoscope was used to help evaluate pigmented lesions.  Skin Pertinent Findings:  Right labia: slight patch of hypopigmentation    Perineum: a patch of hypopigmentation    No suspicious lesions. No skin atrophy.    Diagnostic Test Results:  none     ASSESSMENT:     Encounter Diagnosis   Name Primary?     Lichen sclerosus et atrophicus      .    PLAN:   Patient Instructions   FUTURE APPOINTMENTS  Follow up in 1 year(s).    Continue applying clobetasol 0.05% ointment every other day.    If you find that it is too costly, then have the pharmacy call Dr. Sandoval, and we may change the topical steroid to augmented betamethasone 0.05%  ointment.      TT: 20 minutes.  CT: 15 minutes.    The information in this document, created by the medical scribe for me, accurately reflects the services I personally performed and the decisions made by me. I have reviewed and approved this document for accuracy prior to leaving the patient care area.  January 23, 2020 11:59 AM  Sneha Sandoval MD  [facility]

## 2020-01-24 ENCOUNTER — TELEPHONE (OUTPATIENT)
Dept: NURSING | Facility: CLINIC | Age: 70
End: 2020-01-24

## 2020-01-24 NOTE — TELEPHONE ENCOUNTER
Called patient:  States this is for the Vaginal- perineal area - 3 month supply.    Called pharmacist.    Zuri FARAHRN BSN  [facility]  [phone]

## 2020-01-24 NOTE — TELEPHONE ENCOUNTER
[facility]ab pharmacy called about  -     clobetasol (TEMOVATE) 0.05 % external ointment   Need the location that this will be applied and is it ok for 30 days.     Plz call back @ [phone]

## 2020-05-01 DIAGNOSIS — I10 HTN (HYPERTENSION), BENIGN: ICD-10-CM

## 2020-05-04 RX ORDER — LOSARTAN POTASSIUM 100 MG/1
100 TABLET ORAL DAILY
Qty: 90 TABLET | Refills: 0 | Status: SHIPPED | OUTPATIENT
Start: 2020-05-04 | End: 2020-08-20

## 2020-05-04 NOTE — TELEPHONE ENCOUNTER
Prescription approved per [facility] Refill Protocol.  Chen Madera RN  [facility]

## 2020-06-25 ENCOUNTER — TELEPHONE (OUTPATIENT)
Dept: FAMILY MEDICINE | Facility: CLINIC | Age: 70
End: 2020-06-25

## 2020-06-25 NOTE — TELEPHONE ENCOUNTER
"TO PCP:     Pt walked in from another appointment (oncology) upstairs    Provider there directed pt to come to our clinic     C/o gradual SOB with activity and lightheadedness ongoing for about a month    O2 % 97-98%   Pulse 70  130/79 BP     Also has bilateral lower edema worse in right foot, but states it is \"good\" today compared to some days. Denies any chest pain, breathing is okay at rest, may be a bit dehydrated/has not had much to drink today. Lightheadedness ongoing a couple weeks, a little better today than some days.     Notices feeling winded with any activity. Fine talking while sitting.     No provider in-clinic visits available today. Recommended walk in UC, or if any worsening symptoms ER.     Pt does not have ability to do a video visit, scheduled an OV with you for 7/1/2020, but advised if ANY worsening or new symptoms go to ER    Pt agreeable to plan     Laure RDZ RN    "

## 2020-07-01 ENCOUNTER — OFFICE VISIT (OUTPATIENT)
Dept: FAMILY MEDICINE | Facility: CLINIC | Age: 70
End: 2020-07-01
Payer: MEDICARE

## 2020-07-01 VITALS
BODY MASS INDEX: 32.99 KG/M2 | OXYGEN SATURATION: 99 % | TEMPERATURE: 96.6 F | WEIGHT: 198 LBS | DIASTOLIC BLOOD PRESSURE: 68 MMHG | HEART RATE: 69 BPM | SYSTOLIC BLOOD PRESSURE: 130 MMHG | HEIGHT: 65 IN

## 2020-07-01 DIAGNOSIS — C83.31 DIFFUSE LARGE B-CELL LYMPHOMA OF LYMPH NODES OF HEAD (H): ICD-10-CM

## 2020-07-01 DIAGNOSIS — I82.499 DEEP VEIN THROMBOSIS (DVT) OF OTHER VEIN OF LOWER EXTREMITY, UNSPECIFIED CHRONICITY, UNSPECIFIED LATERALITY (H): ICD-10-CM

## 2020-07-01 DIAGNOSIS — R42 LIGHTHEADEDNESS: Primary | ICD-10-CM

## 2020-07-01 DIAGNOSIS — R60.0 BILATERAL LOWER EXTREMITY EDEMA: ICD-10-CM

## 2020-07-01 DIAGNOSIS — Z86.711 HISTORY OF PULMONARY EMBOLISM: ICD-10-CM

## 2020-07-01 DIAGNOSIS — R06.09 DOE (DYSPNEA ON EXERTION): ICD-10-CM

## 2020-07-01 DIAGNOSIS — I10 HTN (HYPERTENSION), BENIGN: ICD-10-CM

## 2020-07-01 DIAGNOSIS — E03.9 HYPOTHYROIDISM, UNSPECIFIED TYPE: ICD-10-CM

## 2020-07-01 LAB
ALBUMIN SERPL-MCNC: 3.6 G/DL (ref 3.4–5)
ALP SERPL-CCNC: 66 U/L (ref 40–150)
ALT SERPL W P-5'-P-CCNC: 23 U/L (ref 0–50)
ANION GAP SERPL CALCULATED.3IONS-SCNC: 8 MMOL/L (ref 3–14)
AST SERPL W P-5'-P-CCNC: 21 U/L (ref 0–45)
BILIRUB SERPL-MCNC: 0.6 MG/DL (ref 0.2–1.3)
BUN SERPL-MCNC: 13 MG/DL (ref 7–30)
CALCIUM SERPL-MCNC: 9.5 MG/DL (ref 8.5–10.1)
CHLORIDE SERPL-SCNC: 107 MMOL/L (ref 94–109)
CO2 SERPL-SCNC: 23 MMOL/L (ref 20–32)
CREAT SERPL-MCNC: 0.91 MG/DL (ref 0.52–1.04)
D DIMER PPP FEU-MCNC: <0.3 UG/ML FEU (ref 0–0.5)
ERYTHROCYTE [DISTWIDTH] IN BLOOD BY AUTOMATED COUNT: 12.4 % (ref 10–15)
GFR SERPL CREATININE-BSD FRML MDRD: 64 ML/MIN/{1.73_M2}
GLUCOSE SERPL-MCNC: 109 MG/DL (ref 70–99)
HCT VFR BLD AUTO: 41.7 % (ref 35–47)
HGB BLD-MCNC: 14.7 G/DL (ref 11.7–15.7)
MCH RBC QN AUTO: 33.6 PG (ref 26.5–33)
MCHC RBC AUTO-ENTMCNC: 35.3 G/DL (ref 31.5–36.5)
MCV RBC AUTO: 95 FL (ref 78–100)
NT-PROBNP SERPL-MCNC: 88 PG/ML (ref 0–125)
PLATELET # BLD AUTO: 253 10E9/L (ref 150–450)
POTASSIUM SERPL-SCNC: 3.7 MMOL/L (ref 3.4–5.3)
PROT SERPL-MCNC: 7.3 G/DL (ref 6.8–8.8)
RBC # BLD AUTO: 4.37 10E12/L (ref 3.8–5.2)
SODIUM SERPL-SCNC: 138 MMOL/L (ref 133–144)
TSH SERPL DL<=0.005 MIU/L-ACNC: 1.78 MU/L (ref 0.4–4)
WBC # BLD AUTO: 8.7 10E9/L (ref 4–11)

## 2020-07-01 PROCEDURE — 85379 FIBRIN DEGRADATION QUANT: CPT | Performed by: PHYSICIAN ASSISTANT

## 2020-07-01 PROCEDURE — 99215 OFFICE O/P EST HI 40 MIN: CPT | Performed by: PHYSICIAN ASSISTANT

## 2020-07-01 PROCEDURE — 93000 ELECTROCARDIOGRAM COMPLETE: CPT | Performed by: PHYSICIAN ASSISTANT

## 2020-07-01 PROCEDURE — 83880 ASSAY OF NATRIURETIC PEPTIDE: CPT | Performed by: PHYSICIAN ASSISTANT

## 2020-07-01 PROCEDURE — 80053 COMPREHEN METABOLIC PANEL: CPT | Performed by: PHYSICIAN ASSISTANT

## 2020-07-01 PROCEDURE — 85027 COMPLETE CBC AUTOMATED: CPT | Performed by: PHYSICIAN ASSISTANT

## 2020-07-01 PROCEDURE — 84443 ASSAY THYROID STIM HORMONE: CPT | Performed by: PHYSICIAN ASSISTANT

## 2020-07-01 PROCEDURE — 36415 COLL VENOUS BLD VENIPUNCTURE: CPT | Performed by: PHYSICIAN ASSISTANT

## 2020-07-01 ASSESSMENT — MIFFLIN-ST. JEOR: SCORE: 1411.06

## 2020-07-01 NOTE — PROGRESS NOTES
HPI: Marysol is a pleasant 68 yo female here with complaint of lightheadedness x one month  Describes this as feeling faint, but denies syncopal episode since childhood.  Also increase in LE edema  BP occas running low at home (90/50)  Has hx of lymphoma and followed by Dr. Wilkins at [facility]  She also has ALMEIDA for the past month as well.  Denies any chest pain, nausea or vomiting.  Pt has hx of DVT and PE when hospitalized in 2017      TSH   Date Value Ref Range Status   09/17/2019 0.88 0.40 - 4.00 mU/L Final       Past Medical History:   Diagnosis Date     Arthritis      Cancer (H) 2017    lymphoma     Cholelithiasis      Colon polyps 2001 and 2006    no polyps 2011     Constipation      Diffuse large B cell lymphoma (H) 2017    tongue, followed by Dr. Wilkins at [facility] and [facility]     Diverticulitis 2010     DVT (deep venous thrombosis) (H)     x3 (one due to OCs, 2 post op)     Genital herpes      GERD (gastroesophageal reflux disease)      Gout      History of depression      History of migraine      HTN (hypertension), benign      Hypothyroidism     hashimoto's thyroiditis     Lichen sclerosus et atrophicus      Osteopenia      Ovarian cyst 2011     Rectocele      Schatzki's ring 2009    EGD      Tibia/fibula fracture 2009     Vasovagal syncope 1966     Vertigo 2000     Past Surgical History:   Procedure Laterality Date     APPENDECTOMY  1954     ARTHROSCOPY KNEE  1983     BONE MARROW BIOPSY, BONE SPECIMEN, NEEDLE/TROCAR N/A 7/31/2017    Procedure: BIOPSY BONE MARROW;  UNILATERAL BONE MARROW BIPOSY  ;  Surgeon: Jamil Mccarthy MD;  Location:  GI     BREAST BIOPSY, CORE RT/LT  1990     C LAP,SURG,COLECTOMY, PARTIAL, W/ANAST  2011    due to recurrent diverticulitis     EXCISE GARNETT'S NEUROMA FOOT       HYSTERECTOMY, PAP NO LONGER INDICATED  1984    with bladder repair     LAPAROSCOPIC OOPHORECTOMY  2011    with the colon resection     LARYNGOSCOPY WITH BIOPSY(IES) N/A 7/24/2017    Procedure:  LARYNGOSCOPY WITH BIOPSY(IES);  DIRECT LARYNGOSCOPY WITH TONGUE BIOPSIES;  Surgeon: Vernell Yang MD;  Location: [facility]     OPEN REDUCTION INTERNAL FIXATION TIBIA       TONSILLECTOMY & ADENOIDECTOMY  195     Social History     Tobacco Use     Smoking status: Former Smoker     Types: Cigarettes     Last attempt to quit: 2000     Years since quittin.9     Smokeless tobacco: Never Used     Tobacco comment: More of a social smoker   Substance Use Topics     Alcohol use: Yes     Alcohol/week: 0.0 standard drinks     Comment: 0-3 a day. mixed drinks, wine     Current Outpatient Medications   Medication Sig Dispense Refill     ACETAMINOPHEN PO Take 325-650 mg by mouth as needed for pain       allopurinol (ZYLOPRIM) 100 MG tablet TAKE 1 TABLET BY MOUTH EVERY DAY 90 tablet 3     apixaban ANTICOAGULANT (ELIQUIS ANTICOAGULANT) 2.5 MG tablet        cetirizine (ZYRTEC) 10 MG tablet TAKE ONE TABLET BY MOUTH ONE TIME DAILY IN THE P.M. 90 tablet 3     Cholecalciferol (VITAMIN D3) 2000 units CAPS Take 2,000 Units by mouth daily 90 capsule 3     clobetasol (TEMOVATE) 0.05 % external ointment Apply sparingly to affected area twice daily as needed.  Do not apply to face. 45 g 1     conjugated estrogens (PREMARIN) cream Place 0.5 g vaginally twice a week 30 g 12     hydrochlorothiazide (HYDRODIURIL) 25 MG tablet Take 1 tablet (25 mg) by mouth daily 90 tablet 0     levothyroxine (SYNTHROID/LEVOTHROID) 112 MCG tablet Take 1 tablet (112 mcg) by mouth daily 90 tablet 3     losartan (COZAAR) 100 MG tablet Take 1 tablet (100 mg) by mouth daily 90 tablet 0     omeprazole (PRILOSEC) 20 MG DR capsule Take 1 capsule (20 mg) by mouth daily 90 capsule 3     valACYclovir (VALTREX) 500 MG tablet TAKE 1 TABLET BY MOUTH TWICE A DAY 6 tablet 3     Allergies   Allergen Reactions     Chloraprep One Step Hives     Codeine GI Disturbance     Evista [Raloxifene Hydrochloride] Other (See Comments)     Esophagus irritation      Fosamax  "Other (See Comments)     Esophagus irritation      Vicodin [Hydrocodone-Acetaminophen] GI Disturbance     Can Take oxycodone     Pen Vk [Penicillin V] Rash     FAMILY HISTORY NOTED AND REVIEWED      PHYSICAL EXAM:    /68 (BP Location: Left arm, Patient Position: Sitting, Cuff Size: Adult Large)   Pulse 69   Temp 96.6  F (35.9  C) (Tympanic)   Ht 1.638 m (5' 4.5\")   Wt 89.8 kg (198 lb)   SpO2 99%   Breastfeeding No   BMI 33.46 kg/m      Patient appears non toxic  Lungs: CTA bilat  Heart: RRR without m/r/g, no jvd   Extr: trace non pitting pedal edema bilat  No palp cords, erythema or tenderness in calves    Results for orders placed or performed in visit on 07/01/20   CBC with platelets     Status: Abnormal   Result Value Ref Range    WBC 8.7 4.0 - 11.0 10e9/L    RBC Count 4.37 3.8 - 5.2 10e12/L    Hemoglobin 14.7 11.7 - 15.7 g/dL    Hematocrit 41.7 35.0 - 47.0 %    MCV 95 78 - 100 fl    MCH 33.6 (H) 26.5 - 33.0 pg    MCHC 35.3 31.5 - 36.5 g/dL    RDW 12.4 10.0 - 15.0 %    Platelet Count 253 150 - 450 10e9/L     Assessment and Plan:     (R42) Lightheadedness  (primary encounter diagnosis)  Comment: decr hydrochlorothiazide from 25mg every day to 12.5mg every day.  Plan: CBC with platelets, Comprehensive metabolic         panel            (R06.00) ALMEIDA (dyspnea on exertion)  Comment:   Plan: Echocardiogram Complete, D dimer, quantitative,        EKG 12-lead complete w/read - Clinics, BNP-N         terminal pro            (C83.31) Diffuse large B-cell lymphoma of lymph nodes of head (H)  Comment:   Plan: followed by [facility] and had appt last week    (I10) HTN (hypertension), benign  Comment: well controlled so should tolerate lower dose of hctz  Plan: Comprehensive metabolic panel            (R60.0) Bilateral lower extremity edema  Comment:   Plan: not a problem today but does come and go. Had lasix on the med list but hasn't taken that in months.    (E03.9) Hypothyroidism, unspecified type  Comment:   Plan: " TSH with free T4 reflex            (I82.499) Deep vein thrombosis (DVT) of other vein of lower extremity, unspecified chronicity, unspecified laterality (H)  Comment:   Plan: hx of DVT and PE 2017    (Z86.711) History of pulmonary embolism  Comment:   Plan: d dimer pending    Spent 40 minutes FTF with patient of which over 50% was spent discussing the coordination of care and management of their issues noted.    Vernell Sanders PA-C

## 2020-07-03 NOTE — RESULT ENCOUNTER NOTE
It was a pleasure seeing you.      Your TSH (thyroid blood test) was in normal range.  Your BNP was not elevated (this is a test for heart failure).  Your D dimer was not elevated (making a clot less likely).    Your CBC or complete blood count is normal with no signs of anemia, leukemia or platelet abnormalities. Your chemistry panel shows no signs of diabetes.  Your blood salts, kidney tests, liver tests, and proteins are all fine.    Let's see what the echocardiogram shows and then talk again, but so far, so good.    Vernell Sanders PA-C

## 2020-07-08 ENCOUNTER — TELEPHONE (OUTPATIENT)
Dept: CARDIOLOGY | Facility: CLINIC | Age: 70
End: 2020-07-08

## 2020-07-08 NOTE — TELEPHONE ENCOUNTER
PATIENT WELLNESS TELEPHONE SCREENING     Step 1 Screening Questions    In the past 3 weeks, have you been exposed to someone with a suspected or known illness?  COVID-19? No  Chickenpox? No   Measles? No  Pertussis? No    In the past 2 weeks, have you had any of the following symptoms?   Fever/Chills? No   Cough? No   Shortness of breath? No   New loss of taste or smell? No  Sore throat? No  Muscle or body aches? No  Headaches? No  Fatigue? No  Vomiting or diarrhea? No    Step 2 Screening Results (Skip if the patient is negative for symptoms)    If the patient is positive for new or worsening symptoms, contact the ordering provider to determine if the procedure is deemed necessary. Determine if patient can be re-scheduled when the patient is symptom free or has a negative COVID test.     If ordering provider deems the procedure is necessary, notify your manager/supervisor. Provide the patient with the procedural department phone number and inform the patient to call the procedural department upon arrival.  The patient will be registered over the phone.    Step 3 Review Visitor Policy  Patient informed of the updated visitor policy   1 visitor allowed per patient   Visitor must screen negative for COVID symptoms   Visitor must wear a mask    Alan Dias

## 2020-07-09 ENCOUNTER — HOSPITAL ENCOUNTER (OUTPATIENT)
Dept: CARDIOLOGY | Facility: CLINIC | Age: 70
Discharge: HOME | End: 2020-07-09
Attending: PHYSICIAN ASSISTANT | Admitting: PHYSICIAN ASSISTANT
Payer: MEDICARE

## 2020-07-09 DIAGNOSIS — R06.09 DOE (DYSPNEA ON EXERTION): ICD-10-CM

## 2020-07-09 PROCEDURE — 93306 TTE W/DOPPLER COMPLETE: CPT | Mod: 26 | Performed by: INTERNAL MEDICINE

## 2020-07-09 PROCEDURE — 93306 TTE W/DOPPLER COMPLETE: CPT

## 2020-07-14 DIAGNOSIS — R06.09 DOE (DYSPNEA ON EXERTION): Primary | ICD-10-CM

## 2020-07-23 ENCOUNTER — TELEPHONE (OUTPATIENT)
Dept: CARDIOLOGY | Facility: CLINIC | Age: 70
End: 2020-07-23

## 2020-07-24 ENCOUNTER — HOSPITAL ENCOUNTER (OUTPATIENT)
Dept: CARDIOLOGY | Facility: CLINIC | Age: 70
Discharge: HOME | End: 2020-07-24
Attending: PHYSICIAN ASSISTANT | Admitting: PHYSICIAN ASSISTANT
Payer: MEDICARE

## 2020-07-24 VITALS
OXYGEN SATURATION: 98 % | BODY MASS INDEX: 33.7 KG/M2 | HEART RATE: 61 BPM | WEIGHT: 199.4 LBS | SYSTOLIC BLOOD PRESSURE: 120 MMHG | DIASTOLIC BLOOD PRESSURE: 68 MMHG

## 2020-07-24 DIAGNOSIS — R06.09 DOE (DYSPNEA ON EXERTION): ICD-10-CM

## 2020-07-24 LAB
CV STRESS MAX HR HE: 78
NUC STRESS EJECTION FRACTION: 63 %
RATE PRESSURE PRODUCT: 9048
STRESS ECHO BASELINE DIASTOLIC HE: 68
STRESS ECHO BASELINE HR: 61
STRESS ECHO BASELINE SYSTOLIC BP: 120
STRESS ECHO CALCULATED PERCENT HR: 52 %
STRESS ECHO LAST STRESS DIASTOLIC BP: 66
STRESS ECHO LAST STRESS SYSTOLIC BP: 116
STRESS ECHO TARGET HR: 150

## 2020-07-24 PROCEDURE — A9502 TC99M TETROFOSMIN: HCPCS | Performed by: PHYSICIAN ASSISTANT

## 2020-07-24 PROCEDURE — 78452 HT MUSCLE IMAGE SPECT MULT: CPT | Mod: 26 | Performed by: INTERNAL MEDICINE

## 2020-07-24 PROCEDURE — 25000128 H RX IP 250 OP 636: Performed by: INTERNAL MEDICINE

## 2020-07-24 PROCEDURE — 78452 HT MUSCLE IMAGE SPECT MULT: CPT

## 2020-07-24 PROCEDURE — 93016 CV STRESS TEST SUPVJ ONLY: CPT | Performed by: INTERNAL MEDICINE

## 2020-07-24 PROCEDURE — 93018 CV STRESS TEST I&R ONLY: CPT | Performed by: INTERNAL MEDICINE

## 2020-07-24 PROCEDURE — 34300033 ZZH RX 343: Performed by: PHYSICIAN ASSISTANT

## 2020-07-24 RX ORDER — REGADENOSON 0.08 MG/ML
0.4 INJECTION, SOLUTION INTRAVENOUS ONCE
Status: COMPLETED | OUTPATIENT
Start: 2020-07-24 | End: 2020-07-24

## 2020-07-24 RX ORDER — AMINOPHYLLINE 25 MG/ML
50-100 INJECTION, SOLUTION INTRAVENOUS
Status: DISCONTINUED | OUTPATIENT
Start: 2020-07-24 | End: 2020-07-25 | Stop reason: HOSPADM

## 2020-07-24 RX ORDER — ACYCLOVIR 200 MG/1
0-1 CAPSULE ORAL
Status: DISCONTINUED | OUTPATIENT
Start: 2020-07-24 | End: 2020-07-25 | Stop reason: HOSPADM

## 2020-07-24 RX ORDER — ALBUTEROL SULFATE 90 UG/1
2 AEROSOL, METERED RESPIRATORY (INHALATION) EVERY 5 MIN PRN
Status: DISCONTINUED | OUTPATIENT
Start: 2020-07-24 | End: 2020-07-25 | Stop reason: HOSPADM

## 2020-07-24 RX ADMIN — TETROFOSMIN 3.68 MCI.: 1.38 INJECTION, POWDER, LYOPHILIZED, FOR SOLUTION INTRAVENOUS at 12:49

## 2020-07-24 RX ADMIN — REGADENOSON 0.4 MG: 0.08 INJECTION, SOLUTION INTRAVENOUS at 13:57

## 2020-07-24 RX ADMIN — TETROFOSMIN 12.55 MCI.: 1.38 INJECTION, POWDER, LYOPHILIZED, FOR SOLUTION INTRAVENOUS at 14:01

## 2020-07-26 DIAGNOSIS — R94.39 ABNORMAL STRESS TEST: Primary | ICD-10-CM

## 2020-07-28 ENCOUNTER — TRANSFERRED RECORDS (OUTPATIENT)
Dept: HEALTH INFORMATION MANAGEMENT | Facility: CLINIC | Age: 70
End: 2020-07-28

## 2020-07-28 LAB
CREAT SERPL-MCNC: 0.83 MG/DL (ref 0.57–1.11)
GFR SERPL CREATININE-BSD FRML MDRD: >60 ML/MIN/1.73M2
GLUCOSE SERPL-MCNC: 99 MG/DL (ref 65–100)
POTASSIUM SERPL-SCNC: 3.5 MMOL/L (ref 3.5–5)

## 2020-07-29 ENCOUNTER — TRANSFERRED RECORDS (OUTPATIENT)
Dept: HEALTH INFORMATION MANAGEMENT | Facility: CLINIC | Age: 70
End: 2020-07-29

## 2020-07-30 ENCOUNTER — TRANSFERRED RECORDS (OUTPATIENT)
Dept: HEALTH INFORMATION MANAGEMENT | Facility: CLINIC | Age: 70
End: 2020-07-30

## 2020-08-28 ENCOUNTER — VIRTUAL VISIT (OUTPATIENT)
Dept: FAMILY MEDICINE | Facility: OTHER | Age: 70
End: 2020-08-28
Payer: COMMERCIAL

## 2020-08-28 DIAGNOSIS — Z20.822 SUSPECTED COVID-19 VIRUS INFECTION: Primary | ICD-10-CM

## 2020-08-28 PROCEDURE — 99421 OL DIG E/M SVC 5-10 MIN: CPT | Performed by: FAMILY MEDICINE

## 2020-08-28 NOTE — PROGRESS NOTES
"Date: 2020 13:38:13  Clinician: Ebenezer Solis  Clinician NPI: [id number]  Patient: Marysol Morrell  Patient : 1950  Patient Address: [address] [place] [address]  Patient Phone: [phone]  Visit Protocol: URI  Patient Summary:  Marysol is a 69 year old ( : 1950 ) female who initiated a Visit for COVID-19 (Coronavirus) evaluation and screening. When asked the question \"Please sign me up to receive news, health information and promotions from [employer].\", Marysol responded \"No\".    Marysol states her symptoms started 1-2 days ago.   Her symptoms consist of a headache, chills, malaise, a sore throat, a cough, and rhinitis. She is experiencing mild difficulty breathing with activities but can speak normally in full sentences.   Symptom details     Nasal secretions: The color of her mucus is clear.    Cough: Marysol coughs almost every minute and her cough is not more bothersome at night. Phlegm comes into her throat when she coughs. She does not believe her cough is caused by post-nasal drip. The color of the phlegm is clear.     Sore throat: Marysol reports having mild throat pain (1-3 on a 10 point pain scale), does not have exudate on her tonsils, and can swallow liquids. She is not sure if the lymph nodes in her neck are enlarged. A rash has not appeared on the skin since the sore throat started.     Headache: She states the headache is moderate (4-6 on a 10 point pain scale).      Marysol denies having ear pain, fever, wheezing, teeth pain, ageusia, diarrhea, nasal congestion, vomiting, nausea, myalgias, anosmia, and facial pain or pressure. She also denies having recent facial or sinus surgery in the past 60 days and taking antibiotic medication in the past month.   Precipitating events  Within the past week, Marysol has not been exposed to someone with strep throat. She has not recently been exposed to someone with influenza. Marysol has been in close contact " with the following high risk individuals: adults 65 or older.   Pertinent COVID-19 (Coronavirus) information  In the past 14 days, Marysol has not worked in a congregate living setting.   She does not work or volunteer as healthcare worker or a  and does not work or volunteer in a healthcare facility.   Marysol also has not lived in a congregate living setting in the past 14 days. She does not live with a healthcare worker.   Marysol has not had a close contact with a laboratory-confirmed COVID-19 patient within 14 days of symptom onset.   Since December 2019, Marysol and has not had upper respiratory infection or influenza-like illness. Has not been diagnosed with lab-confirmed COVID-19 test   Pertinent medical history  Marysol does not get yeast infections when she takes antibiotics.   Marysol does not need a return to work/school note.   Weight: 196 lbs   Marysol does not smoke or use smokeless tobacco.   Weight: 196 lbs    MEDICATIONS: clobetasol topical, aspirin oral, Eliquis oral, omeprazole oral, hydrochlorothiazide oral, losartan oral, levothyroxine oral, cetirizine oral, allopurinol oral, ALLERGIES: Penicillins, Tylenol-Codeine #3, Vicodin  Clinician Response:  Dear Marysol,   Your symptoms show that you may have coronavirus (COVID-19). This illness can cause fever, cough and trouble breathing. Many people get a mild case and get better on their own. Some people can get very sick.  Based on the symptoms you have shared, I would like you to be re-checked in 2 to 3 days. Please call your family clinic to set up a video or phone visit.  Will I be tested for COVID-19?  We would like to test you for this virus.   Please call [phone] to schedule your visit. Explain that you were referred by [facility] to have a COVID-19 test. Be ready to share your [facility] visit ID number.   The following will serve as your written order for this COVID Test, ordered by me, for the indication of suspected  "COVID [Z20.828]: The test will be ordered in [employer], our electronic health record, after you are scheduled. It will show as ordered and authorized by Yuri Diaz MD.  Order: COVID-19 (Coronavirus) PCR for SYMPTOMATIC testing from [facility].  1.When it's time for your COVID test:   Stay at least 6 feet away from others. (If someone will drive you to your test, stay in the backseat, as far away from the  as you can.)   Cover your mouth and nose with a mask, tissue or washcloth.  Go straight to the testing site. Don't make any stops on the way there or back.      2.Starting now: Stay home and away from others (self-isolate) until:   You've had no fever---and no medicine that reduces fever---for one full day (24 hours). And...   Your other symptoms have gotten better. For example, your cough or breathing has improved. And...   At least 10 days have passed since your symptoms started.       During this time, don't leave the house except for testing or medical care.   Stay in your own room, even for meals. Use your own bathroom if you can.   Stay away from others in your home. No hugging, kissing or shaking hands. No visitors.  Don't go to work, school or anywhere else.    Clean \"high touch\" surfaces often (doorknobs, counters, handles, etc.). Use a household cleaning spray or wipes. You'll find a full list of  on the EPA website: www.epa.gov/pesticide-registration/list-n-disinfectants-use-against-sars-cov-2.   Cover your mouth and nose with a mask, tissue or washcloth to avoid spreading germs.  Wash your hands and face often. Use soap and water.  Caregivers in these groups are at risk for severe illness due to COVID-19:  o People 65 years and older  o People who live in a nursing home or long-term care facility  o People with chronic disease (lung, heart, cancer, diabetes, kidney, liver, immunologic)   o People who have a weakened immune system, including those who:   Are in cancer treatment  Take medicine that " weakens the immune system, such as corticosteroids  Had a bone marrow or organ transplant  Have an immune deficiency  Have poorly controlled HIV or AIDS  Are obese (body mass index of 40 or higher)  Smoke regularly   o Caregivers should wear gloves while washing dishes, handling laundry and cleaning bedrooms and bathrooms.  o Use caution when washing and drying laundry: Don't shake dirty laundry, and use the warmest water setting that you can.  o For more tips, go to www.cdc.gov/coronavirus/2019-ncov/downloads/10Things.pdf.      How can I take care of myself?   Get lots of rest. Drink extra fluids (unless a doctor has told you not to)   Take Tylenol (acetaminophen) for fever or pain. If you have liver or kidney problems, ask your family doctor if it's okay to take Tylenol.   Adults can take either:    650 mg (two 325 mg pills) every 4 to 6 hours, or...   1,000 mg (two 500 mg pills) every 8 hours as needed.    Note: Don't take more than 3,000 mg in one day. Acetaminophen is found in many medicines (both prescribed and over-the-counter medicines). Read all labels to be sure you don't take too much.   For children, check the Tylenol bottle for the right dose. The dose is based on the child's age or weight.    If you have other health problems (like cancer, heart failure, an organ transplant or severe kidney disease): Call your specialty clinic if you don't feel better in the next 2 days.       Know when to call 911. Emergency warning signs include:    Trouble breathing or shortness of breath Pain or pressure in the chest that doesn't go away Feeling confused like you haven't felt before, or not being able to wake up Bluish-colored lips or face  Where can I get more information?    [facility] -- About COVID-19: [email or web address]   CDC -- What to Do If You're Sick: www.cdc.gov/coronavirus/2019-ncov/about/steps-when-sick.html   CDC -- Ending Home Isolation:  www.cdc.gov/coronavirus/2019-ncov/hcp/disposition-in-home-patients.html   [facility] -- Caring for Someone: www.cdc.gov/coronavirus/2019-ncov/if-you-are-sick/care-for-someone.html   [facility] -- Interim Guidance for Hospital Discharge to Home: www.[facility].[place]/diseases/coronavirus/hcp/hospdischarge.pdf   [facility] clinical trials (COVID-19 research studies): clinicalaffairs.[facility]-clinical-trials    Below are the COVID-19 hotlines at the [place] Department of Health ([facility]). Interpreters are available.    For health questions: Call [phone] or [phone] (7 a.m. to 7 p.m.) For questions about schools and childcare: Call [phone] or [phone] (7 a.m. to 7 p.m.)       Diagnosis: Cough  Diagnosis ICD: R05

## 2020-08-30 DIAGNOSIS — Z20.822 SUSPECTED COVID-19 VIRUS INFECTION: ICD-10-CM

## 2020-08-30 PROCEDURE — U0003 INFECTIOUS AGENT DETECTION BY NUCLEIC ACID (DNA OR RNA); SEVERE ACUTE RESPIRATORY SYNDROME CORONAVIRUS 2 (SARS-COV-2) (CORONAVIRUS DISEASE [COVID-19]), AMPLIFIED PROBE TECHNIQUE, MAKING USE OF HIGH THROUGHPUT TECHNOLOGIES AS DESCRIBED BY CMS-2020-01-R: HCPCS | Performed by: FAMILY MEDICINE

## 2020-08-31 LAB
SARS-COV-2 RNA SPEC QL NAA+PROBE: NOT DETECTED
SPECIMEN SOURCE: NORMAL

## 2020-10-08 DIAGNOSIS — E03.9 HYPOTHYROIDISM, UNSPECIFIED TYPE: ICD-10-CM

## 2020-10-08 DIAGNOSIS — K21.9 GASTROESOPHAGEAL REFLUX DISEASE: ICD-10-CM

## 2020-10-08 DIAGNOSIS — Z87.39 HISTORY OF GOUT: ICD-10-CM

## 2020-10-08 RX ORDER — ALLOPURINOL 100 MG/1
TABLET ORAL
Qty: 90 TABLET | Refills: 2 | Status: SHIPPED | OUTPATIENT
Start: 2020-10-08 | End: 2021-07-25

## 2020-10-08 RX ORDER — LEVOTHYROXINE SODIUM 112 UG/1
TABLET ORAL
Qty: 90 TABLET | Refills: 2 | Status: SHIPPED | OUTPATIENT
Start: 2020-10-08 | End: 2021-07-15

## 2020-10-08 NOTE — TELEPHONE ENCOUNTER
"Last visit 7/1/2020 with PCP     Omeprazole - Prescription approved per [facility] Refill Protocol.  Levothyrxoine - Prescription approved per [facility] Refill Protocol.  Allopurinol - Routing refill request to provider for review/approval because:  Labs not current:  Uric acid     Laure RDZ RN          Requested Prescriptions   Pending Prescriptions Disp Refills     omeprazole (PRILOSEC) 20 MG DR capsule [Pharmacy Med Name: Omeprazole Oral Capsule Delayed Release 20 MG] 90 capsule 0     Sig: TAKE ONE CAPSULE BY MOUTH EVERY DAY       PPI Protocol Passed - 10/8/2020 12:05 AM        Passed - Not on Clopidogrel (unless Pantoprazole ordered)        Passed - No diagnosis of osteoporosis on record        Passed - Recent (12 mo) or future (30 days) visit within the authorizing provider's specialty     Patient has had an office visit with the authorizing provider or a provider within the authorizing providers department within the previous 12 mos or has a future within next 30 days. See \"Patient Info\" tab in inbasket, or \"Choose Columns\" in Meds & Orders section of the refill encounter.              Passed - Medication is active on med list        Passed - Patient is age 18 or older        Passed - No active pregnacy on record        Passed - No positive pregnancy test in past 12 months           levothyroxine (SYNTHROID/LEVOTHROID) 112 MCG tablet [Pharmacy Med Name: Levothyroxine Sodium Oral Tablet 112 MCG] 90 tablet 0     Sig: TAKE ONE TABLET BY MOUTH EVERY DAY       Thyroid Protocol Passed - 10/8/2020 12:05 AM        Passed - Patient is 12 years or older        Passed - Recent (12 mo) or future (30 days) visit within the authorizing provider's specialty     Patient has had an office visit with the authorizing provider or a provider within the authorizing providers department within the previous 12 mos or has a future within next 30 days. See \"Patient Info\" tab in inbasket, or \"Choose Columns\" in Meds & Orders section of the refill " "encounter.              Passed - Medication is active on med list        Passed - Normal TSH on file in past 12 months     Recent Labs   Lab Test 07/01/20  1046   TSH 1.78              Passed - No active pregnancy on record     If patient is pregnant or has had a positive pregnancy test, please check TSH.          Passed - No positive pregnancy test in past 12 months     If patient is pregnant or has had a positive pregnancy test, please check TSH.             allopurinol (ZYLOPRIM) 100 MG tablet [Pharmacy Med Name: Allopurinol Oral Tablet 100 MG] 90 tablet 0     Sig: TAKE ONE TABLET BY MOUTH EVERY DAY       Gout Agents Protocol Failed - 10/8/2020 12:05 AM        Failed - Has Uric Acid on file in past 12 months and value is less than 6     Recent Labs   Lab Test 08/08/18  1303   URIC 4.6     If level is 6mg/dL or greater, ok to refill one time and refer to provider.           Passed - CBC on file in past 12 months     Recent Labs   Lab Test 07/01/20  1046   WBC 8.7   RBC 4.37   HGB 14.7   HCT 41.7                    Passed - ALT on file in past 12 months     Recent Labs   Lab Test 07/01/20  1046   ALT 23             Passed - Recent (12 mo) or future (30 days) visit within the authorizing provider's specialty     Patient has had an office visit with the authorizing provider or a provider within the authorizing providers department within the previous 12 mos or has a future within next 30 days. See \"Patient Info\" tab in inbasket, or \"Choose Columns\" in Meds & Orders section of the refill encounter.              Passed - Medication is active on med list        Passed - Patient is age 18 or older        Passed - No active pregnancy on record        Passed - Normal serum creatinine on file in the past 12 months     Recent Labs   Lab Test 07/28/20   CR 0.83       Ok to refill medication if creatinine is low          Passed - No positive pregnancy test in past year             "

## 2020-11-24 ENCOUNTER — OFFICE VISIT (OUTPATIENT)
Dept: FAMILY MEDICINE | Facility: CLINIC | Age: 70
End: 2020-11-24
Payer: MEDICARE

## 2020-11-24 VITALS
WEIGHT: 198 LBS | BODY MASS INDEX: 32.99 KG/M2 | HEIGHT: 65 IN | SYSTOLIC BLOOD PRESSURE: 139 MMHG | DIASTOLIC BLOOD PRESSURE: 88 MMHG

## 2020-11-24 DIAGNOSIS — M1A.9XX0 CHRONIC GOUT WITHOUT TOPHUS, UNSPECIFIED CAUSE, UNSPECIFIED SITE: ICD-10-CM

## 2020-11-24 DIAGNOSIS — I10 HTN (HYPERTENSION), BENIGN: ICD-10-CM

## 2020-11-24 DIAGNOSIS — Z00.00 ENCOUNTER FOR MEDICARE ANNUAL WELLNESS EXAM: Primary | ICD-10-CM

## 2020-11-24 DIAGNOSIS — E03.9 HYPOTHYROIDISM, UNSPECIFIED TYPE: ICD-10-CM

## 2020-11-24 DIAGNOSIS — F41.9 ANXIETY: ICD-10-CM

## 2020-11-24 DIAGNOSIS — C83.31 DIFFUSE LARGE B-CELL LYMPHOMA OF LYMPH NODES OF HEAD (H): ICD-10-CM

## 2020-11-24 LAB
ERYTHROCYTE [DISTWIDTH] IN BLOOD BY AUTOMATED COUNT: 12.6 % (ref 10–15)
HCT VFR BLD AUTO: 42.6 % (ref 35–47)
HGB BLD-MCNC: 14.8 G/DL (ref 11.7–15.7)
MCH RBC QN AUTO: 33.8 PG (ref 26.5–33)
MCHC RBC AUTO-ENTMCNC: 34.7 G/DL (ref 31.5–36.5)
MCV RBC AUTO: 97 FL (ref 78–100)
PLATELET # BLD AUTO: 277 10E9/L (ref 150–450)
RBC # BLD AUTO: 4.38 10E12/L (ref 3.8–5.2)
WBC # BLD AUTO: 8.3 10E9/L (ref 4–11)

## 2020-11-24 PROCEDURE — 80053 COMPREHEN METABOLIC PANEL: CPT | Performed by: PHYSICIAN ASSISTANT

## 2020-11-24 PROCEDURE — 36415 COLL VENOUS BLD VENIPUNCTURE: CPT | Performed by: PHYSICIAN ASSISTANT

## 2020-11-24 PROCEDURE — 85027 COMPLETE CBC AUTOMATED: CPT | Performed by: PHYSICIAN ASSISTANT

## 2020-11-24 PROCEDURE — 84443 ASSAY THYROID STIM HORMONE: CPT | Performed by: PHYSICIAN ASSISTANT

## 2020-11-24 PROCEDURE — 84550 ASSAY OF BLOOD/URIC ACID: CPT | Performed by: PHYSICIAN ASSISTANT

## 2020-11-24 PROCEDURE — G0439 PPPS, SUBSEQ VISIT: HCPCS | Performed by: PHYSICIAN ASSISTANT

## 2020-11-24 PROCEDURE — 80061 LIPID PANEL: CPT | Performed by: PHYSICIAN ASSISTANT

## 2020-11-24 RX ORDER — HYDROCHLOROTHIAZIDE 12.5 MG/1
12.5 TABLET ORAL DAILY
Qty: 90 TABLET | Refills: 3 | Status: SHIPPED | OUTPATIENT
Start: 2020-11-24 | End: 2021-09-27

## 2020-11-24 ASSESSMENT — PATIENT HEALTH QUESTIONNAIRE - PHQ9
SUM OF ALL RESPONSES TO PHQ QUESTIONS 1-9: 11
5. POOR APPETITE OR OVEREATING: NOT AT ALL

## 2020-11-24 ASSESSMENT — MIFFLIN-ST. JEOR: SCORE: 1411.06

## 2020-11-24 ASSESSMENT — ANXIETY QUESTIONNAIRES
1. FEELING NERVOUS, ANXIOUS, OR ON EDGE: MORE THAN HALF THE DAYS
5. BEING SO RESTLESS THAT IT IS HARD TO SIT STILL: NOT AT ALL
IF YOU CHECKED OFF ANY PROBLEMS ON THIS QUESTIONNAIRE, HOW DIFFICULT HAVE THESE PROBLEMS MADE IT FOR YOU TO DO YOUR WORK, TAKE CARE OF THINGS AT HOME, OR GET ALONG WITH OTHER PEOPLE: NOT DIFFICULT AT ALL
GAD7 TOTAL SCORE: 5
7. FEELING AFRAID AS IF SOMETHING AWFUL MIGHT HAPPEN: SEVERAL DAYS
6. BECOMING EASILY ANNOYED OR IRRITABLE: MORE THAN HALF THE DAYS
3. WORRYING TOO MUCH ABOUT DIFFERENT THINGS: NOT AT ALL
2. NOT BEING ABLE TO STOP OR CONTROL WORRYING: NOT AT ALL

## 2020-11-24 ASSESSMENT — ACTIVITIES OF DAILY LIVING (ADL): CURRENT_FUNCTION: NO ASSISTANCE NEEDED

## 2020-11-24 NOTE — PROGRESS NOTES
"SUBJECTIVE:   Marysol Morrell is a 69 year old female who presents for Preventive Visit.    She did have another episode of lightheadedness a week or so ago which has resolved  She did see Dr. Yang and now will be getting hearing aids.  She taking Advil cold and sinus in the morning and benadryl at night which helps her.  She is having work done on her teeth and then Dr. Wilkins will put her on Zometa    Her dtr Lucía feels she needs to be an an antidepressant/anxiety med         Patient has been advised of split billing requirements and indicates understanding: Yes   Are you in the first 12 months of your Medicare coverage?  No    Healthy Habits:     In general, how would you rate your overall health?  Fair    Frequency of exercise:  None    Duration of exercise:  Less than 15 minutes    Do you usually eat at least 4 servings of fruit and vegetables a day, include whole grains    & fiber and avoid regularly eating high fat or \"junk\" foods?  No    Taking medications regularly:  Yes    Barriers to taking medications:  None    Medication side effects:  None    Ability to successfully perform activities of daily living:  No assistance needed    Home Safety:  No safety concerns identified    Hearing Impairment:  No hearing concerns    In the past 6 months, have you been bothered by leaking of urine? Yes    In general, how would you rate your overall mental or emotional health?  Fair      PHQ-2 Total Score: 0    Additional concerns today:  Yes    Do you feel safe in your environment? Yes    Have you ever done Advance Care Planning? (For example, a Health Directive, POLST, or a discussion with a medical provider or your loved ones about your wishes): No, advance care planning information given to patient to review.  Patient plans to discuss their wishes with loved ones or provider.        Fall risk  Fallen 2 or more times in the past year?: No  Any fall with injury in the past year?: No    Cognitive Screening   1) Repeat " 3 items (Leader, Season, Table)    2) Clock draw: NORMAL  3) 3 item recall: Recalls 3 objects  Results: 3 items recalled: COGNITIVE IMPAIRMENT LESS LIKELY    Mini-CogTM Copyright GERSON Edgar. Licensed by the author for use in [facility]; reprinted with permission ([email or web address]). All rights reserved.      Do you have sleep apnea, excessive snoring or daytime drowsiness?: no    Reviewed and updated as needed this visit by clinical staff  Tobacco  Allergies  Meds  Problems             Reviewed and updated as needed this visit by Provider    Meds             Social History     Tobacco Use     Smoking status: Former Smoker     Types: Cigarettes     Quit date: 2000     Years since quittin.3     Smokeless tobacco: Never Used     Tobacco comment: More of a social smoker   Substance Use Topics     Alcohol use: Yes     Alcohol/week: 0.0 standard drinks     Comment: 0-3 a day. mixed drinks, wine     If you drink alcohol do you typically have >3 drinks per day or >7 drinks per week? No    Alcohol Use 2020   Prescreen: >3 drinks/day or >7 drinks/week? No               Current providers sharing in care for this patient include:   Patient Care Team:  Vernell Sanders PA-C as PCP - General (Internal Medicine)  Vernell Sanders PA-C as Assigned PCP    The following health maintenance items are reviewed in Epic and correct as of today:  Health Maintenance   Topic Date Due     ZOSTER IMMUNIZATION (2 of 3) 2012     INFLUENZA VACCINE (1) 2020     FALL RISK ASSESSMENT  2020     COLORECTAL CANCER SCREENING  2021     MAMMO SCREENING  2021     MEDICARE ANNUAL WELLNESS VISIT  2021     LIPID  2024     ADVANCE CARE PLANNING  2025     DTAP/TDAP/TD IMMUNIZATION (3 - Td) 2028     HEPATITIS C SCREENING  Completed     PHQ-2  Completed     Pneumococcal Vaccine: Pediatrics (0 to 5 Years) and At-Risk Patients (6 to 64 Years)  Completed     Pneumococcal Vaccine: 65+  Years  Completed     IPV IMMUNIZATION  Aged Out     MENINGITIS IMMUNIZATION  Aged Out     HEPATITIS B IMMUNIZATION  Aged Out       Past Medical History:   Diagnosis Date     Arthritis      Cancer (H) 2017    lymphoma     Cholelithiasis      Colon polyps 2001 and 2006    no polyps 2011     Constipation      Diffuse large B cell lymphoma (H) 2017    tongue, followed [facility] Dr. [facility]kins at [facility] and [facility]     Diverticulitis 2010     DVT (deep venous thrombosis) (H)     x3 (one due to OCs, 2 post op)     Genital herpes      GERD (gastroesophageal reflux disease)      Gout      History of depression      History of migraine      HTN (hypertension), benign      Hypothyroidism     hashimoto's thyroiditis     Lichen sclerosus et atrophicus      Osteopenia      Ovarian cyst 2011     Rectocele      Schatzki's ring 2009    EGD      Tibia/fibula fracture 2009     Vasovagal syncope 1966     Vertigo 2000     Past Surgical History:   Procedure Laterality Date     APPENDECTOMY  1954     ARTHROSCOPY KNEE  1983     BONE MARROW BIOPSY, BONE SPECIMEN, NEEDLE/TROCAR N/A 7/31/2017    Procedure: BIOPSY BONE MARROW;  UNILATERAL BONE MARROW BIPOSY  ;  Surgeon: Jamil Mccarthy MD;  Location:  GI     BREAST BIOPSY, CORE RT/LT  1990     C LAP,SURG,COLECTOMY, PARTIAL, W/ANAST  2011    due to recurrent diverticulitis     EXCISE GARNETT'S NEUROMA FOOT       HYSTERECTOMY, PAP NO LONGER INDICATED  1984    with bladder repair     LAPAROSCOPIC OOPHORECTOMY  2011    with the colon resection     LARYNGOSCOPY WITH BIOPSY(IES) N/A 7/24/2017    Procedure: LARYNGOSCOPY WITH BIOPSY(IES);  DIRECT LARYNGOSCOPY WITH TONGUE BIOPSIES;  Surgeon: Vernell Yang MD;  Location: [facility]     OPEN REDUCTION INTERNAL FIXATION TIBIA  2009     TONSILLECTOMY & ADENOIDECTOMY  1958     Current Outpatient Medications   Medication Sig Dispense Refill     ACETAMINOPHEN PO Take 325-650 mg by mouth as needed for pain       allopurinol (ZYLOPRIM) 100 MG tablet TAKE  "ONE TABLET BY MOUTH EVERY DAY 90 tablet 2     apixaban ANTICOAGULANT (ELIQUIS ANTICOAGULANT) 2.5 MG tablet 2 times daily        cetirizine (ZYRTEC) 10 MG tablet TAKE ONE TABLET BY MOUTH ONE TIME DAILY IN THE P.M. 90 tablet 3     Cholecalciferol (VITAMIN D3) 2000 units CAPS Take 2,000 Units by mouth daily 90 capsule 3     clobetasol (TEMOVATE) 0.05 % external ointment Apply sparingly to affected area twice daily as needed.  Do not apply to face. 45 g 1     conjugated estrogens (PREMARIN) cream Place 0.5 g vaginally twice a week 30 g 12     hydrochlorothiazide (HYDRODIURIL) 12.5 MG tablet Take 1 tablet (12.5 mg) by mouth daily 90 tablet 3     levothyroxine (SYNTHROID/LEVOTHROID) 112 MCG tablet TAKE ONE TABLET BY MOUTH EVERY DAY 90 tablet 2     losartan (COZAAR) 100 MG tablet Take 1 tablet (100 mg) by mouth daily 90 tablet 2     omeprazole (PRILOSEC) 20 MG DR capsule TAKE ONE CAPSULE BY MOUTH EVERY DAY 90 capsule 2     sertraline (ZOLOFT) 50 MG tablet Take 1 tablet (50 mg) by mouth daily 30 tablet 1     valACYclovir (VALTREX) 500 MG tablet TAKE 1 TABLET BY MOUTH TWICE A DAY 6 tablet 3         Review of Systems  Constitutional, HEENT, cardiovascular, pulmonary, GI, , musculoskeletal, neuro, skin, endocrine and psych systems are negative, except as otherwise noted.    OBJECTIVE:   /88   Ht 1.638 m (5' 4.5\")   Wt 89.8 kg (198 lb)   Breastfeeding No   BMI 33.46 kg/m   Estimated body mass index is 33.46 kg/m  as calculated from the following:    Height as of this encounter: 1.638 m (5' 4.5\").    Weight as of this encounter: 89.8 kg (198 lb).  Physical Exam  GENERAL APPEARANCE: healthy, alert and no distress  EYES: Eyes grossly normal to inspection, PERRL and conjunctivae and sclerae normal  HENT: ear canals and TM's normal, nose and mouth without ulcers or lesions, oropharynx clear and oral mucous membranes moist  NECK: no adenopathy, no asymmetry, masses, or scars and thyroid normal to palpation  RESP: lungs " clear to auscultation - no rales, rhonchi or wheezes  BREAST: normal without masses, tenderness or nipple discharge and no palpable axillary masses or adenopathy  CV: regular rate and rhythm, normal S1 S2, no S3 or S4, no murmur, click or rub, no peripheral edema and peripheral pulses strong  ABDOMEN: soft, nontender, no hepatosplenomegaly, no masses and bowel sounds normal  MS: no musculoskeletal defects are noted and gait is age appropriate without ataxia  SKIN: no suspicious lesions or rashes  NEURO: Normal strength and tone, sensory exam grossly normal, mentation intact and speech normal  PSYCH: mentation appears normal and affect normal/bright        ASSESSMENT / PLAN:   Assessment and Plan:     (Z00.00) Encounter for Medicare annual wellness exam  (primary encounter diagnosis)  Comment: mammogram due and number given. Colonoscopy and dexa up to date.  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  Followed by Dr. Wilkins    (E03.9) Hypothyroidism, unspecified type  Comment:   Plan: TSH with free T4 reflex           (M1A.9XX0) Chronic gout without tophus, unspecified cause, unspecified site  Comment:   Plan: Uric acid            (F41.9) Anxiety  Comment: start zoloft at 25mg every day x one week, then increase to 50mg every day. F/u one month to see how this is making her feel  Plan: sertraline (ZOLOFT) 50 MG tablet                Patient has been advised of split billing requirements and indicates understanding:   COUNSELING:  Reviewed preventive health counseling, as reflected in patient  "instructions    Estimated body mass index is 33.46 kg/m  as calculated from the following:    Height as of this encounter: 1.638 m (5' 4.5\").    Weight as of this encounter: 89.8 kg (198 lb).        She reports that she quit smoking about 20 years ago. Her smoking use included cigarettes. She has never used smokeless tobacco.      Appropriate preventive services were discussed with this patient, including applicable screening as appropriate for cardiovascular disease, diabetes, osteopenia/osteoporosis, and glaucoma.  As appropriate for age/gender, discussed screening for colorectal cancer, prostate cancer, breast cancer, and cervical cancer. Checklist reviewing preventive services available has been given to the patient.    Reviewed patients plan of care and provided an AVS. The Basic Care Plan (routine screening as documented in Health Maintenance) for Marysol meets the Care Plan requirement. This Care Plan has been established and reviewed with the Patient.    Counseling Resources:  ATP IV Guidelines  Pooled Cohorts Equation Calculator  Breast Cancer Risk Calculator  Breast Cancer: Medication to Reduce Risk  FRAX Risk Assessment  ICSI Preventive Guidelines  Dietary Guidelines for Americans, 2010  USDA's MyPlate  ASA Prophylaxis  Lung CA Screening    Vernell Sanders PA-C  [facility]    Identified Health Risks:  "

## 2020-11-25 LAB
ALBUMIN SERPL-MCNC: 3.5 G/DL (ref 3.4–5)
ALP SERPL-CCNC: 80 U/L (ref 40–150)
ALT SERPL W P-5'-P-CCNC: 34 U/L (ref 0–50)
ANION GAP SERPL CALCULATED.3IONS-SCNC: 5 MMOL/L (ref 3–14)
AST SERPL W P-5'-P-CCNC: 30 U/L (ref 0–45)
BILIRUB SERPL-MCNC: 0.4 MG/DL (ref 0.2–1.3)
BUN SERPL-MCNC: 11 MG/DL (ref 7–30)
CALCIUM SERPL-MCNC: 9.7 MG/DL (ref 8.5–10.1)
CHLORIDE SERPL-SCNC: 106 MMOL/L (ref 94–109)
CHOLEST SERPL-MCNC: 226 MG/DL
CO2 SERPL-SCNC: 27 MMOL/L (ref 20–32)
CREAT SERPL-MCNC: 1.12 MG/DL (ref 0.52–1.04)
GFR SERPL CREATININE-BSD FRML MDRD: 49 ML/MIN/{1.73_M2}
GLUCOSE SERPL-MCNC: 110 MG/DL (ref 70–99)
HDLC SERPL-MCNC: 55 MG/DL
LDLC SERPL CALC-MCNC: 135 MG/DL
NONHDLC SERPL-MCNC: 171 MG/DL
POTASSIUM SERPL-SCNC: 3.8 MMOL/L (ref 3.4–5.3)
PROT SERPL-MCNC: 7.1 G/DL (ref 6.8–8.8)
SODIUM SERPL-SCNC: 138 MMOL/L (ref 133–144)
TRIGL SERPL-MCNC: 181 MG/DL
TSH SERPL DL<=0.005 MIU/L-ACNC: 1.12 MU/L (ref 0.4–4)
URATE SERPL-MCNC: 6 MG/DL (ref 2.6–6)

## 2020-11-25 ASSESSMENT — ANXIETY QUESTIONNAIRES: GAD7 TOTAL SCORE: 5

## 2020-11-27 NOTE — RESULT ENCOUNTER NOTE
It was a pleasure seeing you for your physical examination.  I wanted to get back to you with your test results.  I have enclosed a copy for your review.      I am happy to report that your CBC or complete blood count is normal with no signs of anemia, leukemia or platelet abnormalities. Your chemistry panel shows no signs of diabetes.  Your blood salts, liver tests, and proteins are all fine.    Your kidney function tests are elevated so I need you to go off of Advil cold and sinus. The advil is hard on the kidneys.  Perhaps try Flonase nasal spray for your congestion.  We should recheck your kidney function in 3-4 months.    Your total cholesterol is 226 with the normal range being below 200.  Your HDL or good cholesterol is 55 with the normal range being above 50.  Your LDL or bad cholesterol is 135 with the normal range being below 130.  This is slightly higher than last year but still okay.  Follow a low cholesterol/high fiber diet so you can avoid cholesterol medications.    Your TSH (thyroid) and uric acid level is normal.    Happy Holidays!    Vernell Sanders PA-C

## 2021-01-05 ENCOUNTER — VIRTUAL VISIT (OUTPATIENT)
Dept: FAMILY MEDICINE | Facility: CLINIC | Age: 71
End: 2021-01-05
Payer: MEDICARE

## 2021-01-05 DIAGNOSIS — F41.9 ANXIETY: ICD-10-CM

## 2021-01-05 PROCEDURE — 99441 PR PHYSICIAN TELEPHONE EVALUATION 5-10 MIN: CPT | Mod: 95 | Performed by: PHYSICIAN ASSISTANT

## 2021-01-05 RX ORDER — SERTRALINE HYDROCHLORIDE 100 MG/1
100 TABLET, FILM COATED ORAL DAILY
Qty: 90 TABLET | Refills: 1 | Status: SHIPPED | OUTPATIENT
Start: 2021-01-05 | End: 2021-02-18

## 2021-01-05 NOTE — PROGRESS NOTES
Marysol Morrell is a 70 year old female who is being evaluated via a billable telephone visit.      What phone number would you like to be contacted at? [phone]  How would you like to obtain your AVS? MyChart         Subjective     Marysol Morrell is a 70 year old female who presents via phone visit today for the following health issues:    F/u anxiety  Started zoloft  No GI SE  Hasn't noticed a big change in her mood  Still lacks motivation.  Her dreams are more vivid      Medication Followup     Taking Medication as prescribed: yes    Side Effects:  None    She is sleeping well on the zoloft     Review of Systems   Constitutional, HEENT, cardiovascular, pulmonary, GI, , musculoskeletal, neuro, skin, endocrine and psych systems are negative, except as otherwise noted.       Objective          Vitals:  No vitals were obtained today due to virtual visit.    healthy, alert and no distress  PSYCH: Alert and oriented times 3; coherent speech, normal   rate and volume, able to articulate logical thoughts, able   to abstract reason, no tangential thoughts, no hallucinations   or delusions  Her affect is normal  RESP: No cough, no audible wheezing, able to talk in full sentences  Remainder of exam unable to be completed due to telephone visits    Assessment and Plan:     (F41.9) Anxiety  Comment: increase dose from 50 to 75mg for a few weeks and then 100mg daily after that.  Plan: sertraline (ZOLOFT) 100 MG tablet        F/u 6 weeks            Phone call duration:  10 minutes

## 2021-01-27 ENCOUNTER — HOSPITAL ENCOUNTER (OUTPATIENT)
Dept: MAMMOGRAPHY | Facility: CLINIC | Age: 71
Discharge: HOME | End: 2021-01-27
Attending: PHYSICIAN ASSISTANT | Admitting: PHYSICIAN ASSISTANT
Payer: MEDICARE

## 2021-01-27 DIAGNOSIS — Z12.31 SCREENING MAMMOGRAM FOR HIGH-RISK PATIENT: ICD-10-CM

## 2021-01-27 PROCEDURE — 77063 BREAST TOMOSYNTHESIS BI: CPT

## 2021-02-18 ENCOUNTER — VIRTUAL VISIT (OUTPATIENT)
Dept: FAMILY MEDICINE | Facility: CLINIC | Age: 71
End: 2021-02-18
Payer: MEDICARE

## 2021-02-18 DIAGNOSIS — A60.04 HERPES SIMPLEX VULVOVAGINITIS: ICD-10-CM

## 2021-02-18 DIAGNOSIS — F41.9 ANXIETY: ICD-10-CM

## 2021-02-18 PROCEDURE — 99442 PR PHYSICIAN TELEPHONE EVALUATION 11-20 MIN: CPT | Mod: 95 | Performed by: PHYSICIAN ASSISTANT

## 2021-02-18 RX ORDER — SERTRALINE HYDROCHLORIDE 100 MG/1
150 TABLET, FILM COATED ORAL DAILY
Qty: 135 TABLET | Refills: 1 | Status: SHIPPED | OUTPATIENT
Start: 2021-02-18 | End: 2021-06-29

## 2021-02-18 RX ORDER — VALACYCLOVIR HYDROCHLORIDE 500 MG/1
TABLET, FILM COATED ORAL
Qty: 12 TABLET | Refills: 3 | Status: SHIPPED | OUTPATIENT
Start: 2021-02-18 | End: 2022-02-25

## 2021-02-18 NOTE — PROGRESS NOTES
Zuri is a 70 year old who is being evaluated via a billable video visit.  There were technical difficulties and had to transition to telephone visit.    How would you like to obtain your AVS? MyChart  If the video visit is dropped, the invitation should be resent by: Text to cell phone: [phone]  Will anyone else be joining your video visit? No      Video Start Time: 1:55 PM        Subjective   Zuri is a 70 year old who presents for the following health issues: medication refills    HPI     Pt has hx of genital herpes and takes valtrex prn  Has about 4 infections per year    Pt has anxiety and is taking Zoloft 100mg every day  She notices a little difference (ie feels happier and a bit more motivated)  Her sleep is better with the medication but still some trouble falling to sleep  She has tried melatonin without relief.  She will sleep from 4am to 1pm  She is thinking of trying her 's ambien  Dtr thought she needed something like this  Dreams more vivid on zoloft  She has hx of difficulty with motivation  No caffeine except 2 cups of coffee  No etoh    She is now on Reclast for her bones as prescribed by Dr. Wilkins      Medication Followup     Taking Medication as prescribed: yes    Side Effects:  None        Review of Systems   Constitutional, HEENT, cardiovascular, pulmonary, gi and gu systems are negative, except as otherwise noted.      Objective           Vitals:  No vitals were obtained today due to virtual visit.    Physical Exam   PSYCH: Mentation appears normal, affect normal/bright, judgement and insight intact, normal speech and appearance well-groomed.      Assessment and Plan:     (F41.9) Anxiety  Comment: not yet at goal so increase zoloft to 150mg every day.  Plan: sertraline (ZOLOFT) 100 MG tablet            (A60.04) Herpes simplex vulvovaginitis  Comment:   Plan: valACYclovir (VALTREX) 500 MG tablet        Refilled to use prn genital herpes      Vernell Sanders PA-C        Type of service:   telephone Visit  Time of call: 19 minutes

## 2021-02-27 ENCOUNTER — TRANSFERRED RECORDS (OUTPATIENT)
Dept: HEALTH INFORMATION MANAGEMENT | Facility: CLINIC | Age: 71
End: 2021-02-27

## 2021-03-18 ENCOUNTER — TRANSFERRED RECORDS (OUTPATIENT)
Dept: HEALTH INFORMATION MANAGEMENT | Facility: CLINIC | Age: 71
End: 2021-03-18

## 2021-03-22 ENCOUNTER — TELEPHONE (OUTPATIENT)
Dept: FAMILY MEDICINE | Facility: CLINIC | Age: 71
End: 2021-03-22

## 2021-03-22 DIAGNOSIS — K21.9 GASTROESOPHAGEAL REFLUX DISEASE WITHOUT ESOPHAGITIS: Primary | ICD-10-CM

## 2021-03-22 RX ORDER — PANTOPRAZOLE SODIUM 20 MG/1
20 TABLET, DELAYED RELEASE ORAL DAILY
Qty: 90 TABLET | Refills: 1 | Status: SHIPPED | OUTPATIENT
Start: 2021-03-22 | End: 2021-09-27

## 2021-03-22 NOTE — TELEPHONE ENCOUNTER
Pt is requesting switch from Omeprazole to pantoprazole. Reports she reviewed her medications with a [facility] pharmacist. Per pt, it was recommended that she change to pantoprazole due to the way it interacts with other medications and decrease the side effects of dizziness ans sleepiness. Please advice on medication change.     Rx pended. Please approve if agree with order.

## 2021-04-12 DIAGNOSIS — N18.30 STAGE 3 CHRONIC KIDNEY DISEASE, UNSPECIFIED WHETHER STAGE 3A OR 3B CKD (H): ICD-10-CM

## 2021-04-12 LAB
ANION GAP SERPL CALCULATED.3IONS-SCNC: 2 MMOL/L (ref 3–14)
BUN SERPL-MCNC: 15 MG/DL (ref 7–30)
CALCIUM SERPL-MCNC: 10.2 MG/DL (ref 8.5–10.1)
CHLORIDE SERPL-SCNC: 103 MMOL/L (ref 94–109)
CO2 SERPL-SCNC: 30 MMOL/L (ref 20–32)
CREAT SERPL-MCNC: 0.9 MG/DL (ref 0.52–1.04)
GFR SERPL CREATININE-BSD FRML MDRD: 64 ML/MIN/{1.73_M2}
GLUCOSE SERPL-MCNC: 97 MG/DL (ref 70–99)
POTASSIUM SERPL-SCNC: 4 MMOL/L (ref 3.4–5.3)
SODIUM SERPL-SCNC: 135 MMOL/L (ref 133–144)

## 2021-04-12 PROCEDURE — 36415 COLL VENOUS BLD VENIPUNCTURE: CPT | Performed by: PHYSICIAN ASSISTANT

## 2021-04-12 PROCEDURE — 80048 BASIC METABOLIC PNL TOTAL CA: CPT | Performed by: PHYSICIAN ASSISTANT

## 2021-06-06 DIAGNOSIS — I10 HTN (HYPERTENSION), BENIGN: ICD-10-CM

## 2021-06-07 RX ORDER — LOSARTAN POTASSIUM 100 MG/1
100 TABLET ORAL DAILY
Qty: 90 TABLET | Refills: 2 | Status: SHIPPED | OUTPATIENT
Start: 2021-06-07 | End: 2022-03-01

## 2021-06-07 NOTE — TELEPHONE ENCOUNTER
BP Readings from Last 3 Encounters:   11/24/20 139/88   07/24/20 120/68   07/01/20 130/68     Refilled per [facility] protocol.  Billie Billingsley RN

## 2021-06-19 ENCOUNTER — TRANSFERRED RECORDS (OUTPATIENT)
Dept: HEALTH INFORMATION MANAGEMENT | Facility: CLINIC | Age: 71
End: 2021-06-19

## 2021-06-24 ENCOUNTER — MYC MEDICAL ADVICE (OUTPATIENT)
Dept: FAMILY MEDICINE | Facility: CLINIC | Age: 71
End: 2021-06-24

## 2021-06-25 ENCOUNTER — NURSE TRIAGE (OUTPATIENT)
Dept: FAMILY MEDICINE | Facility: CLINIC | Age: 71
End: 2021-06-25

## 2021-06-25 NOTE — TELEPHONE ENCOUNTER
Pt fell on her left knee 1 week ago. Complains of pain and swelling. Was seen at [facility] 06/19/2021 and [facility]. Pt continues to have moderate knee swelling, bruising and pain. She is on Eliquist. Is able to walk but with pain. Has a posket of blood behind her patella. Pain is rated 4/10 while sitting and elevating knee but changes to 10.5 if leg is pending. Notes pain has not worsening but is not improving. Pt has not ice  since she is on an upper room and can't go down stairs were ice is.  Denies calf pain, chest pain or breathing problems. Advised she follow up with ortho or ED for follow up of persistent symptoms. Pt verbalized agreement with plan.    Additional Information    Negative: Major bleeding (actively dripping or spurting) that can't be stopped    Negative: Bullet, stabbed by knife or other serious penetrating wound    Negative: Looks like a dislocated joint (crooked or deformed)    Negative: Serious injury with multiple fractures (broken bones)    Negative: Sounds like a life-threatening emergency to the triager    Negative: Wound looks infected    Negative: Can't stand (bear weight) or walk    Negative: Skin is split open or gaping (length > 1/2 inch or 12 mm)    Negative: Bleeding won't stop after 10 minutes of direct pressure (using correct technique)    Negative: Dirt in the wound and not removed after 15 minutes of scrubbing    Negative: Sounds like a serious injury to the triager    Protocols used: KNEE INJURY-A-OH

## 2021-06-25 NOTE — TELEPHONE ENCOUNTER
Routing to [facility] for further assessment, PCP unavailable.    Nancy CALLES MA on 6/25/2021 at 9:20 AM

## 2021-06-28 ENCOUNTER — MYC MEDICAL ADVICE (OUTPATIENT)
Dept: FAMILY MEDICINE | Facility: CLINIC | Age: 71
End: 2021-06-28

## 2021-06-28 NOTE — TELEPHONE ENCOUNTER
Pt was called. Has left knee pain after fall. Was seen at ED and [facility]. Notes pain was severe while it was compressed but now is able to move and flex knee. Denies chest pain or  breathing problems. Advised she see [facility] or UC at clinic. Pt notes she can't be seen tonight since its her husbands birthday agreed to schedule appt with PCP. Future ppat was scheduled.

## 2021-06-29 ENCOUNTER — OFFICE VISIT (OUTPATIENT)
Dept: FAMILY MEDICINE | Facility: CLINIC | Age: 71
End: 2021-06-29
Payer: MEDICARE

## 2021-06-29 VITALS
OXYGEN SATURATION: 100 % | HEART RATE: 64 BPM | BODY MASS INDEX: 30.93 KG/M2 | WEIGHT: 183 LBS | SYSTOLIC BLOOD PRESSURE: 122 MMHG | TEMPERATURE: 98.1 F | DIASTOLIC BLOOD PRESSURE: 79 MMHG

## 2021-06-29 DIAGNOSIS — M25.062 HEMARTHROSIS OF LEFT KNEE: Primary | ICD-10-CM

## 2021-06-29 PROCEDURE — 99213 OFFICE O/P EST LOW 20 MIN: CPT | Performed by: PHYSICIAN ASSISTANT

## 2021-06-29 RX ORDER — OXYCODONE HYDROCHLORIDE 5 MG/1
5 TABLET ORAL EVERY 6 HOURS PRN
Qty: 40 TABLET | Refills: 0 | Status: SHIPPED | OUTPATIENT
Start: 2021-06-29 | End: 2021-07-02

## 2021-06-29 ASSESSMENT — PATIENT HEALTH QUESTIONNAIRE - PHQ9: SUM OF ALL RESPONSES TO PHQ QUESTIONS 1-9: 7

## 2021-06-29 NOTE — TELEPHONE ENCOUNTER
Pt seen today is the Oxycodone an acute or chronic problem . If acute can only do 7 days of medication .Teodora Guzmán RN

## 2021-06-29 NOTE — PROGRESS NOTES
Jonnathan Keating is a 70 year old who presents for the following health issues: ED f/u for fall on 6/18/21    HPI     ED/UC Followup:    Facility:  [facility]  Date of visit: 6-  Reason for visit: Fall, Left knee pain  Current Status: pt is having pain       She fell on 6/18, on eliquis  She did see ortho who recd compression, elevation  Xray neg for fx.  Given referral for PT  She is in a wheelchair due to this pain  Has pain at rest and with weight bearing  She has taken 25 oxycodone which is the only thing that has helped  She is on tylenol a few times per day  She is out of medication        Past Medical History:   Diagnosis Date     Arthritis      Cancer (H) 2017    lymphoma     Cholelithiasis      Colon polyps 2001 and 2006    no polyps 2011     Constipation      Diffuse large B cell lymphoma (H) 2017    tongue, followed by Dr. Wilkins at [facility] and [facility]     Diverticulitis 2010     DVT (deep venous thrombosis) (H)     x3 (one due to OCs, 2 post op)     Genital herpes      GERD (gastroesophageal reflux disease)      Gout      History of depression      History of migraine      HTN (hypertension), benign      Hypothyroidism     hashimoto's thyroiditis     Lichen sclerosus et atrophicus      Osteopenia      Ovarian cyst 2011     Rectocele      Schatzki's ring 2009    EGD      Tibia/fibula fracture 2009     Vasovagal syncope 1966     Vertigo 2000     Past Surgical History:   Procedure Laterality Date     APPENDECTOMY  1954     ARTHROSCOPY KNEE  1983     BONE MARROW BIOPSY, BONE SPECIMEN, NEEDLE/TROCAR N/A 7/31/2017    Procedure: BIOPSY BONE MARROW;  UNILATERAL BONE MARROW BIPOSY  ;  Surgeon: Jamil Mccarthy MD;  Location:  GI     BREAST BIOPSY, CORE RT/LT  1990     C LAP,SURG,COLECTOMY, PARTIAL, W/ANAST  2011    due to recurrent diverticulitis     EXCISE GARNETT'S NEUROMA FOOT       HYSTERECTOMY, PAP NO LONGER INDICATED  1984    with bladder repair     LAPAROSCOPIC OOPHORECTOMY  2011     with the colon resection     LARYNGOSCOPY WITH BIOPSY(IES) N/A 2017    Procedure: LARYNGOSCOPY WITH BIOPSY(IES);  DIRECT LARYNGOSCOPY WITH TONGUE BIOPSIES;  Surgeon: Vernell Yang MD;  Location: [facility]     OPEN REDUCTION INTERNAL FIXATION TIBIA       TONSILLECTOMY & ADENOIDECTOMY  195     Social History     Tobacco Use     Smoking status: Former Smoker     Types: Cigarettes     Quit date: 2000     Years since quittin.9     Smokeless tobacco: Never Used     Tobacco comment: More of a social smoker   Substance Use Topics     Alcohol use: Yes     Alcohol/week: 0.0 standard drinks     Comment: 0-3 a day. mixed drinks, wine     Current Outpatient Medications   Medication Sig Dispense Refill     ACETAMINOPHEN PO Take 325-650 mg by mouth as needed for pain       allopurinol (ZYLOPRIM) 100 MG tablet TAKE ONE TABLET BY MOUTH EVERY DAY 90 tablet 2     apixaban ANTICOAGULANT (ELIQUIS ANTICOAGULANT) 2.5 MG tablet 2 times daily        cetirizine (ZYRTEC) 10 MG tablet TAKE ONE TABLET BY MOUTH ONE TIME DAILY IN THE P.M. 90 tablet 3     Cholecalciferol (VITAMIN D3) 2000 units CAPS Take 2,000 Units by mouth daily 90 capsule 3     conjugated estrogens (PREMARIN) cream Place 0.5 g vaginally twice a week 30 g 12     hydrochlorothiazide (HYDRODIURIL) 12.5 MG tablet Take 1 tablet (12.5 mg) by mouth daily 90 tablet 3     levothyroxine (SYNTHROID/LEVOTHROID) 112 MCG tablet TAKE ONE TABLET BY MOUTH EVERY DAY 90 tablet 2     losartan (COZAAR) 100 MG tablet Take 1 tablet (100 mg) by mouth daily 90 tablet 2     oxyCODONE (ROXICODONE) 5 MG tablet Take 1 tablet (5 mg) by mouth every 6 hours as needed for pain 40 tablet 0     pantoprazole (PROTONIX) 20 MG EC tablet Take 1 tablet (20 mg) by mouth daily 90 tablet 1     valACYclovir (VALTREX) 500 MG tablet TAKE 1 TABLET BY MOUTH TWICE A DAY 12 tablet 3     Zoledronic Acid (RECLAST IV)        Allergies   Allergen Reactions     Chloraprep One Step Hives     Codeine GI  Disturbance     Evista [Raloxifene Hydrochloride] Other (See Comments)     Esophagus irritation      Fosamax Other (See Comments)     Esophagus irritation      Vicodin [Hydrocodone-Acetaminophen] GI Disturbance     Can Take oxycodone     Pen Vk [Penicillin V] Rash     FAMILY HISTORY NOTED AND REVIEWED    PHYSICAL EXAM:    /79 (BP Location: Right arm, Cuff Size: Adult Large)   Pulse 64   Temp 98.1  F (36.7  C) (Oral)   Wt 83 kg (183 lb)   SpO2 100%   BMI 30.93 kg/m      Patient seated in wheelchair due to pain in L knee with ambulation  L knee: sig swelling and eccymosis with superficial blister formation ant knee  decr ROM due to swelling  No drainage or erythema  Calf soft    Assessment and Plan:     (M25.062) Hemarthrosis of left knee  Comment: pt still having sig pain that is not alleviated by ice or tylenol. She has taken a total of 25 oxycodone since her injury more than 10 days ago. Request refill of pain medication. Feels the swelling improving. Denies fever or chills to suggest infection.  Plan: oxyCODONE (ROXICODONE) 5 MG tablet        Take one Q6hr prn pain. Cont ice and tylenol 1000mg Q6hr.      Vernell Sanders PA-C

## 2021-06-29 NOTE — TELEPHONE ENCOUNTER
The pain is acute. If needed may need to change quantity to #28, let pt know that is the rule set forth by JEANINE.

## 2021-06-29 NOTE — TELEPHONE ENCOUNTER
[facility] pharmacy was called. Verbal approval given to pharmacy to dispense 28 tables since pain is acute. Pt was called with plan. She agreed to follow up with clinic if persistent pain.

## 2021-07-12 ENCOUNTER — MYC MEDICAL ADVICE (OUTPATIENT)
Dept: FAMILY MEDICINE | Facility: CLINIC | Age: 71
End: 2021-07-12

## 2021-07-12 DIAGNOSIS — M25.562 LEFT KNEE PAIN, UNSPECIFIED CHRONICITY: Primary | ICD-10-CM

## 2021-07-12 RX ORDER — OXYCODONE HYDROCHLORIDE 5 MG/1
5 TABLET ORAL
COMMUNITY
Start: 2021-06-19 | End: 2021-07-12

## 2021-07-12 NOTE — TELEPHONE ENCOUNTER
Controlled Substance Refill Request for oxyCODONE (ROXICODONE) 5 MG tablet  Problem List Complete:  No     PROVIDER TO CONSIDER COMPLETION OF PROBLEM LIST AND OVERVIEW/CONTROLLED SUBSTANCE AGREEMENT    Last Written Prescription Date:  6/19/2021  Last Fill Quantity: Unknown,   # refills: Unknown     THE MOST RECENT OFFICE VISIT MUST BE WITHIN THE PAST 3 MONTHS. AT LEAST ONE FACE TO FACE VISIT MUST OCCUR EVERY 6 MONTHS. ADDITIONAL VISITS CAN BE VIRTUAL.  (THIS STATEMENT SHOULD BE DELETED.)    Last Office Visit with [facility] primary care provider: 6/29/2021    Future Office visit: Unknown     Controlled substance agreement:   Encounter-Level CSA:    There are no encounter-level csa.     Patient-Level CSA:    There are no patient-level csa.         Last Urine Drug Screen: No results found for: CDAUT, No results found for: COMDAT, No results found for: THC13, PCP13, COC13, MAMP13, OPI13, AMP13, BZO13, TCA13, MTD13, BAR13, OXY13, PPX13, BUP13     Processing:  Rx to be electronically transmitted to pharmacy by provider      [email or web address]       checked in past 3 months?  No, route to RN

## 2021-07-12 NOTE — TELEPHONE ENCOUNTER
Reason for Call:  Medication or medication refill:    Do you use a [facility]?  Name of the pharmacy and phone number for the current request:      [id number] - [place], [place] - [address] [facility]    Name of the medication requested: oxyCODONE (ROXICODONE) 5 MG tablet [03449]    Other request: Pt is still in a lot of pain, only taking at night so she can sleep.    Can we leave a detailed message on this number? YES    Phone number patient can be reached at: [phone]    Best Time: Any     Call taken on 7/12/2021 at 10:57 AM by Nicole Nunez

## 2021-07-13 ENCOUNTER — TRANSFERRED RECORDS (OUTPATIENT)
Dept: HEALTH INFORMATION MANAGEMENT | Facility: CLINIC | Age: 71
End: 2021-07-13

## 2021-07-13 DIAGNOSIS — E03.9 HYPOTHYROIDISM, UNSPECIFIED TYPE: ICD-10-CM

## 2021-07-14 RX ORDER — OXYCODONE HYDROCHLORIDE 5 MG/1
5 TABLET ORAL EVERY 6 HOURS PRN
Qty: 20 TABLET | Refills: 0 | Status: SHIPPED | OUTPATIENT
Start: 2021-07-14 | End: 2022-01-28

## 2021-07-14 NOTE — TELEPHONE ENCOUNTER
Routing refill request to provider for review/approval because:  Drug not on the [facility] refill protocol     Pending Prescriptions:                       Disp   Refills    oxyCODONE (ROXICODONE) 5 MG tablet                         Sig: Take 1 tablet (5 mg) by mouth    Signed Prescriptions:                        Disp   Refills    oxyCODONE (ROXICODONE) 5 MG tablet                         Sig: Take 5 mg by mouth  Authorizing Provider: PATIENT REPORTED  Ordering User: RAYA RICARDO    Last Written Prescription Date:  6-29-21  Last Fill Quantity: 30,  # refills: 0   Last office visit: 6/29/2021 with prescribing provider:  6-29-21   Future Office Visit:        Vernell, could you add the total amount and prescribing for the pain med? There is no history.       Carolina Brown RN  [facility]

## 2021-07-15 RX ORDER — LEVOTHYROXINE SODIUM 112 UG/1
TABLET ORAL
Qty: 90 TABLET | Refills: 1 | Status: SHIPPED | OUTPATIENT
Start: 2021-07-15 | End: 2022-01-24

## 2021-07-22 DIAGNOSIS — Z87.39 HISTORY OF GOUT: ICD-10-CM

## 2021-07-25 RX ORDER — ALLOPURINOL 100 MG/1
TABLET ORAL
Qty: 90 TABLET | Refills: 0 | Status: SHIPPED | OUTPATIENT
Start: 2021-07-25 | End: 2021-10-20

## 2021-09-14 ENCOUNTER — TRANSFERRED RECORDS (OUTPATIENT)
Dept: HEALTH INFORMATION MANAGEMENT | Facility: CLINIC | Age: 71
End: 2021-09-14

## 2021-09-24 DIAGNOSIS — K21.9 GASTROESOPHAGEAL REFLUX DISEASE WITHOUT ESOPHAGITIS: ICD-10-CM

## 2021-09-24 DIAGNOSIS — I10 HTN (HYPERTENSION), BENIGN: ICD-10-CM

## 2021-09-27 ENCOUNTER — HOSPITAL ENCOUNTER (OUTPATIENT)
Dept: BONE DENSITY | Facility: CLINIC | Age: 71
Discharge: HOME | End: 2021-09-27
Attending: INTERNAL MEDICINE | Admitting: INTERNAL MEDICINE
Payer: MEDICARE

## 2021-09-27 DIAGNOSIS — C83.398 DIFFUSE LARGE B-CELL LYMPHOMA, EXTRANODAL AND SOLID ORGAN SITES: ICD-10-CM

## 2021-09-27 DIAGNOSIS — M81.0 OSTEOPOROSIS: ICD-10-CM

## 2021-09-27 PROCEDURE — 77080 DXA BONE DENSITY AXIAL: CPT

## 2021-09-27 RX ORDER — HYDROCHLOROTHIAZIDE 12.5 MG/1
12.5 TABLET ORAL DAILY
Qty: 90 TABLET | Refills: 2 | Status: SHIPPED | OUTPATIENT
Start: 2021-09-27 | End: 2022-06-21

## 2021-09-27 RX ORDER — PANTOPRAZOLE SODIUM 20 MG/1
20 TABLET, DELAYED RELEASE ORAL DAILY
Qty: 90 TABLET | Refills: 2 | Status: SHIPPED | OUTPATIENT
Start: 2021-09-27 | End: 2022-08-03

## 2021-10-19 DIAGNOSIS — Z87.39 HISTORY OF GOUT: ICD-10-CM

## 2021-10-20 RX ORDER — ALLOPURINOL 100 MG/1
TABLET ORAL
Qty: 90 TABLET | Refills: 0 | Status: SHIPPED | OUTPATIENT
Start: 2021-10-20 | End: 2022-02-03

## 2021-11-24 ENCOUNTER — TRANSFERRED RECORDS (OUTPATIENT)
Dept: HEALTH INFORMATION MANAGEMENT | Facility: CLINIC | Age: 71
End: 2021-11-24
Payer: MEDICARE

## 2022-01-22 DIAGNOSIS — E03.9 HYPOTHYROIDISM, UNSPECIFIED TYPE: ICD-10-CM

## 2022-01-24 RX ORDER — LEVOTHYROXINE SODIUM 112 UG/1
TABLET ORAL
Qty: 90 TABLET | Refills: 0 | Status: SHIPPED | OUTPATIENT
Start: 2022-01-24 | End: 2022-03-01

## 2022-01-24 NOTE — TELEPHONE ENCOUNTER
Pt due for annual exam/labs - sent [employer] message asking pt to schedule     Last OV 6/29/21 - for hemarthrosis     Routing refill request to provider for review/approval because:  Labs not current:  TSH    Laure RDZ, Triage RN  [facility]

## 2022-01-28 ENCOUNTER — HOSPITAL ENCOUNTER (INPATIENT)
Facility: CLINIC | Age: 72
LOS: 2 days | Discharge: HOME | DRG: 378 | End: 2022-01-30
Attending: EMERGENCY MEDICINE | Admitting: INTERNAL MEDICINE
Payer: MEDICARE

## 2022-01-28 ENCOUNTER — APPOINTMENT (OUTPATIENT)
Dept: CT IMAGING | Facility: CLINIC | Age: 72
DRG: 378 | End: 2022-01-28
Attending: EMERGENCY MEDICINE
Payer: MEDICARE

## 2022-01-28 DIAGNOSIS — K52.9 COLITIS: ICD-10-CM

## 2022-01-28 DIAGNOSIS — K92.2 GASTROINTESTINAL HEMORRHAGE, UNSPECIFIED GASTROINTESTINAL HEMORRHAGE TYPE: ICD-10-CM

## 2022-01-28 LAB
ABO/RH(D): NORMAL
ANION GAP SERPL CALCULATED.3IONS-SCNC: 6 MMOL/L (ref 3–14)
ANTIBODY SCREEN: NEGATIVE
APTT PPP: 31 SECONDS (ref 22–38)
BASOPHILS # BLD AUTO: 0.1 10E3/UL (ref 0–0.2)
BASOPHILS NFR BLD AUTO: 1 %
BUN SERPL-MCNC: 19 MG/DL (ref 7–30)
C DIFF TOX B STL QL: NEGATIVE
CALCIUM SERPL-MCNC: 9.4 MG/DL (ref 8.5–10.1)
CHLORIDE BLD-SCNC: 106 MMOL/L (ref 94–109)
CO2 SERPL-SCNC: 26 MMOL/L (ref 20–32)
CREAT SERPL-MCNC: 0.84 MG/DL (ref 0.52–1.04)
EOSINOPHIL # BLD AUTO: 0.1 10E3/UL (ref 0–0.7)
EOSINOPHIL NFR BLD AUTO: 1 %
ERYTHROCYTE [DISTWIDTH] IN BLOOD BY AUTOMATED COUNT: 11.5 % (ref 10–15)
GFR SERPL CREATININE-BSD FRML MDRD: 73 ML/MIN/1.73M2
GLUCOSE BLD-MCNC: 130 MG/DL (ref 70–99)
HCT VFR BLD AUTO: 40.9 % (ref 35–47)
HGB BLD-MCNC: 12.7 G/DL (ref 11.7–15.7)
HGB BLD-MCNC: 12.8 G/DL (ref 11.7–15.7)
HGB BLD-MCNC: 14.2 G/DL (ref 11.7–15.7)
IMM GRANULOCYTES # BLD: 0.1 10E3/UL
IMM GRANULOCYTES NFR BLD: 0 %
INR PPP: 0.93 (ref 0.85–1.15)
LACTATE SERPL-SCNC: 1.9 MMOL/L (ref 0.7–2)
LYMPHOCYTES # BLD AUTO: 2.4 10E3/UL (ref 0.8–5.3)
LYMPHOCYTES NFR BLD AUTO: 15 %
MAGNESIUM SERPL-MCNC: 1.7 MG/DL (ref 1.6–2.3)
MCH RBC QN AUTO: 32.9 PG (ref 26.5–33)
MCHC RBC AUTO-ENTMCNC: 34.7 G/DL (ref 31.5–36.5)
MCV RBC AUTO: 95 FL (ref 78–100)
MONOCYTES # BLD AUTO: 1.1 10E3/UL (ref 0–1.3)
MONOCYTES NFR BLD AUTO: 7 %
NEUTROPHILS # BLD AUTO: 11.8 10E3/UL (ref 1.6–8.3)
NEUTROPHILS NFR BLD AUTO: 76 %
NRBC # BLD AUTO: 0 10E3/UL
NRBC BLD AUTO-RTO: 0 /100
PLATELET # BLD AUTO: 266 10E3/UL (ref 150–450)
POTASSIUM BLD-SCNC: 3.5 MMOL/L (ref 3.4–5.3)
RBC # BLD AUTO: 4.32 10E6/UL (ref 3.8–5.2)
SARS-COV-2 RNA RESP QL NAA+PROBE: NEGATIVE
SODIUM SERPL-SCNC: 138 MMOL/L (ref 133–144)
SPECIMEN EXPIRATION DATE: NORMAL
WBC # BLD AUTO: 15.5 10E3/UL (ref 4–11)

## 2022-01-28 PROCEDURE — 74177 CT ABD & PELVIS W/CONTRAST: CPT | Mod: ME

## 2022-01-28 PROCEDURE — 93005 ELECTROCARDIOGRAM TRACING: CPT

## 2022-01-28 PROCEDURE — 250N000011 HC RX IP 250 OP 636: Performed by: EMERGENCY MEDICINE

## 2022-01-28 PROCEDURE — 120N000004 HC R&B MS OVERFLOW

## 2022-01-28 PROCEDURE — 250N000011 HC RX IP 250 OP 636

## 2022-01-28 PROCEDURE — 96366 THER/PROPH/DIAG IV INF ADDON: CPT

## 2022-01-28 PROCEDURE — 85610 PROTHROMBIN TIME: CPT | Performed by: EMERGENCY MEDICINE

## 2022-01-28 PROCEDURE — 83735 ASSAY OF MAGNESIUM: CPT | Performed by: INTERNAL MEDICINE

## 2022-01-28 PROCEDURE — 85018 HEMOGLOBIN: CPT | Performed by: INTERNAL MEDICINE

## 2022-01-28 PROCEDURE — 96367 TX/PROPH/DG ADDL SEQ IV INF: CPT

## 2022-01-28 PROCEDURE — 36415 COLL VENOUS BLD VENIPUNCTURE: CPT | Performed by: INTERNAL MEDICINE

## 2022-01-28 PROCEDURE — 96376 TX/PRO/DX INJ SAME DRUG ADON: CPT

## 2022-01-28 PROCEDURE — C9803 HOPD COVID-19 SPEC COLLECT: HCPCS

## 2022-01-28 PROCEDURE — 99285 EMERGENCY DEPT VISIT HI MDM: CPT | Mod: 25

## 2022-01-28 PROCEDURE — 250N000011 HC RX IP 250 OP 636: Performed by: INTERNAL MEDICINE

## 2022-01-28 PROCEDURE — 96365 THER/PROPH/DIAG IV INF INIT: CPT

## 2022-01-28 PROCEDURE — 36415 COLL VENOUS BLD VENIPUNCTURE: CPT | Performed by: EMERGENCY MEDICINE

## 2022-01-28 PROCEDURE — 87506 IADNA-DNA/RNA PROBE TQ 6-11: CPT | Performed by: INTERNAL MEDICINE

## 2022-01-28 PROCEDURE — 83605 ASSAY OF LACTIC ACID: CPT | Performed by: EMERGENCY MEDICINE

## 2022-01-28 PROCEDURE — 99223 1ST HOSP IP/OBS HIGH 75: CPT | Mod: AI | Performed by: INTERNAL MEDICINE

## 2022-01-28 PROCEDURE — 250N000009 HC RX 250: Performed by: EMERGENCY MEDICINE

## 2022-01-28 PROCEDURE — 87493 C DIFF AMPLIFIED PROBE: CPT | Performed by: INTERNAL MEDICINE

## 2022-01-28 PROCEDURE — 87635 SARS-COV-2 COVID-19 AMP PRB: CPT | Performed by: INTERNAL MEDICINE

## 2022-01-28 PROCEDURE — 96375 TX/PRO/DX INJ NEW DRUG ADDON: CPT

## 2022-01-28 PROCEDURE — 86901 BLOOD TYPING SEROLOGIC RH(D): CPT | Performed by: EMERGENCY MEDICINE

## 2022-01-28 PROCEDURE — 258N000003 HC RX IP 258 OP 636: Performed by: EMERGENCY MEDICINE

## 2022-01-28 PROCEDURE — 96361 HYDRATE IV INFUSION ADD-ON: CPT

## 2022-01-28 PROCEDURE — 250N000013 HC RX MED GY IP 250 OP 250 PS 637: Performed by: INTERNAL MEDICINE

## 2022-01-28 PROCEDURE — 85730 THROMBOPLASTIN TIME PARTIAL: CPT | Performed by: EMERGENCY MEDICINE

## 2022-01-28 PROCEDURE — 85025 COMPLETE CBC W/AUTO DIFF WBC: CPT | Performed by: EMERGENCY MEDICINE

## 2022-01-28 PROCEDURE — 80048 BASIC METABOLIC PNL TOTAL CA: CPT | Performed by: EMERGENCY MEDICINE

## 2022-01-28 RX ORDER — HYDROCHLOROTHIAZIDE 12.5 MG/1
12.5 CAPSULE ORAL DAILY
Status: DISCONTINUED | OUTPATIENT
Start: 2022-01-28 | End: 2022-01-30 | Stop reason: HOSPADM

## 2022-01-28 RX ORDER — PROCHLORPERAZINE 25 MG
12.5 SUPPOSITORY, RECTAL RECTAL EVERY 12 HOURS PRN
Status: DISCONTINUED | OUTPATIENT
Start: 2022-01-28 | End: 2022-01-30 | Stop reason: HOSPADM

## 2022-01-28 RX ORDER — LOSARTAN POTASSIUM 100 MG/1
100 TABLET ORAL DAILY
Status: DISCONTINUED | OUTPATIENT
Start: 2022-01-28 | End: 2022-01-30 | Stop reason: HOSPADM

## 2022-01-28 RX ORDER — ONDANSETRON 2 MG/ML
4 INJECTION INTRAMUSCULAR; INTRAVENOUS EVERY 6 HOURS PRN
Status: DISCONTINUED | OUTPATIENT
Start: 2022-01-28 | End: 2022-01-30 | Stop reason: HOSPADM

## 2022-01-28 RX ORDER — HYDROMORPHONE HCL IN WATER/PF 6 MG/30 ML
0.2 PATIENT CONTROLLED ANALGESIA SYRINGE INTRAVENOUS ONCE
Status: COMPLETED | OUTPATIENT
Start: 2022-01-28 | End: 2022-01-28

## 2022-01-28 RX ORDER — IOPAMIDOL 755 MG/ML
500 INJECTION, SOLUTION INTRAVASCULAR ONCE
Status: COMPLETED | OUTPATIENT
Start: 2022-01-28 | End: 2022-01-28

## 2022-01-28 RX ORDER — PROCHLORPERAZINE MALEATE 5 MG
5 TABLET ORAL EVERY 6 HOURS PRN
Status: DISCONTINUED | OUTPATIENT
Start: 2022-01-28 | End: 2022-01-30 | Stop reason: HOSPADM

## 2022-01-28 RX ORDER — ACETAMINOPHEN 650 MG/1
650 SUPPOSITORY RECTAL EVERY 6 HOURS PRN
Status: DISCONTINUED | OUTPATIENT
Start: 2022-01-28 | End: 2022-01-30 | Stop reason: HOSPADM

## 2022-01-28 RX ORDER — PANTOPRAZOLE SODIUM 20 MG/1
20 TABLET, DELAYED RELEASE ORAL DAILY
Status: DISCONTINUED | OUTPATIENT
Start: 2022-01-28 | End: 2022-01-30 | Stop reason: HOSPADM

## 2022-01-28 RX ORDER — ONDANSETRON 2 MG/ML
INJECTION INTRAMUSCULAR; INTRAVENOUS
Status: COMPLETED
Start: 2022-01-28 | End: 2022-01-28

## 2022-01-28 RX ORDER — CIPROFLOXACIN 2 MG/ML
400 INJECTION, SOLUTION INTRAVENOUS EVERY 12 HOURS
Status: DISCONTINUED | OUTPATIENT
Start: 2022-01-28 | End: 2022-01-28

## 2022-01-28 RX ORDER — SODIUM CHLORIDE AND POTASSIUM CHLORIDE 150; 900 MG/100ML; MG/100ML
INJECTION, SOLUTION INTRAVENOUS CONTINUOUS
Status: DISPENSED | OUTPATIENT
Start: 2022-01-28 | End: 2022-01-29

## 2022-01-28 RX ORDER — OXYCODONE HYDROCHLORIDE 5 MG/1
5 TABLET ORAL EVERY 4 HOURS PRN
Status: DISCONTINUED | OUTPATIENT
Start: 2022-01-28 | End: 2022-01-30 | Stop reason: HOSPADM

## 2022-01-28 RX ORDER — ONDANSETRON 4 MG/1
4 TABLET, ORALLY DISINTEGRATING ORAL EVERY 6 HOURS PRN
Status: DISCONTINUED | OUTPATIENT
Start: 2022-01-28 | End: 2022-01-30 | Stop reason: HOSPADM

## 2022-01-28 RX ORDER — ALLOPURINOL 100 MG/1
100 TABLET ORAL DAILY
Status: DISCONTINUED | OUTPATIENT
Start: 2022-01-28 | End: 2022-01-30 | Stop reason: HOSPADM

## 2022-01-28 RX ORDER — SODIUM CHLORIDE 9 MG/ML
INJECTION, SOLUTION INTRAVENOUS CONTINUOUS
Status: DISCONTINUED | OUTPATIENT
Start: 2022-01-28 | End: 2022-01-28

## 2022-01-28 RX ORDER — ACETAMINOPHEN 325 MG/1
650 TABLET ORAL EVERY 6 HOURS PRN
Status: DISCONTINUED | OUTPATIENT
Start: 2022-01-28 | End: 2022-01-30 | Stop reason: HOSPADM

## 2022-01-28 RX ORDER — LEVOTHYROXINE SODIUM 112 UG/1
112 TABLET ORAL DAILY
Status: DISCONTINUED | OUTPATIENT
Start: 2022-01-28 | End: 2022-01-30 | Stop reason: HOSPADM

## 2022-01-28 RX ORDER — LIDOCAINE 40 MG/G
CREAM TOPICAL
Status: DISCONTINUED | OUTPATIENT
Start: 2022-01-28 | End: 2022-01-30 | Stop reason: HOSPADM

## 2022-01-28 RX ORDER — HYDROMORPHONE HCL IN WATER/PF 6 MG/30 ML
.2-.4 PATIENT CONTROLLED ANALGESIA SYRINGE INTRAVENOUS
Status: DISCONTINUED | OUTPATIENT
Start: 2022-01-28 | End: 2022-01-29

## 2022-01-28 RX ADMIN — LOSARTAN POTASSIUM 100 MG: 100 TABLET, FILM COATED ORAL at 10:45

## 2022-01-28 RX ADMIN — METRONIDAZOLE 500 MG: 500 INJECTION, SOLUTION INTRAVENOUS at 08:08

## 2022-01-28 RX ADMIN — SODIUM CHLORIDE 63 ML: 9 INJECTION, SOLUTION INTRAVENOUS at 06:08

## 2022-01-28 RX ADMIN — ALLOPURINOL 100 MG: 100 TABLET ORAL at 10:44

## 2022-01-28 RX ADMIN — HYDROMORPHONE HYDROCHLORIDE 0.2 MG: 0.2 INJECTION, SOLUTION INTRAMUSCULAR; INTRAVENOUS; SUBCUTANEOUS at 21:32

## 2022-01-28 RX ADMIN — HYDROMORPHONE HYDROCHLORIDE 0.2 MG: 0.2 INJECTION, SOLUTION INTRAMUSCULAR; INTRAVENOUS; SUBCUTANEOUS at 13:00

## 2022-01-28 RX ADMIN — HYDROMORPHONE HYDROCHLORIDE 0.2 MG: 0.2 INJECTION, SOLUTION INTRAMUSCULAR; INTRAVENOUS; SUBCUTANEOUS at 04:57

## 2022-01-28 RX ADMIN — LEVOTHYROXINE SODIUM 112 MCG: 0.11 TABLET ORAL at 10:45

## 2022-01-28 RX ADMIN — POTASSIUM CHLORIDE AND SODIUM CHLORIDE: 900; 150 INJECTION, SOLUTION INTRAVENOUS at 10:00

## 2022-01-28 RX ADMIN — POTASSIUM CHLORIDE AND SODIUM CHLORIDE: 900; 150 INJECTION, SOLUTION INTRAVENOUS at 20:52

## 2022-01-28 RX ADMIN — PANTOPRAZOLE SODIUM 20 MG: 20 TABLET, DELAYED RELEASE ORAL at 11:07

## 2022-01-28 RX ADMIN — HYDROMORPHONE HYDROCHLORIDE 0.2 MG: 0.2 INJECTION, SOLUTION INTRAMUSCULAR; INTRAVENOUS; SUBCUTANEOUS at 05:56

## 2022-01-28 RX ADMIN — ONDANSETRON 4 MG: 2 INJECTION INTRAMUSCULAR; INTRAVENOUS at 12:12

## 2022-01-28 RX ADMIN — HYDROMORPHONE HYDROCHLORIDE 0.2 MG: 0.2 INJECTION, SOLUTION INTRAMUSCULAR; INTRAVENOUS; SUBCUTANEOUS at 10:45

## 2022-01-28 RX ADMIN — HYDROMORPHONE HYDROCHLORIDE 0.2 MG: 0.2 INJECTION, SOLUTION INTRAMUSCULAR; INTRAVENOUS; SUBCUTANEOUS at 08:06

## 2022-01-28 RX ADMIN — HYDROMORPHONE HYDROCHLORIDE 0.2 MG: 0.2 INJECTION, SOLUTION INTRAMUSCULAR; INTRAVENOUS; SUBCUTANEOUS at 15:58

## 2022-01-28 RX ADMIN — CIPROFLOXACIN 400 MG: 2 INJECTION, SOLUTION INTRAVENOUS at 06:55

## 2022-01-28 RX ADMIN — PROCHLORPERAZINE MALEATE 5 MG: 5 TABLET ORAL at 19:56

## 2022-01-28 RX ADMIN — OXYCODONE HYDROCHLORIDE 5 MG: 5 TABLET ORAL at 19:47

## 2022-01-28 RX ADMIN — SODIUM CHLORIDE 1000 ML: 9 INJECTION, SOLUTION INTRAVENOUS at 04:58

## 2022-01-28 RX ADMIN — HYDROCHLOROTHIAZIDE 12.5 MG: 12.5 CAPSULE ORAL at 10:44

## 2022-01-28 RX ADMIN — ONDANSETRON 4 MG: 2 INJECTION INTRAMUSCULAR; INTRAVENOUS at 04:57

## 2022-01-28 RX ADMIN — IOPAMIDOL 78 ML: 755 INJECTION, SOLUTION INTRAVENOUS at 06:08

## 2022-01-28 ASSESSMENT — ACTIVITIES OF DAILY LIVING (ADL)
ADLS_ACUITY_SCORE: 12
ADLS_ACUITY_SCORE: 6
ADLS_ACUITY_SCORE: 6
ADLS_ACUITY_SCORE: 12
ADLS_ACUITY_SCORE: 6
ADLS_ACUITY_SCORE: 6
ADLS_ACUITY_SCORE: 12
ADLS_ACUITY_SCORE: 6
ADLS_ACUITY_SCORE: 12
ADLS_ACUITY_SCORE: 6
ADLS_ACUITY_SCORE: 12

## 2022-01-28 ASSESSMENT — ENCOUNTER SYMPTOMS
BLOOD IN STOOL: 1
DIARRHEA: 1
ABDOMINAL PAIN: 1

## 2022-01-28 ASSESSMENT — MIFFLIN-ST. JEOR: SCORE: 1341

## 2022-01-28 NOTE — H&P
[facility]  Hospitalist Admission Note  Name: Marysol Morrell    MRN: [id number]  YOB: 1950    Age: 71 year old  Date of admission: 1/28/2022  Primary care provider: Vernell Sanders    Chief Complaint:  Abdominal pain, bloody stools    Assessment and Plan:   Acute diverticulitis versus colitis, acute lower GI bleed: History diverticulitis requiring partial colon resection in 2011 and episode of diverticulitis 2/2021 treated as an outpatient with oral antibiotics.  Now presenting with new moderate severe left lower quadrant abdominal pain described as cramping and sharp persisting throughout the night.  She had one liquid stool followed by 2 episodes of large-volume episodes of blood in the toilet and clots without stool consistent with acute lower GI bleed.  She has not eaten in the of the ordinary.  Did not have bleeding with previous diverticulitis.  She does have a leukocytosis of 15.5, but is afebrile.  She is on Eliquis for history of DVT/PE.  Colonoscopy from 9/14/2021 showed 5 polyps that were removed, diverticulosis involving the sigmoid colon with an overlying superficial ulcer, and internal hemorrhoids.  Differential includes recurrent acute diverticulitis, ischemic lightest, infectious colitis.  CT abdomen pelvis shows mild diffuse wall thickening the distal transverse colon through the descending colon consistent with colitis which may be ischemic, infectious, inflammatory. Some slight haziness in the fat about the diverticulum in the proximal sigmoid colon which may be diverticulitis.  -Penicillin allergy. 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  She was treated with R-CHOP therapy thousand 17.  Follow-up PET scan showed complete response to therapy.    HTN: PTA on losartan 100 mg daily and HCTZ 12.5 mg daily.  Mildly hypertensive in the ER.  -Resume losartan HCTZ with hold parameters for low blood pressure    Hypothyroidism: Resume levothyroxine.    GERD, history Schatzki's ring: Resume Protonix 20 mg daily.    Gout: Resume allopurinol.    Osteopenia: Received zoledronic acid recently and had some headaches, flank pain, back pain, left hip pain after this.      DVT Prophylaxis: hld pta eliquis  Code Status: Full Code  FEN: clear liquid diet, advance as tolerated, NS with 20meq K at 100 ml/hr  Discharge Dispo: home  Estimated Disch Date / # of Days until Disch: Admit inpatient. Anticipate at least 2 night hospitalization.      History of Present Illness:  Marysol Morrell is a 71 year old female with PMH including diverticulitis s/p partial colon resection, DVT/PE on Eliquis, diffuse large B-cell lymphoma involving the tongue, HTN, hypothyroidism, Schatzki ring, GERD, vertigo, gout, osteopenia, and MDD who presents with abdominal pain and bloody stools.   She had an episode of diverticulitis requiring partial colon resection in 2011.  She also had episode of diverticulitis treated as an outpatient with antibiotics in February 2021.  Earlier this evening she developed left lower quadrant abdominal pain described as cramping and sharp.  It increased in severity to moderate to severe.  She had one liquid stool.  Cramping abdominal pain continued and then she had another bowel movement, but there was no stool and instead the bowl was filled with blood and multiple clots.  She had 1 further episode of blood so she came in for evaluation to  the ER.  Denies any previous GI bleeding with her diverticulitis episodes.  Denies any fevers, chills, nausea.  Has not eaten anything of the ordinary does not have any sick contacts.  Vaccinated for COVID-19.  Did receive zoledronic acid this past week and reports some headaches, flank pain, back pain, left hip pain since then.  Denies any dysuria or urinary frequency.  She is on Eliquis with last dose this morning.  Denies any lightheadedness, chest pain, shortness of breath.  She does not take any NSAIDs.    History obtained from patient, medical record, and from Dr. Chris in the emergency department.  Blood pressure 159/72, heart rate 79, temperature 97.8  F, oxygen 100% on room air.  Initial labs showed leukocytosis of 15.5, hemoglobin 14.2, platelet count 266.  Lactic acid is not elevated at 1.9.  BMP is within normal limits.  INR 0.93.  CT abdomen pelvis shows mild diffuse wall thickening the distal transverse colon through the descending colon consistent with colitis which may be ischemic, infectious, inflammatory. Some slight haziness in the fat about the diverticulum in the proximal sigmoid colon which may be diverticulitis.  She received 0.2 mg IV Dilaudid x2 and is having ongoing pain.  Also, received Zofran and 1 L normal saline.  I recommend starting ciprofloxacin and metronidazole given her penicillin allergy for diverticulitis. Admit inpatient.     Past Medical History reviewed:  Past Medical History:   Diagnosis Date     Arthritis      Cancer (H) 2017    lymphoma     Cholelithiasis      Colon polyps 2001 and 2006    no polyps 2011     Constipation      Diffuse large B cell lymphoma (H) 2017    tongue, followed by Dr. Wilkins at [facility] and [facility]     Diverticulitis 2010     DVT (deep venous thrombosis) (H)     x3 (one due to OCs, 2 post op)     Genital herpes      GERD (gastroesophageal reflux disease)      Gout      History of depression      History of migraine      HTN (hypertension),  benign      Hypothyroidism     hashimoto's thyroiditis     Lichen sclerosus et atrophicus      Osteopenia      Ovarian cyst      Rectocele      Schatzki's ring 2009    EGD      Tibia/fibula fracture 2009     Vasovagal syncope 1966     Vertigo      Past Surgical History reviewed:  Past Surgical History:   Procedure Laterality Date     APPENDECTOMY       ARTHROSCOPY KNEE       BONE MARROW BIOPSY, BONE SPECIMEN, NEEDLE/TROCAR N/A 2017    Procedure: BIOPSY BONE MARROW;  UNILATERAL BONE MARROW BIPOSY  ;  Surgeon: Jamil Mccarthy MD;  Location:  GI     BREAST BIOPSY, CORE RT/LT       EXCISE GARNETT'S NEUROMA FOOT       HYSTERECTOMY, PAP NO LONGER INDICATED      with bladder repair     LAPAROSCOPIC OOPHORECTOMY      with the colon resection     LARYNGOSCOPY WITH BIOPSY(IES) N/A 2017    Procedure: LARYNGOSCOPY WITH BIOPSY(IES);  DIRECT LARYNGOSCOPY WITH TONGUE BIOPSIES;  Surgeon: Vernell Yang MD;  Location[facility]     OPEN REDUCTION INTERNAL FIXATION TIBIA  2009     TONSILLECTOMY & ADENOIDECTOMY       ZZC LAP,SURG,COLECTOMY, PARTIAL, W/ANAST      due to recurrent diverticulitis     Social History reviewed:  Social History     Tobacco Use     Smoking status: Former Smoker     Types: Cigarettes     Quit date: 2000     Years since quittin.5     Smokeless tobacco: Never Used     Tobacco comment: More of a social smoker   Substance Use Topics     Alcohol use: Yes     Alcohol/week: 0.0 standard drinks     Comment: 0-3 a day. mixed drinks, wine     Social History     Social History Narrative    Works at [employer]    Dtr elaine Castrejon grandkijose l     Family History reviewed:  Family History   Problem Relation Age of Onset     C.A.D. Mother         and PE     Cancer Father 65        gastric ca     Allergies:  Allergies   Allergen Reactions     Chloraprep One Step Hives     Codeine GI Disturbance     Evista [Raloxifene Hydrochloride] Other (See Comments)      Esophagus irritation      Fosamax Other (See Comments)     Esophagus irritation      Vicodin [Hydrocodone-Acetaminophen] GI Disturbance     Can Take oxycodone     Pen Vk [Penicillin V] Rash     Medications:  Prior to Admission medications    Medication Sig Last Dose Taking? Auth Provider   ACETAMINOPHEN PO Take 325-650 mg by mouth as needed for pain   Unknown, Entered By History   allopurinol (ZYLOPRIM) 100 MG tablet TAKE ONE TABLET BY MOUTH EVERY DAY   Vernell Sanders PA-C   apixaban ANTICOAGULANT (ELIQUIS ANTICOAGULANT) 2.5 MG tablet 2 times daily    Reported, Patient   cetirizine (ZYRTEC) 10 MG tablet TAKE ONE TABLET BY MOUTH ONE TIME DAILY IN THE P.M.   Vernell Sanders PA-C   Cholecalciferol (VITAMIN D3) 2000 units CAPS Take 2,000 Units by mouth daily   Vernell Sanders PA-C   conjugated estrogens (PREMARIN) cream Place 0.5 g vaginally twice a week   Vernell Sanders PA-C   hydrochlorothiazide (HYDRODIURIL) 12.5 MG tablet Take 1 tablet (12.5 mg) by mouth daily   Vernell Sanders PA-C   levothyroxine (SYNTHROID/LEVOTHROID) 112 MCG tablet TAKE ONE TABLET BY MOUTH EVERY DAY   Vernell Sanders PA-C   losartan (COZAAR) 100 MG tablet Take 1 tablet (100 mg) by mouth daily   Vernell Sanders PA-C   oxyCODONE (ROXICODONE) 5 MG tablet Take 1 tablet (5 mg) by mouth every 6 hours as needed for severe pain   Vernell Sanders PA-C   pantoprazole (PROTONIX) 20 MG EC tablet Take 1 tablet (20 mg) by mouth daily   Vernell Sanders PA-C   valACYclovir (VALTREX) 500 MG tablet TAKE 1 TABLET BY MOUTH TWICE A DAY   Vernell Sanders PA-C   Zoledronic Acid (RECLAST IV)    Reported, Patient     Review of Systems:  A Comprehensive greater than 10 system review of systems was carried out.  Pertinent positives and negatives are noted above.  Otherwise negative.     Physical Exam:  Blood pressure (!) 159/72, pulse 79, temperature 97.8  F (36.6  C), temperature source Temporal, resp. rate 20, SpO2 100 %, not currently  breastfeeding.  Wt Readings from Last 1 Encounters:   06/29/21 83 kg (183 lb)     Exam:  Constitutional: Awake, NAD   Eyes: sclera white   HEENT: atraumatic, MMM  Respiratory: no respiratory distress, lungs cta bilaterally, no crackles or wheeze  Cardiovascular: RRR.  No murmur   GI: Moderate tenderness left lower quadrant to palpation without guarding, not distended, bowel sounds present  Skin: no rash or lesions, acyanotic  Musculoskeletal/extremities: atraumatic, no major deformities. No lower extremity edema  Neurologic: A&O, speech clear, moving extremities equally  Psychiatric: calm, cooperative, normal affect    Lab and imaging data personally reviewed:  Labs:  Recent Labs   Lab 01/28/22 0451   WBC 15.5*   HGB 14.2   HCT 40.9   MCV 95        Recent Labs   Lab 01/28/22 0451      POTASSIUM 3.5   CHLORIDE 106   CO2 26   ANIONGAP 6   *   BUN 19   CR 0.84   GFRESTIMATED 73   EMILIANO 9.4     Recent Labs   Lab 01/28/22  0451   INR 0.93     Recent Labs   Lab 01/28/22  0451   LACT 1.9       Imaging:  Recent Results (from the past 24 hour(s))   CT Abdomen Pelvis w Contrast    Narrative    EXAM: CT ABDOMEN AND PELVIS WITH CONTRAST  LOCATION: [facility]  DATE/TIME: 1/28/2022 6:04 AM    INDICATION: Rectal bleeding.  COMPARISON: None.    TECHNIQUE: CT scan of the abdomen and pelvis was performed following injection of IV contrast. Multiplanar reformats were obtained. Dose reduction techniques were used.  CONTRAST: 78 mL Isovue-370.    FINDINGS:    LOWER CHEST: Unremarkable.    HEPATOBILIARY: Unremarkable.    SPLEEN: Unremarkable.    PANCREAS: Unremarkable.    ADRENAL GLANDS: No unremarkable.    KIDNEYS/BLADDER: Mild multifocal left renal atrophy. A few tiny foci of gas in the urinary bladder lumen.    BOWEL: No dilatation of the small or large bowel. Prior rectosigmoid colon surgery. A few colonic diverticula are present. Apparent minimal haziness is present within the fat about a  diverticulum in the anterior aspect of the proximal sigmoid colon (for   example, series 3 image 137). Mild diffuse wall thickening of the distal transverse colon through the descending colon. The appendix is not visualized. No pneumatosis or free intraperitoneal gas.    LYMPH NODES: Unremarkable.    PELVIC ORGANS: No acute findings.    MUSCULOSKELETAL: No acute findings.    OTHER: Atherosclerotic calcification in the abdominal aorta.      Impression    IMPRESSION:   1. Mild diffuse wall thickening of the distal transverse colon through the descending colon. This is consistent with colitis that could be ischemic, infectious or inflammatory in etiology.  2. Apparent slight haziness within the fat about a diverticulum in the proximal sigmoid colon, equivocal for mild diverticulitis.  3. No other acute abnormality identified in the abdomen or pelvis.                      Juan Palacios MD  Hospitalist  [facility]

## 2022-01-28 NOTE — PHARMACY-ADMISSION MEDICATION HISTORY
Admission medication history interview status for this patient is complete. See [facility] admission navigator for allergy information, prior to admission medications and immunization status.     Medication history interview done, indicate source(s): Patient  Medication history resources (including written lists, pill bottles, clinic record):None  Pharmacy: [facility]    Changes made to PTA medication list:  Added: none  Changed: premarin cream to prn, valacyclovir bid to bid prn   Reported as Not Taking: oxycodone  Removed: oxycodone    Actions taken by pharmacist (provider contacted, etc):None     Additional medication history information:None    Medication reconciliation/reorder completed by provider prior to medication history?  Y   (Y/N)     Prior to Admission medications    Medication Sig Last Dose Taking? Auth Provider   ACETAMINOPHEN PO Take 325-650 mg by mouth as needed for pain  Yes Unknown, Entered By History   allopurinol (ZYLOPRIM) 100 MG tablet TAKE ONE TABLET BY MOUTH EVERY DAY 1/27/2022 Yes Vernell Sanders PA-C   apixaban ANTICOAGULANT (ELIQUIS ANTICOAGULANT) 2.5 MG tablet 2 times daily  1/27/2022 at am Yes Reported, Patient   cetirizine (ZYRTEC) 10 MG tablet TAKE ONE TABLET BY MOUTH ONE TIME DAILY IN THE P.M. 1/27/2022 Yes Vernell Sanders PA-C   Cholecalciferol (VITAMIN D3) 2000 units CAPS Take 2,000 Units by mouth daily 1/27/2022 Yes Vernell Sanders PA-C   hydrochlorothiazide (HYDRODIURIL) 12.5 MG tablet Take 1 tablet (12.5 mg) by mouth daily 1/27/2022 Yes Vernell Sanders PA-C   levothyroxine (SYNTHROID/LEVOTHROID) 112 MCG tablet TAKE ONE TABLET BY MOUTH EVERY DAY 1/27/2022 Yes Vernell Sanders PA-C   losartan (COZAAR) 100 MG tablet Take 1 tablet (100 mg) by mouth daily 1/27/2022 Yes Vernell Sanders PA-C   pantoprazole (PROTONIX) 20 MG EC tablet Take 1 tablet (20 mg) by mouth daily 1/27/2022 Yes Vernell Sanders PA-C   Zoledronic Acid (RECLAST IV) Once a year 1/19/2022 Yes Reported, Patient    conjugated estrogens (PREMARIN) cream Place 0.5 g vaginally twice a week  Patient taking differently: Place 0.5 g vaginally twice a week As needed   Vernell Sanders PA-C   valACYclovir (VALTREX) 500 MG tablet TAKE 1 TABLET BY MOUTH TWICE A DAY  Patient taking differently: Take 500 mg by mouth 2 times daily as needed TAKE 1 TABLET BY MOUTH TWICE A DAY not taking  Vernell Sanders PA-C

## 2022-01-28 NOTE — ED NOTES
[facility]  ED Nurse Handoff Report    Marysol Morrell is a 71 year old female   ED Chief complaint: Rectal Bleeding  . ED Diagnosis:   Final diagnoses:   Gastrointestinal hemorrhage, unspecified gastrointestinal hemorrhage type   Colitis     Allergies:   Allergies   Allergen Reactions     Chloraprep One Step Hives     Codeine GI Disturbance     Evista [Raloxifene Hydrochloride] Other (See Comments)     Esophagus irritation      Fosamax Other (See Comments)     Esophagus irritation      Vicodin [Hydrocodone-Acetaminophen] GI Disturbance     Can Take oxycodone     Pen Vk [Penicillin V] Rash       Code Status: Full Code  Activity level - Baseline/Home:  Independent. Activity Level - Current:   Independent. Lift room needed: No. Bariatric: No   Needed: No   Isolation: No. Infection: Not Applicable.     Vital Signs:   Vitals:    01/28/22 0416 01/28/22 0615 01/28/22 0645   BP: (!) 159/72 (!) 155/73    Pulse: 79     Resp: 20     Temp: 97.8  F (36.6  C)     TempSrc: Temporal     SpO2: 100%  94%       Cardiac Rhythm:  ,      Pain level:    Patient confused: No. Patient Falls Risk: No.   Elimination Status: Has voided   Patient Report - Initial Complaint: diarrhea. Focused Assessment: RLQ cramping; magdaleno blood diarrhea x 1 day; nausea  Tests Performed:   CT Abdomen Pelvis w Contrast   Final Result   Addendum 1 of 1   ADDENDUM      This addendum was dictated by Dr. Ayala on 01/28/2022 at 0652 hours.   The original report was dictated by Dr. Ayala on 01/28/2022.      This addendum was issued to add an additional impression.      ADDITIONAL IMPRESSION: A few tiny foci of gas within the urinary bladder    lumen. This could relate to recent instrumentation or cystitis. Recommend    clinical correlation.              Final   IMPRESSION:    1. Mild diffuse wall thickening of the distal transverse colon through the descending colon. This is consistent with colitis that could be ischemic, infectious  or inflammatory in etiology.   2. Apparent slight haziness within the fat about a diverticulum in the proximal sigmoid colon, equivocal for mild diverticulitis.   3. No other acute abnormality identified in the abdomen or pelvis.                         Labs Ordered and Resulted from Time of ED Arrival to Time of ED Departure   BASIC METABOLIC PANEL - Abnormal       Result Value    Sodium 138      Potassium 3.5      Chloride 106      Carbon Dioxide (CO2) 26      Anion Gap 6      Urea Nitrogen 19      Creatinine 0.84      Calcium 9.4      Glucose 130 (*)     GFR Estimate 73     CBC WITH PLATELETS AND DIFFERENTIAL - Abnormal    WBC Count 15.5 (*)     RBC Count 4.32      Hemoglobin 14.2      Hematocrit 40.9      MCV 95      MCH 32.9      MCHC 34.7      RDW 11.5      Platelet Count 266      % Neutrophils 76      % Lymphocytes 15      % Monocytes 7      % Eosinophils 1      % Basophils 1      % Immature Granulocytes 0      NRBCs per 100 WBC 0      Absolute Neutrophils 11.8 (*)     Absolute Lymphocytes 2.4      Absolute Monocytes 1.1      Absolute Eosinophils 0.1      Absolute Basophils 0.1      Absolute Immature Granulocytes 0.1      Absolute NRBCs 0.0     INR - Normal    INR 0.93     PARTIAL THROMBOPLASTIN TIME - Normal    aPTT 31     LACTIC ACID WHOLE BLOOD - Normal    Lactic Acid 1.9     COVID-19 VIRUS (CORONAVIRUS) BY PCR   TYPE AND SCREEN, ADULT    ABO/RH(D) O NEG      Antibody Screen Negative      SPECIMEN EXPIRATION DATE [id number]     ABO/RH TYPE AND SCREEN   . Abnormal Results: see above   Treatments provided: see mar  Family Comments:   OBS brochure/video discussed/provided to patient:  N/A  ED Medications:   Medications   0.9% sodium chloride BOLUS (0 mLs Intravenous Stopped 1/28/22 4600)     Followed by   sodium chloride 0.9% infusion (has no administration in time range)   ciprofloxacin (CIPRO) infusion 400 mg (400 mg Intravenous New Bag 1/28/22 5972)   metroNIDAZOLE (FLAGYL) infusion 500 mg (has no  administration in time range)   HYDROmorphone (DILAUDID) injection 0.2 mg (0.2 mg Intravenous Given 1/28/22 0457)   ondansetron (ZOFRAN) 2 MG/ML injection (4 mg  Given 1/28/22 0457)   HYDROmorphone (DILAUDID) injection 0.2 mg (0.2 mg Intravenous Given 1/28/22 0556)   CT Scan Flush (63 mLs Intravenous Given 1/28/22 0608)   iopamidol (ISOVUE-370) solution 500 mL (78 mLs Intravenous Given 1/28/22 0608)     Drips infusing:  Yes  For the majority of the shift, the patient's behavior Green. Interventions performed were n/a.    Sepsis treatment initiated: No     Patient tested for COVID 19 prior to admission: YES    ED Nurse Name/Phone Number: Josep Jalloh RN,   7:07 AM  RECEIVING UNIT ED HANDOFF REVIEW    Above ED Nurse Handoff Report was reviewed: Yes  Reviewed by: Analilia Campos RN on January 28, 2022 at 11:26 AM

## 2022-01-28 NOTE — PLAN OF CARE
ROOM # 232    Living Situation (if not independent, order SW consult): Home with spouse  Facility name:  : [facility] () [phone]    Activity level at baseline: Ind  Activity level on admit: SBA d/t dizziness      Patient registered to observation; given Patient Bill of Rights; given the opportunity to ask questions about observation status and their plan of care.  Patient has been oriented to the observation room, bathroom and call light is in place.    Discussed discharge goals and expectations with patient/family.

## 2022-01-28 NOTE — CONSULTS
GASTROENTEROLOGY CONSULTATION      Marysol Morrell  71 year old female  Admission Date: 1/28/2022  Care Provider: Vernell Sanders was asked to see this patient in consultation by Dr. Diego for evaluation of colitis.    HPI:  Marysol Morrell is a 71 year old female who was in her usual state of health until yesterday evening, when she had lower abdominal cramping, followed by an episode of non-bloody diarrhea.  About 2 am this morning, she had and episode of passing red blood and clots.  She had a total of 3 episodes of passing blood, the last around noon today.  She had some nausea but no vomiting.  She frequently has constipation, but does not take anything for this regularly.  She denies heartburn, acid reflux, diarrhea, decrease appetite, or weight loss.    She has a history of diverticulitis, but has not had diarrhea or bleeding this these past episodes.  She had a colonoscopy last year with polyps but no colitis.     MEDICAL HISTORY:  Patient Active Problem List    Diagnosis Date Noted     Colitis 01/28/2022     Priority: Medium     Gastrointestinal hemorrhage, unspecified gastrointestinal hemorrhage type 01/28/2022     Priority: Medium     Diffuse large B-cell lymphoma of lymph nodes of head (H) 08/08/2018     Priority: Medium     Deep vein thrombosis (DVT) of other vein of lower extremity, unspecified chronicity, unspecified laterality (H) 08/08/2018     Priority: Medium     Genital herpes      Priority: Medium     Lichen sclerosus et atrophicus      Priority: Medium     Hypothyroidism, unspecified type 06/01/2017     Priority: Medium     Chronic gout without tophus, unspecified cause, unspecified site 04/27/2017     Priority: Medium     Lichen sclerosus of female genitalia 02/22/2017     Priority: Medium     HCD (health care directive) 08/15/2012     Priority: Medium     Discussed advance care planning with patient; information given to patient to review. Vernell Leonard LPN 8/15/2012     O  Regulatory Load OCT 2020       CARDIOVASCULAR SCREENING; LDL GOAL LESS THAN 130 08/15/2012     Priority: Medium     Constipation      Priority: Medium     HTN (hypertension), benign      Priority: Medium     Osteopenia      Priority: Medium     DVT (deep venous thrombosis) (H)      Priority: Medium     x3 (one due to OCs, 2 post op)       Cholelithiasis      Priority: Medium     IMO update changed this record. Please review for accuracy       Diverticulitis      Priority: Medium     Gout      Priority: Medium     Colon polyps      Priority: Medium     Schatzki's ring      Priority: Medium     EGD        Ovarian cyst      Priority: Medium     GERD (gastroesophageal reflux disease)      Priority: Medium     A comprehensive ten point review of systems was performed and was negative aside from lightheadedness, back pain and flank pain.       :   Prior to Admission Medications   Prescriptions Last Dose Informant Patient Reported? Taking?   ACETAMINOPHEN PO  Self Yes Yes   Sig: Take 325-650 mg by mouth as needed for pain   Cholecalciferol (VITAMIN D3) 2000 units CAPS 2022  No Yes   Sig: Take 2,000 Units by mouth daily   Zoledronic Acid (RECLAST IV) 2022  Yes Yes   Sig: Once a year   allopurinol (ZYLOPRIM) 100 MG tablet 2022  No Yes   Sig: TAKE ONE TABLET BY MOUTH EVERY DAY   apixaban ANTICOAGULANT (ELIQUIS ANTICOAGULANT) 2.5 MG tablet 2022 at am  Yes Yes   Si times daily    cetirizine (ZYRTEC) 10 MG tablet 2022  No Yes   Sig: TAKE ONE TABLET BY MOUTH ONE TIME DAILY IN THE P.M.   conjugated estrogens (PREMARIN) cream   No No   Sig: Place 0.5 g vaginally twice a week   Patient taking differently: Place 0.5 g vaginally twice a week As needed   hydrochlorothiazide (HYDRODIURIL) 12.5 MG tablet 2022  No Yes   Sig: Take 1 tablet (12.5 mg) by mouth daily   levothyroxine (SYNTHROID/LEVOTHROID) 112 MCG tablet 2022  No Yes   Sig: TAKE ONE TABLET BY MOUTH EVERY DAY   losartan (COZAAR) 100 MG  "tablet 2022  No Yes   Sig: Take 1 tablet (100 mg) by mouth daily   pantoprazole (PROTONIX) 20 MG EC tablet 2022  No Yes   Sig: Take 1 tablet (20 mg) by mouth daily   valACYclovir (VALTREX) 500 MG tablet not taking  No No   Sig: TAKE 1 TABLET BY MOUTH TWICE A DAY   Patient taking differently: Take 500 mg by mouth 2 times daily as needed TAKE 1 TABLET BY MOUTH TWICE A DAY      Facility-Administered Medications: None      :   Allergies   Allergen Reactions     Chloraprep One Step Hives     Codeine GI Disturbance     Evista [Raloxifene Hydrochloride] Other (See Comments)     Esophagus irritation      Fosamax Other (See Comments)     Esophagus irritation      Vicodin [Hydrocodone-Acetaminophen] GI Disturbance     Can Take oxycodone     Pen Vk [Penicillin V] Rash      Social History:  Social History     Tobacco Use     Smoking status: Former Smoker     Types: Cigarettes     Quit date: 2000     Years since quittin.5     Smokeless tobacco: Never Used     Tobacco comment: More of a social smoker   Substance Use Topics     Alcohol use: Yes     Alcohol/week: 0.0 standard drinks     Comment: 0-3 a day. mixed drinks, wine     Drug use: No       Family History:  Father with gastric cancer.  No other first degree relative with colon cancer, gastric cancer, crohn's disease, ulcerative colitis, or liver disease.     EXAM:  General Appearance: Alert, oriented x3, no acute distress.  /64 (BP Location: Right arm, Patient Position: Supine)   Pulse 61   Temp 98.5  F (36.9  C) (Oral)   Resp 17   Ht 1.638 m (5' 4.49\")   Wt 83.8 kg (184 lb 12.8 oz)   SpO2 98%   BMI 31.24 kg/m    Eye:  PERRL, sclera anicteric.  ENT: oropharynx clear, no thrush.  CV: RRR.  Resp: CTA bilaterally.  GI: Soft, NABS, mild diffuse tenderness without rebound.  Musculoskeletal: no joint swelling, erythema, or warmth.  Neuro: no asterixis.      Labs and imaging reviewed    Recent Labs   Lab Test 22  1142 22  0451 " 11/24/20  1442 07/01/20  1046 09/17/19  1223 02/02/18  1115 10/28/17  0600   WBC  --  15.5* 8.3 8.7   < > 4.4 1.3*   HGB 12.8 14.2 14.8 14.7   < > 11.7 10.7*   MCV  --  95 97 95   < > 93 95   PLT  --  266 277 253   < > 193 174   INR  --  0.93  --   --   --  0.95 1.11    < > = values in this interval not displayed.     Recent Labs   Lab Test 01/28/22  0451 04/12/21  1406 11/24/20  1442   POTASSIUM 3.5 4.0 3.8   CHLORIDE 106 103 106   CO2 26 30 27   BUN 19 15 11   ANIONGAP 6 2* 5     Recent Labs   Lab Test 11/24/20  1442 07/01/20  1046 09/17/19  1223 06/02/17  1158 12/16/16  1441 12/14/16  1110 02/25/15  1027 11/25/13  1011   ALBUMIN 3.5 3.6 3.6   < >  --   --    < >  --    BILITOTAL 0.4 0.6 0.7   < >  --   --    < >  --    ALT 34 23 14   < >  --   --    < >  --    AST 30 21 15   < >  --   --    < >  --    PROTEIN  --   --   --   --  Negative 30*  --  Negative    < > = values in this interval not displayed.       ASSESSMENT AND PLAN:  71 year old female with bloody diarrhea and CT which shows colitis and possible diverticulitis.  Her presentation is most consistent with colitis, likely ischemia or infection.  Her presentation would be unusual for diverticulitis, and her symptoms are not typical of her past episodes.  IBD and malignancy are unlikely given her colonoscopy last year.    -I would recommend stool studies for c diff and routine culture.  -OK with clear liquids today.  -OK with stopping antibiotics.  -Discussed with Dr. Diego.    Thank you for this interesting consult, please feel to call me if any questions arise.    Tony Salazar MD

## 2022-01-28 NOTE — PROGRESS NOTES
Please see dictated H&P from Dr. Palacios from earlier today.      Pt admitted with bloody stools in the setting of abd cramping/pain.      abd CT shows e/o segmental colitis    Pt had no fevers or prodrome prior to acute onset of sx    No recent antibiotic use and no hx of cdiff    Pt is on Eliquis for h/o DVT/PE    hgb normal    A/p:  - suspected ischemic colitis  - r/o infection (stool studies ordered)  - stop antibiotics.  Sx not c/w acute diverticulitis  - hold Eliquis for today.  Resume when bleeding resolves  - home 1-2 days likely    I discussed pt with Dr. Salazar of GI today

## 2022-01-28 NOTE — PLAN OF CARE
"PRIMARY DIAGNOSIS: GI BLEED    OUTPATIENT/OBSERVATION GOALS TO BE MET BEFORE DISCHARGE  Orthostatic performed: N/A    Stable Hgb  Recheck at 16:00 .   Recent Labs   Lab Test 01/28/22  1142 01/28/22  0451 11/24/20  1442   HGB 12.8 14.2 14.8       Resolved or declined bleeding episodes: No,  with a moderate amount of bleeding - 2 BMs this shift    Appropriate testing complete: No - enteric & cdiff pending (sample sent)    Cleared for discharge by consultants (if involved): No    Safe discharge environment identified: Yes    Discharge Planner Nurse   Safe discharge environment identified: Yes  Barriers to discharge: Yes - Enteric/Cdiff pending, pain & nausea control, tolerate clears       Entered by: Leticia Finch 01/28/2022        /64 (BP Location: Right arm, Patient Position: Supine)   Pulse 61   Temp 98.5  F (36.9  C) (Oral)   Resp 17   Ht 1.638 m (5' 4.49\")   Wt 83.8 kg (184 lb 12.8 oz)   SpO2 98%   BMI 31.24 kg/m      3-6/10 abdominal cramping/pain managed with dilaudid PRN & heat application. Tolerating sips of water; zofran given x 1 for nausea upon arrival to floor. 2 BMs this afternoon - liquid, bright red with streaks of stool. Cdiff/enteric pending (sample sent on previous shift). Up with SBA. Reports slight lightheadedness when OOB; bed alarm on for safety & importance of calling before getting up reinforced. Enteric precautions maintained.     Please review provider order for any additional goals.   Nurse to notify provider when observation goals have been met and patient is ready for discharge.  "

## 2022-01-28 NOTE — ED TRIAGE NOTES
Pt to ER with c/o loose stool earlier with low abd cramping, then went to BR with bright red bleeding Pt is also  on Elequis

## 2022-01-28 NOTE — ED PROVIDER NOTES
History   Chief Complaint:  Rectal Bleeding     HPI   Marysol Morrell is a 71 year old female on Eliquis with history of hypertension, PE, cancer, and diverticulitis s/p sigmoid colon resection who presents with lower abdominal pain, diarrhea, and blood in her stool. States she had several episodes of loose stools last night and 2 hours ago went to the bathroom again and thought she was having another episode of diarrhea. When she wiped, she noticed blood on the toilet paper and thought it might have been hemorrhoids. When she looked in the toilet she saw that it was full of blood. She did not see any stool in the toilet at that time. States she had sigmoid colon resection for diverticulitis in 2011 and another bout of diverticulitis last year. Her current abdominal pain does not feel like those episodes. Denies recent antibiotics.     Review of Systems   Gastrointestinal: Positive for abdominal pain, blood in stool and diarrhea.   All other systems reviewed and are negative.    Allergies:  Chloraprep One Step  Codeine  Evista [Raloxifene Hydrochloride]  Fosamax  Vicodin [Hydrocodone-Acetaminophen]  Pen Vk [Penicillin V]    Medications:  Zyloprim  Zyrtec  Hydrodiuril  Synthroid/ Levothroid  Cozaar  Roxicodone  Protonix  Valtrex  Eliquis    Past Medical History:     Arthritis   Cholelithiasis  Colon polyps  Constipation  Diffuse large B cell lymphoma  Diverticulitis  DVT  Genital herpes  GERD  Gout  Depression  Migraines  Hypertension  Hypothyroidism  Lichen sclerosus et atrophicus  Osteopenia  Ovarian cyst  Rectocele  Schatzki's ring  Tibia/fibula fracture  Vasovagal syncope  Vertigo      Past Surgical History:    Appendectomy  Arthroscopy knee  Bone marrow biopsy, bone specimen, needle/ trocar  Breast biopsy, core   Excise hawthorne's neuroma foot  Hysterectomy with bladder repair  Laparoscopic oophorectomy with colon resection  Laryngoscopy with biopsy, tongue  Open reduction internal fixation  tibia  Tonsillectomy & adenoidectomy  Lap, surg, colectomy, partial, w/ anast, due to recurrent diverticulitis     Family History:    Mother: CAD, PE  Father: gastric cancer    Social History:  The patient presents to the ED alone.   The patient is COVID vaccinated (x3) per the [facility].     Physical Exam     Patient Vitals for the past 24 hrs:   BP Temp Temp src Pulse Resp SpO2   01/28/22 0416 (!) 159/72 97.8  F (36.6  C) Temporal 79 20 100 %       Physical Exam  Vitals reviewed.   HENT:      Head: Normocephalic.   Cardiovascular:      Rate and Rhythm: Normal rate and regular rhythm.   Pulmonary:      Effort: Pulmonary effort is normal.      Breath sounds: Normal breath sounds.   Abdominal:      General: Abdomen is flat.      Comments: Mild diffuse lower abdominal tenderness left greater than right no guarding or rebound.   Genitourinary:     Comments: Rectal exam normal without signs of fissure or anal hemorrhoid.  Musculoskeletal:         General: Normal range of motion.   Skin:     General: Skin is warm.      Capillary Refill: Capillary refill takes less than 2 seconds.   Neurological:      General: No focal deficit present.      Mental Status: She is alert.   Psychiatric:         Mood and Affect: Mood normal.           Emergency Department Course     Imaging:  CT Abdomen Pelvis w Contrast   Final Result   Addendum 1 of 1   ADDENDUM      This addendum was dictated by Dr. Ayala on 01/28/2022 at 0652 hours.   The original report was dictated by Dr. Ayala on 01/28/2022.      This addendum was issued to add an additional impression.      ADDITIONAL IMPRESSION: A few tiny foci of gas within the urinary bladder    lumen. This could relate to recent instrumentation or cystitis. Recommend    clinical correlation.              Final   IMPRESSION:    1. Mild diffuse wall thickening of the distal transverse colon through the descending colon. This is consistent with colitis that could be ischemic, infectious or  inflammatory in etiology.   2. Apparent slight haziness within the fat about a diverticulum in the proximal sigmoid colon, equivocal for mild diverticulitis.   3. No other acute abnormality identified in the abdomen or pelvis.                         Report per radiology    Laboratory:  Labs Ordered and Resulted from Time of ED Arrival to Time of ED Departure   BASIC METABOLIC PANEL - Abnormal       Result Value    Sodium 138      Potassium 3.5      Chloride 106      Carbon Dioxide (CO2) 26      Anion Gap 6      Urea Nitrogen 19      Creatinine 0.84      Calcium 9.4      Glucose 130 (*)     GFR Estimate 73     CBC WITH PLATELETS AND DIFFERENTIAL - Abnormal    WBC Count 15.5 (*)     RBC Count 4.32      Hemoglobin 14.2      Hematocrit 40.9      MCV 95      MCH 32.9      MCHC 34.7      RDW 11.5      Platelet Count 266      % Neutrophils 76      % Lymphocytes 15      % Monocytes 7      % Eosinophils 1      % Basophils 1      % Immature Granulocytes 0      NRBCs per 100 WBC 0      Absolute Neutrophils 11.8 (*)     Absolute Lymphocytes 2.4      Absolute Monocytes 1.1      Absolute Eosinophils 0.1      Absolute Basophils 0.1      Absolute Immature Granulocytes 0.1      Absolute NRBCs 0.0     INR - Normal    INR 0.93     PARTIAL THROMBOPLASTIN TIME - Normal    aPTT 31     LACTIC ACID WHOLE BLOOD - Normal    Lactic Acid 1.9     TYPE AND SCREEN, ADULT   ABO/RH TYPE AND SCREEN        Procedures    Emergency Department Course:         Reviewed:  I reviewed nursing notes, vitals, past medical history, Care Everywhere and [facility]    Assessments:  0423 I obtained history and examined the patient as noted above.    I rechecked the patient and explained findings.     Consults:  0600 I spoke with, the hospitalist, who accepted the patient.     Interventions:  0457 Dilaudid 0.2mg IV  0457 Zofran 4mg IV  0458 0.9% sodium chloride BOLUS 1000mL IV     Disposition:  The patient was admitted to the hospital under the care of Hospitalist  service    Impression & Plan     Medical Decision Making:  Patient presents with grossly readily bloody stool.  Patient is on anticoagulation due to prior history of DVT and PE.  Patient is well-appearing hemoglobin is stable.  Due to abdominal pain with bleeding CT was recommended to evaluate for diverticulitis versus colitis.  Ultimately feel patient requires admission due to anticoagulation and active 3 bouts of red blood in the stool.  Care was discussed with the hospitalist CT results were signed out to the oncoming ER physician but likely if colitis no intervention recommended other than IV hydration and reassessment and observation of bleeding in the hospital.  Care was discussed with the hospitalist and was admitted to observation unless patient requires IV antibiotics for diverticulitis.    Diagnosis:    ICD-10-CM    1. Gastrointestinal hemorrhage, unspecified gastrointestinal hemorrhage type  K92.2 Adult Gastro Ref - Consult Only   2. Colitis  K52.9 Care Coordination Referral     acetaminophen (TYLENOL) 325 MG tablet     oxyCODONE (ROXICODONE) 5 MG tablet     prochlorperazine (COMPAZINE) 5 MG tablet     Adult Gastro Ref - Consult Only         Scribe Disclosure:  I, Enma Rivas, am serving as a scribe at 4:20 AM on 1/28/2022 to document services personally performed by Chano Chris MD based on my observations and the provider's statements to me.      Chano Chris MD  02/01/22 0716

## 2022-01-29 LAB
ANION GAP SERPL CALCULATED.3IONS-SCNC: 4 MMOL/L (ref 3–14)
BUN SERPL-MCNC: 7 MG/DL (ref 7–30)
C COLI+JEJUNI+LARI FUSA STL QL NAA+PROBE: NOT DETECTED
CALCIUM SERPL-MCNC: 8.1 MG/DL (ref 8.5–10.1)
CHLORIDE BLD-SCNC: 111 MMOL/L (ref 94–109)
CO2 SERPL-SCNC: 25 MMOL/L (ref 20–32)
CREAT SERPL-MCNC: 0.75 MG/DL (ref 0.52–1.04)
EC STX1 GENE STL QL NAA+PROBE: NOT DETECTED
EC STX2 GENE STL QL NAA+PROBE: NOT DETECTED
ERYTHROCYTE [DISTWIDTH] IN BLOOD BY AUTOMATED COUNT: 11.9 % (ref 10–15)
GFR SERPL CREATININE-BSD FRML MDRD: 84 ML/MIN/1.73M2
GLUCOSE BLD-MCNC: 102 MG/DL (ref 70–99)
HCT VFR BLD AUTO: 37.1 % (ref 35–47)
HGB BLD-MCNC: 12.5 G/DL (ref 11.7–15.7)
MCH RBC QN AUTO: 32.6 PG (ref 26.5–33)
MCHC RBC AUTO-ENTMCNC: 33.7 G/DL (ref 31.5–36.5)
MCV RBC AUTO: 97 FL (ref 78–100)
NOROV GI+II ORF1-ORF2 JNC STL QL NAA+PR: NOT DETECTED
PLATELET # BLD AUTO: 194 10E3/UL (ref 150–450)
POTASSIUM BLD-SCNC: 3.8 MMOL/L (ref 3.4–5.3)
RBC # BLD AUTO: 3.84 10E6/UL (ref 3.8–5.2)
RVA NSP5 STL QL NAA+PROBE: NOT DETECTED
SALMONELLA SP RPOD STL QL NAA+PROBE: NOT DETECTED
SHIGELLA SP+EIEC IPAH STL QL NAA+PROBE: NOT DETECTED
SODIUM SERPL-SCNC: 140 MMOL/L (ref 133–144)
V CHOL+PARA RFBL+TRKH+TNAA STL QL NAA+PR: NOT DETECTED
WBC # BLD AUTO: 10.1 10E3/UL (ref 4–11)
Y ENTERO RECN STL QL NAA+PROBE: NOT DETECTED

## 2022-01-29 PROCEDURE — 250N000011 HC RX IP 250 OP 636: Performed by: INTERNAL MEDICINE

## 2022-01-29 PROCEDURE — 120N000004 HC R&B MS OVERFLOW

## 2022-01-29 PROCEDURE — 99232 SBSQ HOSP IP/OBS MODERATE 35: CPT | Performed by: PHYSICIAN ASSISTANT

## 2022-01-29 PROCEDURE — 85027 COMPLETE CBC AUTOMATED: CPT | Performed by: INTERNAL MEDICINE

## 2022-01-29 PROCEDURE — 36415 COLL VENOUS BLD VENIPUNCTURE: CPT | Performed by: INTERNAL MEDICINE

## 2022-01-29 PROCEDURE — 250N000013 HC RX MED GY IP 250 OP 250 PS 637: Performed by: INTERNAL MEDICINE

## 2022-01-29 PROCEDURE — 250N000013 HC RX MED GY IP 250 OP 250 PS 637: Performed by: PHYSICIAN ASSISTANT

## 2022-01-29 PROCEDURE — 82310 ASSAY OF CALCIUM: CPT | Performed by: INTERNAL MEDICINE

## 2022-01-29 RX ORDER — SIMETHICONE 80 MG
80 TABLET,CHEWABLE ORAL EVERY 6 HOURS PRN
Status: DISCONTINUED | OUTPATIENT
Start: 2022-01-29 | End: 2022-01-30 | Stop reason: HOSPADM

## 2022-01-29 RX ORDER — DICYCLOMINE HCL 20 MG
20 TABLET ORAL 4 TIMES DAILY PRN
Status: DISCONTINUED | OUTPATIENT
Start: 2022-01-29 | End: 2022-01-30 | Stop reason: HOSPADM

## 2022-01-29 RX ORDER — HYDROMORPHONE HCL IN WATER/PF 6 MG/30 ML
.3-.5 PATIENT CONTROLLED ANALGESIA SYRINGE INTRAVENOUS
Status: DISCONTINUED | OUTPATIENT
Start: 2022-01-29 | End: 2022-01-30 | Stop reason: HOSPADM

## 2022-01-29 RX ORDER — HYDROXYZINE HYDROCHLORIDE 50 MG/1
50 TABLET, FILM COATED ORAL EVERY 6 HOURS PRN
Status: DISCONTINUED | OUTPATIENT
Start: 2022-01-29 | End: 2022-01-30 | Stop reason: HOSPADM

## 2022-01-29 RX ORDER — HYDROXYZINE HYDROCHLORIDE 25 MG/1
25 TABLET, FILM COATED ORAL EVERY 6 HOURS PRN
Status: DISCONTINUED | OUTPATIENT
Start: 2022-01-29 | End: 2022-01-30 | Stop reason: HOSPADM

## 2022-01-29 RX ORDER — FAMOTIDINE 20 MG/1
20 TABLET, FILM COATED ORAL 2 TIMES DAILY
Status: DISCONTINUED | OUTPATIENT
Start: 2022-01-29 | End: 2022-01-30 | Stop reason: HOSPADM

## 2022-01-29 RX ADMIN — ALLOPURINOL 100 MG: 100 TABLET ORAL at 09:03

## 2022-01-29 RX ADMIN — HYDROMORPHONE HYDROCHLORIDE 0.2 MG: 0.2 INJECTION, SOLUTION INTRAMUSCULAR; INTRAVENOUS; SUBCUTANEOUS at 08:56

## 2022-01-29 RX ADMIN — OXYCODONE HYDROCHLORIDE 5 MG: 5 TABLET ORAL at 18:01

## 2022-01-29 RX ADMIN — OXYCODONE HYDROCHLORIDE 5 MG: 5 TABLET ORAL at 11:53

## 2022-01-29 RX ADMIN — PROCHLORPERAZINE MALEATE 5 MG: 5 TABLET ORAL at 22:28

## 2022-01-29 RX ADMIN — HYDROMORPHONE HYDROCHLORIDE 0.2 MG: 0.2 INJECTION, SOLUTION INTRAMUSCULAR; INTRAVENOUS; SUBCUTANEOUS at 04:11

## 2022-01-29 RX ADMIN — LEVOTHYROXINE SODIUM 112 MCG: 0.11 TABLET ORAL at 09:03

## 2022-01-29 RX ADMIN — FAMOTIDINE 20 MG: 20 TABLET ORAL at 20:04

## 2022-01-29 RX ADMIN — ONDANSETRON 4 MG: 2 INJECTION INTRAMUSCULAR; INTRAVENOUS at 18:02

## 2022-01-29 RX ADMIN — PANTOPRAZOLE SODIUM 20 MG: 20 TABLET, DELAYED RELEASE ORAL at 09:03

## 2022-01-29 RX ADMIN — APIXABAN 2.5 MG: 2.5 TABLET, FILM COATED ORAL at 20:20

## 2022-01-29 RX ADMIN — LOSARTAN POTASSIUM 100 MG: 100 TABLET, FILM COATED ORAL at 09:03

## 2022-01-29 RX ADMIN — PROCHLORPERAZINE MALEATE 5 MG: 5 TABLET ORAL at 11:53

## 2022-01-29 RX ADMIN — HYDROCHLOROTHIAZIDE 12.5 MG: 12.5 CAPSULE ORAL at 09:03

## 2022-01-29 RX ADMIN — HYDROMORPHONE HYDROCHLORIDE 0.2 MG: 0.2 INJECTION, SOLUTION INTRAMUSCULAR; INTRAVENOUS; SUBCUTANEOUS at 00:58

## 2022-01-29 ASSESSMENT — ACTIVITIES OF DAILY LIVING (ADL)
ADLS_ACUITY_SCORE: 6
ADLS_ACUITY_SCORE: 4
ADLS_ACUITY_SCORE: 6
ADLS_ACUITY_SCORE: 4
ADLS_ACUITY_SCORE: 6
ADLS_ACUITY_SCORE: 6
ADLS_ACUITY_SCORE: 4
ADLS_ACUITY_SCORE: 4
ADLS_ACUITY_SCORE: 6

## 2022-01-29 NOTE — PROGRESS NOTES
Care Management Discharge Note    Discharge Date: 01/30/2022     Additional Information:  Pt has a [facility] listed and is identified as a Service Bundle #2. No needs or assessment needed at this time. Please consult CM/SW  if discharge needs should arise.    Nalini Smith RN BSN   Inpatient Care Coordination  [facility]   Phone [phone]

## 2022-01-29 NOTE — PROGRESS NOTES
[facility]  Medicine Progress Note - Hospitalist Service       Date of Admission:  1/28/2022    Assessment & Plan    70 yo with migraines, HTN, GERD, DLBCL 2017 with JARED, hypothyroidism, history of diverticulitis, and history of VTE now on indefinite Eliquis who presented to [facility] ED on 1/28/2022 with several hours moderate-to-severe, crampy LLQ pain and several episodes of BRBPR.  In ED, afebrile and hemodynamically stable.  WBC 15.5, Hgb 14.2, Plt 266, ANC 11.8.  CT Abd/Pelv revealed mild diffuse wall thickening the distal transverse colon through the descending colon consistent with colitis (ischemic, infectious, or inflammatory) with some slight haziness in the fat about the diverticulum in the proximal sigmoid colon which may be diverticulitis.  Empirically started on IV Cipro/Flagyl (PCN allergy).  Admission requested.     LGIB:  Acute diverticulitis versus ischemic colitis  No bloody stools x 18 hours.  Serial Hgb reassuring.  Advanced from CLD to full liquids by [facility] today.  Continues to have crampy abdominal pain for which she is requesting oxycodone and IV dilaudid.  Reports history of constipation-predominant IBS.  Agreeable to trial of bentyl.    --Trial Bentyl for abdominal cramping  --Minimize narcotics if able given propensity toward constipation  -- If ongoing symptoms, perhaps could trial Linzess?   -- ADAT  -- APAP and oxycodone available for pain.  1 time dose IV dilaudid available for intractable breakthrough pain.   -- Compazine and zofran for nausea      History DVT/PE  -- Resume Eliquis      DLBCL 2017 s/p RCHOP, JARED.    -- Noted      HTN   -- Continue PTA losartan HCTZ with hold parameters       Hypothyroidism:  -- Resume levothyroxine.      GERD, history Schatzki's ring:   --Resume Protonix 20 mg daily  --Add Pepcid to help with nausea (if caused by GERD)      Gout:   --Resume allopurinol.      Osteopenia:   Received zoledronic acid recently and had some headaches,  flank pain, back pain, left hip pain after this.  -- Noted    COVID status: Negative  Diet: Full Liquid Diet    DVT Prophylaxis: DOAC  Code Status: Full Code      DISPO:    RIGOBERTO Thakkar  Hospitalist Service  [facility]     Text Page (7AM - 5PM, M-F)  ______________________________________________________________________    Interval History   Ongoing intermittent cramping pain and nausea.  Advanced to fulls by GI this morning.  No additional bleeding over last 8 hours.  Worried oxycodone 5 isn't enough.  Questions if dose can be doubled or if she can do oxycodone with IV dilaudid.  Pain is crampy.  Nauseated as well.     Data reviewed today: I reviewed all medications, new labs and imaging results over the last 24 hours. I personally reviewed no images or EKG's today.    Physical Exam   Vital Signs: Temp: 98.2  F (36.8  C) Temp src: Oral BP: 130/61 Pulse: 64   Resp: 16 SpO2: 97 % O2 Device: None (Room air)    Weight: 184 lbs 12.8 oz    GENERAL:  Pleasant, cooperative, alert. Well developed, well nourished.  No acute distress.  Nontoxic.    HEENT: Normocephalic, atraumatic.  Extra occular mm intact.  Sclera clear. PER   PULMONOLOGY: Clear   CARDIAC: Regular   ABDOMEN: Soft, nontender, slightly distended, good bowel tones  MUSCULOSKELETAL:  Moving x 4 spontaneously with CMS intact x4.     NEURO: Alert and oriented x3.  CN II-XII grossly intact and symmetric.  Nonfocal.  SKIN: warm, pink, dry       Data   Recent Labs   Lab 01/29/22  0538 01/28/22  1614 01/28/22  1142 01/28/22  0451   WBC 10.1  --   --  15.5*   HGB 12.5 12.7 12.8 14.2   MCV 97  --   --  95     --   --  266   INR  --   --   --  0.93     --   --  138   POTASSIUM 3.8  --   --  3.5   CHLORIDE 111*  --   --  106   CO2 25  --   --  26   BUN 7  --   --  19   CR 0.75  --   --  0.84   ANIONGAP 4  --   --  6   EMILIANO 8.1*  --   --  9.4   *  --   --  130*     No results found for this or any previous visit (from the  past 24 hour(s)).  Medications       allopurinol  100 mg Oral Daily     hydrochlorothiazide  12.5 mg Oral Daily     levothyroxine  112 mcg Oral Daily     losartan  100 mg Oral Daily     pantoprazole  20 mg Oral Daily     sodium chloride (PF)  3 mL Intracatheter Q8H

## 2022-01-29 NOTE — PROGRESS NOTES
PRIMARY DIAGNOSIS: GI Bleed     OUTPATIENT/OBSERVATION GOALS TO BE MET BEFORE DISCHARGE  1. Orthostatic performed: N/A    2. Tolerating PO fluid and/or antibiotics (if applicable): Yes - clears     3. Nausea/Vomiting/Diarrhea symptoms improved: Yes    4. Pain status: Improved but still requiring IV narcotics.    5. Return to near baseline physical activity: No     6. HGB checks - 0600, latest 12.6    Discharge Planner Nurse   Safe discharge environment identified: Yes  Barriers to discharge: Yes       Entered by: Ken Ryan 01/29/2022 3:35 AM     Please review provider order for any additional goals.   Nurse to notify provider when observation goals have been met and patient is ready for discharge.

## 2022-01-29 NOTE — PLAN OF CARE
Pt a/o x4. VSS. C/o abdominal cramping pain, heat applied, PRN dilaudid given x1 this morning and PRN oxycodone given x1. Compazine given for nausea. Pt changed to independent in room. Voiding adequately. IVF stopped. No BM today. Tolerated full liquid diet. Continue monitoring stool output and pain control.

## 2022-01-29 NOTE — PROGRESS NOTES
"  GASTROENTEROLOGY PROGRESS NOTE     IMPRESSION:  1. Ischemic colitis - No further bleeding on last two episodes, tolerating clears. Still moderate abdominal pain, but improved..    RECOMMENDATIONS:  1. Ok to restart Eliquis, given DVT/PE. Patient is aware, there may be increase or recurrent bleeding.  2. Full liquid diet, ADAT  3. Check CBC in AM    Alan Copeland MD  [facility]  [phone]      ________________________________________________________________________      SUBJECTIVE:         OBJECTIVE:  /45 (BP Location: Right arm)   Pulse 56   Temp 98.3  F (36.8  C) (Oral)   Resp 18   Ht 1.638 m (5' 4.49\")   Wt 83.8 kg (184 lb 12.8 oz)   SpO2 98%   BMI 31.24 kg/m    Temp (24hrs), Av.2  F (36.8  C), Min:98  F (36.7  C), Max:98.5  F (36.9  C)    Patient Vitals for the past 72 hrs:   Weight   22 1153 83.8 kg (184 lb 12.8 oz)        PHYSICAL EXAM  GEN: Alert, NAD.    HRT: reg  LUNGS: CTA  ABD: +BS, non-tender, non-distended, no rebound or guarding.    Additional Data:  I have reviewed the patient's new clinical lab results:     Recent Labs   Lab Test 22  0538 22  1614 22  1142 22  0451 22  0451 20  1442 19  1223 18  1115 10/28/17  0600   WBC 10.1  --   --   --  15.5* 8.3   < > 4.4 1.3*   HGB 12.5 12.7 12.8   < > 14.2 14.8   < > 11.7 10.7*   MCV 97  --   --   --  95 97   < > 93 95     --   --   --  266 277   < > 193 174   INR  --   --   --   --  0.93  --   --  0.95 1.11    < > = values in this interval not displayed.     Recent Labs   Lab Test 22  0538 22  0451 21  1406    138 135   POTASSIUM 3.8 3.5 4.0   CHLORIDE 111* 106 103   CO2 25 26 30   BUN 7 19 15   CR 0.75 0.84 0.90   ANIONGAP 4 6 2*   EMILIANO 8.1* 9.4 10.2*   * 130* 97     Recent Labs   Lab Test 20  1442 20  1046 19  1223 17  1158 16  1441 16  1110 02/25/15  1027 13  1011   ALBUMIN 3.5 3.6 3.6   < >  " --   --    < >  --    BILITOTAL 0.4 0.6 0.7   < >  --   --    < >  --    ALT 34 23 14   < >  --   --    < >  --    AST 30 21 15   < >  --   --    < >  --    PROTEIN  --   --   --   --  Negative 30*  --  Negative    < > = values in this interval not displayed.

## 2022-01-30 VITALS
HEIGHT: 64 IN | RESPIRATION RATE: 16 BRPM | SYSTOLIC BLOOD PRESSURE: 137 MMHG | OXYGEN SATURATION: 97 % | HEART RATE: 59 BPM | TEMPERATURE: 97.9 F | DIASTOLIC BLOOD PRESSURE: 60 MMHG | WEIGHT: 184.8 LBS | BODY MASS INDEX: 31.55 KG/M2

## 2022-01-30 LAB
ANION GAP SERPL CALCULATED.3IONS-SCNC: 4 MMOL/L (ref 3–14)
BUN SERPL-MCNC: 6 MG/DL (ref 7–30)
CALCIUM SERPL-MCNC: 8.7 MG/DL (ref 8.5–10.1)
CHLORIDE BLD-SCNC: 105 MMOL/L (ref 94–109)
CHOLEST SERPL-MCNC: 181 MG/DL
CO2 SERPL-SCNC: 27 MMOL/L (ref 20–32)
CREAT SERPL-MCNC: 0.82 MG/DL (ref 0.52–1.04)
ERYTHROCYTE [DISTWIDTH] IN BLOOD BY AUTOMATED COUNT: 11.6 % (ref 10–15)
GFR SERPL CREATININE-BSD FRML MDRD: 76 ML/MIN/1.73M2
GLUCOSE BLD-MCNC: 102 MG/DL (ref 70–99)
HCT VFR BLD AUTO: 36.4 % (ref 35–47)
HDLC SERPL-MCNC: 56 MG/DL
HGB BLD-MCNC: 12.2 G/DL (ref 11.7–15.7)
LDLC SERPL CALC-MCNC: 103 MG/DL
MCH RBC QN AUTO: 32.3 PG (ref 26.5–33)
MCHC RBC AUTO-ENTMCNC: 33.5 G/DL (ref 31.5–36.5)
MCV RBC AUTO: 96 FL (ref 78–100)
NONHDLC SERPL-MCNC: 125 MG/DL
PLATELET # BLD AUTO: 216 10E3/UL (ref 150–450)
POTASSIUM BLD-SCNC: 3.8 MMOL/L (ref 3.4–5.3)
RBC # BLD AUTO: 3.78 10E6/UL (ref 3.8–5.2)
SODIUM SERPL-SCNC: 136 MMOL/L (ref 133–144)
TRIGL SERPL-MCNC: 109 MG/DL
WBC # BLD AUTO: 9.5 10E3/UL (ref 4–11)

## 2022-01-30 PROCEDURE — 99207 PR CDG-CODE CATEGORY CHANGED: CPT | Performed by: PHYSICIAN ASSISTANT

## 2022-01-30 PROCEDURE — 99239 HOSP IP/OBS DSCHRG MGMT >30: CPT | Performed by: PHYSICIAN ASSISTANT

## 2022-01-30 PROCEDURE — 250N000013 HC RX MED GY IP 250 OP 250 PS 637: Performed by: INTERNAL MEDICINE

## 2022-01-30 PROCEDURE — 80048 BASIC METABOLIC PNL TOTAL CA: CPT | Performed by: PHYSICIAN ASSISTANT

## 2022-01-30 PROCEDURE — 80061 LIPID PANEL: CPT | Performed by: PHYSICIAN ASSISTANT

## 2022-01-30 PROCEDURE — 250N000013 HC RX MED GY IP 250 OP 250 PS 637: Performed by: PHYSICIAN ASSISTANT

## 2022-01-30 PROCEDURE — 36415 COLL VENOUS BLD VENIPUNCTURE: CPT | Performed by: PHYSICIAN ASSISTANT

## 2022-01-30 PROCEDURE — 85027 COMPLETE CBC AUTOMATED: CPT | Performed by: INTERNAL MEDICINE

## 2022-01-30 RX ORDER — PROCHLORPERAZINE MALEATE 5 MG
5 TABLET ORAL EVERY 6 HOURS PRN
Qty: 20 TABLET | Refills: 0 | Status: SHIPPED | OUTPATIENT
Start: 2022-01-30 | End: 2022-03-01

## 2022-01-30 RX ORDER — ACETAMINOPHEN 325 MG/1
650 TABLET ORAL EVERY 4 HOURS PRN
COMMUNITY
Start: 2022-01-30

## 2022-01-30 RX ORDER — OXYCODONE HYDROCHLORIDE 5 MG/1
5 TABLET ORAL EVERY 6 HOURS PRN
Qty: 10 TABLET | Refills: 0 | Status: SHIPPED | OUTPATIENT
Start: 2022-01-30 | End: 2022-03-01

## 2022-01-30 RX ADMIN — ACETAMINOPHEN 650 MG: 325 TABLET, FILM COATED ORAL at 11:20

## 2022-01-30 RX ADMIN — HYDROCHLOROTHIAZIDE 12.5 MG: 12.5 CAPSULE ORAL at 08:22

## 2022-01-30 RX ADMIN — LOSARTAN POTASSIUM 100 MG: 100 TABLET, FILM COATED ORAL at 08:21

## 2022-01-30 RX ADMIN — ACETAMINOPHEN 650 MG: 325 TABLET, FILM COATED ORAL at 04:29

## 2022-01-30 RX ADMIN — ALLOPURINOL 100 MG: 100 TABLET ORAL at 08:22

## 2022-01-30 RX ADMIN — PANTOPRAZOLE SODIUM 20 MG: 20 TABLET, DELAYED RELEASE ORAL at 08:22

## 2022-01-30 RX ADMIN — APIXABAN 2.5 MG: 2.5 TABLET, FILM COATED ORAL at 08:22

## 2022-01-30 RX ADMIN — LEVOTHYROXINE SODIUM 112 MCG: 0.11 TABLET ORAL at 08:21

## 2022-01-30 RX ADMIN — FAMOTIDINE 20 MG: 20 TABLET ORAL at 08:23

## 2022-01-30 ASSESSMENT — ACTIVITIES OF DAILY LIVING (ADL)
ADLS_ACUITY_SCORE: 4

## 2022-01-30 NOTE — PLAN OF CARE
B/P: 128/60, T: 98.2, P: 57, R: 16    Abdominal pain controlled with oxycodone & heating pad. Patient requests antiemetic at time of pain medication to prevent nausea. Up ind; steady gait. Tolerating full liquid diet. Denies further stools. Enteric panel still pending - precautions maintained.

## 2022-01-30 NOTE — PLAN OF CARE
Pt a/o x4. VSS. C/o pain, PRN tylenol given. Went over discharge information with pt. Pt verbalized understanding and all questions answered. Medications sent home with pt. All belongings sent home with pt.  picked pt up and pt discharged home with .

## 2022-01-30 NOTE — PROVIDER NOTIFICATION
"Page sent to obs pager - \"Per notes by PRECIOUS and PA - to restart eliquis but no order yet. Pt normally takes 2.5mg BID per pt. Do we want to start tonight?\"    *MD entered orders. Will resume eliquis tonight   "

## 2022-01-30 NOTE — DISCHARGE SUMMARY
[facility]  Discharge Summary  Hospitalist    Date of Admission:  1/28/2022  Date of Discharge:  1/30/2022    Discharging Provider: Nataly Hartley PAC    Discharge Diagnoses   1. Lower GI bleed.  Suspect ischemic colitis over acute diverticulitis.  2. History of IBS-type symptoms    3. History of VTE; PTA Eliquis resumed  4. DLBCL s/p RCHOP 2017.  JARED.  5. HTN  6. GERD    Discharge Disposition     Discharged to home    Condition at Discharge:  Stable    History of Present Illness   72 yo with migraines, HTN, GERD, DLBCL 2017 with JARED, hypothyroidism, history of diverticulitis, and history of VTE now on indefinite Eliquis who presented to [facility] ED on 1/28/2022 with several hours moderate-to-severe, crampy LLQ pain and several episodes of BRBPR.  In ED, afebrile and hemodynamically stable.  WBC 15.5, Hgb 14.2, Plt 266, ANC 11.8.  CT Abd/Pelv revealed mild diffuse wall thickening the distal transverse colon through the descending colon consistent with colitis (ischemic, infectious, or inflammatory) with some slight haziness in the fat about the diverticulum in the proximal sigmoid colon which may be diverticulitis.  Empirically started on IV Cipro/Flagyl (PCN allergy).  Admission requested.    Seen by [facility] who suspected ischemic colitis.  Abx stopped.  Enteric panel negative.  Only 1 small bloody stools after admission.  No bloody stools x 36hours.  Serial Hgb reassuring.  Advanced from full liquids by [facility] with plans to discharge on a slow advancement back to regular diet.  Continued abdominal discomfort, no longer crampy.  VSS.  Tolerating PO.  Working to minimize narcotics.  Finds compazine most helpful for nausea.  Stable to discharge home today with outpatient GI follow-up.     RIGOBERTO Thakkar    Code Status   Full Code       Primary Care Physician   Vernell Sanders    Consultations This Hospital Stay   GASTROENTEROLOGY IP CONSULT    Time Spent on this Encounter   I, Nataly Hartley,  PA, personally saw the patient today and spent greater than 30 minutes discharging this patient.    Allergies   Allergies   Allergen Reactions     Chloraprep One Step Hives     Codeine GI Disturbance     Evista [Raloxifene Hydrochloride] Other (See Comments)     Esophagus irritation      Fosamax Other (See Comments)     Esophagus irritation      Vicodin [Hydrocodone-Acetaminophen] GI Disturbance     Can Take oxycodone     Pen Vk [Penicillin V] Rash       Physical Exam   Temp: 97.9  F (36.6  C) Temp src: Oral BP: 137/60 Pulse: 59   Resp: 16 SpO2: 97 % O2 Device: None (Room air)    Vitals:    01/28/22 1153   Weight: 83.8 kg (184 lb 12.8 oz)     Vital Signs with Ranges  Temp:  [97.9  F (36.6  C)-99.8  F (37.7  C)] 97.9  F (36.6  C)  Pulse:  [57-75] 59  Resp:  [16] 16  BP: (112-137)/(45-68) 137/60  SpO2:  [95 %-99 %] 97 %  I/O last 3 completed shifts:  In: 240 [P.O.:240]  Out: 700 [Urine:700]      GENERAL:  Pleasant, cooperative, alert. WDWN.  Appears state age.   HEENT: Normocephalic, atraumatic.  Extra occular mm intact.  Sclera clear. PER   PULMONOLOGY: Unlabored  ABDOMEN: Soft, slight distension  MUSCULOSKELETAL:  Moving x 4 spontaneously with CMS intact x4.  Normal bulk and tone.   NEURO: Alert and oriented x3.  CN II-XII grossly intact and symmetric.  Nonfocal  SKIN: Warm, pink, dry    Discharge Medications   Current Discharge Medication List      START taking these medications    Details   !! acetaminophen (TYLENOL) 325 MG tablet Take 2 tablets (650 mg) by mouth every 4 hours as needed for mild pain or other (and adjunct with moderate or severe pain or per patient request)    Associated Diagnoses: Colitis      oxyCODONE (ROXICODONE) 5 MG tablet Take 1 tablet (5 mg) by mouth every 6 hours as needed for severe pain . May cause constipation and sleepiness. No driving if taking this medication.  Qty: 10 tablet, Refills: 0    Associated Diagnoses: Colitis      prochlorperazine (COMPAZINE) 5 MG tablet Take 1 tablet (5  mg) by mouth every 6 hours as needed for vomiting . May cause sleepiness.  Qty: 20 tablet, Refills: 0    Associated Diagnoses: Colitis       !! - Potential duplicate medications found. Please discuss with provider.      CONTINUE these medications which have NOT CHANGED    Details   !! ACETAMINOPHEN PO Take 325-650 mg by mouth as needed for pain      allopurinol (ZYLOPRIM) 100 MG tablet TAKE ONE TABLET BY MOUTH EVERY DAY  Qty: 90 tablet, Refills: 0    Associated Diagnoses: History of gout      apixaban ANTICOAGULANT (ELIQUIS ANTICOAGULANT) 2.5 MG tablet 2 times daily       cetirizine (ZYRTEC) 10 MG tablet TAKE ONE TABLET BY MOUTH ONE TIME DAILY IN THE P.M.  Qty: 90 tablet, Refills: 3    Associated Diagnoses: Chronic rhinitis, unspecified type      Cholecalciferol (VITAMIN D3) 2000 units CAPS Take 2,000 Units by mouth daily  Qty: 90 capsule, Refills: 3    Associated Diagnoses: Vitamin D deficiency      hydrochlorothiazide (HYDRODIURIL) 12.5 MG tablet Take 1 tablet (12.5 mg) by mouth daily  Qty: 90 tablet, Refills: 2    Associated Diagnoses: HTN (hypertension), benign      levothyroxine (SYNTHROID/LEVOTHROID) 112 MCG tablet TAKE ONE TABLET BY MOUTH EVERY DAY  Qty: 90 tablet, Refills: 0    Comments: Please schedule appointment to discuss further refills ([phone]  Associated Diagnoses: Hypothyroidism, unspecified type      losartan (COZAAR) 100 MG tablet Take 1 tablet (100 mg) by mouth daily  Qty: 90 tablet, Refills: 2    Associated Diagnoses: HTN (hypertension), benign      pantoprazole (PROTONIX) 20 MG EC tablet Take 1 tablet (20 mg) by mouth daily  Qty: 90 tablet, Refills: 2    Associated Diagnoses: Gastroesophageal reflux disease without esophagitis      Zoledronic Acid (RECLAST IV) Once a year      conjugated estrogens (PREMARIN) cream Place 0.5 g vaginally twice a week  Qty: 30 g, Refills: 12    Comments: 0.5g cream (as measured with package applicator) = 0.3125mg conj. estrogens  Associated Diagnoses:  Atrophic vaginitis      valACYclovir (VALTREX) 500 MG tablet TAKE 1 TABLET BY MOUTH TWICE A DAY  Qty: 12 tablet, Refills: 3    Associated Diagnoses: Herpes simplex vulvovaginitis       !! - Potential duplicate medications found. Please discuss with provider.          Data   Most Recent 3 CBC's:Recent Labs   Lab Test 01/30/22  0538 01/29/22  0538 01/28/22  1614 01/28/22  1142 01/28/22  0451   WBC 9.5 10.1  --   --  15.5*   HGB 12.2 12.5 12.7   < > 14.2   MCV 96 97  --   --  95    194  --   --  266    < > = values in this interval not displayed.      Most Recent 3 BMP's:  Recent Labs   Lab Test 01/30/22  0538 01/29/22  0538 01/28/22  0451    140 138   POTASSIUM 3.8 3.8 3.5   CHLORIDE 105 111* 106   CO2 27 25 26   BUN 6* 7 19   CR 0.82 0.75 0.84   ANIONGAP 4 4 6   EMILIANO 8.7 8.1* 9.4   * 102* 130*     Most Recent 2 LFT's:  Recent Labs   Lab Test 11/24/20  1442 07/01/20  1046   AST 30 21   ALT 34 23   ALKPHOS 80 66   BILITOTAL 0.4 0.6     Most Recent INR's and Anticoagulation Dosing History:  Anticoagulation Dose History     Recent Dosing and Labs Latest Ref Rng & Units 8/4/2017 10/26/2017 10/28/2017 2/2/2018 1/28/2022    Warfarin 5 mg - - - 5 mg - -    [id number] 70 - 130 % - - 64(L) - -    INR 0.85 - 1.15 1.38(H) 0.96 1.11 0.95 0.93        Most Recent 3 Troponin's:No lab results found.  Most Recent Cholesterol Panel:  Recent Labs   Lab Test 01/30/22  0538   CHOL 181   *   HDL 56   TRIG 109     Most Recent 6 Bacteria Isolates From Any Culture (See EPIC Reports for Culture Details):  Recent Labs   Lab Test 10/04/19  1030 12/14/16  1110 11/25/13  1027   CULT Light growth  Coagulase negative Staphylococcus  Susceptibility testing not routinely done  * >100,000 colonies/mL Escherichia coli* >100,000 colonies/mL Escherichia coli     Most Recent TSH, T4 and A1c Labs:  Recent Labs   Lab Test 11/24/20  1442 05/21/14  1323 01/22/14  1304   TSH 1.12   < > 0.37*   T4  --   --  2.07*    < > = values in  this interval not displayed.     Results for orders placed or performed during the hospital encounter of 01/28/22   CT Abdomen Pelvis w Contrast    Addendum: 1/28/2022    ADDENDUM    This addendum was dictated by Dr. Ayala on 01/28/2022 at 0652 hours.  The original report was dictated by Dr. Ayala on 01/28/2022.    This addendum was issued to add an additional impression.    ADDITIONAL IMPRESSION: A few tiny foci of gas within the urinary bladder lumen. This could relate to recent instrumentation or cystitis. Recommend clinical correlation.             Narrative    EXAM: CT ABDOMEN AND PELVIS WITH CONTRAST  LOCATION: [facility]  DATE/TIME: 1/28/2022 6:04 AM    INDICATION: Rectal bleeding.  COMPARISON: None.    TECHNIQUE: CT scan of the abdomen and pelvis was performed following injection of IV contrast. Multiplanar reformats were obtained. Dose reduction techniques were used.  CONTRAST: 78 mL Isovue-370.    FINDINGS:    LOWER CHEST: Unremarkable.    HEPATOBILIARY: Unremarkable.    SPLEEN: Unremarkable.    PANCREAS: Unremarkable.    ADRENAL GLANDS: No unremarkable.    KIDNEYS/BLADDER: Mild multifocal left renal atrophy. A few tiny foci of gas in the urinary bladder lumen.    BOWEL: No dilatation of the small or large bowel. Prior rectosigmoid colon surgery. A few colonic diverticula are present. Apparent minimal haziness is present within the fat about a diverticulum in the anterior aspect of the proximal sigmoid colon (for   example, series 3 image 137). Mild diffuse wall thickening of the distal transverse colon through the descending colon. The appendix is not visualized. No pneumatosis or free intraperitoneal gas.    LYMPH NODES: Unremarkable.    PELVIC ORGANS: No acute findings.    MUSCULOSKELETAL: No acute findings.    OTHER: Atherosclerotic calcification in the abdominal aorta.      Impression    IMPRESSION:   1. Mild diffuse wall thickening of the distal transverse colon through  the descending colon. This is consistent with colitis that could be ischemic, infectious or inflammatory in etiology.  2. Apparent slight haziness within the fat about a diverticulum in the proximal sigmoid colon, equivocal for mild diverticulitis.  3. No other acute abnormality identified in the abdomen or pelvis.                      Discharge Orders      Care Coordination Referral      Adult Gastro Ref - Consult Only      Follow-up and recommended labs and tests     Follow-up with [facility] regarding ischemic colitis and history of IBS-type symptoms.  Call [phone] to schedule an appointment.     Activity    Your activity upon discharge: Resume normal activity as tolerated.     Reason for your hospital stay    Ischemic colitis. Bleeding stopped.  Hemoglobin stable at 12.  You will be going home with a limited quantity of oxycodone (narcotic medication used for severe pain) and compazine (nausea medication).  Oxycodone can cause constipation and has addictive properties so please use sparingly.  [facility] also recommends IBGard - an over-the-counter medication that helps with cramping by causing smooth muscle relaxation.  This medication can be purchased at [employer].  It is in a green box.  [facility] will contact you to set up a follow-up appointment.  If you do not hear from them, please call [phone].     Diet    Follow this diet upon discharge: Full liquid diet for 2-3 days then advance to a soft diet for 2-3 days and then slowly resume your normal diet as tolerated.  If increased nausea or abdominal cramping, slow down diet advancement.

## 2022-01-30 NOTE — PROGRESS NOTES
"End of shift summary- 1430-1890   Dx: Colitis - GI Bleed   A/O: x4  Diet: Full liq - tolerating   Transfer: [facility]   Bathroom: no bloody stools tonight. Voiding well  Pain: oxycodone PO w/ antiemetic   Treatment: hgb check @ 0600, I/O, Enteric panel negative- cdiff negative, GI following - holding abx, IVF d/c'd today.   Discharge Plans: tbd from home     Blood pressure 134/68, pulse 75, temperature 99.8  F (37.7  C), temperature source Oral, resp. rate 16, height 1.638 m (5' 4.49\"), weight 83.8 kg (184 lb 12.8 oz), SpO2 95 %, not currently breastfeeding.      "

## 2022-01-31 ENCOUNTER — PATIENT OUTREACH (OUTPATIENT)
Dept: NURSING | Facility: CLINIC | Age: 72
End: 2022-01-31
Payer: MEDICARE

## 2022-01-31 ASSESSMENT — ACTIVITIES OF DAILY LIVING (ADL): DEPENDENT_IADLS:: INDEPENDENT

## 2022-01-31 NOTE — PROGRESS NOTES
"Clinic Care Coordination Contact    Clinic Care Coordination Contact  OUTREACH with Post Discharge Assessment    Referral Information:  Referral Source: IP Report         Chief Complaint   Patient presents with     Clinic Care Coordination - Initial     Clinic Care Coordination - Post Hospital        Universal Utilization:   [facility]  Clinic Utilization:  [facility]  Difficulty keeping appointments:: No  Compliance Concerns: No  No-Show Concerns: No  No PCP office visit in Past Year: No  Utilization    Hospital Admissions  1             ED Visits  1             No Show Count (past year)  0                Current as of: 1/30/2022  1:24 PM            Clinical Concerns:  Current Medical Concerns:  Recent discharge from hospital    Current Behavioral Concerns: None    Education Provided to patient:   RNCC called and spoke with patient; introduced self, discussed role of Care Coordination and explained reason for call  Pain  Pain (GOAL):: Yes (Tylenol is alleviating \"gut\" pain rated 4/10)  Type: Acute (<3mo)  Location of chronic pain:: Abdominal  Health Maintenance Reviewed: Due/Overdue   Health Maintenance Due   Topic Date Due     ZOSTER IMMUNIZATION (2 of 3) 05/14/2012     INFLUENZA VACCINE (1) 09/01/2021     MEDICARE ANNUAL WELLNESS VISIT  11/24/2021     FALL RISK ASSESSMENT  11/24/2021     PHQ-2  01/01/2022     Clinical Pathway: None    Admission:    Admission Date: 01/28/22   Reason for Admission: Colitis, gastrointestinal hemorrhage  Discharge:   Discharge Date: 01/30/22  Discharge Diagnosis: Colitis, gastrointestinal hemorrhage    Enrollment  Primary Care Care Coordination Status: Not a Candidate  Clinical Pathway Name: None    Discharge Assessment  How are you doing now that you are home?: \"I'm ok, I still have gut pain but it seems to be lessening.\"  How are your symptoms? (Red Flag symptoms escalate to triage hotline per guidelines): Improved  Do you feel your " "condition is stable enough to be safe at home until your provider visit?: Yes  Does the patient have their discharge instructions? : Yes  Does the patient have questions regarding their discharge instructions? : No  Were you started on any new medications or were there changes to any of your previous medications? : Yes  Does the patient have all of their medications?: Yes  Do you have questions regarding any of your medications? : No  Do you have all of your needed medical supplies or equipment (DME)?  (i.e. oxygen tank, CPAP, cane, etc.): Yes  Discharge follow-up appointment scheduled within 14 calendar days? : No ([facility] will be reaching out to patient to schedule an appointment)  Discharge Follow Up Appointment Scheduled with?: Specialty Care Provider  Is patient agreeable to assistance with scheduling? : Yes    Post-op (Clinicians Only)  Fever: No  Chills: No  Eating & Drinking: unable to tolerate solid foods (Patient has progressed from clear liquid to full liquid diet)  PO Intake: full liquids  Additional Symptoms:  (Nausea 1/30/2022 took compazine; none today)  Bowel Function: bright red blood in stool  Date of last BM: 01/28/22 (Per patient, her last BM was at the hospital 1/28/2022 and \"it was all blood\".)  Urinary Status: voiding without complaint/concerns    Medication Management:  Medication review status: Medications reviewed and no changes reported per patient.        Current Outpatient Medications   Medication     allopurinol (ZYLOPRIM) 100 MG tablet     apixaban ANTICOAGULANT (ELIQUIS ANTICOAGULANT) 2.5 MG tablet     cetirizine (ZYRTEC) 10 MG tablet     Cholecalciferol (VITAMIN D3) 2000 units CAPS     conjugated estrogens (PREMARIN) cream     hydrochlorothiazide (HYDRODIURIL) 12.5 MG tablet     levothyroxine (SYNTHROID/LEVOTHROID) 112 MCG tablet     losartan (COZAAR) 100 MG tablet     oxyCODONE (ROXICODONE) 5 MG tablet     prochlorperazine (COMPAZINE) 5 MG tablet     acetaminophen (TYLENOL) 325 MG " tablet     ACETAMINOPHEN PO     pantoprazole (PROTONIX) 20 MG EC tablet     valACYclovir (VALTREX) 500 MG tablet     Zoledronic Acid (RECLAST IV)     No current facility-administered medications for this visit.     Functional Status:  Dependent IADLs:: Independent  Bed or wheelchair confined:: No  Mobility Status: Independent  Fallen 2 or more times in the past year?: No  Any fall with injury in the past year?: No    Living Situation:  Current living arrangement:: I live in a private home with spouse  Type of residence:: Private home - stairs    Lifestyle & Psychosocial Needs:    Social Determinants of Health     Tobacco Use: Medium Risk     Smoking Tobacco Use: Former Smoker     Smokeless Tobacco Use: Never Used   Alcohol Use: Not on file   Financial Resource Strain: Not on file   Food Insecurity: Not on file   Transportation Needs: Not on file   Physical Activity: Not on file   Stress: Not on file   Social Connections: Not on file   Intimate Partner Violence: Not on file   Depression: Not at risk     PHQ-2 Score: 2   Housing Stability: Not on file     Diet:: Regular (Patient has advance from clear liquid to full liquid diet)  Inadequate nutrition (GOAL):: No  Tube Feeding: No  Inadequate activity/exercise (GOAL):: No  Significant changes in sleep pattern (GOAL): No  Transportation means:: Regular car     Confucianist or spiritual beliefs that impact treatment:: No  Mental health DX:: No  Mental health management concern (GOAL):: No  Chemical Dependency Status: No Current Concerns  Chemical Dependency Management:  (NA)      Resources and Interventions:  Current Resources:      Community Resources: None  Supplies used at home:: None  Equipment Currently Used at Home: none  Employment Status: retired (RN)     Advance Care Plan/Directive  Advanced Care Plans/Directives on file:: No  Type Advanced Care Plans/Directives:  (Full Code)    Referrals Placed: None     Goals:     Patient/Caregiver understanding: Yes    Future  Appointments              In 2 months Vernell Sanders PA-C [facility]        Plan:   -Patient will contact the care team with questions, concerns, support needs   -Patient will use the clinic as a resource and understands (s)he can contact the [facility] with 24/7 after hours services available  -Care Coordinator will remain available as needed  -No further care coordination outreaches at this time     Jammie Gtz RN, BSN, PHN  Primary Care / Care Coordinator   [facility]  E-mail [email or web address]   [phone]

## 2022-01-31 NOTE — LETTER
[facility] CARE COORDINATION  [address]  [place] [address]    January 31, 2022    Marysol Morrell  [address] [place] [address]      Dear Marysol,    I am a clinic care coordinator who works with Vernell Sanders PA-C at [facility]. I wanted to thank you for spending the time to talk with me.  Below is a description of clinic care coordination and how I can further assist you.      The clinic care coordination team is made up of a registered nurse,  and community health worker who understand the health care system. The goal of clinic care coordination is to help you manage your health and improve access to the health care system in the most efficient manner. The team can assist you in meeting your health care goals by providing education, coordinating services, strengthening the communication among your providers and supporting you with any resource needs.    Please feel free to contact me with any questions or concerns. We are focused on providing you with the highest-quality healthcare experience possible and that all starts with you.     Sincerely,     Jammie Gtz RN, BSN, PHN  Primary Care / Care Coordinator   [facility]  E-mail [email or web address]   [phone]

## 2022-02-23 ENCOUNTER — TRANSFERRED RECORDS (OUTPATIENT)
Dept: HEALTH INFORMATION MANAGEMENT | Facility: CLINIC | Age: 72
End: 2022-02-23
Payer: MEDICARE

## 2022-02-25 DIAGNOSIS — A60.04 HERPES SIMPLEX VULVOVAGINITIS: ICD-10-CM

## 2022-02-25 RX ORDER — VALACYCLOVIR HYDROCHLORIDE 500 MG/1
TABLET, FILM COATED ORAL
Qty: 12 TABLET | Refills: 0 | Status: SHIPPED | OUTPATIENT
Start: 2022-02-25 | End: 2022-04-14

## 2022-03-01 ENCOUNTER — OFFICE VISIT (OUTPATIENT)
Dept: FAMILY MEDICINE | Facility: CLINIC | Age: 72
End: 2022-03-01
Payer: MEDICARE

## 2022-03-01 VITALS
TEMPERATURE: 97.3 F | SYSTOLIC BLOOD PRESSURE: 125 MMHG | HEIGHT: 64 IN | DIASTOLIC BLOOD PRESSURE: 83 MMHG | WEIGHT: 186 LBS | RESPIRATION RATE: 18 BRPM | OXYGEN SATURATION: 99 % | HEART RATE: 63 BPM | BODY MASS INDEX: 31.76 KG/M2

## 2022-03-01 DIAGNOSIS — E03.9 HYPOTHYROIDISM, UNSPECIFIED TYPE: ICD-10-CM

## 2022-03-01 DIAGNOSIS — Z86.711 HISTORY OF PULMONARY EMBOLISM: ICD-10-CM

## 2022-03-01 DIAGNOSIS — C83.31 DIFFUSE LARGE B-CELL LYMPHOMA OF LYMPH NODES OF HEAD (H): ICD-10-CM

## 2022-03-01 DIAGNOSIS — Z00.00 ENCOUNTER FOR MEDICARE ANNUAL WELLNESS EXAM: Primary | ICD-10-CM

## 2022-03-01 DIAGNOSIS — I10 HTN (HYPERTENSION), BENIGN: ICD-10-CM

## 2022-03-01 DIAGNOSIS — F33.9 EPISODE OF RECURRENT MAJOR DEPRESSIVE DISORDER, UNSPECIFIED DEPRESSION EPISODE SEVERITY (H): ICD-10-CM

## 2022-03-01 DIAGNOSIS — K22.2 SCHATZKI'S RING: ICD-10-CM

## 2022-03-01 PROBLEM — I26.99 PULMONARY EMBOLISM, OTHER, UNSPECIFIED CHRONICITY, UNSPECIFIED WHETHER ACUTE COR PULMONALE PRESENT (H): Status: ACTIVE | Noted: 2022-03-01

## 2022-03-01 PROBLEM — I26.99 PULMONARY EMBOLISM, OTHER, UNSPECIFIED CHRONICITY, UNSPECIFIED WHETHER ACUTE COR PULMONALE PRESENT (H): Status: RESOLVED | Noted: 2022-03-01 | Resolved: 2022-03-01

## 2022-03-01 PROCEDURE — 99214 OFFICE O/P EST MOD 30 MIN: CPT | Mod: 25 | Performed by: PHYSICIAN ASSISTANT

## 2022-03-01 PROCEDURE — G0439 PPPS, SUBSEQ VISIT: HCPCS | Performed by: PHYSICIAN ASSISTANT

## 2022-03-01 PROCEDURE — 84443 ASSAY THYROID STIM HORMONE: CPT | Performed by: PHYSICIAN ASSISTANT

## 2022-03-01 PROCEDURE — 36415 COLL VENOUS BLD VENIPUNCTURE: CPT | Performed by: PHYSICIAN ASSISTANT

## 2022-03-01 RX ORDER — LOSARTAN POTASSIUM 100 MG/1
100 TABLET ORAL DAILY
Qty: 90 TABLET | Refills: 3 | Status: SHIPPED | OUTPATIENT
Start: 2022-03-01 | End: 2023-05-11

## 2022-03-01 RX ORDER — DULOXETIN HYDROCHLORIDE 30 MG/1
30 CAPSULE, DELAYED RELEASE ORAL 2 TIMES DAILY
Qty: 60 CAPSULE | Refills: 1 | Status: SHIPPED | OUTPATIENT
Start: 2022-03-01 | End: 2022-04-14

## 2022-03-01 RX ORDER — LEVOTHYROXINE SODIUM 112 UG/1
112 TABLET ORAL DAILY
Qty: 90 TABLET | Refills: 3 | Status: SHIPPED | OUTPATIENT
Start: 2022-03-01 | End: 2023-03-07

## 2022-03-01 ASSESSMENT — ACTIVITIES OF DAILY LIVING (ADL): CURRENT_FUNCTION: NO ASSISTANCE NEEDED

## 2022-03-01 ASSESSMENT — PATIENT HEALTH QUESTIONNAIRE - PHQ9: SUM OF ALL RESPONSES TO PHQ QUESTIONS 1-9: 12

## 2022-03-01 ASSESSMENT — PAIN SCALES - GENERAL: PAINLEVEL: NO PAIN (0)

## 2022-03-01 NOTE — PROGRESS NOTES
"SUBJECTIVE:   Marysol Morrell is a 71 year old female who presents for Preventive Visit.    Mammogram and shingrex due    Was hospitalized in Jan for probable ischemic colitis and did f/u with [facility]. Has flex sig and EGD to be scheduled.  She has some incont of stool without urge.    Pt is helping her mom at her assisted living.  She is 89 yo.   Her family thinks she is depressed   She lacks energy and motivation  Feels overwhelmed and down  She has tried zoloft in the past      Pt has hypothyroidism:    TSH   Date Value Ref Range Status   11/24/2020 1.12 0.40 - 4.00 mU/L Final       Pt had SE from Reclast, this is prescribed by Dr. Wilkins. Not sure if she will continue the Reclast.    Patient has been advised of split billing requirements and indicates understanding: Yes  Are you in the first 12 months of your Medicare coverage?  No    Healthy Habits:     In general, how would you rate your overall health?  Good    Frequency of exercise:  None    Duration of exercise:  Other    Do you usually eat at least 4 servings of fruit and vegetables a day, include whole grains    & fiber and avoid regularly eating high fat or \"junk\" foods?  Yes    Taking medications regularly:  Yes    Barriers to taking medications:  None    Medication side effects:  None    Ability to successfully perform activities of daily living:  No assistance needed    Home Safety:  Lack of grab bars in the bathroom    Hearing Impairment:  Difficulty following a conversation in a noisy restaurant or crowded room and difficulty understanding soft or whispered speech    In the past 6 months, have you been bothered by leaking of urine? Yes    In general, how would you rate your overall mental or emotional health?  Fair      PHQ-2 Total Score: 1    Additional concerns today:  Yes    Do you feel safe in your environment? Yes    Have you ever done Advance Care Planning? (For example, a Health Directive, POLST, or a discussion with a medical provider or " your loved ones about your wishes): No, advance care planning information given to patient to review.  Patient plans to discuss their wishes with loved ones or provider.         Fall risk  Fallen 2 or more times in the past year?: No  Any fall with injury in the past year?: No    Cognitive Screening   1) Repeat 3 items (Leader, Season, Table)    2) Clock draw: NORMAL  3) 3 item recall: Recalls 3 objects  Results: 3 items recalled: COGNITIVE IMPAIRMENT LESS LIKELY    Mini-CogTM Copyright S Tala. Licensed by the author for use in [facility]; reprinted with permission (tiffani@[facility]). All rights reserved.      Do you have sleep apnea, excessive snoring or daytime drowsiness?: no    Reviewed and updated as needed this visit by clinical staff   Tobacco  Allergies  Meds              Reviewed and updated as needed this visit by Provider                 Social History     Tobacco Use     Smoking status: Former Smoker     Types: Cigarettes     Quit date: 2000     Years since quittin.5     Smokeless tobacco: Never Used     Tobacco comment: More of a social smoker   Substance Use Topics     Alcohol use: Yes     Alcohol/week: 0.0 standard drinks     Comment: 0-3 a day. mixed drinks, wine     If you drink alcohol do you typically have >3 drinks per day or >7 drinks per week? No        Current providers sharing in care for this patient include:   Patient Care Team:  Vernell Sanders PA-C as PCP - General (Internal Medicine)  Vernell Sanders PA-C as Assigned PCP    The following health maintenance items are reviewed in Epic and correct as of today:  Health Maintenance Due   Topic Date Due     ZOSTER IMMUNIZATION (2 of 3) 2012       Past Medical History:   Diagnosis Date     Arthritis      Cancer (H) 2017    lymphoma     Cholelithiasis      Colon polyps  and     no polyps      Constipation      Diffuse large B cell lymphoma (H) 2017    tongue, followed [facility]. Wilkins [facility] and [facility]     Diverticulitis 2010     DVT (deep venous thrombosis) (H)     x3 (one due to OCs, 2 post op)     Genital herpes      GERD (gastroesophageal reflux disease)      Gout      History of depression      History of migraine      HTN (hypertension), benign      Hypothyroidism     hashimoto's thyroiditis     Lichen sclerosus et atrophicus      Osteopenia      Ovarian cyst 2011     Rectocele      Schatzki's ring 2009    EGD      Tibia/fibula fracture 2009     Vasovagal syncope 1966     Vertigo 2000     Past Surgical History:   Procedure Laterality Date     APPENDECTOMY  1954     ARTHROSCOPY KNEE  1983     BONE MARROW BIOPSY, BONE SPECIMEN, NEEDLE/TROCAR N/A 7/31/2017    Procedure: BIOPSY BONE MARROW;  UNILATERAL BONE MARROW BIPOSY  ;  Surgeon: Jamil Mccarthy MD;  Location:  GI     BREAST BIOPSY, CORE RT/LT  1990     EXCISE GARNETT'S NEUROMA FOOT       HYSTERECTOMY, PAP NO LONGER INDICATED  1984    with bladder repair     LAPAROSCOPIC OOPHORECTOMY  2011    with the colon resection     LARYNGOSCOPY WITH BIOPSY(IES) N/A 7/24/2017    Procedure: LARYNGOSCOPY WITH BIOPSY(IES);  DIRECT LARYNGOSCOPY WITH TONGUE BIOPSIES;  Surgeon: Vernell Yang MD;  Location:  [facility]     OPEN REDUCTION INTERNAL FIXATION TIBIA  2009     TONSILLECTOMY & ADENOIDECTOMY  1958     Z LAP,SURG,COLECTOMY, PARTIAL, W/ANAST  2011    due to recurrent diverticulitis     Current Outpatient Medications   Medication Sig Dispense Refill     acetaminophen (TYLENOL) 325 MG tablet Take 2 tablets (650 mg) by mouth every 4 hours as needed for mild pain or other (and adjunct with moderate or severe pain or per patient request)       ACETAMINOPHEN PO Take 325-650 mg by mouth as needed for pain       allopurinol (ZYLOPRIM) 100 MG tablet TAKE ONE TABLET BY MOUTH EVERY DAY 90 tablet 0     apixaban ANTICOAGULANT (ELIQUIS ANTICOAGULANT) 2.5 MG tablet 2 times daily        cetirizine (ZYRTEC) 10 MG tablet TAKE ONE TABLET BY MOUTH ONE TIME DAILY IN  "THE P.M. 90 tablet 3     Cholecalciferol (VITAMIN D3) 2000 units CAPS Take 2,000 Units by mouth daily 90 capsule 3     conjugated estrogens (PREMARIN) cream Place 0.5 g vaginally twice a week (Patient taking differently: Place 0.5 g vaginally twice a week As needed) 30 g 12     hydrochlorothiazide (HYDRODIURIL) 12.5 MG tablet Take 1 tablet (12.5 mg) by mouth daily 90 tablet 2     levothyroxine (SYNTHROID/LEVOTHROID) 112 MCG tablet TAKE ONE TABLET BY MOUTH EVERY DAY 90 tablet 0     losartan (COZAAR) 100 MG tablet Take 1 tablet (100 mg) by mouth daily 90 tablet 2     pantoprazole (PROTONIX) 20 MG EC tablet Take 1 tablet (20 mg) by mouth daily 90 tablet 2     valACYclovir (VALTREX) 500 MG tablet TAKE 1 TABLET BY MOUTH TWICE A DAY 12 tablet 0     Zoledronic Acid (RECLAST IV) Once a year           Review of Systems  Constitutional, HEENT, cardiovascular, pulmonary, GI, , musculoskeletal, neuro, skin, endocrine and psych systems are negative, except as otherwise noted.    OBJECTIVE:   /83 (BP Location: Right arm, Patient Position: Sitting, Cuff Size: Adult Large)   Pulse 63   Temp 97.3  F (36.3  C) (Temporal)   Resp 18   Ht 1.631 m (5' 4.2\")   Wt 84.4 kg (186 lb)   SpO2 99%   BMI 31.73 kg/m   Estimated body mass index is 31.73 kg/m  as calculated from the following:    Height as of this encounter: 1.631 m (5' 4.2\").    Weight as of this encounter: 84.4 kg (186 lb).  Physical Exam  GENERAL APPEARANCE: healthy, alert and no distress  EYES: Eyes grossly normal to inspection, PERRL and conjunctivae and sclerae normal  HENT: ear canals and TM's normal, nose and mouth without ulcers or lesions, oropharynx clear and oral mucous membranes moist  NECK: no adenopathy, no asymmetry, masses, or scars and thyroid normal to palpation  RESP: lungs clear to auscultation - no rales, rhonchi or wheezes  BREAST: normal without masses, tenderness or nipple discharge and no palpable axillary masses or adenopathy  CV: regular " "rate and rhythm, normal S1 S2, no S3 or S4, no murmur, click or rub, no peripheral edema and peripheral pulses strong  ABDOMEN: soft, nontender, no hepatosplenomegaly, no masses and bowel sounds normal  MS: no musculoskeletal defects are noted and gait is age appropriate without ataxia  SKIN: no suspicious lesions or rashes  NEURO: Normal strength and tone, sensory exam grossly normal, mentation intact and speech normal  PSYCH: mentation appears normal and affect normal/bright        ASSESSMENT / PLAN:   Assessment and Plan:     (Z00.00) Encounter for Medicare annual wellness exam  (primary encounter diagnosis)  Comment:   Plan: Mammogram due. Recd she check insurance RE: shingrex. Colonoscopy done last Sept so up to date. DEXA per onc.    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 Followed by Dr. Wilkins    (Z86.901) History of pulmonary embolism  Comment:   Plan: pt on eliquis    (K22.2) Schatzki's ring  Comment:   Plan: Pt to call GI and set up flex sig and EGD as recommended by gi following her ischemic colitis        Patient has been advised of split billing requirements and indicates understanding: Yes    COUNSELING:  Reviewed preventive health counseling, as reflected in patient instructions    Estimated body mass index is 31.73 kg/m  as calculated from the following:    Height as of this encounter: 1.631 m (5' 4.2\").    Weight as of this encounter: 84.4 kg (186 " lb).        She reports that she quit smoking about 21 years ago. Her smoking use included cigarettes. She has never used smokeless tobacco.      Appropriate preventive services were discussed with this patient, including applicable screening as appropriate for cardiovascular disease, diabetes, osteopenia/osteoporosis, and glaucoma.  As appropriate for age/gender, discussed screening for colorectal cancer, prostate cancer, breast cancer, and cervical cancer. Checklist reviewing preventive services available has been given to the patient.    Reviewed patients plan of care and provided an AVS. The Basic Care Plan (routine screening as documented in Health Maintenance) for Marysol meets the Care Plan requirement. This Care Plan has been established and reviewed with the Patient.    Counseling Resources:  ATP IV Guidelines  Pooled Cohorts Equation Calculator  Breast Cancer Risk Calculator  Breast Cancer: Medication to Reduce Risk  FRAX Risk Assessment  ICSI Preventive Guidelines  Dietary Guidelines for Americans, 2010  USDA's MyPlate  ASA Prophylaxis  Lung CA Screening    [facility]    Identified Health Risks:

## 2022-03-01 NOTE — PATIENT INSTRUCTIONS
Call and schedule your flex sig and EGD with [facility] [phone]    Schedule your mammogram [phone]    Shingrex is the shingles vaccine.

## 2022-03-02 LAB — TSH SERPL DL<=0.005 MIU/L-ACNC: 1.79 MU/L (ref 0.4–4)

## 2022-04-14 ENCOUNTER — OFFICE VISIT (OUTPATIENT)
Dept: FAMILY MEDICINE | Facility: CLINIC | Age: 72
End: 2022-04-14
Payer: MEDICARE

## 2022-04-14 VITALS
OXYGEN SATURATION: 98 % | WEIGHT: 186 LBS | TEMPERATURE: 96.8 F | HEIGHT: 64 IN | BODY MASS INDEX: 31.76 KG/M2 | RESPIRATION RATE: 20 BRPM | HEART RATE: 71 BPM | SYSTOLIC BLOOD PRESSURE: 123 MMHG | DIASTOLIC BLOOD PRESSURE: 84 MMHG

## 2022-04-14 DIAGNOSIS — F32.A MILD DEPRESSION: Primary | ICD-10-CM

## 2022-04-14 PROCEDURE — 99213 OFFICE O/P EST LOW 20 MIN: CPT | Performed by: PHYSICIAN ASSISTANT

## 2022-04-14 RX ORDER — BUPROPION HYDROCHLORIDE 150 MG/1
150 TABLET ORAL EVERY MORNING
Qty: 30 TABLET | Refills: 1 | Status: SHIPPED | OUTPATIENT
Start: 2022-04-14 | End: 2022-06-17

## 2022-04-14 ASSESSMENT — PAIN SCALES - GENERAL: PAINLEVEL: NO PAIN (0)

## 2022-04-14 ASSESSMENT — PATIENT HEALTH QUESTIONNAIRE - PHQ9: SUM OF ALL RESPONSES TO PHQ QUESTIONS 1-9: 10

## 2022-04-14 NOTE — PROGRESS NOTES
HPI: Zuri is here for f/u depression  She started cymbalta a month ago but is very tired with this medication and sleeping 14 hours per day  She thinks this may have helped her mood a little and felt a bit more organized.  She has tired nortriptyline and prozac many years ago but gained wt from nortriptyline and doesn't think prozac worked well for her.    Past Medical History:   Diagnosis Date     Arthritis      Cancer (H) 2017    lymphoma     Cholelithiasis      Colon polyps 2001 and 2006    no polyps 2011     Constipation      Diffuse large B cell lymphoma (H) 2017    tongue, followed by Dr. Wilkins at [facility] and [facility]     Diverticulitis 2010     DVT (deep venous thrombosis) (H)     x3 (one due to OCs, 2 post op)     Genital herpes      GERD (gastroesophageal reflux disease)      Gout      History of depression      History of migraine      HTN (hypertension), benign      Hypothyroidism     hashimoto's thyroiditis     Lichen sclerosus et atrophicus      Osteopenia      Ovarian cyst 2011     Rectocele      Schatzki's ring 2009    EGD      Tibia/fibula fracture 2009     Vasovagal syncope 1966     Vertigo 2000     Past Surgical History:   Procedure Laterality Date     APPENDECTOMY  1954     ARTHROSCOPY KNEE  1983     BONE MARROW BIOPSY, BONE SPECIMEN, NEEDLE/TROCAR N/A 7/31/2017    Procedure: BIOPSY BONE MARROW;  UNILATERAL BONE MARROW BIPOSY  ;  Surgeon: Jamil Mccarthy MD;  Location:  GI     BREAST BIOPSY, CORE RT/LT  1990     EXCISE GARNETT'S NEUROMA FOOT       HYSTERECTOMY, PAP NO LONGER INDICATED  1984    with bladder repair     LAPAROSCOPIC OOPHORECTOMY  2011    with the colon resection     LARYNGOSCOPY WITH BIOPSY(IES) N/A 7/24/2017    Procedure: LARYNGOSCOPY WITH BIOPSY(IES);  DIRECT LARYNGOSCOPY WITH TONGUE BIOPSIES;  Surgeon: Vernell Yang MD;  Location: [facility]     OPEN REDUCTION INTERNAL FIXATION TIBIA  2009     TONSILLECTOMY & ADENOIDECTOMY  1958     [facility] LAP,SURG,COLECTOMY, PARTIAL,  W/ANAST      due to recurrent diverticulitis     Social History     Tobacco Use     Smoking status: Former Smoker     Types: Cigarettes     Quit date: 2000     Years since quittin.7     Smokeless tobacco: Never Used     Tobacco comment: More of a social smoker   Substance Use Topics     Alcohol use: Yes     Alcohol/week: 0.0 standard drinks     Comment: 0-3 a day. mixed drinks, wine     Current Outpatient Medications   Medication Sig Dispense Refill     acetaminophen (TYLENOL) 325 MG tablet Take 2 tablets (650 mg) by mouth every 4 hours as needed for mild pain or other (and adjunct with moderate or severe pain or per patient request)       allopurinol (ZYLOPRIM) 100 MG tablet TAKE ONE TABLET BY MOUTH EVERY DAY 90 tablet 0     apixaban ANTICOAGULANT (ELIQUIS) 2.5 MG tablet 2 times daily        buPROPion (WELLBUTRIN XL) 150 MG 24 hr tablet Take 1 tablet (150 mg) by mouth every morning 30 tablet 1     cetirizine (ZYRTEC) 10 MG tablet TAKE ONE TABLET BY MOUTH ONE TIME DAILY IN THE P.M. 90 tablet 3     Cholecalciferol (VITAMIN D3) 2000 units CAPS Take 2,000 Units by mouth daily 90 capsule 3     conjugated estrogens (PREMARIN) cream Place 0.5 g vaginally twice a week (Patient taking differently: Place 0.5 g vaginally twice a week As needed) 30 g 12     hydrochlorothiazide (HYDRODIURIL) 12.5 MG tablet Take 1 tablet (12.5 mg) by mouth daily 90 tablet 2     levothyroxine (SYNTHROID/LEVOTHROID) 112 MCG tablet Take 1 tablet (112 mcg) by mouth daily 90 tablet 3     losartan (COZAAR) 100 MG tablet Take 1 tablet (100 mg) by mouth daily 90 tablet 3     pantoprazole (PROTONIX) 20 MG EC tablet Take 1 tablet (20 mg) by mouth daily 90 tablet 2     Zoledronic Acid (RECLAST IV) Once a year       Allergies   Allergen Reactions     Chloraprep One Step Hives     Codeine GI Disturbance     Evista [Raloxifene Hydrochloride] Other (See Comments)     Esophagus irritation      Fosamax Other (See Comments)     Esophagus irritation   "    Vicodin [Hydrocodone-Acetaminophen] GI Disturbance     Can Take oxycodone     Pen Vk [Penicillin V] Rash       PHYSICAL EXAM:    /84 (BP Location: Left arm, Patient Position: Sitting, Cuff Size: Adult Large)   Pulse 71   Temp 96.8  F (36  C) (Temporal)   Resp 20   Ht 1.631 m (5' 4.2\")   Wt 84.4 kg (186 lb)   SpO2 98%   BMI 31.73 kg/m      Patient appears non toxic  Psych: normal affect and mood    Assessment and Plan:     (F32.0) Mild depression (H)  (primary encounter diagnosis)  Comment:   Plan: buPROPion (WELLBUTRIN XL) 150 MG 24 hr tablet          Patient Instructions   Taper off of the cymbalta by decreasing the dose to 30mg (one per day) for a week, then every other day for a week and then stop.     Start wellbutrin XL 150mg each MORNING.  Follow up in one month so we can see how this is working.              Vernell Sanders PA-C          "

## 2022-04-14 NOTE — PATIENT INSTRUCTIONS
Taper off of the cymbalta by decreasing the dose to 30mg (one per day) for a week, then every other day for a week and then stop.     Start wellbutrin XL 150mg each MORNING.  Follow up in one month so we can see how this is working.

## 2022-04-29 ENCOUNTER — TELEPHONE (OUTPATIENT)
Dept: FAMILY MEDICINE | Facility: CLINIC | Age: 72
End: 2022-04-29
Payer: MEDICARE

## 2022-04-29 NOTE — TELEPHONE ENCOUNTER
Marion from [facility] called requesting approval on 3 days hold for Eliquis prior to upper endoscopy and flex sigmoidoscopy 05/24/2022.       RN would need a call back with providers response at [phone] ok to leave a message.

## 2022-05-02 NOTE — TELEPHONE ENCOUNTER
Gave hold orders to JACKELIN Ingram at [facility].   Rosi Jimenez RN

## 2022-05-16 DIAGNOSIS — Z87.39 HISTORY OF GOUT: ICD-10-CM

## 2022-05-18 RX ORDER — ALLOPURINOL 100 MG/1
TABLET ORAL
Qty: 90 TABLET | Refills: 0 | Status: SHIPPED | OUTPATIENT
Start: 2022-05-18 | End: 2022-06-23

## 2022-05-18 NOTE — TELEPHONE ENCOUNTER
Routing refill request to provider for review/approval because:  Labs not current:  no labs since 2020  Calvin FARAH RN, BSN

## 2022-05-24 ENCOUNTER — TRANSFERRED RECORDS (OUTPATIENT)
Dept: HEALTH INFORMATION MANAGEMENT | Facility: CLINIC | Age: 72
End: 2022-05-24
Payer: MEDICARE

## 2022-06-16 DIAGNOSIS — F32.A MILD DEPRESSION: ICD-10-CM

## 2022-06-17 RX ORDER — BUPROPION HYDROCHLORIDE 150 MG/1
TABLET ORAL
Qty: 30 TABLET | Refills: 0 | Status: SHIPPED | OUTPATIENT
Start: 2022-06-17 | End: 2022-06-21 | Stop reason: DRUGHIGH

## 2022-06-17 NOTE — TELEPHONE ENCOUNTER
Routing refill request to provider for review/approval because:  PHQ-9 score:    PHQ 4/14/2022   PHQ-9 Total Score 10   Q9: Thoughts of better off dead/self-harm past 2 weeks Not at all                 Ernesto Donovan RN  [facility] Triage Nurse

## 2022-06-21 ENCOUNTER — VIRTUAL VISIT (OUTPATIENT)
Dept: FAMILY MEDICINE | Facility: CLINIC | Age: 72
End: 2022-06-21
Payer: MEDICARE

## 2022-06-21 DIAGNOSIS — F32.A MILD DEPRESSION: ICD-10-CM

## 2022-06-21 DIAGNOSIS — I10 HTN (HYPERTENSION), BENIGN: ICD-10-CM

## 2022-06-21 PROCEDURE — 99442 PR PHYSICIAN TELEPHONE EVALUATION 11-20 MIN: CPT | Mod: 95 | Performed by: PHYSICIAN ASSISTANT

## 2022-06-21 RX ORDER — HYDROCHLOROTHIAZIDE 12.5 MG/1
12.5 TABLET ORAL DAILY
Qty: 90 TABLET | Refills: 3 | Status: SHIPPED | OUTPATIENT
Start: 2022-06-21 | End: 2023-08-21

## 2022-06-21 RX ORDER — BUPROPION HYDROCHLORIDE 300 MG/1
300 TABLET ORAL EVERY MORNING
Qty: 90 TABLET | Refills: 1 | Status: SHIPPED | OUTPATIENT
Start: 2022-06-21 | End: 2023-01-17

## 2022-06-21 RX ORDER — BUPROPION HYDROCHLORIDE 150 MG/1
150 TABLET ORAL EVERY MORNING
Qty: 30 TABLET | Refills: 0 | Status: CANCELLED | OUTPATIENT
Start: 2022-06-21

## 2022-06-21 ASSESSMENT — PATIENT HEALTH QUESTIONNAIRE - PHQ9: SUM OF ALL RESPONSES TO PHQ QUESTIONS 1-9: 6

## 2022-06-21 NOTE — PROGRESS NOTES
Zuri is a 71 year old who is being evaluated via a billable telephone visit.      What phone number would you like to be contacted at? [phone]  How would you like to obtain your AVS? Ortiz De Santiago   Zuri is a 71 year old, presenting for the following health issues: depression  No chief complaint on file.      HPI       Chief Complaint   Patient presents with     Follow Up     We started her on Wellbutrin XL 150mg in April and she thinks that is helping  She is no longer needing to sleep all of the time.  She is feeling a bit more motivated  No SE from the wellbutrin  She is open to increasing the dose.  She is busy with caring for her 92 yo mother so also has caregiver stress  She enjoys spending some time with her dtr Lucía.    TSH   Date Value Ref Range Status   03/01/2022 1.79 0.40 - 4.00 mU/L Final   11/24/2020 1.12 0.40 - 4.00 mU/L Final         Review of Systems         Objective           Vitals:  No vitals were obtained today due to virtual visit.    Physical Exam   healthy, alert and no distress  PSYCH: Alert and oriented times 3; coherent speech, normal   rate and volume, able to articulate logical thoughts, able   to abstract reason, no tangential thoughts, no hallucinations   or delusions  Her affect is normal  RESP: No cough, no audible wheezing, able to talk in full sentences  Remainder of exam unable to be completed due to telephone visits      Assessment and Plan:     (F32.0) Mild depression (H)  Comment: increased dose of wellbutrin from 150mg to 300mg  Plan: buPROPion (WELLBUTRIN XL) 300 MG 24 hr tablet            (I10) HTN (hypertension), benign  Comment: well controlled  Plan: hydrochlorothiazide (HYDRODIURIL) 12.5 MG         Tablet  She plans to transfer care to [facility] closer to home.              Vernell Sanders PA-C              Phone call duration: 12 minutes    .

## 2022-06-23 ENCOUNTER — MYC REFILL (OUTPATIENT)
Dept: FAMILY MEDICINE | Facility: CLINIC | Age: 72
End: 2022-06-23

## 2022-06-23 DIAGNOSIS — Z87.39 HISTORY OF GOUT: ICD-10-CM

## 2022-06-24 RX ORDER — ALLOPURINOL 100 MG/1
100 TABLET ORAL DAILY
Qty: 90 TABLET | Refills: 3 | Status: SHIPPED | OUTPATIENT
Start: 2022-06-24 | End: 2023-10-02

## 2022-06-24 NOTE — TELEPHONE ENCOUNTER
Routing refill request to provider for review/approval because:  last visit 6/21/22 - virtual         Laure RDZ, Triage RN  [facility]

## 2022-07-31 DIAGNOSIS — K21.9 GASTROESOPHAGEAL REFLUX DISEASE WITHOUT ESOPHAGITIS: ICD-10-CM

## 2022-08-03 RX ORDER — PANTOPRAZOLE SODIUM 20 MG/1
20 TABLET, DELAYED RELEASE ORAL DAILY
Qty: 90 TABLET | Refills: 2 | Status: SHIPPED | OUTPATIENT
Start: 2022-08-03 | End: 2023-06-05

## 2022-08-03 NOTE — TELEPHONE ENCOUNTER
Prescription approved per [facility] Refill Protocol.  Last OV: 6/21/22  Next OV: none    Phi Guadarrama RN  [facility]

## 2022-09-16 ENCOUNTER — TRANSFERRED RECORDS (OUTPATIENT)
Dept: HEALTH INFORMATION MANAGEMENT | Facility: CLINIC | Age: 72
End: 2022-09-16

## 2022-09-16 LAB
CREATININE (EXTERNAL): 0.85 MG/DL (ref 0.57–1)
GFR ESTIMATED (EXTERNAL): 73 ML/MIN/1.73
GLUCOSE (EXTERNAL): 94 MG/DL (ref 65–99)
POTASSIUM (EXTERNAL): 3.9 MMOL/L (ref 3.5–5.2)

## 2022-10-07 ENCOUNTER — TRANSFERRED RECORDS (OUTPATIENT)
Dept: HEALTH INFORMATION MANAGEMENT | Facility: CLINIC | Age: 72
End: 2022-10-07

## 2023-01-17 DIAGNOSIS — F32.A MILD DEPRESSION: ICD-10-CM

## 2023-01-17 RX ORDER — BUPROPION HYDROCHLORIDE 300 MG/1
TABLET ORAL
Qty: 90 TABLET | Refills: 0 | Status: SHIPPED | OUTPATIENT
Start: 2023-01-17 | End: 2023-05-18

## 2023-02-14 DIAGNOSIS — A60.04 HERPES SIMPLEX VULVOVAGINITIS: ICD-10-CM

## 2023-02-14 RX ORDER — VALACYCLOVIR HYDROCHLORIDE 500 MG/1
TABLET, FILM COATED ORAL
Qty: 12 TABLET | Refills: 0 | Status: SHIPPED | OUTPATIENT
Start: 2023-02-14 | End: 2024-01-08

## 2023-03-07 DIAGNOSIS — E03.9 HYPOTHYROIDISM, UNSPECIFIED TYPE: ICD-10-CM

## 2023-03-07 RX ORDER — LEVOTHYROXINE SODIUM 112 UG/1
112 TABLET ORAL DAILY
Qty: 90 TABLET | Refills: 0 | Status: SHIPPED | OUTPATIENT
Start: 2023-03-07 | End: 2023-06-21

## 2023-04-20 ENCOUNTER — OFFICE VISIT (OUTPATIENT)
Dept: FAMILY MEDICINE | Facility: CLINIC | Age: 73
End: 2023-04-20
Payer: MEDICARE

## 2023-04-20 VITALS
TEMPERATURE: 96.8 F | OXYGEN SATURATION: 98 % | DIASTOLIC BLOOD PRESSURE: 65 MMHG | RESPIRATION RATE: 20 BRPM | HEART RATE: 77 BPM | SYSTOLIC BLOOD PRESSURE: 125 MMHG | HEIGHT: 64 IN | BODY MASS INDEX: 32.06 KG/M2 | WEIGHT: 187.8 LBS

## 2023-04-20 DIAGNOSIS — E03.9 HYPOTHYROIDISM, UNSPECIFIED TYPE: ICD-10-CM

## 2023-04-20 DIAGNOSIS — Z12.31 VISIT FOR SCREENING MAMMOGRAM: ICD-10-CM

## 2023-04-20 DIAGNOSIS — F33.9 EPISODE OF RECURRENT MAJOR DEPRESSIVE DISORDER, UNSPECIFIED DEPRESSION EPISODE SEVERITY (H): ICD-10-CM

## 2023-04-20 DIAGNOSIS — C83.31 DIFFUSE LARGE B-CELL LYMPHOMA OF LYMPH NODES OF HEAD (H): ICD-10-CM

## 2023-04-20 DIAGNOSIS — I82.499 DEEP VEIN THROMBOSIS (DVT) OF OTHER VEIN OF LOWER EXTREMITY, UNSPECIFIED CHRONICITY, UNSPECIFIED LATERALITY (H): ICD-10-CM

## 2023-04-20 DIAGNOSIS — F32.A MILD DEPRESSION: ICD-10-CM

## 2023-04-20 DIAGNOSIS — Z00.00 ENCOUNTER FOR MEDICARE ANNUAL WELLNESS EXAM: Primary | ICD-10-CM

## 2023-04-20 LAB
BASOPHILS # BLD AUTO: 0.1 10E3/UL (ref 0–0.2)
BASOPHILS NFR BLD AUTO: 1 %
EOSINOPHIL # BLD AUTO: 0.3 10E3/UL (ref 0–0.7)
EOSINOPHIL NFR BLD AUTO: 3 %
ERYTHROCYTE [DISTWIDTH] IN BLOOD BY AUTOMATED COUNT: 12.1 % (ref 10–15)
HCT VFR BLD AUTO: 43.1 % (ref 35–47)
HGB BLD-MCNC: 14.8 G/DL (ref 11.7–15.7)
IMM GRANULOCYTES # BLD: 0 10E3/UL
IMM GRANULOCYTES NFR BLD: 0 %
LYMPHOCYTES # BLD AUTO: 2.1 10E3/UL (ref 0.8–5.3)
LYMPHOCYTES NFR BLD AUTO: 28 %
MCH RBC QN AUTO: 32.2 PG (ref 26.5–33)
MCHC RBC AUTO-ENTMCNC: 34.3 G/DL (ref 31.5–36.5)
MCV RBC AUTO: 94 FL (ref 78–100)
MONOCYTES # BLD AUTO: 0.7 10E3/UL (ref 0–1.3)
MONOCYTES NFR BLD AUTO: 9 %
NEUTROPHILS # BLD AUTO: 4.5 10E3/UL (ref 1.6–8.3)
NEUTROPHILS NFR BLD AUTO: 59 %
PLATELET # BLD AUTO: 251 10E3/UL (ref 150–450)
RBC # BLD AUTO: 4.59 10E6/UL (ref 3.8–5.2)
WBC # BLD AUTO: 7.5 10E3/UL (ref 4–11)

## 2023-04-20 PROCEDURE — G0439 PPPS, SUBSEQ VISIT: HCPCS | Performed by: PHYSICIAN ASSISTANT

## 2023-04-20 PROCEDURE — 80061 LIPID PANEL: CPT | Performed by: PHYSICIAN ASSISTANT

## 2023-04-20 PROCEDURE — 2894A VOIDCORRECT: CPT | Performed by: PHYSICIAN ASSISTANT

## 2023-04-20 PROCEDURE — 85025 COMPLETE CBC W/AUTO DIFF WBC: CPT | Performed by: PHYSICIAN ASSISTANT

## 2023-04-20 PROCEDURE — 36415 COLL VENOUS BLD VENIPUNCTURE: CPT | Performed by: PHYSICIAN ASSISTANT

## 2023-04-20 PROCEDURE — 99214 OFFICE O/P EST MOD 30 MIN: CPT | Mod: 25 | Performed by: PHYSICIAN ASSISTANT

## 2023-04-20 PROCEDURE — 84443 ASSAY THYROID STIM HORMONE: CPT | Performed by: PHYSICIAN ASSISTANT

## 2023-04-20 PROCEDURE — 80053 COMPREHEN METABOLIC PANEL: CPT | Performed by: PHYSICIAN ASSISTANT

## 2023-04-20 RX ORDER — BUPROPION HYDROCHLORIDE 150 MG/1
150 TABLET, EXTENDED RELEASE ORAL 2 TIMES DAILY
Qty: 60 TABLET | Refills: 2 | Status: SHIPPED | OUTPATIENT
Start: 2023-04-20 | End: 2023-05-18

## 2023-04-20 RX ORDER — ESCITALOPRAM OXALATE 10 MG/1
10 TABLET ORAL DAILY
Qty: 30 TABLET | Refills: 2 | Status: CANCELLED | OUTPATIENT
Start: 2023-04-20

## 2023-04-20 RX ORDER — ESCITALOPRAM OXALATE 10 MG/1
10 TABLET ORAL DAILY
Qty: 30 TABLET | Refills: 2 | Status: SHIPPED | OUTPATIENT
Start: 2023-04-20 | End: 2023-05-18

## 2023-04-20 ASSESSMENT — ENCOUNTER SYMPTOMS
PALPITATIONS: 0
COUGH: 0
PARESTHESIAS: 0
ARTHRALGIAS: 1
EYE PAIN: 0
HEADACHES: 0
CONSTIPATION: 1
HEMATOCHEZIA: 0
DIZZINESS: 0
FREQUENCY: 0
HEMATURIA: 0
HEARTBURN: 0
BREAST MASS: 0
MYALGIAS: 0
NAUSEA: 0
ABDOMINAL PAIN: 0
DYSURIA: 0
WEAKNESS: 0
NERVOUS/ANXIOUS: 0
CHILLS: 0
SHORTNESS OF BREATH: 0
DIARRHEA: 0
SORE THROAT: 0
JOINT SWELLING: 0
FEVER: 0

## 2023-04-20 ASSESSMENT — PATIENT HEALTH QUESTIONNAIRE - PHQ9
SUM OF ALL RESPONSES TO PHQ QUESTIONS 1-9: 15
10. IF YOU CHECKED OFF ANY PROBLEMS, HOW DIFFICULT HAVE THESE PROBLEMS MADE IT FOR YOU TO DO YOUR WORK, TAKE CARE OF THINGS AT HOME, OR GET ALONG WITH OTHER PEOPLE: VERY DIFFICULT
SUM OF ALL RESPONSES TO PHQ QUESTIONS 1-9: 15

## 2023-04-20 ASSESSMENT — PAIN SCALES - GENERAL: PAINLEVEL: NO PAIN (0)

## 2023-04-20 ASSESSMENT — ACTIVITIES OF DAILY LIVING (ADL): CURRENT_FUNCTION: NO ASSISTANCE NEEDED

## 2023-04-20 NOTE — PROGRESS NOTES
"SUBJECTIVE:   Zuri is a 72 year old who presents for Preventive Visit.    Here today to establish care and for a annual wellness visit. Previous patient of Vernell Sanders PA-C.     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  Unfortunately lost her  over the past month.  This is causing her some distress.  Wants to stay in bed all day.  Worsening of her depression.  Also, primary caregiver for her mother who is in the assisted living facility in [place].  Her 2 daughters live close by.    -Needs to switch to Wellbutrin twice daily due to better coverage for her insurance.    -History of DVT/PE on apixaban 2.5 mg twice daily.  Too expensive, to discuss switching to Xarelto which she is willing to try.  Discussed differences.     -Due for mammogram    Patient has been advised of split billing requirements and indicates understanding: Yes  Are you in the first 12 months of your Medicare coverage?  No    Healthy Habits:     In general, how would you rate your overall health?  Good    Frequency of exercise:  None    Do you usually eat at least 4 servings of fruit and vegetables a day, include whole grains    & fiber and avoid regularly eating high fat or \"junk\" foods?  No    Taking medications regularly:  Yes    Medication side effects:  None    Ability to successfully perform activities of daily living:  No assistance needed    Home Safety:  No safety concerns identified    Hearing Impairment:  Difficulty understanding soft or whispered speech    In the past 6 months, have you been bothered by leaking of urine? Yes    In general, how would you rate your overall mental or emotional health?  Poor      PHQ-2 Total Score: 6    Additional concerns today:  Yes      Have you ever done Advance Care Planning? (For example, a Health Directive, POLST, or a discussion with a medical provider or your loved ones about your wishes): No, advance care planning information given to patient to review.  Patient plans to discuss their " wishes with loved ones or provider.  Daughters Aware       Fall risk  Fallen 2 or more times in the past year?: No  Any fall with injury in the past year?: No    Cognitive Screening   1) Repeat 3 items (Leader, Season, Table)    2) Clock draw: NORMAL  3) 3 item recall: Recalls 3 objects  Results: 3 items recalled: COGNITIVE IMPAIRMENT LESS LIKELY    Mini-CogTM Copyright GERSON Edgar. Licensed by the author for use in [facility]; reprinted with permission (tiffani@[facility]). All rights reserved.      Do you have sleep apnea, excessive snoring or daytime drowsiness?: no    Reviewed and updated as needed this visit by clinical staff   Tobacco  Allergies  Meds   Med Hx  Surg Hx  Fam Hx          Reviewed and updated as needed this visit by Provider   Tobacco  Allergies  Meds   Med Hx  Surg Hx  Fam Hx         Social History     Tobacco Use     Smoking status: Former     Types: Cigarettes     Quit date: 2000     Years since quittin.7     Smokeless tobacco: Never     Tobacco comments:     More of a social smoker   Vaping Use     Vaping status: Never Used   Substance Use Topics     Alcohol use: Yes     Alcohol/week: 0.0 standard drinks of alcohol     Comment: 0-3 a day. mixed drinks, wine         Current providers sharing in care for this patient include:   Patient Care Team:  Vernell Sanders PA-C as PCP - General (Internal Medicine)    The following health maintenance items are reviewed in Epic and correct as of today:  Health Maintenance   Topic Date Due     ZOSTER IMMUNIZATION (1 of 2) 2012     MAMMO SCREENING  2023     MEDICARE ANNUAL WELLNESS VISIT  2023     TSH W/FREE T4 REFLEX  2023     PHQ-9  10/20/2023     ANNUAL REVIEW OF HM ORDERS  2024     FALL RISK ASSESSMENT  2024     COLORECTAL CANCER SCREENING  2026     LIPID  2027     ADVANCE CARE PLANNING  2028     DTAP/TDAP/TD IMMUNIZATION (4 - Td or Tdap) 2028     DEXA  2036      HEPATITIS C SCREENING  Completed     DEPRESSION ACTION PLAN  Completed     INFLUENZA VACCINE  Completed     Pneumococcal Vaccine: 65+ Years  Completed     COVID-19 Vaccine  Completed     IPV IMMUNIZATION  Aged Out     MENINGITIS IMMUNIZATION  Aged Out     LUNG CANCER SCREENING  Discontinued     Lab work is in process  Labs reviewed in EPIC  BP Readings from Last 3 Encounters:   04/20/23 125/65   04/14/22 123/84   03/01/22 125/83    Wt Readings from Last 3 Encounters:   04/20/23 85.2 kg (187 lb 12.8 oz)   04/14/22 84.4 kg (186 lb)   03/01/22 84.4 kg (186 lb)               Patient Active Problem List   Diagnosis     Constipation     HTN (hypertension), benign     Osteopenia     DVT (deep venous thrombosis) (H)     Cholelithiasis     Diverticulitis     Gout     Colon polyps     Schatzki's ring     Ovarian cyst     HCD (health care directive)     GERD (gastroesophageal reflux disease)     CARDIOVASCULAR SCREENING; LDL GOAL LESS THAN 130     Lichen sclerosus of female genitalia     Chronic gout without tophus, unspecified cause, unspecified site     Hypothyroidism, unspecified type     Diffuse large B-cell lymphoma of lymph nodes of head (H)     Genital herpes     Lichen sclerosus et atrophicus     Deep vein thrombosis (DVT) of other vein of lower extremity, unspecified chronicity, unspecified laterality (H)     Colitis     Gastrointestinal hemorrhage, unspecified gastrointestinal hemorrhage type     Past Surgical History:   Procedure Laterality Date     APPENDECTOMY  1954     ARTHROSCOPY KNEE  1983     BONE MARROW BIOPSY, BONE SPECIMEN, NEEDLE/TROCAR N/A 7/31/2017    Procedure: BIOPSY BONE MARROW;  UNILATERAL BONE MARROW BIPOSY  ;  Surgeon: Jamil Mccarthy MD;  Location: [facility]     BREAST BIOPSY, CORE RT/LT  1990     EXCISE GARNETT'S NEUROMA FOOT       HYSTERECTOMY, PAP NO LONGER INDICATED  1984    with bladder repair     LAPAROSCOPIC OOPHORECTOMY  2011    with the colon resection     LARYNGOSCOPY WITH  BIOPSY(IES) N/A 2017    Procedure: LARYNGOSCOPY WITH BIOPSY(IES);  DIRECT LARYNGOSCOPY WITH TONGUE BIOPSIES;  Surgeon: Vernell Yang MD;  Location: [facility]     OPEN REDUCTION INTERNAL FIXATION TIBIA       TONSILLECTOMY & ADENOIDECTOMY       [facility] LAP,SURG,COLECTOMY, PARTIAL, W/ANAST      due to recurrent diverticulitis       Social History     Tobacco Use     Smoking status: Former     Types: Cigarettes     Quit date: 2000     Years since quittin.7     Smokeless tobacco: Never     Tobacco comments:     More of a social smoker   Vaping Use     Vaping status: Never Used   Substance Use Topics     Alcohol use: Yes     Alcohol/week: 0.0 standard drinks of alcohol     Comment: 0-3 a day. mixed drinks, wine     Family History   Problem Relation Age of Onset     C.A.D. Mother         and PE     Cancer Father 65        gastric ca         Current Outpatient Medications   Medication Sig Dispense Refill     acetaminophen (TYLENOL) 325 MG tablet Take 2 tablets (650 mg) by mouth every 4 hours as needed for mild pain or other (and adjunct with moderate or severe pain or per patient request)       allopurinol (ZYLOPRIM) 100 MG tablet Take 1 tablet (100 mg) by mouth daily 90 tablet 3     apixaban ANTICOAGULANT (ELIQUIS) 2.5 MG tablet 2 times daily        buPROPion (WELLBUTRIN SR) 150 MG 12 hr tablet Take 1 tablet (150 mg) by mouth 2 times daily 60 tablet 2     buPROPion (WELLBUTRIN XL) 300 MG 24 hr tablet TAKE 1 TABLET BY MOUTH EVERY MORNING. 90 tablet 0     cetirizine (ZYRTEC) 10 MG tablet TAKE ONE TABLET BY MOUTH ONE TIME DAILY IN THE P.M. 90 tablet 3     conjugated estrogens (PREMARIN) cream Place 0.5 g vaginally twice a week (Patient taking differently: Place 0.5 g vaginally twice a week As needed) 30 g 12     escitalopram (LEXAPRO) 10 MG tablet Take 1 tablet (10 mg) by mouth daily 30 tablet 2     hydrochlorothiazide (HYDRODIURIL) 12.5 MG tablet Take 1 tablet (12.5 mg) by mouth daily 90 tablet 3      "levothyroxine (SYNTHROID/LEVOTHROID) 112 MCG tablet Take 1 tablet (112 mcg) by mouth daily 90 tablet 0     losartan (COZAAR) 100 MG tablet Take 1 tablet (100 mg) by mouth daily 90 tablet 3     pantoprazole (PROTONIX) 20 MG EC tablet Take 1 tablet (20 mg) by mouth daily 90 tablet 2     rivaroxaban ANTICOAGULANT (XARELTO) 10 MG TABS tablet Take 1 tablet (10 mg) by mouth daily (with dinner) 30 tablet 2     valACYclovir (VALTREX) 500 MG tablet TAKE 1 TABLET BY MOUTH TWICE A DAY 12 tablet 0     Zoledronic Acid (RECLAST IV) Once a year       Allergies   Allergen Reactions     Chlorhexidine Hives     Chloraprep One Step Hives     Codeine GI Disturbance     Evista [Raloxifene Hydrochloride] Other (See Comments)     Esophagus irritation      Fosamax Other (See Comments)     Esophagus irritation      Vicodin [Hydrocodone-Acetaminophen] GI Disturbance     Can Take oxycodone     Pen Vk [Penicillin V] Rash     Recent Labs   Lab Test 03/01/22  1410 01/30/22  0538 01/29/22  0538 01/28/22  0451 04/12/21  1406 11/24/20  1442 07/28/20  0000 07/01/20  1046 09/17/19  1223   LDL  --  103*  --   --   --  135*  --   --  122*   HDL  --  56  --   --   --  55  --   --  58   TRIG  --  109  --   --   --  181*  --   --  179*   ALT  --   --   --   --   --  34  --  23 14   CR  --  0.82 0.75   < > 0.90 1.12*   < > 0.91 0.87   GFRESTIMATED  --  76 84   < > 64 49*   < > 64 68   GFRESTBLACK  --   --   --   --  75 57*   < > 74 79   POTASSIUM  --  3.8 3.8   < > 4.0 3.8   < > 3.7 4.1   TSH 1.79  --   --   --   --  1.12  --  1.78 0.88    < > = values in this interval not displayed.          Review of Systems  Constitutional, HEENT, cardiovascular, pulmonary, GI, , musculoskeletal, neuro, skin, endocrine and psych systems are negative, except as otherwise noted.    OBJECTIVE:   /65   Pulse 77   Temp 96.8  F (36  C) (Oral)   Resp 20   Ht 1.631 m (5' 4.2\")   Wt 85.2 kg (187 lb 12.8 oz)   SpO2 98%   BMI 32.04 kg/m   Estimated body mass index " "is 32.04 kg/m  as calculated from the following:    Height as of this encounter: 1.631 m (5' 4.2\").    Weight as of this encounter: 85.2 kg (187 lb 12.8 oz).  Physical Exam  GENERAL: healthy, alert and no distress  EYES: Eyes grossly normal to inspection, PERRL and conjunctivae and sclerae normal  HENT: ear canals and TM's normal, nose and mouth without ulcers or lesions  NECK: no adenopathy, no asymmetry, masses, or scars and thyroid normal to palpation  RESP: lungs clear to auscultation - no rales, rhonchi or wheezes  CV: regular rate and rhythm, normal S1 S2, no S3 or S4, no murmur, click or rub, no peripheral edema and peripheral pulses strong  ABDOMEN: soft, nontender, no hepatosplenomegaly, no masses and bowel sounds normal  MS: no gross musculoskeletal defects noted, no edema  SKIN: no suspicious lesions or rashes  NEURO: Normal strength and tone, mentation intact and speech normal  PSYCH: mentation appears normal, affect normal/bright    ASSESSMENT / PLAN:   Marysol was seen today for physical and establish care.    Diagnoses and all orders for this visit:    Encounter for Medicare annual wellness exam    Labs today.  Mammogram ordered.  Up-to-date on colonoscopy.  Annual wellness visit in 1 year.  Continue follow-up with her oncologist.  Sees eye doctor and dentist.    -     REVIEW OF HEALTH MAINTENANCE PROTOCOL ORDERS  -     TSH WITH FREE T4 REFLEX; Future  -     CBC with platelets and differential; Future  -     Comprehensive metabolic panel (BMP + Alb, Alk Phos, ALT, AST, Total. Bili, TP); Future  -     Lipid panel reflex to direct LDL Non-fasting; Future  -     TSH WITH FREE T4 REFLEX  -     CBC with platelets and differential  -     Comprehensive metabolic panel (BMP + Alb, Alk Phos, ALT, AST, Total. Bili, TP)  -     Lipid panel reflex to direct LDL Non-fasting    Hypothyroidism, unspecified type  -     TSH WITH FREE T4 REFLEX; Future  -     TSH WITH FREE T4 REFLEX    Episode of recurrent major " depressive disorder, unspecified depression episode severity (H)  Mild depression    Sent Wellbutrin standard release 150 mg twice daily.  Add Lexapro 10 mg at bedtime.  Denies SI/HI.  Has good support from daughters.  Plan for follow-up in 1 month to reassess.  Sooner if needed.    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  Discussed emesis.  Discussed side effects.  Discussed proper switching.    -     rivaroxaban ANTICOAGULANT (XARELTO) 10 MG TABS tablet; Take 1 tablet (10 mg) by mouth daily (with dinner)    Patient has been advised of split billing requirements and indicates understanding: Yes      COUNSELING:  Reviewed preventive health counseling, as reflected in patient instructions       Regular exercise       Healthy diet/nutrition       Vision screening       Dental care       Fall risk prevention        She reports that she quit smoking about 22 years ago. Her smoking use included cigarettes. She has never used smokeless tobacco.      Appropriate preventive services were discussed with this patient, including applicable screening as appropriate for cardiovascular disease, diabetes, osteopenia/osteoporosis, and glaucoma.  As appropriate for age/gender, discussed screening for colorectal cancer, prostate cancer, breast cancer, and cervical cancer. Checklist reviewing preventive services available has been given to the patient.    Reviewed patients plan of care and provided an AVS. The Basic Care Plan (routine screening as documented in Health  Maintenance) for Marysol meets the Care Plan requirement. This Care Plan has been established and reviewed with the Patient.          The likelihood of other entities in the differential is insufficient to justify any further testing for them at this time. This was explained to the patient. The patient was advised that persistent or worsening symptoms would require further evaluation. Patient advised to call the office and if unable to reach to go to the emergency room if they develop any new or worsening symptoms. Expressed understanding and agreement with above stated plan.       Robson Jaime PA-C  [facility]

## 2023-04-21 LAB
ALBUMIN SERPL BCG-MCNC: 4.2 G/DL (ref 3.5–5.2)
ALP SERPL-CCNC: 69 U/L (ref 35–104)
ALT SERPL W P-5'-P-CCNC: 13 U/L (ref 10–35)
ANION GAP SERPL CALCULATED.3IONS-SCNC: 13 MMOL/L (ref 7–15)
AST SERPL W P-5'-P-CCNC: 20 U/L (ref 10–35)
BILIRUB SERPL-MCNC: 0.4 MG/DL
BUN SERPL-MCNC: 14.7 MG/DL (ref 8–23)
CALCIUM SERPL-MCNC: 10 MG/DL (ref 8.8–10.2)
CHLORIDE SERPL-SCNC: 102 MMOL/L (ref 98–107)
CHOLEST SERPL-MCNC: 241 MG/DL
CREAT SERPL-MCNC: 1 MG/DL (ref 0.51–0.95)
DEPRECATED HCO3 PLAS-SCNC: 25 MMOL/L (ref 22–29)
GFR SERPL CREATININE-BSD FRML MDRD: 59 ML/MIN/1.73M2
GLUCOSE SERPL-MCNC: 96 MG/DL (ref 70–99)
HDLC SERPL-MCNC: 65 MG/DL
LDLC SERPL CALC-MCNC: 131 MG/DL
NONHDLC SERPL-MCNC: 176 MG/DL
POTASSIUM SERPL-SCNC: 4 MMOL/L (ref 3.4–5.3)
PROT SERPL-MCNC: 7.1 G/DL (ref 6.4–8.3)
SODIUM SERPL-SCNC: 140 MMOL/L (ref 136–145)
TRIGL SERPL-MCNC: 225 MG/DL
TSH SERPL DL<=0.005 MIU/L-ACNC: 1.93 UIU/ML (ref 0.3–4.2)

## 2023-04-24 NOTE — RESULT ENCOUNTER NOTE
"The following letter pertains to your most recent diagnostic tests:    My name is Dr. Butler and I am covering for Robson GALICIA who is out of the office this week.     -Liver and gallbladder tests are normal for you. (ALT,AST, Alk phos, bilirubin), kidney function is normal for you (Creatinine, GFR), Sodium is normal, Potassium is normal for you, Calcium is normal for you, Glucose (blood sugar) is normal for you.      -Your total cholesterol is 241 which is above your goal of total cholesterol less than 200.    -Your triglycerides are 225 which are above your goal of triglycerides less than 150.    -Your HDL or \"good cholesterol\" is 65 which is at your goal of HDL cholesterol greater than 50.    -Your LDL cholesterol or \"bad cholesterol\" is 131 which is at your goal of LDL cholesterol less than <70.  Your LDL goal is based on your risk factors for artery disease.    -TSH (thyroid stimulating hormone) level is normal which indicates normal circulating thyroid hormone levels.      -Your complete blood counts including your hemoglobin returned normal for you.         Bottom line:  These results are OK except for that the cholesterol is too high.    Based on your cholesterol levels and other risk factors, your calculated statistical risk for having symptomatic, cholesterol plaque-related blood vessel disease (also known as atherosclerotic vascular disease or in even simpler terms, blood vessel 'blockage') over the next 10 years is elevated.  I estimate a 16.8% chance of significant blood vessel disease over the next 10 years.      Because we believe that statin medications such as atorvastatin (Lipitor) have protective effects in the blood vessels that extend beyond their ability to lower cholesterol, if you took atorvastatin (Lipitor) regularly, you could bring that 10-year statistical risk for blood vessel disease down significantly.  Generally, we recommend statin cholesterol-lowering medications such as " atorvastatin (Lipitor) to individuals with 10 year statistical risk for blood vessel disease greater than 7.5%.      Most recent guidelines would suggest that you are a candidate for starting that drug for prevention purposes.      You should be informed that a very small minority of people who take atorvastatin can develop muscle pain and weakness as a side effect from that drug.  If you experience those side effects, then stop the drug and contact me.      If you start the medication, you should have a follow up fasting cholesterol panel after you have been taking the medication for 1-2 months.  You can schedule a lab appointment for that purpose.  Please contact us if you would like to get that medication started so we can send an prescritpion and arrange for appropriate follow up.      Alternatively, you could discuss this matter further when you meet with Robson Jaime next month.       Sincerely,    Dr. Butler

## 2023-05-01 ENCOUNTER — HOSPITAL ENCOUNTER (OUTPATIENT)
Dept: MAMMOGRAPHY | Facility: CLINIC | Age: 73
Discharge: HOME | End: 2023-05-01
Attending: PHYSICIAN ASSISTANT | Admitting: PHYSICIAN ASSISTANT
Payer: MEDICARE

## 2023-05-01 DIAGNOSIS — Z12.31 VISIT FOR SCREENING MAMMOGRAM: ICD-10-CM

## 2023-05-01 PROCEDURE — 77067 SCR MAMMO BI INCL CAD: CPT

## 2023-05-11 DIAGNOSIS — I10 HTN (HYPERTENSION), BENIGN: ICD-10-CM

## 2023-05-11 RX ORDER — LOSARTAN POTASSIUM 100 MG/1
TABLET ORAL
Qty: 90 TABLET | Refills: 0 | Status: SHIPPED | OUTPATIENT
Start: 2023-05-11 | End: 2023-08-21

## 2023-05-18 ENCOUNTER — VIRTUAL VISIT (OUTPATIENT)
Dept: FAMILY MEDICINE | Facility: CLINIC | Age: 73
End: 2023-05-18
Payer: MEDICARE

## 2023-05-18 DIAGNOSIS — C83.31 DIFFUSE LARGE B-CELL LYMPHOMA OF LYMPH NODES OF HEAD (H): ICD-10-CM

## 2023-05-18 DIAGNOSIS — I82.499 DEEP VEIN THROMBOSIS (DVT) OF OTHER VEIN OF LOWER EXTREMITY, UNSPECIFIED CHRONICITY, UNSPECIFIED LATERALITY (H): ICD-10-CM

## 2023-05-18 DIAGNOSIS — F32.A MILD DEPRESSION: Primary | ICD-10-CM

## 2023-05-18 PROCEDURE — 99442 PR PHYSICIAN TELEPHONE EVALUATION 11-20 MIN: CPT | Mod: 95 | Performed by: PHYSICIAN ASSISTANT

## 2023-05-18 RX ORDER — BUPROPION HYDROCHLORIDE 150 MG/1
150 TABLET, EXTENDED RELEASE ORAL 2 TIMES DAILY
Qty: 180 TABLET | Refills: 1 | Status: SHIPPED | OUTPATIENT
Start: 2023-05-18 | End: 2024-05-23

## 2023-05-18 RX ORDER — ESCITALOPRAM OXALATE 10 MG/1
10 TABLET ORAL DAILY
Qty: 30 TABLET | Refills: 2 | Status: SHIPPED | OUTPATIENT
Start: 2023-05-18 | End: 2024-05-23

## 2023-05-18 NOTE — PROGRESS NOTES
"Zuri is a 72 year old who is being evaluated via a billable telephone visit.      What phone number would you like to be contacted at? [phone]  How would you like to obtain your AVS? Ortiz    Distant Location (provider location):  On-site    Assessment & Plan     Mild depression  Continue Wellbutrin at same dose.  Refilled for 90 days and a refill.  We decided to stick with Lexapro for a few more weeks.  Follow-up in 1 to 2 months.  Sooner if needed if adverse side effects.  Potentially option to switch to SNRI.  - buPROPion (WELLBUTRIN SR) 150 MG 12 hr tablet  Dispense: 180 tablet; Refill: 1  - escitalopram (LEXAPRO) 10 MG tablet  Dispense: 30 tablet; Refill: 2    Deep vein thrombosis (DVT) of other vein of lower extremity, unspecified chronicity, unspecified laterality (H)  Diffuse large B-cell lymphoma of lymph nodes of head (H)  Wishes to pursue Xarelto which is a reasonable option.  Discussed differences between the medications.  More cost effective for the patient.  Sent to pharmacy.   - rivaroxaban ANTICOAGULANT (XARELTO) 10 MG TABS tablet  Dispense: 90 tablet; Refill: 1    15 minutes spent by me on the date of the encounter doing chart review, review of test results, interpretation of tests, patient visit and documentation        BMI:   Estimated body mass index is 32.04 kg/m  as calculated from the following:    Height as of 4/20/23: 1.631 m (5' 4.2\").    Weight as of 4/20/23: 85.2 kg (187 lb 12.8 oz).   Weight management plan: Discussed healthy diet and exercise guidelines    The likelihood of other entities in the differential is insufficient to justify any further testing for them at this time. This was explained to the patient. The patient was advised that persistent or worsening symptoms would require further evaluation. Patient advised to call the office and if unable to reach to go to the emergency room if they develop any new or worsening symptoms. Expressed understanding and agreement with " above stated plan.     [facility] RAZ Keating is a 72 year old, presenting for the following health issues:  Follow Up and Recheck Medication    Here today for medication follow-up.  Started on Lexapro.  Has more or less not noticed any effect of the medication positive or negative.  Has taken for approximately 3 to 4 weeks.  Tolerating Wellbutrin well.  Requesting 90-day refill.  Receiving good support from her daughter and grandkids who want her to move in.    Currently still taking Eliquis, plans to switch to Xarelto given this is a improved cost.  Requesting 90-day prescription.    Reviewed recent blood work with the patient.  Elevated cholesterol.  No current statin therapy.  ASCVD risk score:   The 10-year ASCVD risk score (Meghan PETTY, et al., 2019) is: 16.8%    Values used to calculate the score:      Age: 72 years      Sex: Female      Is Non- : No      Diabetic: No      Tobacco smoker: No      Systolic Blood Pressure: 125 mmHg      Is BP treated: Yes      HDL Cholesterol: 65 mg/dL      Total Cholesterol: 241 mg/dL        Review of Systems   Constitutional, HEENT, cardiovascular, pulmonary, GI, , musculoskeletal, neuro, skin, endocrine and psych systems are negative, except as otherwise noted.      Objective    Vitals - Patient Reported  Pain Score: No Pain (0)      Vitals:  No vitals were obtained today due to virtual visit.    Physical Exam   healthy, alert and no distress  PSYCH: Alert and oriented times 3; coherent speech, normal   rate and volume, able to articulate logical thoughts, able   to abstract reason, no tangential thoughts, no hallucinations   or delusions  Her affect is normal  RESP: No cough, no audible wheezing, able to talk in full sentences  Remainder of exam unable to be completed due to telephone visits        Phone call duration: 11 minutes

## 2023-06-04 DIAGNOSIS — K21.9 GASTROESOPHAGEAL REFLUX DISEASE WITHOUT ESOPHAGITIS: ICD-10-CM

## 2023-06-05 RX ORDER — PANTOPRAZOLE SODIUM 20 MG/1
TABLET, DELAYED RELEASE ORAL
Qty: 90 TABLET | Refills: 1 | Status: SHIPPED | OUTPATIENT
Start: 2023-06-05 | End: 2024-01-10

## 2023-08-18 ENCOUNTER — TELEPHONE (OUTPATIENT)
Dept: FAMILY MEDICINE | Facility: CLINIC | Age: 73
End: 2023-08-18
Payer: MEDICARE

## 2023-08-18 NOTE — TELEPHONE ENCOUNTER
Patient Quality Outreach    Patient is due for the following:   Depression  -  PHQ-9 needed    Next Steps:   Patient was assigned appropriate questionnaire to complete    Type of outreach:    Sent [employer] message.      Questions for provider review:    None           Genevieve Nova MA

## 2023-08-19 DIAGNOSIS — I10 HTN (HYPERTENSION), BENIGN: ICD-10-CM

## 2023-08-21 RX ORDER — LOSARTAN POTASSIUM 100 MG/1
TABLET ORAL
Qty: 90 TABLET | Refills: 3 | Status: SHIPPED | OUTPATIENT
Start: 2023-08-21

## 2023-08-21 RX ORDER — HYDROCHLOROTHIAZIDE 12.5 MG/1
12.5 TABLET ORAL DAILY
Qty: 90 TABLET | Refills: 3 | Status: SHIPPED | OUTPATIENT
Start: 2023-08-21

## 2023-09-20 ENCOUNTER — HOSPITAL ENCOUNTER (EMERGENCY)
Facility: CLINIC | Age: 73
Discharge: HOME | End: 2023-09-20
Attending: EMERGENCY MEDICINE | Admitting: EMERGENCY MEDICINE
Payer: MEDICARE

## 2023-09-20 ENCOUNTER — NURSE TRIAGE (OUTPATIENT)
Dept: NURSING | Facility: CLINIC | Age: 73
End: 2023-09-20
Payer: MEDICARE

## 2023-09-20 ENCOUNTER — APPOINTMENT (OUTPATIENT)
Dept: GENERAL RADIOLOGY | Facility: CLINIC | Age: 73
End: 2023-09-20
Attending: EMERGENCY MEDICINE
Payer: MEDICARE

## 2023-09-20 VITALS
OXYGEN SATURATION: 96 % | RESPIRATION RATE: 20 BRPM | TEMPERATURE: 98.5 F | SYSTOLIC BLOOD PRESSURE: 128 MMHG | HEART RATE: 62 BPM | DIASTOLIC BLOOD PRESSURE: 64 MMHG

## 2023-09-20 DIAGNOSIS — U07.1 COVID-19: ICD-10-CM

## 2023-09-20 DIAGNOSIS — R06.02 SHORTNESS OF BREATH: ICD-10-CM

## 2023-09-20 LAB
ANION GAP SERPL CALCULATED.3IONS-SCNC: 14 MMOL/L (ref 7–15)
BASOPHILS # BLD AUTO: 0.1 10E3/UL (ref 0–0.2)
BASOPHILS NFR BLD AUTO: 1 %
BUN SERPL-MCNC: 12.1 MG/DL (ref 8–23)
CALCIUM SERPL-MCNC: 9.2 MG/DL (ref 8.8–10.2)
CHLORIDE SERPL-SCNC: 105 MMOL/L (ref 98–107)
CREAT SERPL-MCNC: 0.89 MG/DL (ref 0.51–0.95)
DEPRECATED HCO3 PLAS-SCNC: 22 MMOL/L (ref 22–29)
EGFRCR SERPLBLD CKD-EPI 2021: 68 ML/MIN/1.73M2
EOSINOPHIL # BLD AUTO: 0.1 10E3/UL (ref 0–0.7)
EOSINOPHIL NFR BLD AUTO: 1 %
ERYTHROCYTE [DISTWIDTH] IN BLOOD BY AUTOMATED COUNT: 11.7 % (ref 10–15)
GLUCOSE SERPL-MCNC: 102 MG/DL (ref 70–99)
HCT VFR BLD AUTO: 37.6 % (ref 35–47)
HGB BLD-MCNC: 12.9 G/DL (ref 11.7–15.7)
HOLD SPECIMEN: NORMAL
HOLD SPECIMEN: NORMAL
IMM GRANULOCYTES # BLD: 0.1 10E3/UL
IMM GRANULOCYTES NFR BLD: 1 %
LYMPHOCYTES # BLD AUTO: 1.9 10E3/UL (ref 0.8–5.3)
LYMPHOCYTES NFR BLD AUTO: 22 %
MCH RBC QN AUTO: 32 PG (ref 26.5–33)
MCHC RBC AUTO-ENTMCNC: 34.3 G/DL (ref 31.5–36.5)
MCV RBC AUTO: 93 FL (ref 78–100)
MONOCYTES # BLD AUTO: 1.2 10E3/UL (ref 0–1.3)
MONOCYTES NFR BLD AUTO: 14 %
NEUTROPHILS # BLD AUTO: 5.2 10E3/UL (ref 1.6–8.3)
NEUTROPHILS NFR BLD AUTO: 61 %
NRBC # BLD AUTO: 0 10E3/UL
NRBC BLD AUTO-RTO: 0 /100
PLATELET # BLD AUTO: 194 10E3/UL (ref 150–450)
POTASSIUM SERPL-SCNC: 3.6 MMOL/L (ref 3.4–5.3)
RBC # BLD AUTO: 4.03 10E6/UL (ref 3.8–5.2)
SARS-COV-2 RNA RESP QL NAA+PROBE: POSITIVE
SODIUM SERPL-SCNC: 141 MMOL/L (ref 136–145)
TROPONIN T SERPL HS-MCNC: 10 NG/L
TROPONIN T SERPL HS-MCNC: 11 NG/L
WBC # BLD AUTO: 8.4 10E3/UL (ref 4–11)

## 2023-09-20 PROCEDURE — 93005 ELECTROCARDIOGRAM TRACING: CPT

## 2023-09-20 PROCEDURE — 36415 COLL VENOUS BLD VENIPUNCTURE: CPT | Performed by: EMERGENCY MEDICINE

## 2023-09-20 PROCEDURE — 99285 EMERGENCY DEPT VISIT HI MDM: CPT | Mod: 25

## 2023-09-20 PROCEDURE — 84484 ASSAY OF TROPONIN QUANT: CPT | Performed by: EMERGENCY MEDICINE

## 2023-09-20 PROCEDURE — 80048 BASIC METABOLIC PNL TOTAL CA: CPT | Performed by: EMERGENCY MEDICINE

## 2023-09-20 PROCEDURE — 71046 X-RAY EXAM CHEST 2 VIEWS: CPT

## 2023-09-20 PROCEDURE — 87635 SARS-COV-2 COVID-19 AMP PRB: CPT | Performed by: EMERGENCY MEDICINE

## 2023-09-20 PROCEDURE — 85025 COMPLETE CBC W/AUTO DIFF WBC: CPT | Performed by: EMERGENCY MEDICINE

## 2023-09-20 ASSESSMENT — ACTIVITIES OF DAILY LIVING (ADL)
ADLS_ACUITY_SCORE: 35
ADLS_ACUITY_SCORE: 35

## 2023-09-20 NOTE — TELEPHONE ENCOUNTER
Patient has covid.  Sore throat started on Saturday September 16 th.  Now has a fever of 101 and is very congested.  Patient is requesting covid treatment with Paxlovid.  Triaged patient and advised to go to ED as patient is having troubles breathing while on the phone and is very weak and fatigued patient states that she gets short of breath when she gets up to walk.   Patient agrees and will go to ER.  Patient is still requesting a message be sent to her PCP/Clinic.      COVID Positive/Requesting COVID treatment    Patient is positive for COVID and requesting treatment options.    Date of positive COVID test (PCR or at home) 9/19/2023 Home  Current COVID symptoms: fever or chills, cough, shortness of breath or difficulty breathing, fatigue, muscle or body aches, headache, sore throat, and congestion or runny nose  Date COVID symptoms began: September 16 th.    Message should be routed to clinic RN pool. Best phone number to use for call back: [phone].       Reason for Disposition   MODERATE difficulty breathing (e.g., speaks in phrases, SOB even at rest, pulse 100-120)    Additional Information   Negative: SEVERE difficulty breathing (e.g., struggling for each breath, speaks in single words)   Negative: Difficult to awaken or acting confused (e.g., disoriented, slurred speech)   Negative: Bluish (or gray) lips or face now   Negative: Shock suspected (e.g., cold/pale/clammy skin, too weak to stand, low BP, rapid pulse)   Negative: Sounds like a life-threatening emergency to the triager   Negative: SEVERE or constant chest pain or pressure  (Exception: Mild central chest pain, present only when coughing.)    Protocols used: Coronavirus (COVID-19) Diagnosed or Tmlmaiooc-C-HI

## 2023-09-20 NOTE — ED TRIAGE NOTES
Pt presents to ED due to sob and feeling lightheaded. Called the triage line for paxlovid prescription and due to symptoms they recommended she been seen.   Tested positive yesterday, symptoms began Sunday night.   Denies chest pain

## 2023-09-20 NOTE — ED PROVIDER NOTES
History     Chief Complaint:  Covid Concern       HPI   Marysol Morrell is a 72 year old female who presents with sore throat cough and weakness that been present for the last 3 days.  She states that she took a home COVID test yesterday that was quite clearly positive, and comes in here today if she is having worsening shortness of breath and a cough.  She is vaccinated and boosted.  She states that she has had a blood clot in the past, and is taking blood thinners for this.    States no fevers today but did have a fever yesterday, coughing frequently, wants to be checked out and evaluated for possible Paxlovid.      Independent Historian:   No    Review of External Notes: Yes, I did review patient's ER note from [facility] from June 1 of this year where the patient was seen for diverticulitis.      Allergies:  Chlorhexidine  Chlorhexidine Gluconate  Codeine  Evista [Raloxifene Hydrochloride]  Fosamax  Vicodin [Hydrocodone-Acetaminophen]  Pen Vk [Penicillin V]     Medications:    acetaminophen (TYLENOL) 325 MG tablet  allopurinol (ZYLOPRIM) 100 MG tablet  apixaban ANTICOAGULANT (ELIQUIS) 2.5 MG tablet  buPROPion (WELLBUTRIN SR) 150 MG 12 hr tablet  cetirizine (ZYRTEC) 10 MG tablet  conjugated estrogens (PREMARIN) cream  escitalopram (LEXAPRO) 10 MG tablet  hydrochlorothiazide (HYDRODIURIL) 12.5 MG tablet  levothyroxine (SYNTHROID/LEVOTHROID) 112 MCG tablet  losartan (COZAAR) 100 MG tablet  pantoprazole (PROTONIX) 20 MG EC tablet  rivaroxaban ANTICOAGULANT (XARELTO) 10 MG TABS tablet  valACYclovir (VALTREX) 500 MG tablet  Zoledronic Acid (RECLAST IV)        Past Medical History:    Past Medical History:   Diagnosis Date    Arthritis     Cancer (H) 2017    Cholelithiasis     Colon polyps 2001 and 2006    Constipation     Diffuse large B cell lymphoma (H) 2017    Diverticulitis 2010    DVT (deep venous thrombosis) (H)     Genital herpes     GERD (gastroesophageal reflux disease)     Gout      History of depression     History of migraine     HTN (hypertension), benign     Hypothyroidism     Lichen sclerosus et atrophicus     Osteopenia     Ovarian cyst 2011    Rectocele     Schatzki's ring 2009    Tibia/fibula fracture 2009    Vasovagal syncope 1966    Vertigo 2000       Past Surgical History:    Past Surgical History:   Procedure Laterality Date    APPENDECTOMY  1954    ARTHROSCOPY KNEE  1983    BONE MARROW BIOPSY, BONE SPECIMEN, NEEDLE/TROCAR N/A 7/31/2017    Procedure: BIOPSY BONE MARROW;  UNILATERAL BONE MARROW BIPOSY  ;  Surgeon: Jamil Mccarthy MD;  Location:  GI    BREAST BIOPSY, CORE RT/LT  1990    EXCISE GARNETT'S NEUROMA FOOT      HYSTERECTOMY, PAP NO LONGER INDICATED  1984    with bladder repair    LAPAROSCOPIC OOPHORECTOMY  2011    with the colon resection    LARYNGOSCOPY WITH BIOPSY(IES) N/A 7/24/2017    Procedure: LARYNGOSCOPY WITH BIOPSY(IES);  DIRECT LARYNGOSCOPY WITH TONGUE BIOPSIES;  Surgeon: Vernell Yang MD;  Location: [facility]    OPEN REDUCTION INTERNAL FIXATION TIBIA  2009    TONSILLECTOMY & ADENOIDECTOMY  1958    ZZC LAP,SURG,COLECTOMY, PARTIAL, W/ANAST  2011    due to recurrent diverticulitis        Family History:    family history includes C.A.D. in her mother; Cancer (age of onset: 65) in her father.    Social History:   reports that she quit smoking about 23 years ago. Her smoking use included cigarettes. She has never used smokeless tobacco. She reports current alcohol use. She reports that she does not use drugs.  PCP: Robson Jaime     Physical Exam   Patient Vitals for the past 24 hrs:   BP Temp Temp src Pulse Resp SpO2   09/20/23 1211 -- -- -- -- 20 --   09/20/23 1210 134/72 -- -- -- -- --   09/20/23 1208 -- 98.5  F (36.9  C) Oral 74 -- 97 %        Physical Exam  Vitals: reviewed by me  General: Pt seen on [facility], pleasant, cooperative, and alert to conversation  Eyes: Tracking well, clear conjunctiva BL  ENT: MMM, midline trachea.   Lungs:  Slight tachypneic but speaking full sentences, coughing occasionally.  No accessory muscle use noted  CV: Rate as above  MSK: no joint effusion.  No evidence of trauma  Skin: No rash  Neuro: Clear speech and no facial droop.  Psych: Not RIS, no e/o AH/VH          Emergency Department Course     ECG results from 09/20/23   EKG 12 lead     Value    Systolic Blood Pressure     Diastolic Blood Pressure     Ventricular Rate 61    Atrial Rate 61    NY Interval 156    QRS Duration 90        QTc 398    P Axis 47    R AXIS -13    T Axis 45    Interpretation ECG      Sinus rhythm  Low voltage QRS  Borderline ECG  No previous ECGs available           Imaging:  Chest XR,  PA & LAT   Final Result   IMPRESSION: Heart is normal in size. Lungs are clear. Old right rib   fractures.      NEVAEH DAMON MD            SYSTEM ID:  [id number]         Report per radiology    Laboratory:  Labs Ordered and Resulted from Time of ED Arrival to Time of ED Departure   BASIC METABOLIC PANEL - Abnormal       Result Value    Sodium 141      Potassium 3.6      Chloride 105      Carbon Dioxide (CO2) 22      Anion Gap 14      Urea Nitrogen 12.1      Creatinine 0.89      Calcium 9.2      Glucose 102 (*)     GFR Estimate 68     COVID-19 VIRUS (CORONAVIRUS) BY PCR - Abnormal    SARS CoV2 PCR Positive (*)    TROPONIN T, HIGH SENSITIVITY - Normal    Troponin T, High Sensitivity 11     TROPONIN T, HIGH SENSITIVITY - Normal    Troponin T, High Sensitivity 10     CBC WITH PLATELETS AND DIFFERENTIAL    WBC Count 8.4      RBC Count 4.03      Hemoglobin 12.9      Hematocrit 37.6      MCV 93      MCH 32.0      MCHC 34.3      RDW 11.7      Platelet Count 194      % Neutrophils 61      % Lymphocytes 22      % Monocytes 14      % Eosinophils 1      % Basophils 1      % Immature Granulocytes 1      NRBCs per 100 WBC 0      Absolute Neutrophils 5.2      Absolute Lymphocytes 1.9      Absolute Monocytes 1.2      Absolute Eosinophils 0.1      Absolute Basophils 0.1       Absolute Immature Granulocytes 0.1      Absolute NRBCs 0.0            Emergency Department Course & Assessments:        Interventions:  Medications - No data to display       Independent Interpretation (X-rays, CTs, rhythm strip):  Yes independently reviewed the patient's x-ray, no obvious pneumothorax noted.        Social Determinants of Health affecting care:   Stress/Adjustment Disorders      Disposition:  The patient was discharged to home.     Impression & Plan        Medical Decision Making:  This is a very pleasant 72-year-old female who presents the emergency room with appears to be some degree of chest discomfort after home positive test for COVID was taken yesterday.  She states that her symptoms all started within 72 hours, and tells me that she is worried about her shortness of breath and cough, although thankfully the x-ray does not show any evidence of a secondary bacterial infection, and her troponins are also flat.  Even on road test she does not desaturate significantly, and does not require admission or Decadron.  She is interested in Paxlovid, which we discussed, and she knows that she have to come off of her Xarelto for the duration of the Paxlovid therapy, and she feels mixed about this, as to why.  I told her that I would prefer that she stay on the Xarelto given her PE risk and increased thrombogenic nature of COVID, but ultimately the decision is hers and I sent her home with the Paxil prescription that she can take if she wants to, knowing full well that she would need to stop her blood thinner.    She feels comfortable with this plan, has ambulated independently, and is quite reliable appearing.  She knows to come back if she becomes too short of breath to do her normal activities, will plan for discharge as above with outpatient follow-up.    Diagnosis:    ICD-10-CM    1. COVID-19  U07.1       2. Shortness of breath  R06.02            Discharge Medications:  Discharge Medication List  as of 9/20/2023  3:18 PM        START taking these medications    Details   nirmatrelvir and ritonavir (PAXLOVID) 300 mg/100 mg therapy pack Take 3 tablets by mouth 2 times daily for 5 days, Disp-30 tablet, R-0, Local PrintDate of symptom onset: 9/17; Risk criteria met: Yes; Weight >40 kg Yes; Renal fxn: normal;  Drug-Drug interactions reviewed & addressed: Yes                9/20/2023   Robson Collins*        Robson Collins MD  09/20/23 2703

## 2023-09-20 NOTE — TELEPHONE ENCOUNTER
Called patient and patient agreeable to going to the ER. Relayed message below.     JACKELIN Vidal  [facility]

## 2023-09-20 NOTE — DISCHARGE INSTRUCTIONS
Please come back to the emergency room immediately with any concerns or have any new symptoms.  Please be certain to follow with your regular doctor in the next 1 week as we discussed, this is very important.  Please come back to become or short of breath, and also please be mindful of the medication interactions that were discussed if you decide to take the Paxlovid.    Do not take your Xarelto if you decide to take Paxlovid, this cannot be taken together.  If you decide to take Paxlovid, please stop taking Xarelto for the duration of your Paxil therapy.

## 2023-09-21 LAB
ATRIAL RATE - MUSE: 61 BPM
DIASTOLIC BLOOD PRESSURE - MUSE: NORMAL MMHG
INTERPRETATION ECG - MUSE: NORMAL
P AXIS - MUSE: 47 DEGREES
PR INTERVAL - MUSE: 156 MS
QRS DURATION - MUSE: 90 MS
QT - MUSE: 396 MS
QTC - MUSE: 398 MS
R AXIS - MUSE: -13 DEGREES
SYSTOLIC BLOOD PRESSURE - MUSE: NORMAL MMHG
T AXIS - MUSE: 45 DEGREES
VENTRICULAR RATE- MUSE: 61 BPM

## 2023-09-30 DIAGNOSIS — Z87.39 HISTORY OF GOUT: ICD-10-CM

## 2023-10-02 RX ORDER — ALLOPURINOL 100 MG/1
100 TABLET ORAL DAILY
Qty: 90 TABLET | Refills: 0 | Status: SHIPPED | OUTPATIENT
Start: 2023-10-02 | End: 2024-01-08

## 2023-10-19 ENCOUNTER — HOSPITAL ENCOUNTER (OUTPATIENT)
Dept: BONE DENSITY | Facility: CLINIC | Age: 73
Discharge: HOME | End: 2023-10-19
Attending: INTERNAL MEDICINE | Admitting: INTERNAL MEDICINE
Payer: MEDICARE

## 2023-10-19 DIAGNOSIS — M85.88 OTHER SPECIFIED DISORDERS OF BONE DENSITY AND STRUCTURE, OTHER SITE: ICD-10-CM

## 2023-10-19 PROCEDURE — 77080 DXA BONE DENSITY AXIAL: CPT

## 2023-10-24 ENCOUNTER — TRANSFERRED RECORDS (OUTPATIENT)
Dept: HEALTH INFORMATION MANAGEMENT | Facility: CLINIC | Age: 73
End: 2023-10-24
Payer: MEDICARE

## 2024-01-05 ENCOUNTER — MYC REFILL (OUTPATIENT)
Dept: FAMILY MEDICINE | Facility: CLINIC | Age: 74
End: 2024-01-05
Payer: COMMERCIAL

## 2024-01-05 DIAGNOSIS — I82.499 DEEP VEIN THROMBOSIS (DVT) OF OTHER VEIN OF LOWER EXTREMITY, UNSPECIFIED CHRONICITY, UNSPECIFIED LATERALITY (H): ICD-10-CM

## 2024-01-05 DIAGNOSIS — C83.31 DIFFUSE LARGE B-CELL LYMPHOMA OF LYMPH NODES OF HEAD (H): ICD-10-CM

## 2024-01-05 NOTE — TELEPHONE ENCOUNTER
Please remove Eliquis from patient med list.  Per virtual visit 5/18/2023 patient switched to Xarelto due to more cost effective.    LOV 5/18/2023 virtual Yeni  No future OV scheduled    AWE due April 2024, pharm note given    Linda Cavazos, RT (R)

## 2024-01-08 ENCOUNTER — MYC REFILL (OUTPATIENT)
Dept: FAMILY MEDICINE | Facility: CLINIC | Age: 74
End: 2024-01-08
Payer: COMMERCIAL

## 2024-01-08 DIAGNOSIS — Z87.39 HISTORY OF GOUT: ICD-10-CM

## 2024-01-08 DIAGNOSIS — C83.31 DIFFUSE LARGE B-CELL LYMPHOMA OF LYMPH NODES OF HEAD (H): ICD-10-CM

## 2024-01-08 DIAGNOSIS — A60.04 HERPES SIMPLEX VULVOVAGINITIS: ICD-10-CM

## 2024-01-08 DIAGNOSIS — I82.499 DEEP VEIN THROMBOSIS (DVT) OF OTHER VEIN OF LOWER EXTREMITY, UNSPECIFIED CHRONICITY, UNSPECIFIED LATERALITY (H): ICD-10-CM

## 2024-01-08 RX ORDER — VALACYCLOVIR HYDROCHLORIDE 500 MG/1
TABLET, FILM COATED ORAL
Qty: 12 TABLET | Refills: 0 | Status: SHIPPED | OUTPATIENT
Start: 2024-01-08

## 2024-01-08 RX ORDER — ALLOPURINOL 100 MG/1
100 TABLET ORAL DAILY
Qty: 90 TABLET | Refills: 3 | Status: SHIPPED | OUTPATIENT
Start: 2024-01-08

## 2024-01-09 DIAGNOSIS — A60.04 HERPES SIMPLEX VULVOVAGINITIS: ICD-10-CM

## 2024-01-10 ENCOUNTER — MYC REFILL (OUTPATIENT)
Dept: FAMILY MEDICINE | Facility: CLINIC | Age: 74
End: 2024-01-10
Payer: COMMERCIAL

## 2024-01-10 DIAGNOSIS — K21.9 GASTROESOPHAGEAL REFLUX DISEASE WITHOUT ESOPHAGITIS: ICD-10-CM

## 2024-01-11 RX ORDER — VALACYCLOVIR HYDROCHLORIDE 500 MG/1
TABLET, FILM COATED ORAL
Qty: 12 TABLET | Refills: 0 | OUTPATIENT
Start: 2024-01-11

## 2024-01-11 RX ORDER — PANTOPRAZOLE SODIUM 20 MG/1
20 TABLET, DELAYED RELEASE ORAL DAILY
Qty: 90 TABLET | Refills: 0 | Status: SHIPPED | OUTPATIENT
Start: 2024-01-11 | End: 2024-05-03

## 2024-01-11 NOTE — TELEPHONE ENCOUNTER
Prescription approved per [facility] Refill Protocol.  Pamela Longo, RN  [facility] Triage Nurse

## 2024-01-11 NOTE — TELEPHONE ENCOUNTER
Pharmacy requested duplicate. Medication refused.     Refill sent to the pharmacy on 1/8/24.    Pamela Longo RN

## 2024-01-27 DIAGNOSIS — E03.9 HYPOTHYROIDISM, UNSPECIFIED TYPE: ICD-10-CM

## 2024-01-29 RX ORDER — LEVOTHYROXINE SODIUM 112 UG/1
112 TABLET ORAL DAILY
Qty: 90 TABLET | Refills: 0 | Status: SHIPPED | OUTPATIENT
Start: 2024-01-29 | End: 2024-05-03

## 2024-05-02 DIAGNOSIS — I82.499 DEEP VEIN THROMBOSIS (DVT) OF OTHER VEIN OF LOWER EXTREMITY, UNSPECIFIED CHRONICITY, UNSPECIFIED LATERALITY (H): ICD-10-CM

## 2024-05-02 DIAGNOSIS — C83.31 DIFFUSE LARGE B-CELL LYMPHOMA OF LYMPH NODES OF HEAD (H): ICD-10-CM

## 2024-05-02 RX ORDER — RIVAROXABAN 10 MG/1
10 TABLET, FILM COATED ORAL
Qty: 90 TABLET | Refills: 0 | Status: SHIPPED | OUTPATIENT
Start: 2024-05-02 | End: 2024-07-29

## 2024-05-03 DIAGNOSIS — E03.9 HYPOTHYROIDISM, UNSPECIFIED TYPE: ICD-10-CM

## 2024-05-03 DIAGNOSIS — K21.9 GASTROESOPHAGEAL REFLUX DISEASE WITHOUT ESOPHAGITIS: ICD-10-CM

## 2024-05-06 RX ORDER — PANTOPRAZOLE SODIUM 20 MG/1
20 TABLET, DELAYED RELEASE ORAL DAILY
Qty: 90 TABLET | Refills: 0 | Status: SHIPPED | OUTPATIENT
Start: 2024-05-06 | End: 2024-07-29

## 2024-05-06 RX ORDER — LEVOTHYROXINE SODIUM 112 UG/1
112 TABLET ORAL DAILY
Qty: 90 TABLET | Refills: 3 | Status: ON HOLD | OUTPATIENT
Start: 2024-05-06 | End: 2024-08-05

## 2024-05-23 ENCOUNTER — OFFICE VISIT (OUTPATIENT)
Dept: FAMILY MEDICINE | Facility: CLINIC | Age: 74
End: 2024-05-23
Payer: COMMERCIAL

## 2024-05-23 VITALS
SYSTOLIC BLOOD PRESSURE: 122 MMHG | OXYGEN SATURATION: 99 % | WEIGHT: 191 LBS | HEIGHT: 64 IN | HEART RATE: 55 BPM | RESPIRATION RATE: 16 BRPM | TEMPERATURE: 98 F | DIASTOLIC BLOOD PRESSURE: 78 MMHG | BODY MASS INDEX: 32.61 KG/M2

## 2024-05-23 DIAGNOSIS — R73.09 ELEVATED GLUCOSE: ICD-10-CM

## 2024-05-23 DIAGNOSIS — Z12.31 VISIT FOR SCREENING MAMMOGRAM: ICD-10-CM

## 2024-05-23 DIAGNOSIS — N39.46 MIXED INCONTINENCE: ICD-10-CM

## 2024-05-23 DIAGNOSIS — C83.31 DIFFUSE LARGE B-CELL LYMPHOMA OF LYMPH NODES OF HEAD (H): ICD-10-CM

## 2024-05-23 DIAGNOSIS — F51.01 PRIMARY INSOMNIA: ICD-10-CM

## 2024-05-23 DIAGNOSIS — Z00.00 ENCOUNTER FOR ANNUAL WELLNESS EXAM IN MEDICARE PATIENT: Primary | ICD-10-CM

## 2024-05-23 DIAGNOSIS — I82.499 DEEP VEIN THROMBOSIS (DVT) OF OTHER VEIN OF LOWER EXTREMITY, UNSPECIFIED CHRONICITY, UNSPECIFIED LATERALITY (H): ICD-10-CM

## 2024-05-23 DIAGNOSIS — E03.9 HYPOTHYROIDISM, UNSPECIFIED TYPE: ICD-10-CM

## 2024-05-23 DIAGNOSIS — I10 HTN (HYPERTENSION), BENIGN: ICD-10-CM

## 2024-05-23 DIAGNOSIS — Z87.39 HISTORY OF GOUT: ICD-10-CM

## 2024-05-23 DIAGNOSIS — Z23 HIGH PRIORITY FOR 2019-NCOV VACCINE: ICD-10-CM

## 2024-05-23 DIAGNOSIS — E78.5 HYPERLIPIDEMIA LDL GOAL <130: ICD-10-CM

## 2024-05-23 LAB
BASOPHILS # BLD AUTO: 0 10E3/UL (ref 0–0.2)
BASOPHILS NFR BLD AUTO: 0 %
EOSINOPHIL # BLD AUTO: 0.2 10E3/UL (ref 0–0.7)
EOSINOPHIL NFR BLD AUTO: 3 %
ERYTHROCYTE [DISTWIDTH] IN BLOOD BY AUTOMATED COUNT: 11.7 % (ref 10–15)
HBA1C MFR BLD: 5.3 % (ref 0–5.6)
HCT VFR BLD AUTO: 41.8 % (ref 35–47)
HGB BLD-MCNC: 13.9 G/DL (ref 11.7–15.7)
IMM GRANULOCYTES # BLD: 0 10E3/UL
IMM GRANULOCYTES NFR BLD: 0 %
LYMPHOCYTES # BLD AUTO: 2.3 10E3/UL (ref 0.8–5.3)
LYMPHOCYTES NFR BLD AUTO: 30 %
MCH RBC QN AUTO: 31.9 PG (ref 26.5–33)
MCHC RBC AUTO-ENTMCNC: 33.3 G/DL (ref 31.5–36.5)
MCV RBC AUTO: 96 FL (ref 78–100)
MONOCYTES # BLD AUTO: 0.6 10E3/UL (ref 0–1.3)
MONOCYTES NFR BLD AUTO: 7 %
NEUTROPHILS # BLD AUTO: 4.5 10E3/UL (ref 1.6–8.3)
NEUTROPHILS NFR BLD AUTO: 59 %
PLATELET # BLD AUTO: 252 10E3/UL (ref 150–450)
RBC # BLD AUTO: 4.36 10E6/UL (ref 3.8–5.2)
WBC # BLD AUTO: 7.7 10E3/UL (ref 4–11)

## 2024-05-23 PROCEDURE — 90480 ADMN SARSCOV2 VAC 1/ONLY CMP: CPT | Performed by: PHYSICIAN ASSISTANT

## 2024-05-23 PROCEDURE — 85025 COMPLETE CBC W/AUTO DIFF WBC: CPT | Performed by: PHYSICIAN ASSISTANT

## 2024-05-23 PROCEDURE — 84550 ASSAY OF BLOOD/URIC ACID: CPT | Performed by: PHYSICIAN ASSISTANT

## 2024-05-23 PROCEDURE — 83036 HEMOGLOBIN GLYCOSYLATED A1C: CPT | Performed by: PHYSICIAN ASSISTANT

## 2024-05-23 PROCEDURE — 99214 OFFICE O/P EST MOD 30 MIN: CPT | Mod: 25 | Performed by: PHYSICIAN ASSISTANT

## 2024-05-23 PROCEDURE — 80053 COMPREHEN METABOLIC PANEL: CPT | Performed by: PHYSICIAN ASSISTANT

## 2024-05-23 PROCEDURE — 84439 ASSAY OF FREE THYROXINE: CPT | Performed by: PHYSICIAN ASSISTANT

## 2024-05-23 PROCEDURE — 84443 ASSAY THYROID STIM HORMONE: CPT | Performed by: PHYSICIAN ASSISTANT

## 2024-05-23 PROCEDURE — 91320 SARSCV2 VAC 30MCG TRS-SUC IM: CPT | Performed by: PHYSICIAN ASSISTANT

## 2024-05-23 PROCEDURE — 80061 LIPID PANEL: CPT | Performed by: PHYSICIAN ASSISTANT

## 2024-05-23 PROCEDURE — G0439 PPPS, SUBSEQ VISIT: HCPCS | Performed by: PHYSICIAN ASSISTANT

## 2024-05-23 PROCEDURE — 36415 COLL VENOUS BLD VENIPUNCTURE: CPT | Performed by: PHYSICIAN ASSISTANT

## 2024-05-23 RX ORDER — TRAZODONE HYDROCHLORIDE 50 MG/1
50 TABLET, FILM COATED ORAL AT BEDTIME
Qty: 30 TABLET | Refills: 2 | Status: SHIPPED | OUTPATIENT
Start: 2024-05-23

## 2024-05-23 ASSESSMENT — PATIENT HEALTH QUESTIONNAIRE - PHQ9
SUM OF ALL RESPONSES TO PHQ QUESTIONS 1-9: 4
SUM OF ALL RESPONSES TO PHQ QUESTIONS 1-9: 4
10. IF YOU CHECKED OFF ANY PROBLEMS, HOW DIFFICULT HAVE THESE PROBLEMS MADE IT FOR YOU TO DO YOUR WORK, TAKE CARE OF THINGS AT HOME, OR GET ALONG WITH OTHER PEOPLE: SOMEWHAT DIFFICULT

## 2024-05-23 ASSESSMENT — PAIN SCALES - GENERAL: PAINLEVEL: NO PAIN (0)

## 2024-05-23 NOTE — PROGRESS NOTES
Preventive Care Visit  [facility] RAZ Jaime PA-C, Physician Assistant - Medical  May 23, 2024      Assessment & Plan     Encounter for annual wellness exam in Medicare patient    Annual labs ordered. Healthy diet and exercise reviewed. Recommended routine dental, eye, and skin screenings.  Shingles shot at the pharmacy.  Updated COVID shot today.  Continue follow-up with oncology.    - TSH WITH FREE T4 REFLEX  - CBC with platelets and differential  - Comprehensive metabolic panel (BMP + Alb, Alk Phos, ALT, AST, Total. Bili, TP)  - Lipid panel reflex to direct LDL Fasting  - Hemoglobin A1c  - Uric acid      Hypothyroidism, unspecified type  Check TSH today.  Continue Synthroid  - TSH WITH FREE T4 REFLEX    Hyperlipidemia LDL goal <130  Declined statin therapy.  Repeat lipids today.  Counseled on diet/exercise.  Encouraged plant-based heart healthy diet.  Plan to repeat lipids next year and consider additional coronary calcium score which she is amenable to.  - Lipid panel reflex to direct LDL Fasting      HTN (hypertension), benign  Well-controlled on current antihypertensive regimen.  - TSH WITH FREE T4 REFLEX  - CBC with platelets and differential  - Comprehensive metabolic panel (BMP + Alb, Alk Phos, ALT, AST, Total. Bili, TP)  - Hemoglobin A1c      Deep vein thrombosis (DVT) of other vein of lower extremity, unspecified chronicity, unspecified laterality (H)  Continue Xarelto    Diffuse large B-cell lymphoma of lymph nodes of head (H)  Continue follow-up with oncology    History of gout  Continue on allopurinol.  Check uric acid level today.  - Uric acid    Primary insomnia  Longstanding issue with insomnia.  Reviewed sleep hygiene.  Discussed issues with Ambien with consistent use.  For trazodone 50 mg.  Can use 50 to 100 mg nightly.  She will contact me if this is effective if not we could consider further dose increases or discuss other options.    - TSH WITH FREE T4 REFLEX  -  "CBC with platelets and differential  - Comprehensive metabolic panel (BMP + Alb, Alk Phos, ALT, AST, Total. Bili, TP)  - traZODone (DESYREL) 50 MG tablet  Dispense: 30 tablet; Refill: 2    Elevated glucose  - Hemoglobin A1c    Visit for screening mammogram  - MA Screen Bilateral w/Bertin    Mixed incontinence  Pelvic floor training reviewed.  Established with urogynecology.  Will occasionally note bladder prolapse as well.  - Adult Uro/Gyn  Referral    High priority for 2019-nCoV vaccine  COVID-19 vaccine today.      Patient has been advised of split billing requirements and indicates understanding: Yes        BMI  Estimated body mass index is 32.58 kg/m  as calculated from the following:    Height as of this encounter: 1.631 m (5' 4.2\").    Weight as of this encounter: 86.6 kg (191 lb).   Weight management plan: Discussed healthy diet and exercise guidelines    Counseling  Appropriate preventive services were discussed with this patient, including applicable screening as appropriate for fall prevention, nutrition, physical activity, Tobacco-use cessation, weight loss and cognition.  Checklist reviewing preventive services available has been given to the patient.  Reviewed patient's diet, addressing concerns and/or questions.   The patient was instructed to see the dentist every 6 months.   Discussed possible causes of fatigue. The patient was provided with written information regarding signs of hearing loss.   Information on urinary incontinence and treatment options given to patient.         Jonnathan Keating is a 73 year old, presenting for the following:  Physical and Imm/Inj (COVID-19 VACCINE)    Here today for her annual wellness visit.      -At our first meeting as well as her last annual wellness visit back in April 2023 she had just lost her .  Has been struggling with some depression/grief related to this.  She especially over the past 3 to 4 months has noticed significant improvement in her " mood.  Coming to terms with this new reality of life without her .  In the process of selling her house.  Plans to move in with her daughter.  Additionally, was having some issues with her 's pension from [employer] which she settled on as well.  She has since weaned off her Lexapro as well as her Wellbutrin.  Notes no significant change in mood with this discontinuation.  Has good support from family.    -Reviewed cholesterol levels from last years meeting.  She did have a coronary calcium score in 2020 which showed 0.  Has no interest in starting a statin.  Reviewed side effects and safety of this.  Motivated to start a new diet which is plant-based as well as wants to get into an exercise routine such as walking.    -Due for follow-up with [facility].  History of Schatzki ring.  Last checked in 2022.  Notes food getting stuck on occasion.  Continues on Protonix.  Colonoscopy last done in 2021.  Will be due for follow-up in the fall for this.    -States has a longstanding issue of stress incontinence.  However more recently over the past 1 to 2 years has become more of a mixed incontinence with increasing urge incontinence.  Aware of pelvic floor training.    -Recovering from COVID-19 infection in the fall.  Which seemed to linger for multiple months.  Questions whether she had long COVID.  Due for updated COVID shot.    -Longstanding issue with insomnia.  Especially when it comes to sleep maintenance.  Often wake up approximately 2 to 3 hours after initiating sleep and then will be unable to fall asleep consistently.  Had previously used her 's Ambien in the past which worked well.  Discussed issues with Ambien long-term.          Health Care Directive  Patient does not have a Health Care Directive or Living Will: Discussed advance care planning with patient; information given to patient to review.          5/23/2024   General Health   How would you rate your overall physical health? (!) FAIR          5/23/2024   Nutrition   Diet: Regular (no restrictions)         4/20/2023   Exercise   Frequency of exercise: None         5/23/2024   Social Factors   Worry food won't last until get money to buy more No   Food not last or not have enough money for food? No   Do you have housing?  Yes   Are you worried about losing your housing? No   Lack of transportation? No   Unable to get utilities (heat,electricity)? No         5/23/2024   Fall Risk   Fallen 2 or more times in the past year? No   Trouble with walking or balance? No          5/23/2024   Activities of Daily Living- Home Safety   Needs help with the following daily activites None of the above   Safety concerns in the home None of the above         5/23/2024   Dental   Dentist two times every year? (!) NO         5/23/2024   Hearing Screening   Hearing concerns? (!) IT'S HARD TO FOLLOW A CONVERSATION IN A NOISY RESTAURANT OR CROWDED ROOM.    (!) TROUBLE UNDESTANDING A SPEAKER IN A PUBLIC MEETING OR [facility] SERVICE.    (!) TROUBLE UNDERSTANDING SPEECH ON THE TELEPHONE         5/23/2024   Driving Risk Screening   Patient/family members have concerns about driving No         5/23/2024   General Alertness/Fatigue Screening   Have you been more tired than usual lately? (!) YES         5/23/2024   Urinary Incontinence Screening   Bothered by leaking urine in past 6 months Yes         5/23/2024   TB Screening   Were you born outside of the [place]? Decline       Today's PHQ-9 Score:       5/23/2024    12:57 PM   PHQ-9 SCORE   PHQ-9 Total Score MyChart 4 (Minimal depression)   PHQ-9 Total Score 4         5/23/2024   Substance Use   Alcohol more than 3/day or more than 7/wk No   Do you have a current opioid prescription? No   How severe/bad is pain from 1 to 10? 0/10 (No Pain)   Do you use any other substances recreationally? No     Social History     Tobacco Use    Smoking status: Former     Current packs/day: 0.00     Types: Cigarettes     Quit date: 7/31/2000     Years  since quittin.8    Smokeless tobacco: Never    Tobacco comments:     More of a social smoker   Vaping Use    Vaping status: Never Used   Substance Use Topics    Alcohol use: Yes     Alcohol/week: 0.0 standard drinks of alcohol     Comment: 0-3 a day. mixed drinks, wine    Drug use: No           2023   LAST FHS-7 RESULTS   1st degree relative breast or ovarian cancer No   Any relative bilateral breast cancer No   Any male have breast cancer No   Any ONE woman have BOTH breast AND ovarian cancer No   Any woman with breast cancer before 50yrs No   2 or more relatives with breast AND/OR ovarian cancer No   2 or more relatives with breast AND/OR bowel cancer No            ASCVD Risk   The 10-year ASCVD risk score (Meghan PETTY, et al., 2019) is: 17.8%    Values used to calculate the score:      Age: 73 years      Sex: Female      Is Non- : No      Diabetic: No      Tobacco smoker: No      Systolic Blood Pressure: 122 mmHg      Is BP treated: Yes      HDL Cholesterol: 65 mg/dL      Total Cholesterol: 241 mg/dL          Reviewed and updated as needed this visit by Provider   Tobacco  Allergies  Meds   Med Hx   Fam Hx            Past Medical History:   Diagnosis Date    Arthritis     Cancer (H) 2017    lymphoma    Cholelithiasis     Colon polyps  and     no polyps 2011    Constipation     Diffuse large B cell lymphoma (H) 2017    tongue, [facility] Dr. Wilkins [facility] and [facility]    Diverticulitis 2010    DVT (deep venous thrombosis) (H)     x3 (one due to OCs, 2 post op)    Genital herpes     GERD (gastroesophageal reflux disease)     Gout     History of depression     History of migraine     HTN (hypertension), benign     Hypothyroidism     hashimoto's thyroiditis    Lichen sclerosus et atrophicus     Osteopenia     Ovarian cyst 2011    Rectocele     Schatzki's ring 2009    EGD     Tibia/fibula fracture 2009    Vasovagal syncope 1966    Vertigo 2000     Past  Surgical History:   Procedure Laterality Date    APPENDECTOMY  1954    ARTHROSCOPY KNEE  1983    BONE MARROW BIOPSY, BONE SPECIMEN, NEEDLE/TROCAR N/A 7/31/2017    Procedure: BIOPSY BONE MARROW;  UNILATERAL BONE MARROW BIPOSY  ;  Surgeon: Jamil Mccarthy MD;  Location:  GI    BREAST BIOPSY, CORE RT/LT  1990    EXCISE GARNETT'S NEUROMA FOOT      HYSTERECTOMY, PAP NO LONGER INDICATED  1984    with bladder repair    LAPAROSCOPIC OOPHORECTOMY  2011    with the colon resection    LARYNGOSCOPY WITH BIOPSY(IES) N/A 7/24/2017    Procedure: LARYNGOSCOPY WITH BIOPSY(IES);  DIRECT LARYNGOSCOPY WITH TONGUE BIOPSIES;  Surgeon: Vernell Yang MD;  Location: [facility]    OPEN REDUCTION INTERNAL FIXATION TIBIA  2009    TONSILLECTOMY & ADENOIDECTOMY  1958    ZZC LAP,SURG,COLECTOMY, PARTIAL, W/ANAST  2011    due to recurrent diverticulitis     Lab work is in process  Labs reviewed in EPIC  BP Readings from Last 3 Encounters:   05/23/24 122/78   09/20/23 128/64   04/20/23 125/65    Wt Readings from Last 3 Encounters:   05/23/24 86.6 kg (191 lb)   04/20/23 85.2 kg (187 lb 12.8 oz)   04/14/22 84.4 kg (186 lb)                  Patient Active Problem List   Diagnosis    Constipation    HTN (hypertension), benign    Osteopenia    DVT (deep venous thrombosis) (H)    Cholelithiasis    Diverticulitis    Gout    Colon polyps    Schatzki's ring    Ovarian cyst    HCD (health care directive)    GERD (gastroesophageal reflux disease)    CARDIOVASCULAR SCREENING; LDL GOAL LESS THAN 130    Lichen sclerosus of female genitalia    Chronic gout without tophus, unspecified cause, unspecified site    Hypothyroidism, unspecified type    Diffuse large B-cell lymphoma of lymph nodes of head (H)    Genital herpes    Lichen sclerosus et atrophicus    Deep vein thrombosis (DVT) of other vein of lower extremity, unspecified chronicity, unspecified laterality (H)    Colitis    Gastrointestinal hemorrhage, unspecified gastrointestinal hemorrhage type      Past Surgical History:   Procedure Laterality Date    APPENDECTOMY      ARTHROSCOPY KNEE      BONE MARROW BIOPSY, BONE SPECIMEN, NEEDLE/TROCAR N/A 2017    Procedure: BIOPSY BONE MARROW;  UNILATERAL BONE MARROW BIPOSY  ;  Surgeon: Jamil Mccarthy MD;  Location:  GI    BREAST BIOPSY, CORE RT/LT      EXCISE GARNETT'S NEUROMA FOOT      HYSTERECTOMY, PAP NO LONGER INDICATED      with bladder repair    LAPAROSCOPIC OOPHORECTOMY      with the colon resection    LARYNGOSCOPY WITH BIOPSY(IES) N/A 2017    Procedure: LARYNGOSCOPY WITH BIOPSY(IES);  DIRECT LARYNGOSCOPY WITH TONGUE BIOPSIES;  Surgeon: Vernell Yang MD;  Location:  [facility]    OPEN REDUCTION INTERNAL FIXATION TIBIA      TONSILLECTOMY & ADENOIDECTOMY      ZZC LAP,SURG,COLECTOMY, PARTIAL, W/ANAST      due to recurrent diverticulitis       Social History     Tobacco Use    Smoking status: Former     Current packs/day: 0.00     Types: Cigarettes     Quit date: 2000     Years since quittin.8    Smokeless tobacco: Never    Tobacco comments:     More of a social smoker   Substance Use Topics    Alcohol use: Yes     Alcohol/week: 0.0 standard drinks of alcohol     Comment: 0-3 a day. mixed drinks, wine     Family History   Problem Relation Age of Onset    C.A.D. Mother         and PE    Cancer Father 65        gastric ca         Current Outpatient Medications   Medication Sig Dispense Refill    traZODone (DESYREL) 50 MG tablet Take 1 tablet (50 mg) by mouth at bedtime 30 tablet 2    acetaminophen (TYLENOL) 325 MG tablet Take 2 tablets (650 mg) by mouth every 4 hours as needed for mild pain or other (and adjunct with moderate or severe pain or per patient request)      allopurinol (ZYLOPRIM) 100 MG tablet Take 1 tablet (100 mg) by mouth daily 90 tablet 3    cetirizine (ZYRTEC) 10 MG tablet TAKE ONE TABLET BY MOUTH ONE TIME DAILY IN THE P.M. 90 tablet 3    conjugated estrogens (PREMARIN) cream Place 0.5 g  vaginally twice a week (Patient taking differently: Place 0.5 g vaginally twice a week As needed) 30 g 12    hydrochlorothiazide (HYDRODIURIL) 12.5 MG tablet Take 1 tablet (12.5 mg) by mouth daily. 90 tablet 3    levothyroxine (SYNTHROID/LEVOTHROID) 112 MCG tablet Take 1 tablet (112 mcg) by mouth daily 90 tablet 3    losartan (COZAAR) 100 MG tablet TAKE ONE TABLET BY MOUTH EVERY DAY 90 tablet 3    pantoprazole (PROTONIX) 20 MG EC tablet Take 1 tablet (20 mg) by mouth daily 90 tablet 0    valACYclovir (VALTREX) 500 MG tablet TAKE 1 TABLET BY MOUTH TWICE A DAY 12 tablet 0    XARELTO ANTICOAGULANT 10 MG TABS tablet Take 1 tablet (10 mg) by mouth daily (with dinner) 90 tablet 0    Zoledronic Acid (RECLAST IV) Once a year       Allergies   Allergen Reactions    Chlorhexidine Hives    Chlorhexidine Gluconate Hives    Codeine GI Disturbance    Evista [Raloxifene Hydrochloride] Other (See Comments)     Esophagus irritation     Fosamax Other (See Comments)     Esophagus irritation     Vicodin [Hydrocodone-Acetaminophen] GI Disturbance     Can Take oxycodone    Pen Vk [Penicillin V] Rash     Current providers sharing in care for this patient include:  Patient Care Team:  Robson Jaime PA-C as PCP - General (Physician Assistant - Medical)  Robson Jaime PA-C as Assigned PCP    The following health maintenance items are reviewed in Epic and correct as of today:  Health Maintenance   Topic Date Due    RSV VACCINE (Pregnancy & 60+) (1 - 1-dose 60+ series) Never done    ZOSTER IMMUNIZATION (1 of 2) 05/14/2012    MEDICARE ANNUAL WELLNESS VISIT  04/20/2024    TSH W/FREE T4 REFLEX  04/20/2024    COVID-19 Vaccine (6 - 2023-24 season) 07/18/2024    INFLUENZA VACCINE (Season Ended) 09/01/2024    PHQ-9  11/23/2024    MAMMO SCREENING  05/01/2025    ANNUAL REVIEW OF HM ORDERS  05/23/2025    FALL RISK ASSESSMENT  05/23/2025    COLORECTAL CANCER SCREENING  09/14/2026    GLUCOSE  09/20/2026    LIPID  04/20/2028    DTAP/TDAP/TD  "IMMUNIZATION (4 - Td or Tdap) 08/08/2028    ADVANCE CARE PLANNING  05/23/2029    DEXA  10/19/2038    HEPATITIS C SCREENING  Completed    DEPRESSION ACTION PLAN  Completed    Pneumococcal Vaccine: 65+ Years  Completed    IPV IMMUNIZATION  Aged Out    HPV IMMUNIZATION  Aged Out    MENINGITIS IMMUNIZATION  Aged Out    RSV MONOCLONAL ANTIBODY  Aged Out    LUNG CANCER SCREENING  Discontinued         Review of Systems  Constitutional, neuro, ENT, endocrine, pulmonary, cardiac, gastrointestinal, genitourinary, musculoskeletal, integument and psychiatric systems are negative, except as otherwise noted.     Objective    Exam  /78 (BP Location: Left arm, Patient Position: Sitting, Cuff Size: Adult Large)   Pulse 55   Temp 98  F (36.7  C)   Resp 16   Ht 1.631 m (5' 4.2\")   Wt 86.6 kg (191 lb)   SpO2 99%   BMI 32.58 kg/m     Estimated body mass index is 32.58 kg/m  as calculated from the following:    Height as of this encounter: 1.631 m (5' 4.2\").    Weight as of this encounter: 86.6 kg (191 lb).    Physical Exam  GENERAL: alert and no distress  EYES: Eyes grossly normal to inspection, PERRL and conjunctivae and sclerae normal  NECK: no adenopathy, no asymmetry, masses, or scars  RESP: lungs clear to auscultation - no rales, rhonchi or wheezes  CV: regular rate and rhythm, normal S1 S2, no S3 or S4, no murmur, click or rub, no peripheral edema  MS: no gross musculoskeletal defects noted, no edema  SKIN: no suspicious lesions or rashes  NEURO: Normal strength and tone, mentation intact and speech normal  PSYCH: mentation appears normal, affect normal/bright         5/23/2024   Mini Cog   Clock Draw Score 2 Normal   3 Item Recall 3 objects recalled   Mini Cog Total Score 5         The likelihood of other entities in the differential is insufficient to justify any further testing for them at this time. This was explained to the patient. The patient was advised that persistent or worsening symptoms would require " further evaluation. Patient advised to call the office and if unable to reach to go to the emergency room if they develop any new or worsening symptoms. Expressed understanding and agreement with above stated plan.     Signed Electronically by: Robson Jaime PA-C

## 2024-05-24 LAB
ALBUMIN SERPL BCG-MCNC: 4.2 G/DL (ref 3.5–5.2)
ALP SERPL-CCNC: 60 U/L (ref 40–150)
ALT SERPL W P-5'-P-CCNC: 9 U/L (ref 0–50)
ANION GAP SERPL CALCULATED.3IONS-SCNC: 11 MMOL/L (ref 7–15)
AST SERPL W P-5'-P-CCNC: 25 U/L (ref 0–45)
BILIRUB SERPL-MCNC: 0.6 MG/DL
BUN SERPL-MCNC: 17.3 MG/DL (ref 8–23)
CALCIUM SERPL-MCNC: 10.2 MG/DL (ref 8.8–10.2)
CHLORIDE SERPL-SCNC: 103 MMOL/L (ref 98–107)
CHOLEST SERPL-MCNC: 241 MG/DL
CREAT SERPL-MCNC: 0.91 MG/DL (ref 0.51–0.95)
DEPRECATED HCO3 PLAS-SCNC: 23 MMOL/L (ref 22–29)
EGFRCR SERPLBLD CKD-EPI 2021: 66 ML/MIN/1.73M2
FASTING STATUS PATIENT QL REPORTED: YES
FASTING STATUS PATIENT QL REPORTED: YES
GLUCOSE SERPL-MCNC: 103 MG/DL (ref 70–99)
HDLC SERPL-MCNC: 66 MG/DL
LDLC SERPL CALC-MCNC: 135 MG/DL
NONHDLC SERPL-MCNC: 175 MG/DL
POTASSIUM SERPL-SCNC: 4.6 MMOL/L (ref 3.4–5.3)
PROT SERPL-MCNC: 7 G/DL (ref 6.4–8.3)
SODIUM SERPL-SCNC: 137 MMOL/L (ref 135–145)
T4 FREE SERPL-MCNC: 1.66 NG/DL (ref 0.9–1.7)
TRIGL SERPL-MCNC: 200 MG/DL
TSH SERPL DL<=0.005 MIU/L-ACNC: 5 UIU/ML (ref 0.3–4.2)
URATE SERPL-MCNC: 5.2 MG/DL (ref 2.4–5.7)

## 2024-05-24 NOTE — RESULT ENCOUNTER NOTE
Ric Keating,    -Elevated TSH however normal T4.  Let's plan to repeat your thyroid studies in 6 to 8 weeks to see if we need to make an adjustment.  I will place the orders for you.    -Stable blood counts. No signs of anemia.     -Your comprehensive metabolic panel which includes tests of liver function (ALT, AST, total bilirubin, alkaline phosphatase), kidney function (creatinine, BUN), electrolytes (sodium, potassium, calcium), and blood sugar (glucose) returned stable for you.    -Normal uric acid level    -Cholesterol levels continue to remain elevated.  I would once again encourage a statin however I know you are not amenable to this at this time.  Focus on a healthy diet and exercise as we discussed.  Lets pursue coronary calcium score next year.    -A1c shows no sign of diabetes or prediabetes.    Let me know if you have any questions or concerns,     Robson Jaime PA-C  [facility]

## 2024-06-13 ENCOUNTER — MYC MEDICAL ADVICE (OUTPATIENT)
Dept: FAMILY MEDICINE | Facility: CLINIC | Age: 74
End: 2024-06-13
Payer: COMMERCIAL

## 2024-06-13 DIAGNOSIS — R23.4 BREAST SKIN CHANGES: ICD-10-CM

## 2024-06-13 DIAGNOSIS — N64.89 OTHER SPECIFIED DISORDERS OF BREAST: ICD-10-CM

## 2024-06-13 DIAGNOSIS — Z12.31 VISIT FOR SCREENING MAMMOGRAM: Primary | ICD-10-CM

## 2024-06-25 ENCOUNTER — HOSPITAL ENCOUNTER (OUTPATIENT)
Dept: MAMMOGRAPHY | Facility: CLINIC | Age: 74
Discharge: HOME | End: 2024-06-25
Attending: PHYSICIAN ASSISTANT
Payer: COMMERCIAL

## 2024-06-25 DIAGNOSIS — R23.4 BREAST SKIN CHANGES: ICD-10-CM

## 2024-06-25 DIAGNOSIS — Z12.31 VISIT FOR SCREENING MAMMOGRAM: ICD-10-CM

## 2024-06-25 DIAGNOSIS — N64.89 OTHER SPECIFIED DISORDERS OF BREAST: ICD-10-CM

## 2024-06-25 PROCEDURE — 76642 ULTRASOUND BREAST LIMITED: CPT | Mod: RT

## 2024-06-25 PROCEDURE — 77062 BREAST TOMOSYNTHESIS BI: CPT

## 2024-07-03 ENCOUNTER — HOSPITAL ENCOUNTER (OUTPATIENT)
Dept: MAMMOGRAPHY | Facility: CLINIC | Age: 74
Discharge: HOME | End: 2024-07-03
Attending: PHYSICIAN ASSISTANT
Payer: COMMERCIAL

## 2024-07-03 DIAGNOSIS — N63.11 MASS OF UPPER OUTER QUADRANT OF RIGHT BREAST: ICD-10-CM

## 2024-07-03 PROCEDURE — 88360 TUMOR IMMUNOHISTOCHEM/MANUAL: CPT | Mod: 26 | Performed by: PATHOLOGY

## 2024-07-03 PROCEDURE — 250N000009 HC RX 250: Performed by: PHYSICIAN ASSISTANT

## 2024-07-03 PROCEDURE — 88342 IMHCHEM/IMCYTCHM 1ST ANTB: CPT | Mod: TC,XU | Performed by: PHYSICIAN ASSISTANT

## 2024-07-03 PROCEDURE — 88342 IMHCHEM/IMCYTCHM 1ST ANTB: CPT | Mod: 26 | Performed by: PATHOLOGY

## 2024-07-03 PROCEDURE — 88305 TISSUE EXAM BY PATHOLOGIST: CPT | Mod: 26 | Performed by: PATHOLOGY

## 2024-07-03 PROCEDURE — 272N000615 US BREAST BIOPSY CORE NEEDLE RIGHT

## 2024-07-03 PROCEDURE — 999N000065 MA POST PROCEDURE RIGHT

## 2024-07-03 RX ADMIN — LIDOCAINE HYDROCHLORIDE 5 ML: 10 INJECTION, SOLUTION INFILTRATION; PERINEURAL at 13:30

## 2024-07-03 NOTE — DISCHARGE INSTRUCTIONS
After Your Breast Biopsy  Bleeding, bruising, and pain  Breast tenderness and some bruising is normal and may last several days. You may wear your bra overnight to support the breast.  You may use an ice pack for pain. Place it over the area for 15 to 20 minutes, several times a day.  You may take over-the-counter pain medicine:  On the day of the biopsy, we recommend Tylenol (acetaminophen) because it does not raise your risk of bleeding.  The next day, you may take an anti-inflammatory medicine (aspirin, ibuprofen, Motrin, Aleve, Advil), unless your doctor tells you not to.  Bandages and showering  Keep your bandage in place until tomorrow morning. Don't get it wet.  If you have small pieces of tape on the skin, leave them in place. They will fall off on their own, or you can remove them after 5 days.  You may shower the next morning after your biopsy.  Activity  No heavy activity (no running, no gym workouts, no lifting, no vacuuming, etc.) on the day of your biopsy.  You may go back to normal activity the next day. But limit what you do if you still have pain or discomfort.  Infection  Infection is rare. Signs of infection include:  Fever (including sweats and chills)  Redness  Pain that gets worse  Fluid draining from the biopsy site  Biopsy results  Results may take up to 5 business days.  A nurse or doctor from the Breast Center will call with your results. We will also send the results to the doctor that ordered your biopsy.  If you have not gotten your results in 5 days, please call the Breast Center.  Call the Breast Center with questions or if:   You have bleeding that lasts more than 20 minutes.  You have pain that you can't control.  You have signs of infection (fever, sweats, chills, redness, increasing pain, or drainage).  After hours, please call the doctor who ordered your biopsy.  For informational purposes only. Not to replace the advice of your health care provider. Copyright   2010 [facility]. All rights reserved. Clinically reviewed by Stephanie Vieira, Director, [facility]. [employer] [id number] - REV 08/23.

## 2024-07-08 LAB
PATH REPORT.COMMENTS IMP SPEC: ABNORMAL
PATH REPORT.COMMENTS IMP SPEC: YES
PATH REPORT.FINAL DX SPEC: ABNORMAL
PATH REPORT.GROSS SPEC: ABNORMAL
PATH REPORT.MICROSCOPIC SPEC OTHER STN: ABNORMAL
PATHOLOGY SYNOPTIC REPORT: ABNORMAL
PHOTO IMAGE: ABNORMAL

## 2024-07-08 NOTE — PROGRESS NOTES
"Malignant Path:  Pathology report reviewed with our breast radiologist Dr. Pablito Welch, who confirmed the recent breast imaging is concordant with the final surgical pathology results below.    I phoned Ms. Gregg \"Zuri\" Porsche, confirmed her full name, date of birth, and notified patient of Ultrasound Guided Right Breast Biopsy results showing Invasive Ductal Carcinoma, grade 1.  Estrogen/ Progesterone Receptors and HER2 are still pending.  Informed patient we will call her once results are available.    Patient states no problems with biopsy site.  Recommended follow up is Surgical and Medical Oncology Consultations.  Medical Oncology Consult has been scheduled with Dr. Jessica Garcia on 7/9/24 at 9:30 a.m. and then directly following she will have her Surgical Consult with Dr Virgie Yang on 7/9/24 at 10:15 a.m. at the [facility].   Patient has directions and phone numbers.    Questions were answered and I explained my role as Breast Care Nurse Coordinator in assisting her with appointments, resources and social support.  New diagnosis information packet will be available for patient at surgical consult.  I will follow up with the patient. She has my phone number if she has further questions.  Patient verbalized understanding and agrees with the plan of care.  Ordering provider- Robson Jaime - PAC has been notified of the results, recommendations for follow up: scheduled medical oncology and surgical consultations.  Patient asked that I also inform her Lymphoma Oncologist:  Dr. Mitchell Wilkins.   I will forward this note, along with the pathology results.    Ciara Shell RN, BSN  Breast Care Nurse Coordinator  [facility] [place]  [phone]        Marysol Morrell [id number]  F, 1950  Surgical Pathology Report (Final result) [id number]  Authorizing Provider: Robson Jaime PA-C Ordering " Provider: Robson Jaime PA-C  Ordering Location: [facility]  Breast [place]  Collected: 07/03/2024 01:37 PM  Pathologist: Esteban Chiang MD Received: 07/03/2024 02:27 PM  .  Specimens  A Breast, Right  .  .  Final Diagnosis  Breast, right, 930, 10 cm from nipple, ultrasound core biopsy-  Invasive ductal carcinoma, Cecile grade 1 with associated stromal calcification  Electronically signed by Esteban Chiang MD on 7/8/2024 at 9:39 AM

## 2024-07-08 NOTE — PROGRESS NOTES
[facility]    Hematology/Oncology New Patient Note      Today's Date: 07/08/24    Reason for Consult: Right breast cancer.    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 6/2017: Developed oropharyngeal dysphagia. Found to have indurated area, 2 x 1 cm on left side of anterior 2/3's of tongue and multiple left anterior neck nodes.  2. 7/24/2017: Laryngoscopy showed 2 x 1 cm exophytic lesion of left posterolateral oral tongue and diffuse lobular fullness of tongue base.  Biopsy of left lateral tongue and base of tongue showed CD20-positive diffuse large B-cell lymphoma, germinal center type; Ki-67 = 70%; histologic features and proliferation markers did not indicate double hit lymphoma so FISH not performed.  3. 8/2/2017: PET/CT showed hypermetabolic soft tissue thickening at base of tongue, bilateral abisai metastases at this level with single right hypermetabolic node and multiple left-sided hypermetabolic nodes; hypermetabolic 0.8 cm right thyroid nodule; hypermetabolic nonenlarged right axillary node, nonspecific and measuring 1.2 cm; remaining exam unremarkable.  4. 7/31/2017: Bone marrow biopsy negative for lymphoma.  5. 8/7/2017: Started R-CHOP chemotherapy under care of Dr. Mitchell Wilkins at [facility].  6. 10/12/2017: PET/CT after 3 cycles of chemo showed resolution of enlarged and hypermetabolic lymph nodes of neck and at base of tongue.  7. 12/6/2017: Completed 6 cycles of R-CHOP.  8. 1/16/2018: PET/CT showed complete response.  9. 6/25/2024: Diagnostic bilateral mammogram obtained due to right breast nipple/areolar itching.  This showed spiculated mass at 9:00 in right breast, 10 cm from nipple. Right breast U/S  showed 0.7 x 0.5 x 0.7 cm mass at 9:30, 10 cm from nipple; no right axillary adenopathy.  10.  7/3/2024: Right breast biopsy at 9:30, 10 cm from nipple showed grade 1 invasive ductal carcinoma, ER positive %, IL moderate positive 5%, HER-2 negative (IHC = 0)    Zuri reports persistent deeper right subareolar pruritus without associated rash for multiple years, since 2017.    REVIEW OF SYSTEMS:   14 point ROS was reviewed and is negative other than as noted above in HPI.       HOME MEDICATIONS:  Current Outpatient Medications   Medication Sig Dispense Refill    acetaminophen (TYLENOL) 325 MG tablet Take 2 tablets (650 mg) by mouth every 4 hours as needed for mild pain or other (and adjunct with moderate or severe pain or per patient request)      allopurinol (ZYLOPRIM) 100 MG tablet Take 1 tablet (100 mg) by mouth daily 90 tablet 3    cetirizine (ZYRTEC) 10 MG tablet TAKE ONE TABLET BY MOUTH ONE TIME DAILY IN THE P.M. 90 tablet 3    conjugated estrogens (PREMARIN) cream Place 0.5 g vaginally twice a week (Patient taking differently: Place 0.5 g vaginally twice a week As needed) 30 g 12    hydrochlorothiazide (HYDRODIURIL) 12.5 MG tablet Take 1 tablet (12.5 mg) by mouth daily. 90 tablet 3    levothyroxine (SYNTHROID/LEVOTHROID) 112 MCG tablet Take 1 tablet (112 mcg) by mouth daily 90 tablet 3    losartan (COZAAR) 100 MG tablet TAKE ONE TABLET BY MOUTH EVERY DAY 90 tablet 3    pantoprazole (PROTONIX) 20 MG EC tablet Take 1 tablet (20 mg) by mouth daily 90 tablet 0    traZODone (DESYREL) 50 MG tablet Take 1 tablet (50 mg) by mouth at bedtime 30 tablet 2    valACYclovir (VALTREX) 500 MG tablet TAKE 1 TABLET BY MOUTH TWICE A DAY 12 tablet 0    XARELTO ANTICOAGULANT 10 MG TABS tablet Take 1 tablet (10 mg) by mouth daily (with dinner) 90 tablet 0    Zoledronic Acid (RECLAST IV) Once a year           ALLERGIES:  Allergies   Allergen Reactions    Chlorhexidine Hives    Chlorhexidine Gluconate Hives    Codeine GI  Disturbance    Evista [Raloxifene Hydrochloride] Other (See Comments)     Esophagus irritation     Fosamax Other (See Comments)     Esophagus irritation     Vicodin [Hydrocodone-Acetaminophen] GI Disturbance     Can Take oxycodone    Pen Vk [Penicillin V] Rash         PAST MEDICAL HISTORY:  Past Medical History:   Diagnosis Date    Arthritis     Cancer (H) 2017    lymphoma    Cholelithiasis     Colon polyps 2001 and 2006    no polyps 2011    Constipation     Diffuse large B cell lymphoma (H) 2017    tongue, followed by Dr. Wilkins at [facility] and [facility]    Diverticulitis 2010    DVT (deep venous thrombosis) (H)     x3 (one due to OCs, 2 post op)    Genital herpes     GERD (gastroesophageal reflux disease)     Gout     History of depression     History of migraine     HTN (hypertension), benign     Hypothyroidism     hashimoto's thyroiditis    Lichen sclerosus et atrophicus     Osteopenia     Ovarian cyst 2011    Rectocele     Schatzki's ring 2009    EGD     Tibia/fibula fracture 2009    Vasovagal syncope 1966    Vertigo 2000         PAST SURGICAL HISTORY:  Past Surgical History:   Procedure Laterality Date    APPENDECTOMY  1954    ARTHROSCOPY KNEE  1983    BONE MARROW BIOPSY, BONE SPECIMEN, NEEDLE/TROCAR N/A 7/31/2017    Procedure: BIOPSY BONE MARROW;  UNILATERAL BONE MARROW BIPOSY  ;  Surgeon: Jamil Mccarthy MD;  Location:  GI    BREAST BIOPSY, CORE RT/LT  1990    EXCISE GARNETT'S NEUROMA FOOT      HYSTERECTOMY, PAP NO LONGER INDICATED  1984    with bladder repair    LAPAROSCOPIC OOPHORECTOMY  2011    with the colon resection    LARYNGOSCOPY WITH BIOPSY(IES) N/A 7/24/2017    Procedure: LARYNGOSCOPY WITH BIOPSY(IES);  DIRECT LARYNGOSCOPY WITH TONGUE BIOPSIES;  Surgeon: [facility] MD;  Location:  SD    OPEN REDUCTION INTERNAL FIXATION TIBIA  2009    TONSILLECTOMY & ADENOIDECTOMY  1958    [email or web address] LAP,SURG,COLECTOMY, PARTIAL, W/ANAST  2011    due to recurrent diverticulitis         SOCIAL  HISTORY:  Social History     Socioeconomic History    Marital status:      Spouse name: Not on file    Number of children: Not on file    Years of education: Not on file    Highest education level: Not on file   Occupational History    Not on file   Tobacco Use    Smoking status: Former     Current packs/day: 0.00     Types: Cigarettes     Quit date: 2000     Years since quittin.9    Smokeless tobacco: Never    Tobacco comments:     More of a social smoker   Vaping Use    Vaping status: Never Used   Substance and Sexual Activity    Alcohol use: Yes     Alcohol/week: 0.0 standard drinks of alcohol     Comment: 0-3 a day. mixed drinks, wine    Drug use: No    Sexual activity: Not Currently     Partners: Male   Other Topics Concern    Parent/sibling w/ CABG, MI or angioplasty before 65F 55M? 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 [employer]    [employer]telaine Luther     Social Determinants of Health     Financial Resource Strain: Low Risk  (2024)    Financial Resource Strain     Within the past 12 months, have you or your family members you live with been unable to get utilities (heat, electricity) when it was really needed?: No   Food Insecurity: Low Risk  (2024)    Food Insecurity     Within the past 12 months, did you worry that your food would run out before you got money to buy more?: No     Within the past 12 months, did the food you bought just not last and you didn t have money to get more?: No   Transportation Needs: Low Risk  (2024)    Transportation Needs     Within the past 12 months, has lack of transportation kept you from medical appointments, getting your medicines, non-medical meetings or appointments, work, or from getting things that you need?: No   Physical Activity: Not on file   Stress: Not on file   Social Connections: Unknown (2022)    [facility] [employer] [facility]  "   Social Connections     Frequency of Communication with Friends and Family: Not on file   Interpersonal Safety: Low Risk  (2024)    Interpersonal Safety     Do you feel physically and emotionally safe where you currently live?: Yes     Within the past 12 months, have you been hit, slapped, kicked or otherwise physically hurt by someone?: No     Within the past 12 months, have you been humiliated or emotionally abused in other ways by your partner or ex-partner?: No   Housing Stability: Low Risk  (2024)    Housing Stability     Do you have housing? : Yes     Are you worried about losing your housing?: No   ;  passed away in .      FAMILY HISTORY:  Family History   Problem Relation Age of Onset    C.A.D. Mother         and PE    Cancer Father 65        gastric ca         PHYSICAL EXAM:  Vital signs:  /77   Pulse 68   Resp 16   Ht 1.638 m (5' 4.5\")   Wt 83.9 kg (185 lb)   SpO2 100%   BMI 31.26 kg/m     ECO  GENERAL/CONSTITUTIONAL: No acute distress.  EYES:  No scleral icterus.  ENT/MOUTH: Neck supple. Oropharynx grossly clear.  LYMPH: No cervical, supraclavicular, axillary adenopathy.   BREAST: Diffuse fibronodular breast tissue bilaterally with greater nodularity in the right central breast; no scaling or discharge from right nipple; no masses in left breast; nipples everted.  RESPIRATORY: No audible cough or wheezing.   GASTROINTESTINAL: No hepatosplenomegaly, masses, or tenderness.  No guarding.  No distention.  MUSCULOSKELETAL: Warm and well-perfused, no cyanosis, clubbing, or edema.  NEUROLOGIC: No focal motor deficits. Alert, oriented, answers questions appropriately.  INTEGUMENTARY: No rashes or jaundice.  GAIT: Steady, does not use assistive device      LABS:  CBC RESULTS:   Recent Labs   Lab Test 24  1401   WBC 7.7   RBC 4.36   HGB 13.9   HCT 41.8   MCV 96   MCH 31.9   MCHC 33.3   RDW 11.7          Recent Labs   Lab Test 24  1401 23  1232 "    141   POTASSIUM 4.6 3.6   CHLORIDE 103 105   CO2 23 22   ANIONGAP 11 14   * 102*   BUN 17.3 12.1   CR 0.91 0.89   EMILIANO 10.2 9.2         PATHOLOGY:  Reviewed as per HPI.    IMAGING:  Reviewed as per HPI.    ASSESSMENT/PLAN:  Marysol Morrell is a 73 year old female with the following issues:  1. Stage IA, [id number], grade 1 invasive ductal carcinoma of right breast overlapping sites, ER positive, NC positive, HER-2 negative  --I reviewed Zuri's breast imaging and biopsy pathology.  She has a very small prognostically favorable right breast cancer that is low grade, ER/NC positive, HER-2 negative.  --Advised lumpectomy.  Discussed role of Oncotype DX assay -- she is very interested in this for prognostic value.  Discussed I would not recommend adjuvant chemotherapy given that she has received prior chemo for her lymphoma and quite a bit of alkylating agents already.  --Discussed role of radiation following lumpectomy and role of adjuvant endocrine therapy.  --I discussed the potential side effects of anastrozole, including but not limited to: fatigue, myalgias/arthralgias, bone density loss, decrease sexual drive, vaginal dryness, nausea, headache, difficulty sleeping, hot flashes, night sweats, small cardiovascular risk. She expresses some initial hesitance about hormone blockade therapy due to her existing arthritis but ultimately is willing to try it.  Given the possibility she might not tolerate AI, I emphasized importance of radiation.  --Will obtain CBC and CMP today.    2. History of diffuse large B-cell lymphoma of tongue region and neck lymph nodes  --Diagnosed 7/24/2017 and completed 6 cycles of R-CHOP elsewhere on 12/6/2017 with clinical complete response.  --Discussed she can have annual follow-up with me for this.    3. History of right calf DVT/PE  --Has self-reported history of DVT related to OCPs age 30's and also phlebitis.  --[place] to be unprovoked in 2017 although she was  diagnosed with diffuse large B-cell lymphoma around that time.  She was placed on long term anticoagulation.  --She will continue with Xarelto but hold for 2 days prior to breast surgery.  No bridging needed.    4. Vaginal atrophy  --Discussed okay for her to continue vaginal estrogen cream twice weekly.    Return after surgery.    Jessica Garcia MD  [facility] Hematology/Oncology    Total time spent today: 60 minutes in chart review, patient evaluation, counseling, documentation, test and/or medication/prescription orders, and coordination of care.

## 2024-07-09 ENCOUNTER — OFFICE VISIT (OUTPATIENT)
Dept: SURGERY | Facility: CLINIC | Age: 74
End: 2024-07-09
Attending: PHYSICIAN ASSISTANT
Payer: COMMERCIAL

## 2024-07-09 ENCOUNTER — ONCOLOGY VISIT (OUTPATIENT)
Dept: ONCOLOGY | Facility: CLINIC | Age: 74
End: 2024-07-09
Attending: PHYSICIAN ASSISTANT
Payer: COMMERCIAL

## 2024-07-09 ENCOUNTER — LAB (OUTPATIENT)
Dept: LAB | Facility: CLINIC | Age: 74
End: 2024-07-09
Payer: COMMERCIAL

## 2024-07-09 VITALS
DIASTOLIC BLOOD PRESSURE: 77 MMHG | RESPIRATION RATE: 16 BRPM | OXYGEN SATURATION: 100 % | HEIGHT: 65 IN | BODY MASS INDEX: 30.82 KG/M2 | SYSTOLIC BLOOD PRESSURE: 126 MMHG | HEART RATE: 68 BPM | WEIGHT: 185 LBS

## 2024-07-09 VITALS
SYSTOLIC BLOOD PRESSURE: 126 MMHG | HEART RATE: 68 BPM | BODY MASS INDEX: 30.82 KG/M2 | WEIGHT: 185 LBS | RESPIRATION RATE: 16 BRPM | DIASTOLIC BLOOD PRESSURE: 77 MMHG | HEIGHT: 65 IN | OXYGEN SATURATION: 100 %

## 2024-07-09 DIAGNOSIS — C50.811 MALIGNANT NEOPLASM OF OVERLAPPING SITES OF RIGHT BREAST (H): ICD-10-CM

## 2024-07-09 DIAGNOSIS — Z85.72 HISTORY OF DIFFUSE LARGE B-CELL LYMPHOMA: ICD-10-CM

## 2024-07-09 DIAGNOSIS — Z17.0 MALIGNANT NEOPLASM OF UPPER-OUTER QUADRANT OF RIGHT BREAST IN FEMALE, ESTROGEN RECEPTOR POSITIVE (H): Primary | ICD-10-CM

## 2024-07-09 DIAGNOSIS — C50.411 MALIGNANT NEOPLASM OF UPPER-OUTER QUADRANT OF RIGHT BREAST IN FEMALE, ESTROGEN RECEPTOR POSITIVE (H): Primary | ICD-10-CM

## 2024-07-09 DIAGNOSIS — E03.9 HYPOTHYROIDISM, UNSPECIFIED TYPE: ICD-10-CM

## 2024-07-09 DIAGNOSIS — Z86.718 HISTORY OF VENOUS THROMBOEMBOLISM: ICD-10-CM

## 2024-07-09 DIAGNOSIS — C50.811 MALIGNANT NEOPLASM OF OVERLAPPING SITES OF RIGHT BREAST (H): Primary | ICD-10-CM

## 2024-07-09 DIAGNOSIS — N95.2 VAGINAL ATROPHY: ICD-10-CM

## 2024-07-09 LAB
BASOPHILS # BLD AUTO: 0 10E3/UL (ref 0–0.2)
BASOPHILS NFR BLD AUTO: 1 %
EOSINOPHIL # BLD AUTO: 0.2 10E3/UL (ref 0–0.7)
EOSINOPHIL NFR BLD AUTO: 3 %
ERYTHROCYTE [DISTWIDTH] IN BLOOD BY AUTOMATED COUNT: 12 % (ref 10–15)
HCT VFR BLD AUTO: 41.5 % (ref 35–47)
HGB BLD-MCNC: 14 G/DL (ref 11.7–15.7)
IMM GRANULOCYTES # BLD: 0 10E3/UL
IMM GRANULOCYTES NFR BLD: 0 %
LYMPHOCYTES # BLD AUTO: 2.6 10E3/UL (ref 0.8–5.3)
LYMPHOCYTES NFR BLD AUTO: 33 %
MCH RBC QN AUTO: 32.3 PG (ref 26.5–33)
MCHC RBC AUTO-ENTMCNC: 33.7 G/DL (ref 31.5–36.5)
MCV RBC AUTO: 96 FL (ref 78–100)
MONOCYTES # BLD AUTO: 0.8 10E3/UL (ref 0–1.3)
MONOCYTES NFR BLD AUTO: 9 %
NEUTROPHILS # BLD AUTO: 4.4 10E3/UL (ref 1.6–8.3)
NEUTROPHILS NFR BLD AUTO: 54 %
PLATELET # BLD AUTO: 260 10E3/UL (ref 150–450)
RBC # BLD AUTO: 4.33 10E6/UL (ref 3.8–5.2)
WBC # BLD AUTO: 8 10E3/UL (ref 4–11)

## 2024-07-09 PROCEDURE — 84443 ASSAY THYROID STIM HORMONE: CPT

## 2024-07-09 PROCEDURE — 99205 OFFICE O/P NEW HI 60 MIN: CPT | Performed by: SURGERY

## 2024-07-09 PROCEDURE — 99205 OFFICE O/P NEW HI 60 MIN: CPT | Performed by: INTERNAL MEDICINE

## 2024-07-09 PROCEDURE — 85025 COMPLETE CBC W/AUTO DIFF WBC: CPT

## 2024-07-09 PROCEDURE — 84439 ASSAY OF FREE THYROXINE: CPT

## 2024-07-09 PROCEDURE — 36415 COLL VENOUS BLD VENIPUNCTURE: CPT

## 2024-07-09 PROCEDURE — 80053 COMPREHEN METABOLIC PANEL: CPT

## 2024-07-09 PROCEDURE — G0463 HOSPITAL OUTPT CLINIC VISIT: HCPCS

## 2024-07-09 RX ORDER — CLINDAMYCIN PHOSPHATE 900 MG/50ML
900 INJECTION, SOLUTION INTRAVENOUS
Status: CANCELLED | OUTPATIENT
Start: 2024-07-09

## 2024-07-09 RX ORDER — CLINDAMYCIN PHOSPHATE 900 MG/50ML
900 INJECTION, SOLUTION INTRAVENOUS SEE ADMIN INSTRUCTIONS
Status: CANCELLED | OUTPATIENT
Start: 2024-07-09

## 2024-07-09 ASSESSMENT — PAIN SCALES - GENERAL: PAINLEVEL: NO PAIN (0)

## 2024-07-09 NOTE — PROGRESS NOTES
Breast Patients        1-Do you have any of the following symptoms? Lump(s) or Mass(es)  2-In which breast are you having the symptoms? right  3-Have you had a Mammogram? Yes, no answer on location or date  4-Have you ever had a breast cyst drained? No  5-Have you ever had a breast biopsy? Yes:  Right  -  Date:  Week of 7/1/24  6-Have you ever had Breast Cancer? No   7-Is there a history of Breast Cancer in your family? No  8-Have you ever had Ovarian Cancer? No  9-Is there a history of Ovarian Cancer in your family? No  10-Is there a history of Colon Cancer in your family?  No  11-Is there a history of Uterine Cancer in your family?  No  12-Any known genetic abnormalities in your family?  No  13-Summarize your caffeine intake (i.e. coffee, tea, chocolate, soda etc.): 1-2 cups per day        14-What age did your periods begin?  11      15-Date your last menstrual period began?  1984 - hysterectomy in 1984  16-Number of full-term pregnancies:  2         17-Your age when your first child was born? 21  18-Did you nurse your children? Yes  19-Are you pregnant now? No  20-Have you begun menopause?  N/A  21-Have you had either ovary removed?Yes  Date of Surgery:  2011 22-Do you have breast implants? No   23-Have you used hormone replacement therapy?  Yes  Type: Premarin Cream  Dosage: No answer  24-Have you taken oral contraceptive pills?  Yes, For how many years?  Approx: 6 years  25-Have you had an intrauterine device (IUD) placed?  No  26-What is your current bra size?  40 c/d

## 2024-07-09 NOTE — NURSING NOTE
Breast Nurse Care Coordination:  I introduced self to patient, and her daughters:  Elena and Lucía, and explained my role of breast nurse coordinator. I accompanied Zuri to her surgical consultation on 7/9/24 with Dr. Virgie Yang at the [facility].      Zuri was given the new breast cancer patient packet which includes educational material and support resources such as [employer], American Cancer Society, Fighting Cancer through Diet and Lifestyle, [employer], [facility], [employer] [place],  and the [facility] Breast Cancer Support Group.  I also enclosed a copy of her Right breast biopsy pathology report(07/03/2024).       At the end of the consultation, we reviewed Zuri's plan of care and education.  The plan is for patient to have a RFID tag localized Right Breast Lumpectomy.    Informed patient she will need a preop exam with her primary provider within 30 days of her surgery.    I gave patient Peter educational materials regarding Exercises After Breast Surgery, [place] Lymph Node Biopsy, and Lumpectomy  Informed patient for lumpectomy surgery, she will want to have two good supportive sports bras that open in the front to wear the 2 weeks following her surgery. I gave patient information on different options to purchase sports bras.     I answered her questions and encouraged patient to call me back with any future questions or concerns.  Zuri has my contact information, and knows to contact me in the future with any questions or concerns.     Ciara Shell, RN, BSN  Breast Care Nurse Coordinator  [facility]  [phone]

## 2024-07-09 NOTE — PROGRESS NOTES
[facility] Breast Surgery Consultation    HPI:   Marysol Morrell is a 73 year old female who is seen in consultation at the request of Robson Jaime PA-C  for evaluation of newly diagnosed right breast invasive ductal carcinoma, grade 1 ER 91 to 100% MS 5% and HER2 negative at 9:30, 10 cm from nipple measuring 7 mm in size.     Zuri presented for diagnostic breast imaging due to a right breast nipple/areolar itching.  Mammogram revealed a small spiculated mass at 9:00 on the right breast, 10 cm from nipple.  Ultrasound evaluation confirmed a hypoechoic mass at 930, 10 cm from nipple measuring 7 mm in size.  Axillary ultrasound was negative.  She then underwent ultrasound-guided core needle biopsy with the above results.    She has a personal history of lymphoma, treated in 2017. She on xarelto due to history of prior PE at the time of lymphoma and also in her 20s (while on OCPs). She has held this for prior procedures without issue. She has tolerated general anesthesia fine in the past.     Hormonal history:  menarche 11, 2 children, 1st at age 21,  post menopausal, 6 years OCP use, no HRT, no fertility treatment.     Family history of breast cancer: No  Family history of ovarian cancer:  No  Family history of colon cancer: No  Family history of prostate cancer: No      Past Medical History:   has a past medical history of Arthritis, Cancer (H) (2017), Cholelithiasis, Colon polyps (2001 and 2006), Constipation, Diffuse large B cell lymphoma (H) (2017), Diverticulitis (2010), DVT (deep venous thrombosis) (H), Genital herpes, GERD (gastroesophageal reflux disease), Gout, History of depression, History of migraine, HTN (hypertension), benign, Hypothyroidism, Lichen sclerosus et atrophicus, Osteopenia, Ovarian cyst (2011), Rectocele, Schatzki's ring (2009), Tibia/fibula fracture (2009), Vasovagal syncope (1966), and Vertigo (2000).      Current Outpatient Medications:     acetaminophen (TYLENOL) 325 MG  tablet, Take 2 tablets (650 mg) by mouth every 4 hours as needed for mild pain or other (and adjunct with moderate or severe pain or per patient request), Disp: , Rfl:     allopurinol (ZYLOPRIM) 100 MG tablet, Take 1 tablet (100 mg) by mouth daily, Disp: 90 tablet, Rfl: 3    cetirizine (ZYRTEC) 10 MG tablet, TAKE ONE TABLET BY MOUTH ONE TIME DAILY IN THE P.M., Disp: 90 tablet, Rfl: 3    conjugated estrogens (PREMARIN) cream, Place 0.5 g vaginally twice a week (Patient taking differently: Place 0.5 g vaginally twice a week As needed), Disp: 30 g, Rfl: 12    hydrochlorothiazide (HYDRODIURIL) 12.5 MG tablet, Take 1 tablet (12.5 mg) by mouth daily., Disp: 90 tablet, Rfl: 3    levothyroxine (SYNTHROID/LEVOTHROID) 112 MCG tablet, Take 1 tablet (112 mcg) by mouth daily, Disp: 90 tablet, Rfl: 3    losartan (COZAAR) 100 MG tablet, TAKE ONE TABLET BY MOUTH EVERY DAY, Disp: 90 tablet, Rfl: 3    pantoprazole (PROTONIX) 20 MG EC tablet, Take 1 tablet (20 mg) by mouth daily, Disp: 90 tablet, Rfl: 0    traZODone (DESYREL) 50 MG tablet, Take 1 tablet (50 mg) by mouth at bedtime, Disp: 30 tablet, Rfl: 2    valACYclovir (VALTREX) 500 MG tablet, TAKE 1 TABLET BY MOUTH TWICE A DAY, Disp: 12 tablet, Rfl: 0    XARELTO ANTICOAGULANT 10 MG TABS tablet, Take 1 tablet (10 mg) by mouth daily (with dinner), Disp: 90 tablet, Rfl: 0    Zoledronic Acid (RECLAST IV), Once a year, Disp: , Rfl:     Past Surgical History:  Past Surgical History:   Procedure Laterality Date    APPENDECTOMY  1954    ARTHROSCOPY KNEE  1983    BONE MARROW BIOPSY, BONE SPECIMEN, NEEDLE/TROCAR N/A 7/31/2017    Procedure: BIOPSY BONE MARROW;  UNILATERAL BONE MARROW BIPOSY  ;  Surgeon: Jamil Mccarthy MD;  Location:  GI    BREAST BIOPSY, CORE RT/LT  1990    EXCISE GARNETT'S NEUROMA FOOT      HYSTERECTOMY, PAP NO LONGER INDICATED  1984    with bladder repair    LAPAROSCOPIC OOPHORECTOMY  2011    with the colon resection    LARYNGOSCOPY WITH BIOPSY(IES) N/A 7/24/2017     Procedure: LARYNGOSCOPY WITH BIOPSY(IES);  DIRECT LARYNGOSCOPY WITH TONGUE BIOPSIES;  Surgeon: Vernell Yang MD;  Location: [facility]    OPEN REDUCTION INTERNAL FIXATION TIBIA  2009    TONSILLECTOMY & ADENOIDECTOMY      ZZC LAP,SURG,COLECTOMY, PARTIAL, W/ANAST      due to recurrent diverticulitis           Allergies   Allergen Reactions    Chlorhexidine Hives    Chlorhexidine Gluconate Hives    Codeine GI Disturbance    Evista [Raloxifene Hydrochloride] Other (See Comments)     Esophagus irritation     Fosamax Other (See Comments)     Esophagus irritation     Vicodin [Hydrocodone-Acetaminophen] GI Disturbance     Can Take oxycodone    Pen Vk [Penicillin V] Rash        Social History:  Social History     Socioeconomic History    Marital status:      Spouse name: Not on file    Number of children: Not on file    Years of education: Not on file    Highest education level: Not on file   Occupational History    Not on file   Tobacco Use    Smoking status: Former     Current packs/day: 0.00     Types: Cigarettes     Quit date: 2000     Years since quittin.9    Smokeless tobacco: Never    Tobacco comments:     More of a social smoker   Vaping Use    Vaping status: Never Used   Substance and Sexual Activity    Alcohol use: Yes     Alcohol/week: 0.0 standard drinks of alcohol     Comment: 0-3 a day. mixed drinks, wine    Drug use: No    Sexual activity: Not Currently     Partners: Male   Other Topics Concern    Parent/sibling w/ CABG, MI or angioplasty before 65F 55M? 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[employer], elaine sung     Social Determinants of Health     Financial Resource Strain: Low Risk  (2024)    Financial Resource Strain     Within the past 12 months, have you or your family members you live with been unable to get utilities (heat, electricity) when it was really needed?: No   Food Insecurity: Low Risk  (2024)    Food Insecurity      "Within the past 12 months, did you worry that your food would run out before you got money to buy more?: No     Within the past 12 months, did the food you bought just not last and you didn t have money to get more?: No   Transportation Needs: Low Risk  (5/23/2024)    Transportation Needs     Within the past 12 months, has lack of transportation kept you from medical appointments, getting your medicines, non-medical meetings or appointments, work, or from getting things that you need?: No   Physical Activity: Not on file   Stress: Not on file   Social Connections: Unknown (1/1/2022)    [facility]    Social Connections     Frequency of Communication with Friends and Family: Not on file   Interpersonal Safety: Low Risk  (5/23/2024)    Interpersonal Safety     Do you feel physically and emotionally safe where you currently live?: Yes     Within the past 12 months, have you been hit, slapped, kicked or otherwise physically hurt by someone?: No     Within the past 12 months, have you been humiliated or emotionally abused in other ways by your partner or ex-partner?: No   Housing Stability: Low Risk  (5/23/2024)    Housing Stability     Do you have housing? : Yes     Are you worried about losing your housing?: No        ROS:  The 10 point review of systems is negative other than noted in the HPI and above.    PE:  Vitals: /77   Pulse 68   Resp 16   Ht 1.638 m (5' 4.5\")   Wt 83.9 kg (185 lb)   SpO2 100%   BMI 31.26 kg/m    General appearance: well-nourished, sitting comfortably, no apparent distress  Psych: normal affect, pleasant  HEENT:  Head normocephalic and atraumatic, pupils equal and round, conjunctivae clear, mucous membranes moist, external ears and nose normal  Neck: Supple without thyromegaly or masses  Lungs: Respirations unlabored  Lymphatic: No cervical, or supraclavicular lymphadenopathy  Extremities: " Without edema  Musculoskeletal:  Normal station and gait  Neurologic: nonfocal, grossly intact times four extremities, alert and oriented times three  Psychiatric: Mood and affect are appropriate  Skin: Without lesions or rashes    Breast:  A bilateral breast exam was performed in the supine position.. Bilateral breasts were palpated in a circumferential clockwise fashion including the supraclavicular and axillary areas.   Breasts are symmetric. Skin is normal. No bruising on the right breast. NAC normal. There are no palpable masses in either breast. Tissue is heterogeneously dense.     Lymph:       No supraclavicular/infraclavicular adenopathy.   Axillary adenopathy: none    Assessment: Right breast invasive ductal carcinoma, grade 1 ER 91 to 100% TX 5% and HER2 negative at 9:30, 10 cm from nipple measuring 7 mm in size.     Plan:   Marysol Morrell is a 73 year old female has newly diagnosed right breast cancer.  I reviewed the imaging and pathology reports with her and daughters and explained the findings.  We talked about the fact that this is invasive ductal carcinoma  that is small in size.  We discussed the receptor status. We discussed that breast cancer is treated in a multidisciplinary fashion and she will also meet with oncology as well as radiation oncology pending surgical decision making and final pathology results.     We next discussed the surgical options for treatment.  I described the procedures for tag localized oncoplastic partial mastectomy (ie. Lumpectomy) with sentinel lymph node biopsy and mastectomy with sentinel lymph node biopsy, possible axillary node dissection including the details of the procedures, the risks, anesthesia and expected recovery.      I reviewed the data regarding lumpectomy and radiation vs mastectomy that that local risk recurrence risk is less than 5% with either lumpectomy or mastectomy and that survival is equivalent with the newer studies actually showing  slight survival benefit with lumpectomy and radiation for patients who are candidates for both.  I also explained that since this is a small tumor and was not palpable on clinical breast exam prior to the biopsy, I would ask radiology to place a radiofrequency ID tag pre-operatively for localization. We discussed the role of oncoplastic lumpectomy. We discussed the risk of margin positivity following partial mastectomy surgery and need for possible return to the operating room for additional procedures if margins are positive.     I advised that lymph node biopsy is recommended whenever we are dealing with invasive breast cancer and described the procedure for sentinel lymph node biopsy.  We discussed that in women over 70 it is reasonable to consider omission of SLNB. I would not recommend SLNB according to [employer] guidelines.     We talked about post-lumpectomy radiation, the course and usual side effects. We discussed that with lumpectomy, radiation is typically recommended to decrease risk of recurrence. It may be necessary following mastectomy depending on final pathology and if abisai involvement is present. We discussed possibility of omitting radiation as well given age >70 and a small mass.     In addition, I have recommended genetic counseling.  She would be a candidate in that situation based on her new diagnosis. A referral for genetic counseling was placed.       Plan:   Right breast tag localized lumpectomy    60 minutes total time spent on the date of this encounter doing: chart review, review of test results, patient visit, physical exam, education, counseling, developing plan of care, and documenting.    Virgie Yang MD      Please route or send letter to:  Primary Care Provider (PCP) and Referring Provider

## 2024-07-09 NOTE — LETTER
7/9/2024      Marysol Morrell  [address] [place] [address]      Dear Colleague,    Thank you for referring your patient, Marysol Morrell, to the [facility] [place]. Please see a copy of my visit note below.    [facility]    Hematology/Oncology New Patient Note      Today's Date: 07/08/24    Reason for Consult: Right breast cancer.    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 6/2017: Developed oropharyngeal dysphagia. Found to have indurated area, 2 x 1 cm on left side of anterior 2/3's of tongue and multiple left anterior neck nodes.  2. 7/24/2017: Laryngoscopy showed 2 x 1 cm exophytic lesion of left posterolateral oral tongue and diffuse lobular fullness of tongue base.  Biopsy of left lateral tongue and base of tongue showed CD20-positive diffuse large B-cell lymphoma, germinal center type; Ki-67 = 70%; histologic features and proliferation markers did not indicate double hit lymphoma so FISH not performed.  3. 8/2/2017: PET/CT showed hypermetabolic soft tissue thickening at base of tongue, bilateral abisai metastases at this level with single right hypermetabolic node and multiple left-sided hypermetabolic nodes; hypermetabolic 0.8 cm right thyroid nodule; hypermetabolic nonenlarged right axillary node, nonspecific and measuring 1.2 cm; remaining exam unremarkable.  4. 7/31/2017: Bone marrow biopsy negative for lymphoma.  5. 8/7/2017: Started R-CHOP chemotherapy under care of Dr. Mitchell Wilkins at [facility].  6. 10/12/2017: PET/CT after 3 cycles of chemo showed resolution of enlarged and hypermetabolic lymph nodes of neck and at base of tongue.  7. 12/6/2017: Completed 6  cycles of R-CHOP.  8. 1/16/2018: PET/CT showed complete response.  9. 6/25/2024: Diagnostic bilateral mammogram obtained due to right breast nipple/areolar itching.  This showed spiculated mass at 9:00 in right breast, 10 cm from nipple. Right breast U/S showed 0.7 x 0.5 x 0.7 cm mass at 9:30, 10 cm from nipple; no right axillary adenopathy.  10.  7/3/2024: Right breast biopsy at 9:30, 10 cm from nipple showed grade 1 invasive ductal carcinoma, ER positive %, NV moderate positive 5%, HER-2 negative (IHC = 0)    Zuri reports persistent deeper right subareolar pruritus without associated rash for multiple years, since 2017.    REVIEW OF SYSTEMS:   14 point ROS was reviewed and is negative other than as noted above in HPI.       HOME MEDICATIONS:  Current Outpatient Medications   Medication Sig Dispense Refill     acetaminophen (TYLENOL) 325 MG tablet Take 2 tablets (650 mg) by mouth every 4 hours as needed for mild pain or other (and adjunct with moderate or severe pain or per patient request)       allopurinol (ZYLOPRIM) 100 MG tablet Take 1 tablet (100 mg) by mouth daily 90 tablet 3     cetirizine (ZYRTEC) 10 MG tablet TAKE ONE TABLET BY MOUTH ONE TIME DAILY IN THE P.M. 90 tablet 3     conjugated estrogens (PREMARIN) cream Place 0.5 g vaginally twice a week (Patient taking differently: Place 0.5 g vaginally twice a week As needed) 30 g 12     hydrochlorothiazide (HYDRODIURIL) 12.5 MG tablet Take 1 tablet (12.5 mg) by mouth daily. 90 tablet 3     levothyroxine (SYNTHROID/LEVOTHROID) 112 MCG tablet Take 1 tablet (112 mcg) by mouth daily 90 tablet 3     losartan (COZAAR) 100 MG tablet TAKE ONE TABLET BY MOUTH EVERY DAY 90 tablet 3     pantoprazole (PROTONIX) 20 MG EC tablet Take 1 tablet (20 mg) by mouth daily 90 tablet 0     traZODone (DESYREL) 50 MG tablet Take 1 tablet (50 mg) by mouth at bedtime 30 tablet 2     valACYclovir (VALTREX) 500 MG tablet TAKE 1 TABLET BY MOUTH TWICE A DAY 12 tablet 0     XARELTO  ANTICOAGULANT 10 MG TABS tablet Take 1 tablet (10 mg) by mouth daily (with dinner) 90 tablet 0     Zoledronic Acid (RECLAST IV) Once a year           ALLERGIES:  Allergies   Allergen Reactions     Chlorhexidine Hives     Chlorhexidine Gluconate Hives     Codeine GI Disturbance     Evista [Raloxifene Hydrochloride] Other (See Comments)     Esophagus irritation      Fosamax Other (See Comments)     Esophagus irritation      Vicodin [Hydrocodone-Acetaminophen] GI Disturbance     Can Take oxycodone     Pen Vk [Penicillin V] Rash         PAST MEDICAL HISTORY:  Past Medical History:   Diagnosis Date     Arthritis      Cancer (H) 2017    lymphoma     Cholelithiasis      Colon polyps 2001 and 2006    no polyps 2011     Constipation      Diffuse large B cell lymphoma (H) 2017    tongue, followed by Dr. Wilkins at [facility]     Diverticulitis 2010     DVT (deep venous thrombosis) (H)     x3 (one due to OCs, 2 post op)     Genital herpes      GERD (gastroesophageal reflux disease)      Gout      History of depression      History of migraine      HTN (hypertension), benign      Hypothyroidism     hashimoto's thyroiditis     Lichen sclerosus et atrophicus      Osteopenia      Ovarian cyst 2011     Rectocele      Schatzki's ring 2009    EGD      Tibia/fibula fracture 2009     Vasovagal syncope 1966     Vertigo 2000         PAST SURGICAL HISTORY:  Past Surgical History:   Procedure Laterality Date     APPENDECTOMY  1954     ARTHROSCOPY KNEE  1983     BONE MARROW BIOPSY, BONE SPECIMEN, NEEDLE/TROCAR N/A 7/31/2017    Procedure: BIOPSY BONE MARROW;  UNILATERAL BONE MARROW BIPOSY  ;  Surgeon: Jamil Mccarthy MD;  Location:  GI     BREAST BIOPSY, CORE RT/LT  1990     EXCISE GARNETT'S NEUROMA FOOT       HYSTERECTOMY, PAP NO LONGER INDICATED  1984    with bladder repair     LAPAROSCOPIC OOPHORECTOMY  2011    with the colon resection     LARYNGOSCOPY WITH BIOPSY(IES) N/A 7/24/2017    Procedure: LARYNGOSCOPY WITH  BIOPSY(IES);  DIRECT LARYNGOSCOPY WITH TONGUE BIOPSIES;  Surgeon: [facility] MD;  Location: [facility]     OPEN REDUCTION INTERNAL FIXATION TIBIA  2009     TONSILLECTOMY & ADENOIDECTOMY       ZC LAP,SURG,COLECTOMY, PARTIAL, W/ANAST      due to recurrent diverticulitis         SOCIAL HISTORY:  Social History     Socioeconomic History     Marital status:      Spouse name: Not on file     Number of children: Not on file     Years of education: Not on file     Highest education level: Not on file   Occupational History     Not on file   Tobacco Use     Smoking status: Former     Current packs/day: 0.00     Types: Cigarettes     Quit date: 2000     Years since quittin.9     Smokeless tobacco: Never     Tobacco comments:     More of a social smoker   Vaping Use     Vaping status: Never Used   Substance and Sexual Activity     Alcohol use: Yes     Alcohol/week: 0.0 standard drinks of alcohol     Comment: 0-3 a day. mixed drinks, wine     Drug use: No     Sexual activity: Not Currently     Partners: Male   Other Topics Concern     Parent/sibling w/ CABG, MI or angioplasty before 65F 55M? Not Asked   Social History Narrative    Works at [employer]    Dtle Castrejon, elaine sung     Social Determinants of Health     Financial Resource Strain: Low Risk  (2024)    Financial Resource Strain      Within the past 12 months, have you or your family members you live with been unable to get utilities (heat, electricity) when it was really needed?: No   Food Insecurity: Low Risk  (2024)    Food Insecurity      Within the past 12 months, did you worry that your food would run out before you got money to buy more?: No      Within the past 12 months, did the food you bought just not last and you didn t have money to get more?: No   Transportation Needs: Low Risk  (2024)    Transportation Needs      Within the past 12 months, has lack of transportation kept you from medical appointments,  "getting your medicines, non-medical meetings or appointments, work, or from getting things that you need?: No   Physical Activity: Not on file   Stress: Not on file   Social Connections: Unknown (2022)    [facility]    Social Connections      Frequency of Communication with Friends and Family: Not on file   Interpersonal Safety: Low Risk  (2024)    Interpersonal Safety      Do you feel physically and emotionally safe where you currently live?: Yes      Within the past 12 months, have you been hit, slapped, kicked or otherwise physically hurt by someone?: No      Within the past 12 months, have you been humiliated or emotionally abused in other ways by your partner or ex-partner?: No   Housing Stability: Low Risk  (2024)    Housing Stability      Do you have housing? : Yes      Are you worried about losing your housing?: No   ;  passed away in .      FAMILY HISTORY:  Family History   Problem Relation Age of Onset     C.A.D. Mother         and PE     Cancer Father 65        gastric ca         PHYSICAL EXAM:  Vital signs:  /77   Pulse 68   Resp 16   Ht 1.638 m (5' 4.5\")   Wt 83.9 kg (185 lb)   SpO2 100%   BMI 31.26 kg/m     ECO  GENERAL/CONSTITUTIONAL: No acute distress.  EYES:  No scleral icterus.  ENT/MOUTH: Neck supple. Oropharynx grossly clear.  LYMPH: No cervical, supraclavicular, axillary adenopathy.   BREAST: Diffuse fibronodular breast tissue bilaterally with greater nodularity in the right central breast; no scaling or discharge from right nipple; no masses in left breast; nipples everted.  RESPIRATORY: No audible cough or wheezing.   GASTROINTESTINAL: No hepatosplenomegaly, masses, or tenderness.  No guarding.  No distention.  MUSCULOSKELETAL: Warm and well-perfused, no cyanosis, clubbing, or edema.  NEUROLOGIC: No focal motor deficits. Alert, oriented, answers " questions appropriately.  INTEGUMENTARY: No rashes or jaundice.  GAIT: Steady, does not use assistive device      LABS:  CBC RESULTS:   Recent Labs   Lab Test 05/23/24  1401   WBC 7.7   RBC 4.36   HGB 13.9   HCT 41.8   MCV 96   MCH 31.9   MCHC 33.3   RDW 11.7          Recent Labs   Lab Test 05/23/24  1401 09/20/23  1232    141   POTASSIUM 4.6 3.6   CHLORIDE 103 105   CO2 23 22   ANIONGAP 11 14   * 102*   BUN 17.3 12.1   CR 0.91 0.89   EMILIANO 10.2 9.2         PATHOLOGY:  Reviewed as per HPI.    IMAGING:  Reviewed as per HPI.    ASSESSMENT/PLAN:  Marysol Morrell is a 73 year old female with the following issues:  1. Stage IA, [id number], grade 1 invasive ductal carcinoma of right breast overlapping sites, ER positive, MD positive, HER-2 negative  --I reviewed Zuri's breast imaging and biopsy pathology.  She has a very small prognostically favorable right breast cancer that is low grade, ER/MD positive, HER-2 negative.  --Advised lumpectomy.  Discussed role of Oncotype DX assay -- she is very interested in this for prognostic value.  Discussed I would not recommend adjuvant chemotherapy given that she has received prior chemo for her lymphoma and quite a bit of alkylating agents already.  --Discussed role of radiation following lumpectomy and role of adjuvant endocrine therapy.  --I discussed the potential side effects of anastrozole, including but not limited to: fatigue, myalgias/arthralgias, bone density loss, decrease sexual drive, vaginal dryness, nausea, headache, difficulty sleeping, hot flashes, night sweats, small cardiovascular risk. She expresses some initial hesitance about hormone blockade therapy due to her existing arthritis but ultimately is willing to try it.  Given the possibility she might not tolerate AI, I emphasized importance of radiation.  --Will obtain CBC and CMP today.    2. History of diffuse large B-cell lymphoma of tongue region and neck lymph nodes  --Diagnosed  7/24/2017 and completed 6 cycles of R-CHOP elsewhere on 12/6/2017 with clinical complete response.  --Discussed she can have annual follow-up with me for this.    3. History of right calf DVT/PE  --Has self-reported history of DVT related to OCPs age 30's and also phlebitis.  --[place] to be unprovoked in 2017 although she was diagnosed with diffuse large B-cell lymphoma around that time.  She was placed on long term anticoagulation.  --She will continue with Xarelto but hold for 2 days prior to breast surgery.  No bridging needed.    4. Vaginal atrophy  --Discussed okay for her to continue vaginal estrogen cream twice weekly.    Return after surgery.    Jessica Garcia MD  [facility] Hematology/Oncology    Total time spent today: 60 minutes in chart review, patient evaluation, counseling, documentation, test and/or medication/prescription orders, and coordination of care.      Breast Patients      1-Do you have any of the following symptoms? Lump(s) or Mass(es)  2-In which breast are you having the symptoms? right  3-Have you had a Mammogram? Yes, no answer on location or date  4-Have you ever had a breast cyst drained? No  5-Have you ever had a breast biopsy? Yes:  Right  -  Date:  Week of 7/1/24  6-Have you ever had Breast Cancer? No   7-Is there a history of Breast Cancer in your family? No  8-Have you ever had Ovarian Cancer? No  9-Is there a history of Ovarian Cancer in your family? No  10-Is there a history of Colon Cancer in your family?  No  11-Is there a history of Uterine Cancer in your family?  No  12-Any known genetic abnormalities in your family?  No  13-Summarize your caffeine intake (i.e. coffee, tea, chocolate, soda etc.): 1-2 cups per day      14-What age did your periods begin?  11    15-Date your last menstrual period began?  1984 - hysterectomy in 1984  16-Number of full-term pregnancies:  2    17-Your age when your first child was born? 21  18-Did you nurse your children? Yes  19-Are you  pregnant now? No  20-Have you begun menopause?  N/A  21-Have you had either ovary removed?Yes  Date of Surgery:  2011  22-Do you have breast implants? No   23-Have you used hormone replacement therapy?  Yes  Type: Premarin Cream  Dosage: No answer  24-Have you taken oral contraceptive pills?  Yes, For how many years?  Approx: 6 years  25-Have you had an intrauterine device (IUD) placed?  No  26-What is your current bra size?  40 c/d                   Again, thank you for allowing me to participate in the care of your patient.        Sincerely,        Jessica Garcia MD

## 2024-07-10 ENCOUNTER — TELEPHONE (OUTPATIENT)
Dept: SURGERY | Facility: CLINIC | Age: 74
End: 2024-07-10
Payer: COMMERCIAL

## 2024-07-10 LAB
ALBUMIN SERPL BCG-MCNC: 4.3 G/DL (ref 3.5–5.2)
ALP SERPL-CCNC: 69 U/L (ref 40–150)
ALT SERPL W P-5'-P-CCNC: 11 U/L (ref 0–50)
ANION GAP SERPL CALCULATED.3IONS-SCNC: 9 MMOL/L (ref 7–15)
AST SERPL W P-5'-P-CCNC: 21 U/L (ref 0–45)
BILIRUB SERPL-MCNC: 0.4 MG/DL
BUN SERPL-MCNC: 19.5 MG/DL (ref 8–23)
CALCIUM SERPL-MCNC: 10.1 MG/DL (ref 8.8–10.2)
CHLORIDE SERPL-SCNC: 101 MMOL/L (ref 98–107)
CREAT SERPL-MCNC: 0.88 MG/DL (ref 0.51–0.95)
DEPRECATED HCO3 PLAS-SCNC: 28 MMOL/L (ref 22–29)
EGFRCR SERPLBLD CKD-EPI 2021: 69 ML/MIN/1.73M2
GLUCOSE SERPL-MCNC: 109 MG/DL (ref 70–99)
POTASSIUM SERPL-SCNC: 4.2 MMOL/L (ref 3.4–5.3)
PROT SERPL-MCNC: 7.3 G/DL (ref 6.4–8.3)
SODIUM SERPL-SCNC: 138 MMOL/L (ref 135–145)
T4 FREE SERPL-MCNC: 1.65 NG/DL (ref 0.9–1.7)
TSH SERPL DL<=0.005 MIU/L-ACNC: 8.07 UIU/ML (ref 0.3–4.2)

## 2024-07-10 NOTE — RESULT ENCOUNTER NOTE
Ric Keating,     TSH is elevated even more so, let's chat about this at your upcoming pre-op.     Best,     REED Montiel [facility]

## 2024-07-10 NOTE — TELEPHONE ENCOUNTER
Type of surgery: RFID localized right breast lumpectomy  Location of surgery: [facility]  Date and time of surgery: 8/5/24 at 11:30am  Surgeon: Dr. Virgie Yang  Pre-Op Appt Date: patient to schedule  Post-Op Appt Date: patient to schedule   Packet sent out: Yes  Pre-cert/Authorization completed:  Not Applicable  Date: 7/10/24

## 2024-07-26 ENCOUNTER — OFFICE VISIT (OUTPATIENT)
Dept: FAMILY MEDICINE | Facility: CLINIC | Age: 74
End: 2024-07-26
Payer: COMMERCIAL

## 2024-07-26 VITALS
BODY MASS INDEX: 31.99 KG/M2 | SYSTOLIC BLOOD PRESSURE: 105 MMHG | WEIGHT: 192 LBS | RESPIRATION RATE: 20 BRPM | HEART RATE: 79 BPM | DIASTOLIC BLOOD PRESSURE: 70 MMHG | HEIGHT: 65 IN | TEMPERATURE: 97.1 F | OXYGEN SATURATION: 98 %

## 2024-07-26 DIAGNOSIS — M1A.9XX0 CHRONIC GOUT WITHOUT TOPHUS, UNSPECIFIED CAUSE, UNSPECIFIED SITE: ICD-10-CM

## 2024-07-26 DIAGNOSIS — Z01.818 PRE-OP EXAM: Primary | ICD-10-CM

## 2024-07-26 DIAGNOSIS — E03.9 HYPOTHYROIDISM, UNSPECIFIED TYPE: ICD-10-CM

## 2024-07-26 DIAGNOSIS — I10 HTN (HYPERTENSION), BENIGN: ICD-10-CM

## 2024-07-26 DIAGNOSIS — C50.911 MALIGNANT NEOPLASM OF RIGHT FEMALE BREAST, UNSPECIFIED ESTROGEN RECEPTOR STATUS, UNSPECIFIED SITE OF BREAST (H): ICD-10-CM

## 2024-07-26 DIAGNOSIS — F33.9 EPISODE OF RECURRENT MAJOR DEPRESSIVE DISORDER, UNSPECIFIED DEPRESSION EPISODE SEVERITY (H): ICD-10-CM

## 2024-07-26 DIAGNOSIS — I82.499 DEEP VEIN THROMBOSIS (DVT) OF OTHER VEIN OF LOWER EXTREMITY, UNSPECIFIED CHRONICITY, UNSPECIFIED LATERALITY (H): ICD-10-CM

## 2024-07-26 DIAGNOSIS — M85.80 OSTEOPENIA, UNSPECIFIED LOCATION: ICD-10-CM

## 2024-07-26 DIAGNOSIS — K21.9 GASTROESOPHAGEAL REFLUX DISEASE WITHOUT ESOPHAGITIS: ICD-10-CM

## 2024-07-26 DIAGNOSIS — C83.31 DIFFUSE LARGE B-CELL LYMPHOMA OF LYMPH NODES OF HEAD (H): ICD-10-CM

## 2024-07-26 DIAGNOSIS — Z86.718 PERSONAL HISTORY OF DVT (DEEP VEIN THROMBOSIS): ICD-10-CM

## 2024-07-26 LAB
ANION GAP SERPL CALCULATED.3IONS-SCNC: 8 MMOL/L (ref 7–15)
BASOPHILS # BLD AUTO: 0.1 10E3/UL (ref 0–0.2)
BASOPHILS NFR BLD AUTO: 1 %
BUN SERPL-MCNC: 23 MG/DL (ref 8–23)
CALCIUM SERPL-MCNC: 9.9 MG/DL (ref 8.8–10.4)
CHLORIDE SERPL-SCNC: 103 MMOL/L (ref 98–107)
CREAT SERPL-MCNC: 0.94 MG/DL (ref 0.51–0.95)
EGFRCR SERPLBLD CKD-EPI 2021: 63 ML/MIN/1.73M2
EOSINOPHIL # BLD AUTO: 0.3 10E3/UL (ref 0–0.7)
EOSINOPHIL NFR BLD AUTO: 4 %
ERYTHROCYTE [DISTWIDTH] IN BLOOD BY AUTOMATED COUNT: 11.8 % (ref 10–15)
GLUCOSE SERPL-MCNC: 90 MG/DL (ref 70–99)
HCO3 SERPL-SCNC: 29 MMOL/L (ref 22–29)
HCT VFR BLD AUTO: 39.8 % (ref 35–47)
HGB BLD-MCNC: 13.5 G/DL (ref 11.7–15.7)
IMM GRANULOCYTES # BLD: 0 10E3/UL
IMM GRANULOCYTES NFR BLD: 0 %
LYMPHOCYTES # BLD AUTO: 2.5 10E3/UL (ref 0.8–5.3)
LYMPHOCYTES NFR BLD AUTO: 32 %
MCH RBC QN AUTO: 32.1 PG (ref 26.5–33)
MCHC RBC AUTO-ENTMCNC: 33.9 G/DL (ref 31.5–36.5)
MCV RBC AUTO: 95 FL (ref 78–100)
MONOCYTES # BLD AUTO: 0.7 10E3/UL (ref 0–1.3)
MONOCYTES NFR BLD AUTO: 9 %
NEUTROPHILS # BLD AUTO: 4.2 10E3/UL (ref 1.6–8.3)
NEUTROPHILS NFR BLD AUTO: 55 %
PLATELET # BLD AUTO: 249 10E3/UL (ref 150–450)
POTASSIUM SERPL-SCNC: 4.3 MMOL/L (ref 3.4–5.3)
RBC # BLD AUTO: 4.21 10E6/UL (ref 3.8–5.2)
SODIUM SERPL-SCNC: 140 MMOL/L (ref 135–145)
T4 FREE SERPL-MCNC: 1.58 NG/DL (ref 0.9–1.7)
TSH SERPL DL<=0.005 MIU/L-ACNC: 6.25 UIU/ML (ref 0.3–4.2)
WBC # BLD AUTO: 7.7 10E3/UL (ref 4–11)

## 2024-07-26 PROCEDURE — 84443 ASSAY THYROID STIM HORMONE: CPT | Performed by: PHYSICIAN ASSISTANT

## 2024-07-26 PROCEDURE — 85025 COMPLETE CBC W/AUTO DIFF WBC: CPT | Performed by: PHYSICIAN ASSISTANT

## 2024-07-26 PROCEDURE — 80048 BASIC METABOLIC PNL TOTAL CA: CPT | Performed by: PHYSICIAN ASSISTANT

## 2024-07-26 PROCEDURE — G2211 COMPLEX E/M VISIT ADD ON: HCPCS | Performed by: PHYSICIAN ASSISTANT

## 2024-07-26 PROCEDURE — 36415 COLL VENOUS BLD VENIPUNCTURE: CPT | Performed by: PHYSICIAN ASSISTANT

## 2024-07-26 PROCEDURE — 99214 OFFICE O/P EST MOD 30 MIN: CPT | Performed by: PHYSICIAN ASSISTANT

## 2024-07-26 PROCEDURE — 84439 ASSAY OF FREE THYROXINE: CPT | Performed by: PHYSICIAN ASSISTANT

## 2024-07-26 PROCEDURE — 93000 ELECTROCARDIOGRAM COMPLETE: CPT | Performed by: PHYSICIAN ASSISTANT

## 2024-07-26 ASSESSMENT — PAIN SCALES - GENERAL: PAINLEVEL: NO PAIN (0)

## 2024-07-26 NOTE — PATIENT INSTRUCTIONS
-1 week prior to the procedure hold vitamins/supplements + NSAIDs (ibuprofen, aleve, advil, aspirin). Tylenol/acetaminophen is safe   -HOLD Xarelto 2 days prior to surgery  -Morning of surgery HOLD hydrochlorothiazide       -Do not eat anything after midnight  (mauro of the surgery) and nothing the morning of the surgery.     -Follow all instructions given by the surgery team. They usually give out a packet. Read it and please follow it precisely. This helps surgical experience and outcomes.     -If you have any questions do not hesitate to call me or the surgeon/surgical team.

## 2024-07-26 NOTE — PROGRESS NOTES
Preoperative Evaluation  [facility]  [address] [facility], [address]  [facility] [address]  Phone: [phone]  Primary Provider: Robson Jaime PA-C  Pre-op Performing Provider: Robson Jaime PA-C  Jul 26, 2024 7/26/2024   Surgical Information   What procedure is being done? lumpectomy   Facility or Hospital where procedure/surgery will be performed: [facility]   Who is doing the procedure / surgery? Virgie Yang   Date of surgery / procedure: 080524   Time of surgery / procedure: 0930   Where do you plan to recover after surgery? at home with family        Fax number for surgical facility: Note does not need to be faxed, will be available electronically in Epic.    Assessment & Plan     The proposed surgical procedure is considered INTERMEDIATE risk.    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  Check CBC, BMP, as well as thyroid studies.  EKG displays normal sinus rhythm.  No acute ST or T wave changes.  Reviewed echo/Lexiscan from 2020.  Hold NSAIDs/vitamins 1 week prior to procedure.  Hold Xarelto 2 days prior to procedure.  Hold hydrochlorothiazide morning of procedure.  Ok to continue all other medications without modifications.  I will contact her with results.  She is comfortable with this plan..     - EKG 12-lead complete w/read - Clinics  - CBC with platelets and differential  - Basic metabolic panel  (Ca, Cl, CO2, Creat, Gluc, K, Na,  BUN)  - TSH  - T4, free     - No identified additional risk factors other than previously addressed      30 minutes spent by me on the date of the encounter doing chart review, review of test results, interpretation of tests, patient visit, and documentation     The longitudinal plan of care for the diagnosis(es)/condition(s) as documented were addressed during this visit. Due to the added complexity in care, I will continue to support Zuri in the subsequent management and with ongoing continuity of care.    Recommendation  Approval given to proceed with proposed procedure pending review of diagnostic evaluation.    Subjective   Zuri is a 73 year old, presenting for the following:  Pre-Op Exam (Patient is here for a Pre-Op physical.  DOS 8/5/2024.)    Here for preoperative clearance for upcoming lumpectomy scheduled for 8/5/2024.  No history of problems with anesthesia.  No history of heart attack, stroke.  Denies chest pain, shortness breath, heart racing, or new leg swelling.  She does have a history of DVTs for which she takes Xarelto.  Due for follow-up in regards to her thyroid levels.          7/26/2024    10:45 AM   Additional Questions   Roomed by Dariel GONGORA MA   Accompanied by Self     HPI related to upcoming procedure:         7/26/2024   Pre-Op Questionnaire   Have you ever had a heart attack or stroke? No   Have you ever had surgery on your heart or blood vessels, such as a stent placement, a coronary artery bypass, or surgery on an artery in your head, neck, heart, or legs? No   Do you have chest pain with activity? No   Do you have a history of heart failure? No   Do you currently have a cold, bronchitis or symptoms of other infection? No   Do you have a cough, shortness of breath, or wheezing? (!) YES   Do you or anyone in your family have previous history of blood clots? (!) YES   Do you or does anyone in your family have a serious bleeding problem such as prolonged bleeding following surgeries or  cuts? No   Have you ever had problems with anemia or been told to take iron pills? No   Have you had any abnormal blood loss such as black, tarry or bloody stools, or abnormal vaginal bleeding? No   Have you ever had a blood transfusion? No   Are you willing to have a blood transfusion if it is medically needed before, during, or after your surgery? Yes   Have you or any of your relatives ever had problems with anesthesia? No   Do you have sleep apnea, excessive snoring or daytime drowsiness? No   Do you have any artifical heart valves or other implanted medical devices like a pacemaker, defibrillator, or continuous glucose monitor? No   Do you have artificial joints? No   Are you allergic to latex? No        Health Care Directive  Patient does not have a Health Care Directive or Living Will:     Preoperative Review of    reviewed - no record of controlled substances prescribed.        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) 08/08/2018     Priority: Medium    Deep vein thrombosis (DVT) of other vein of lower extremity, unspecified chronicity, unspecified laterality (H) 08/08/2018     Priority: Medium    Genital herpes      Priority: Medium    Lichen sclerosus et atrophicus      Priority: Medium    Hypothyroidism, unspecified type 06/01/2017     Priority: Medium    Chronic gout without tophus, unspecified cause, unspecified site 04/27/2017     Priority: Medium    Lichen sclerosus of female genitalia 02/22/2017     Priority: Medium    HCD (health care directive) 08/15/2012     Priority: Medium     Discussed advance care planning with patient; information given to  patient to review. Vernell DengLUCIANA wolfe 8/15/2012     [facility] Regulatory Load OCT 2020      CARDIOVASCULAR SCREENING; LDL GOAL LESS THAN 130 08/15/2012     Priority: Medium    Constipation      Priority: Medium    HTN (hypertension), benign      Priority: Medium    Osteopenia      Priority: Medium    DVT (deep venous thrombosis) (H)      Priority: Medium     x3 (one due to OCs, 2 post op)      Cholelithiasis      Priority: Medium     IMO update changed this record. Please review for accuracy      Diverticulitis      Priority: Medium    Gout      Priority: Medium    Colon polyps      Priority: Medium    Schatzki's ring      Priority: Medium     EGD       Ovarian cyst      Priority: Medium    GERD (gastroesophageal reflux disease)      Priority: Medium      Past Medical History:   Diagnosis Date    Arthritis     Cancer (H) 2017    lymphoma    Cholelithiasis     Colon polyps 2001 and 2006    no polyps 2011    Constipation     Diffuse large B cell lymphoma (H) 2017    tongue, followed by Dr. Wilkins at [facility] and [facility]    Diverticulitis 2010    DVT (deep venous thrombosis) (H)     x3 (one due to OCs, 2 post op)    Genital herpes     GERD (gastroesophageal reflux disease)     Gout     History of depression     History of migraine     HTN (hypertension), benign     Hypothyroidism     hashimoto's thyroiditis    Lichen sclerosus et atrophicus     Osteopenia     Ovarian cyst 2011    Rectocele     Schatzki's ring 2009    EGD     Tibia/fibula fracture 2009    Vasovagal syncope 1966    Vertigo 2000     Past Surgical History:   Procedure Laterality Date    APPENDECTOMY  1954    ARTHROSCOPY KNEE  1983    BONE MARROW BIOPSY, BONE SPECIMEN, NEEDLE/TROCAR N/A 7/31/2017    Procedure: BIOPSY BONE MARROW;  UNILATERAL BONE MARROW BIPOSY  ;  Surgeon: Jamil Mccarthy MD;  Location:  GI    BREAST BIOPSY, CORE RT/LT  1990    EXCISE GARNETT'S NEUROMA FOOT      HYSTERECTOMY, PAP NO LONGER INDICATED  1984    with bladder repair     LAPAROSCOPIC OOPHORECTOMY  2011    with the colon resection    LARYNGOSCOPY WITH BIOPSY(IES) N/A 7/24/2017    Procedure: LARYNGOSCOPY WITH BIOPSY(IES);  DIRECT LARYNGOSCOPY WITH TONGUE BIOPSIES;  Surgeon: Vernell Yang MD;  Location: [facility]    OPEN REDUCTION INTERNAL FIXATION TIBIA  2009    TONSILLECTOMY & ADENOIDECTOMY  1958    ZZC LAP,SURG,COLECTOMY, PARTIAL, W/ANAST  2011    due to recurrent diverticulitis     Current Outpatient Medications   Medication Sig Dispense Refill    acetaminophen (TYLENOL) 325 MG tablet Take 2 tablets (650 mg) by mouth every 4 hours as needed for mild pain or other (and adjunct with moderate or severe pain or per patient request)      allopurinol (ZYLOPRIM) 100 MG tablet Take 1 tablet (100 mg) by mouth daily 90 tablet 3    cetirizine (ZYRTEC) 10 MG tablet TAKE ONE TABLET BY MOUTH ONE TIME DAILY IN THE P.M. 90 tablet 3    conjugated estrogens (PREMARIN) cream Place 0.5 g vaginally twice a week (Patient taking differently: Place 0.5 g vaginally twice a week As needed) 30 g 12    hydrochlorothiazide (HYDRODIURIL) 12.5 MG tablet Take 1 tablet (12.5 mg) by mouth daily. 90 tablet 3    levothyroxine (SYNTHROID/LEVOTHROID) 112 MCG tablet Take 1 tablet (112 mcg) by mouth daily 90 tablet 3    losartan (COZAAR) 100 MG tablet TAKE ONE TABLET BY MOUTH EVERY DAY 90 tablet 3    pantoprazole (PROTONIX) 20 MG EC tablet Take 1 tablet (20 mg) by mouth daily 90 tablet 0    traZODone (DESYREL) 50 MG tablet Take 1 tablet (50 mg) by mouth at bedtime 30 tablet 2    valACYclovir (VALTREX) 500 MG tablet TAKE 1 TABLET BY MOUTH TWICE A DAY 12 tablet 0    XARELTO ANTICOAGULANT 10 MG TABS tablet Take 1 tablet (10 mg) by mouth daily (with dinner) 90 tablet 0    Zoledronic Acid (RECLAST IV) Once a year         Allergies   Allergen Reactions    Chlorhexidine Hives    Chlorhexidine Gluconate Hives    Codeine GI Disturbance    Evista [Raloxifene Hydrochloride] Other (See Comments)     Esophagus irritation      "Fosamax Other (See Comments)     Esophagus irritation     Vicodin [Hydrocodone-Acetaminophen] GI Disturbance     Can Take oxycodone    Pen Vk [Penicillin V] Rash        Social History     Tobacco Use    Smoking status: Former     Current packs/day: 0.00     Types: Cigarettes     Quit date: 2000     Years since quittin.0    Smokeless tobacco: Never    Tobacco comments:     More of a social smoker   Substance Use Topics    Alcohol use: Yes     Alcohol/week: 0.0 standard drinks of alcohol     Comment: 0-3 a day. mixed drinks, wine     Family History   Problem Relation Age of Onset    C.A.D. Mother         and PE    Cancer Father 65        gastric ca     History   Drug Use No             Review of Systems  Constitutional, neuro, ENT, endocrine, pulmonary, cardiac, gastrointestinal, genitourinary, musculoskeletal, integument and psychiatric systems are negative, except as otherwise noted.    Objective    /70 (BP Location: Right arm, Patient Position: Sitting, Cuff Size: Adult Large)   Pulse 79   Temp 97.1  F (36.2  C) (Temporal)   Resp 20   Ht 1.638 m (5' 4.5\")   Wt 87.1 kg (192 lb)   SpO2 98%   BMI 32.45 kg/m     Estimated body mass index is 32.45 kg/m  as calculated from the following:    Height as of this encounter: 1.638 m (5' 4.5\").    Weight as of this encounter: 87.1 kg (192 lb).  Physical Exam  GENERAL: alert and no distress  EYES: Eyes grossly normal to inspection, PERRL and conjunctivae and sclerae normal  NECK: no adenopathy, no asymmetry, masses, or scars  RESP: lungs clear to auscultation - no rales, rhonchi or wheezes  CV: regular rate and rhythm, normal S1 S2, no S3 or S4, no murmur, click or rub, no peripheral edema  MS: no gross musculoskeletal defects noted, no edema  SKIN: no suspicious lesions or rashes  NEURO: Normal strength and tone, mentation intact and speech normal  PSYCH: mentation appears normal, affect normal/bright    Recent Labs   Lab Test 24  1121 " 05/23/24  1401   HGB 14.0 13.9    252    137   POTASSIUM 4.2 4.6   CR 0.88 0.91   A1C  --  5.3        Diagnostics  Labs pending at this time.  Results will be reviewed when available.   EKG: appears normal, NSR, normal axis, normal intervals, no acute ST/T changes c/w ischemia, no LVH by voltage criteria, unchanged from previous tracings    Revised Cardiac Risk Index (RCRI)  The patient has the following serious cardiovascular risks for perioperative complications:   - No serious cardiac risks = 0 points     RCRI Interpretation: 0 points: Class I (very low risk - 0.4% complication rate)    The likelihood of other entities in the differential is insufficient to justify any further testing for them at this time. This was explained to the patient. The patient was advised that persistent or worsening symptoms would require further evaluation. Patient advised to call the office and if unable to reach to go to the emergency room if they develop any new or worsening symptoms. Expressed understanding and agreement with above stated plan.       Signed Electronically by: Robson Jaime PA-C  A copy of this evaluation report is provided to the requesting physician.

## 2024-07-27 ENCOUNTER — MYC MEDICAL ADVICE (OUTPATIENT)
Dept: FAMILY MEDICINE | Facility: CLINIC | Age: 74
End: 2024-07-27
Payer: COMMERCIAL

## 2024-07-28 DIAGNOSIS — I82.499 DEEP VEIN THROMBOSIS (DVT) OF OTHER VEIN OF LOWER EXTREMITY, UNSPECIFIED CHRONICITY, UNSPECIFIED LATERALITY (H): ICD-10-CM

## 2024-07-28 DIAGNOSIS — C83.31 DIFFUSE LARGE B-CELL LYMPHOMA OF LYMPH NODES OF HEAD (H): ICD-10-CM

## 2024-07-28 DIAGNOSIS — K21.9 GASTROESOPHAGEAL REFLUX DISEASE WITHOUT ESOPHAGITIS: ICD-10-CM

## 2024-07-29 RX ORDER — PANTOPRAZOLE SODIUM 20 MG/1
20 TABLET, DELAYED RELEASE ORAL DAILY
Qty: 90 TABLET | Refills: 3 | Status: SHIPPED | OUTPATIENT
Start: 2024-07-29

## 2024-07-29 RX ORDER — RIVAROXABAN 10 MG/1
10 TABLET, FILM COATED ORAL
Qty: 90 TABLET | Refills: 3 | Status: SHIPPED | OUTPATIENT
Start: 2024-07-29

## 2024-07-29 RX ORDER — LEVOTHYROXINE SODIUM 125 UG/1
125 TABLET ORAL DAILY
Qty: 90 TABLET | Refills: 1 | Status: SHIPPED | OUTPATIENT
Start: 2024-07-29

## 2024-07-30 ENCOUNTER — HOSPITAL ENCOUNTER (OUTPATIENT)
Dept: MAMMOGRAPHY | Facility: CLINIC | Age: 74
Discharge: HOME | End: 2024-07-30
Attending: SURGERY
Payer: COMMERCIAL

## 2024-07-30 DIAGNOSIS — C50.411 MALIGNANT NEOPLASM OF UPPER-OUTER QUADRANT OF RIGHT BREAST IN FEMALE, ESTROGEN RECEPTOR POSITIVE (H): ICD-10-CM

## 2024-07-30 DIAGNOSIS — Z17.0 MALIGNANT NEOPLASM OF UPPER-OUTER QUADRANT OF RIGHT BREAST IN FEMALE, ESTROGEN RECEPTOR POSITIVE (H): ICD-10-CM

## 2024-07-30 PROCEDURE — 19285 PERQ DEV BREAST 1ST US IMAG: CPT | Mod: RT

## 2024-07-30 PROCEDURE — 250N000009 HC RX 250: Performed by: SURGERY

## 2024-07-30 PROCEDURE — 999N000065 MA POST PROCEDURE RIGHT

## 2024-07-30 RX ADMIN — LIDOCAINE HYDROCHLORIDE 5 ML: 10 INJECTION, SOLUTION INFILTRATION; PERINEURAL at 13:11

## 2024-07-30 NOTE — DISCHARGE INSTRUCTIONS
What to Do after Localizer Placement    Your care team has placed a localizer tag in your body.    Here's what to expect and what you should do for the next few days.    Bleeding or bruising  A  little bruising is normal.    If you bleed through the bandage, put direct pressure on the site.   If you're still bleeding after 20 minutes, call the doctor who ordered the localizer.    Caring for you bandage  Keep your bandage on until tomorrow morning.    Do not get it wet.    Showering  Don't shower today.  Tomorrow, you may remove the bandage and shower.      If you have pain or discomfort  Place an ice pack on the sore area for 15 to 20 minutes, several times a day.    What to do for infection      Infection is rare.  Signs of infection include:  Fever  Redness  Pain getting worse  Fluid draining from the site.      If you have any of these symptoms, please call the doctor who ordered the localizer.      When to call the doctor   Call the doctor who ordered the localizer if:  You have bleeding that lasts more than 20 minutes.  You have pain that you can't control.  You have signs of infection (fever, redness, drainage or other signs).      Please call one of our nurses if you have questions or concerns.   [facility]   Nurse Navigator  [phone]  Procedure Nurse  [phone]

## 2024-08-04 ENCOUNTER — ANESTHESIA EVENT (OUTPATIENT)
Dept: SURGERY | Facility: CLINIC | Age: 74
End: 2024-08-04
Payer: COMMERCIAL

## 2024-08-05 ENCOUNTER — TRANSFERRED RECORDS (OUTPATIENT)
Dept: HEALTH INFORMATION MANAGEMENT | Facility: CLINIC | Age: 74
End: 2024-08-05

## 2024-08-05 ENCOUNTER — HOSPITAL ENCOUNTER (OUTPATIENT)
Dept: MAMMOGRAPHY | Facility: CLINIC | Age: 74
Discharge: HOME | End: 2024-08-05
Attending: SURGERY
Payer: COMMERCIAL

## 2024-08-05 ENCOUNTER — APPOINTMENT (OUTPATIENT)
Dept: SURGERY | Facility: PHYSICIAN GROUP | Age: 74
End: 2024-08-05
Payer: COMMERCIAL

## 2024-08-05 ENCOUNTER — ANESTHESIA (OUTPATIENT)
Dept: SURGERY | Facility: CLINIC | Age: 74
End: 2024-08-05
Payer: COMMERCIAL

## 2024-08-05 ENCOUNTER — HOSPITAL ENCOUNTER (OUTPATIENT)
Facility: CLINIC | Age: 74
Discharge: HOME | End: 2024-08-05
Attending: SURGERY | Admitting: SURGERY
Payer: COMMERCIAL

## 2024-08-05 VITALS
RESPIRATION RATE: 14 BRPM | DIASTOLIC BLOOD PRESSURE: 74 MMHG | OXYGEN SATURATION: 97 % | BODY MASS INDEX: 32.11 KG/M2 | SYSTOLIC BLOOD PRESSURE: 137 MMHG | HEART RATE: 62 BPM | WEIGHT: 190 LBS | TEMPERATURE: 97.2 F

## 2024-08-05 DIAGNOSIS — Z17.0 MALIGNANT NEOPLASM OF UPPER-OUTER QUADRANT OF RIGHT BREAST IN FEMALE, ESTROGEN RECEPTOR POSITIVE (H): ICD-10-CM

## 2024-08-05 DIAGNOSIS — C50.411 MALIGNANT NEOPLASM OF UPPER-OUTER QUADRANT OF RIGHT BREAST IN FEMALE, ESTROGEN RECEPTOR POSITIVE (H): ICD-10-CM

## 2024-08-05 DIAGNOSIS — G89.18 POST-OP PAIN: Primary | ICD-10-CM

## 2024-08-05 DIAGNOSIS — Z98.890 PONV (POSTOPERATIVE NAUSEA AND VOMITING): ICD-10-CM

## 2024-08-05 DIAGNOSIS — R11.2 PONV (POSTOPERATIVE NAUSEA AND VOMITING): ICD-10-CM

## 2024-08-05 PROCEDURE — 250N000011 HC RX IP 250 OP 636: Performed by: ANESTHESIOLOGY

## 2024-08-05 PROCEDURE — 19301 PARTIAL MASTECTOMY: CPT | Performed by: ANESTHESIOLOGY

## 2024-08-05 PROCEDURE — 250N000009 HC RX 250: Performed by: SURGERY

## 2024-08-05 PROCEDURE — 370N000017 HC ANESTHESIA TECHNICAL FEE, PER MIN: Performed by: SURGERY

## 2024-08-05 PROCEDURE — 250N000013 HC RX MED GY IP 250 OP 250 PS 637: Performed by: PHYSICIAN ASSISTANT

## 2024-08-05 PROCEDURE — 88307 TISSUE EXAM BY PATHOLOGIST: CPT | Mod: TC | Performed by: SURGERY

## 2024-08-05 PROCEDURE — 999N000104 MA BREAST SPECIMEN RIGHT OR: Mod: TC

## 2024-08-05 PROCEDURE — 19301 PARTIAL MASTECTOMY: CPT | Mod: RT | Performed by: SURGERY

## 2024-08-05 PROCEDURE — 19301 PARTIAL MASTECTOMY: CPT | Mod: AS | Performed by: PHYSICIAN ASSISTANT

## 2024-08-05 PROCEDURE — 360N000083 HC SURGERY LEVEL 3 W/ FLUORO, PER MIN: Performed by: SURGERY

## 2024-08-05 PROCEDURE — 999N000141 HC STATISTIC PRE-PROCEDURE NURSING ASSESSMENT: Performed by: SURGERY

## 2024-08-05 PROCEDURE — 250N000011 HC RX IP 250 OP 636: Performed by: SURGERY

## 2024-08-05 PROCEDURE — 19301 PARTIAL MASTECTOMY: CPT | Performed by: NURSE ANESTHETIST, CERTIFIED REGISTERED

## 2024-08-05 PROCEDURE — 258N000003 HC RX IP 258 OP 636: Performed by: ANESTHESIOLOGY

## 2024-08-05 PROCEDURE — 250N000011 HC RX IP 250 OP 636

## 2024-08-05 PROCEDURE — 258N000003 HC RX IP 258 OP 636

## 2024-08-05 PROCEDURE — 99100 ANES PT EXTEME AGE<1 YR&>70: CPT | Performed by: NURSE ANESTHETIST, CERTIFIED REGISTERED

## 2024-08-05 PROCEDURE — 710N000009 HC RECOVERY PHASE 1, LEVEL 1, PER MIN: Performed by: SURGERY

## 2024-08-05 PROCEDURE — 272N000001 HC OR GENERAL SUPPLY STERILE: Performed by: SURGERY

## 2024-08-05 PROCEDURE — 250N000013 HC RX MED GY IP 250 OP 250 PS 637: Performed by: ANESTHESIOLOGY

## 2024-08-05 PROCEDURE — 710N000012 HC RECOVERY PHASE 2, PER MINUTE: Performed by: SURGERY

## 2024-08-05 RX ORDER — CLINDAMYCIN PHOSPHATE 900 MG/50ML
900 INJECTION, SOLUTION INTRAVENOUS
Status: DISCONTINUED | OUTPATIENT
Start: 2024-08-05 | End: 2024-08-05 | Stop reason: HOSPADM

## 2024-08-05 RX ORDER — HYDROMORPHONE HCL IN WATER/PF 6 MG/30 ML
0.4 PATIENT CONTROLLED ANALGESIA SYRINGE INTRAVENOUS EVERY 5 MIN PRN
Status: DISCONTINUED | OUTPATIENT
Start: 2024-08-05 | End: 2024-08-05 | Stop reason: HOSPADM

## 2024-08-05 RX ORDER — OXYCODONE HYDROCHLORIDE 5 MG/1
5 TABLET ORAL EVERY 6 HOURS PRN
Qty: 8 TABLET | Refills: 0 | Status: SHIPPED | OUTPATIENT
Start: 2024-08-05 | End: 2024-08-20

## 2024-08-05 RX ORDER — ONDANSETRON 2 MG/ML
4 INJECTION INTRAMUSCULAR; INTRAVENOUS EVERY 30 MIN PRN
Status: DISCONTINUED | OUTPATIENT
Start: 2024-08-05 | End: 2024-08-05 | Stop reason: HOSPADM

## 2024-08-05 RX ORDER — FENTANYL CITRATE 0.05 MG/ML
25 INJECTION, SOLUTION INTRAMUSCULAR; INTRAVENOUS EVERY 5 MIN PRN
Status: DISCONTINUED | OUTPATIENT
Start: 2024-08-05 | End: 2024-08-05 | Stop reason: HOSPADM

## 2024-08-05 RX ORDER — ONDANSETRON 2 MG/ML
INJECTION INTRAMUSCULAR; INTRAVENOUS PRN
Status: DISCONTINUED | OUTPATIENT
Start: 2024-08-05 | End: 2024-08-05

## 2024-08-05 RX ORDER — HYDROXYZINE HYDROCHLORIDE 50 MG/1
50 TABLET, FILM COATED ORAL EVERY 6 HOURS PRN
Status: DISCONTINUED | OUTPATIENT
Start: 2024-08-05 | End: 2024-08-05 | Stop reason: HOSPADM

## 2024-08-05 RX ORDER — CLINDAMYCIN PHOSPHATE 900 MG/50ML
900 INJECTION, SOLUTION INTRAVENOUS SEE ADMIN INSTRUCTIONS
Status: DISCONTINUED | OUTPATIENT
Start: 2024-08-05 | End: 2024-08-05 | Stop reason: HOSPADM

## 2024-08-05 RX ORDER — DEXAMETHASONE SODIUM PHOSPHATE 4 MG/ML
4 INJECTION, SOLUTION INTRA-ARTICULAR; INTRALESIONAL; INTRAMUSCULAR; INTRAVENOUS; SOFT TISSUE
Status: DISCONTINUED | OUTPATIENT
Start: 2024-08-05 | End: 2024-08-05 | Stop reason: HOSPADM

## 2024-08-05 RX ORDER — SODIUM CHLORIDE, SODIUM LACTATE, POTASSIUM CHLORIDE, CALCIUM CHLORIDE 600; 310; 30; 20 MG/100ML; MG/100ML; MG/100ML; MG/100ML
INJECTION, SOLUTION INTRAVENOUS CONTINUOUS
Status: DISCONTINUED | OUTPATIENT
Start: 2024-08-05 | End: 2024-08-05 | Stop reason: HOSPADM

## 2024-08-05 RX ORDER — HYDROMORPHONE HCL IN WATER/PF 6 MG/30 ML
0.2 PATIENT CONTROLLED ANALGESIA SYRINGE INTRAVENOUS EVERY 5 MIN PRN
Status: DISCONTINUED | OUTPATIENT
Start: 2024-08-05 | End: 2024-08-05 | Stop reason: HOSPADM

## 2024-08-05 RX ORDER — FENTANYL CITRATE 50 UG/ML
INJECTION, SOLUTION INTRAMUSCULAR; INTRAVENOUS PRN
Status: DISCONTINUED | OUTPATIENT
Start: 2024-08-05 | End: 2024-08-05

## 2024-08-05 RX ORDER — AMOXICILLIN 250 MG
1 CAPSULE ORAL 2 TIMES DAILY PRN
Qty: 10 TABLET | Refills: 0 | Status: SHIPPED | OUTPATIENT
Start: 2024-08-05

## 2024-08-05 RX ORDER — SODIUM CHLORIDE, SODIUM LACTATE, POTASSIUM CHLORIDE, CALCIUM CHLORIDE 600; 310; 30; 20 MG/100ML; MG/100ML; MG/100ML; MG/100ML
INJECTION, SOLUTION INTRAVENOUS CONTINUOUS PRN
Status: DISCONTINUED | OUTPATIENT
Start: 2024-08-05 | End: 2024-08-05

## 2024-08-05 RX ORDER — PROPOFOL 10 MG/ML
INJECTION, EMULSION INTRAVENOUS PRN
Status: DISCONTINUED | OUTPATIENT
Start: 2024-08-05 | End: 2024-08-05

## 2024-08-05 RX ORDER — OXYCODONE HYDROCHLORIDE 5 MG/1
5 TABLET ORAL
Status: COMPLETED | OUTPATIENT
Start: 2024-08-05 | End: 2024-08-05

## 2024-08-05 RX ORDER — MAGNESIUM HYDROXIDE 1200 MG/15ML
LIQUID ORAL PRN
Status: DISCONTINUED | OUTPATIENT
Start: 2024-08-05 | End: 2024-08-05 | Stop reason: HOSPADM

## 2024-08-05 RX ORDER — HYDROXYZINE HYDROCHLORIDE 25 MG/1
25 TABLET, FILM COATED ORAL EVERY 6 HOURS PRN
Status: DISCONTINUED | OUTPATIENT
Start: 2024-08-05 | End: 2024-08-05 | Stop reason: HOSPADM

## 2024-08-05 RX ORDER — PROPOFOL 10 MG/ML
INJECTION, EMULSION INTRAVENOUS CONTINUOUS PRN
Status: DISCONTINUED | OUTPATIENT
Start: 2024-08-05 | End: 2024-08-05

## 2024-08-05 RX ORDER — ONDANSETRON 4 MG/1
4 TABLET, ORALLY DISINTEGRATING ORAL EVERY 6 HOURS PRN
Qty: 10 TABLET | Refills: 0 | Status: SHIPPED | OUTPATIENT
Start: 2024-08-05 | End: 2024-08-20

## 2024-08-05 RX ORDER — HYDRALAZINE HYDROCHLORIDE 20 MG/ML
2.5-5 INJECTION INTRAMUSCULAR; INTRAVENOUS EVERY 10 MIN PRN
Status: DISCONTINUED | OUTPATIENT
Start: 2024-08-05 | End: 2024-08-05 | Stop reason: HOSPADM

## 2024-08-05 RX ORDER — DEXAMETHASONE SODIUM PHOSPHATE 4 MG/ML
INJECTION, SOLUTION INTRA-ARTICULAR; INTRALESIONAL; INTRAMUSCULAR; INTRAVENOUS; SOFT TISSUE PRN
Status: DISCONTINUED | OUTPATIENT
Start: 2024-08-05 | End: 2024-08-05

## 2024-08-05 RX ORDER — FENTANYL CITRATE 0.05 MG/ML
50 INJECTION, SOLUTION INTRAMUSCULAR; INTRAVENOUS EVERY 5 MIN PRN
Status: DISCONTINUED | OUTPATIENT
Start: 2024-08-05 | End: 2024-08-05 | Stop reason: HOSPADM

## 2024-08-05 RX ORDER — NALOXONE HYDROCHLORIDE 0.4 MG/ML
0.1 INJECTION, SOLUTION INTRAMUSCULAR; INTRAVENOUS; SUBCUTANEOUS
Status: DISCONTINUED | OUTPATIENT
Start: 2024-08-05 | End: 2024-08-05 | Stop reason: HOSPADM

## 2024-08-05 RX ORDER — ONDANSETRON 4 MG/1
4 TABLET, ORALLY DISINTEGRATING ORAL EVERY 30 MIN PRN
Status: DISCONTINUED | OUTPATIENT
Start: 2024-08-05 | End: 2024-08-05 | Stop reason: HOSPADM

## 2024-08-05 RX ADMIN — PROPOFOL 20 MG: 10 INJECTION, EMULSION INTRAVENOUS at 11:12

## 2024-08-05 RX ADMIN — PROPOFOL 20 MG: 10 INJECTION, EMULSION INTRAVENOUS at 11:06

## 2024-08-05 RX ADMIN — PHENYLEPHRINE HYDROCHLORIDE 150 MCG: 10 INJECTION INTRAVENOUS at 11:31

## 2024-08-05 RX ADMIN — PHENYLEPHRINE HYDROCHLORIDE 100 MCG: 10 INJECTION INTRAVENOUS at 11:45

## 2024-08-05 RX ADMIN — FENTANYL CITRATE 50 MCG: 50 INJECTION, SOLUTION INTRAMUSCULAR; INTRAVENOUS at 12:15

## 2024-08-05 RX ADMIN — PROPOFOL 20 MG: 10 INJECTION, EMULSION INTRAVENOUS at 11:04

## 2024-08-05 RX ADMIN — ONDANSETRON 4 MG: 2 INJECTION INTRAMUSCULAR; INTRAVENOUS at 11:42

## 2024-08-05 RX ADMIN — SODIUM CHLORIDE, POTASSIUM CHLORIDE, SODIUM LACTATE AND CALCIUM CHLORIDE: 600; 310; 30; 20 INJECTION, SOLUTION INTRAVENOUS at 11:00

## 2024-08-05 RX ADMIN — PHENYLEPHRINE HYDROCHLORIDE 100 MCG: 10 INJECTION INTRAVENOUS at 11:19

## 2024-08-05 RX ADMIN — HYDROXYZINE HYDROCHLORIDE 25 MG: 25 TABLET, FILM COATED ORAL at 13:43

## 2024-08-05 RX ADMIN — ONDANSETRON 4 MG: 2 INJECTION INTRAMUSCULAR; INTRAVENOUS at 12:44

## 2024-08-05 RX ADMIN — PROPOFOL 200 MCG/KG/MIN: 10 INJECTION, EMULSION INTRAVENOUS at 11:00

## 2024-08-05 RX ADMIN — DEXAMETHASONE SODIUM PHOSPHATE 4 MG: 4 INJECTION, SOLUTION INTRA-ARTICULAR; INTRALESIONAL; INTRAMUSCULAR; INTRAVENOUS; SOFT TISSUE at 11:11

## 2024-08-05 RX ADMIN — OXYCODONE HYDROCHLORIDE 5 MG: 5 TABLET ORAL at 12:17

## 2024-08-05 RX ADMIN — PHENYLEPHRINE HYDROCHLORIDE 100 MCG: 10 INJECTION INTRAVENOUS at 11:25

## 2024-08-05 RX ADMIN — CLINDAMYCIN PHOSPHATE 900 MG: 900 INJECTION, SOLUTION INTRAVENOUS at 10:14

## 2024-08-05 RX ADMIN — PROPOFOL 20 MG: 10 INJECTION, EMULSION INTRAVENOUS at 11:33

## 2024-08-05 RX ADMIN — PHENYLEPHRINE HYDROCHLORIDE 200 MCG: 10 INJECTION INTRAVENOUS at 11:39

## 2024-08-05 RX ADMIN — FENTANYL CITRATE 50 MCG: 50 INJECTION INTRAMUSCULAR; INTRAVENOUS at 11:00

## 2024-08-05 ASSESSMENT — ACTIVITIES OF DAILY LIVING (ADL)
ADLS_ACUITY_SCORE: 22

## 2024-08-05 ASSESSMENT — LIFESTYLE VARIABLES: TOBACCO_USE: 1

## 2024-08-05 NOTE — OR NURSING
Text to Raz Steven\on - patient requesting PO Zofran for home- prescription to send to [facility] pharmacy in [place] on [address].   Call to Dr Dumont - patient nauseated- see new orders.

## 2024-08-05 NOTE — ANESTHESIA PREPROCEDURE EVALUATION
Anesthesia Pre-Procedure Evaluation    Patient: Marysol Morrell   MRN: [id number] : 1950        Procedure : Procedure(s):  right breast tag localized lumpectomy          Past Medical History:   Diagnosis Date    Arthritis     Cancer (H) 2017    lymphoma    Cholelithiasis     Colon polyps 2001 and 2006    no polyps     Constipation     Diffuse large B cell lymphoma (H) 2017    tongue, followed by Dr. Wilkins at [facility] and [facility]    Diverticulitis 2010    DVT (deep venous thrombosis) (H)     x3 (one due to OCs, 2 post op)    Genital herpes     GERD (gastroesophageal reflux disease)     Gout     History of depression     History of migraine     HTN (hypertension), benign     Hypothyroidism     hashimoto's thyroiditis    Lichen sclerosus et atrophicus     Osteopenia     Ovarian cyst     Rectocele     Schatzki's ring     EGD     Tibia/fibula fracture 2009    Vasovagal syncope 1966    Vertigo 2000      Past Surgical History:   Procedure Laterality Date    APPENDECTOMY      ARTHROSCOPY KNEE      BONE MARROW BIOPSY, BONE SPECIMEN, NEEDLE/TROCAR N/A 2017    Procedure: BIOPSY BONE MARROW;  UNILATERAL BONE MARROW BIPOSY  ;  Surgeon: Jamil Mccarthy MD;  Location:  GI    BREAST BIOPSY, CORE RT/LT      EXCISE GARNETT'S NEUROMA FOOT      HYSTERECTOMY, PAP NO LONGER INDICATED      with bladder repair    LAPAROSCOPIC OOPHORECTOMY      with the colon resection    LARYNGOSCOPY WITH BIOPSY(IES) N/A 2017    Procedure: LARYNGOSCOPY WITH BIOPSY(IES);  DIRECT LARYNGOSCOPY WITH TONGUE BIOPSIES;  Surgeon: Vernell Yang MD;  Location:  [facility]    OPEN REDUCTION INTERNAL FIXATION TIBIA  2009    TONSILLECTOMY & ADENOIDECTOMY      [facility] LAP,SURG,COLECTOMY, PARTIAL, W/ANAST      due to recurrent diverticulitis      Allergies   Allergen Reactions    Chlorhexidine Hives    Chlorhexidine Gluconate Hives    Codeine GI Disturbance    Evista [Raloxifene Hydrochloride] Other  (See Comments)     Esophagus irritation     Fosamax Other (See Comments)     Esophagus irritation     Vicodin [Hydrocodone-Acetaminophen] GI Disturbance     Can Take oxycodone    Pen Vk [Penicillin V] Rash      Social History     Tobacco Use    Smoking status: Former     Current packs/day: 0.00     Types: Cigarettes     Quit date: 2000     Years since quittin.0    Smokeless tobacco: Never    Tobacco comments:     More of a social smoker   Substance Use Topics    Alcohol use: Yes     Alcohol/week: 0.0 standard drinks of alcohol     Comment: 0-3 a day. mixed drinks, wine      Wt Readings from Last 1 Encounters:   24 86.2 kg (190 lb)        Anesthesia Evaluation   Pt has had prior anesthetic.     No history of anesthetic complications       ROS/MED HX  ENT/Pulmonary:     (+)                tobacco use, Past use,                    (-) sleep apnea   Neurologic:     (+)      migraines,                          Cardiovascular:     (+)  hypertension- -   -  - -             fainting (syncope).                         METS/Exercise Tolerance:     Hematologic:     (+) History of blood clots,               Musculoskeletal:   (+)  arthritis,             GI/Hepatic: Comment: H/O GI bleed    Diverticulitis s/p partial colectomy    (+) GERD, Asymptomatic on medication,        cholecystitis/cholelithiasis,          Renal/Genitourinary:       Endo:     (+)          thyroid problem, hypothyroidism,           Psychiatric/Substance Use:       Infectious Disease:       Malignancy:   (+) Malignancy, History of Breast and Lymphoma/Leukemia.    Other:            Physical Exam    Airway        Mallampati: II   TM distance: > 3 FB   Neck ROM: full   Mouth opening: > 3 cm    Respiratory Devices and Support         Dental     Comment: Bottom partials    (+) Removable bridges or other hardware      Cardiovascular   cardiovascular exam normal          Pulmonary   pulmonary exam normal                OUTSIDE LABS:  CBC:   Lab  "Results   Component Value Date    WBC 7.7 07/26/2024    WBC 8.0 07/09/2024    HGB 13.5 07/26/2024    HGB 14.0 07/09/2024    HCT 39.8 07/26/2024    HCT 41.5 07/09/2024     07/26/2024     07/09/2024     BMP:   Lab Results   Component Value Date     07/26/2024     07/09/2024    POTASSIUM 4.3 07/26/2024    POTASSIUM 4.2 07/09/2024    CHLORIDE 103 07/26/2024    CHLORIDE 101 07/09/2024    CO2 29 07/26/2024    CO2 28 07/09/2024    BUN 23.0 07/26/2024    BUN 19.5 07/09/2024    CR 0.94 07/26/2024    CR 0.88 07/09/2024    GLC 90 07/26/2024     (H) 07/09/2024     COAGS:   Lab Results   Component Value Date    PTT 31 01/28/2022    INR 0.93 01/28/2022     POC: No results found for: \"BGM\", \"HCG\", \"HCGS\"  HEPATIC:   Lab Results   Component Value Date    ALBUMIN 4.3 07/09/2024    PROTTOTAL 7.3 07/09/2024    ALT 11 07/09/2024    AST 21 07/09/2024    ALKPHOS 69 07/09/2024    BILITOTAL 0.4 07/09/2024     OTHER:   Lab Results   Component Value Date    LACT 1.9 01/28/2022    A1C 5.3 05/23/2024    EMILIANO 9.9 07/26/2024    MAG 1.7 01/28/2022    TSH 6.25 (H) 07/26/2024    T4 1.58 07/26/2024       Anesthesia Plan    ASA Status:  2    NPO Status:  NPO Appropriate    Anesthesia Type: MAC.     - Reason for MAC: immobility needed, straight local not clinically adequate              Consents    Anesthesia Plan(s) and associated risks, benefits, and realistic alternatives discussed. Questions answered and patient/representative(s) expressed understanding.     - Discussed:     - Discussed with:  Patient            Postoperative Care    Pain management: IV analgesics.   PONV prophylaxis: Ondansetron (or other 5HT-3), Background Propofol Infusion     Comments:               Elana Dumont MD    I have reviewed the pertinent notes and labs in the chart from the past 30 days and (re)examined the patient.  Any updates or changes from those notes are reflected in this note.            # Drug Induced Coagulation Defect: " "home medication list includes an anticoagulant medication   # Obesity: Estimated body mass index is 32.11 kg/m  as calculated from the following:    Height as of 7/26/24: 1.638 m (5' 4.5\").    Weight as of this encounter: 86.2 kg (190 lb).      "

## 2024-08-05 NOTE — OP NOTE
[facility] Breast Surgery Operative Note      Pre-operative diagnosis: Right breast invasive ductal carcinoma   Post-operative diagnosis: Same, pathology pending     Procedure: 1.  RIGHT BREAST RFID TAG LOCALIZED PARTIAL MASTECTOMY       Surgeon: Virgie Yang MD   Assistant(s):  Raz Rosen PA-C  The PA s assistance was medically necessary to provide adequate exposure in the operating field, maintain hemostasis, cutting suture, clamping and ligating bleeding vessels, and visualization of anatomic structures throughout the surgical procedure.      Anesthesia: Local with MAC    Estimated blood loss:   5 cc     Specimens: ID Type Source Tests Collected by Time Destination   1 : RIght beast tag localized lumpectomy Tissue Breast, Right SURGICAL PATHOLOGY EXAM Virgie Yang MD 8/5/2024 11:25 AM    2 : RIGHT BREAST NEW MEDIAL AND INFERIOR MARGINS Tissue Breast, Right SURGICAL PATHOLOGY EXAM Virgie Yang MD 8/5/2024 11:33 AM         INDICATION:  Please see my clinic note for details  DESCRIPTION OF PROCEDURE: The patient was placed on the table in supine position. Conscious sedation was induced. Perioperative antibiotics were given.  The right breast and axilla were prepped and draped in standard sterile fashion.  We used the radiofrequency localizer tag placed in the Breast Center as well as the post-tag mammograms to localize the area of interest. Local anesthetic was injected along the marked line for the incision. We made a radially oriented incision centered at the 10 o'clock position. We created skin flaps along the anterior mammary fascial plane circumferentially using cautery. A suture was placed over the highest signal with the probe to guide circumferential dissection. We then carried the dissection down using electrocautery into the breast tissue and excised the area of interest, including the tag.  The localizer probe was used to guide the dissection. The area of concerning breast tissue was  removed in its entirety with some surrounding benign appearing breast tissue. The posterior margin was including the pectoral fascia. After the specimen was removed it had a high signal with the localizer probe.  Once the mass was removed, it was oriented with Reyes dyes. A specimen mammogram was obtained and revealed the clip, tag and mass near the inferior margin. The specimen was then sent to pathology for review. Gross evaluation of margins revealed the inferior margin was close at 3mm. I did take an additional medial/inferior margin and this was inked at the new margin and sent to pathology.  The wound was then examined for bleeding and hemostasis was achieved using electrocautery.      Clips were placed at the 12, 3, 6, and 9 o'clock positions of the lumpectomy cavity as well as posterior.  The lumpectomy cavity was reapproximated with several interrupted 2-0 vicryl sutures. The skin was closed with a deep dermal 3-0 vicryl and running 4-0 Monocryl subcuticular suture and steri strips.    The patient tolerated the procedure well.  Sponge and instrument counts were correct.    Virgie Yang MD  [facility]  [phone]

## 2024-08-05 NOTE — DISCHARGE INSTRUCTIONS
Oxycodone 5mg given at 1220 today    **Restart Xarelto the evening of 8/6/24**    [facility]  Discharge Instructions: Post-Operative Breast Surgery    ACTIVITY  Take frequent short walks and increase your activity gradually.    Avoid strenuous physical activity or heavy lifting greater than 15-20 lbs. for 1-2 weeks with arm on the surgery side.  You may climb stairs.  Gentle rotation and stretching of your arms and shoulders will prevent joint stiffness.  You may drive without restrictions when you are not using any prescription pain medication and feel comfortable in a car.  You may return to work/school when you are comfortable without any prescription pain medication.    WOUND CARE  You may remove your ACE wrap/dressing and shower 48 hours after the surgery.  Pat your incisions dry and leave them open to air.  Re-apply dressing (Band-Aids or gauze/tape) as needed for drainage.  You may have steri-strips (looks like white tape) or skin glue on your incisions.  You may peel off the steri-strips 2 weeks after your surgery if they have not peeled off on their own.  If you have skin glue, it will peel up and fall off on its own.  Do not soak your incisions in a tub or pool for 2 weeks.   Do not apply any lotions, creams, or ointments to your incisions.  A ridge under your incisions is normal and will gradually resolve.  Wear a supportive bra for 1-2 weeks, day and night.    DIET  Start with liquids, then gradually resume your regular diet as tolerated.   Drink plenty of liquids to stay hydrated.    PAIN  Expect some tenderness and discomfort at the incision site(s).  Use the prescribed pain medication at your discretion.  Expect gradual resolution of your pain over several days.  You may take ibuprofen with food (unless you have been told not to) or acetaminophen/Tylenol instead of or in addition to your prescribed pain medication.  If you are taking Norco or Percocet, do not take any  additional acetaminophen/Tylenol.  Do not drink alcohol or drive while you are taking pain medications.    EXPECTATIONS  Pain medications can cause constipation.  Limit use when possible.  Take over the counter stool softener/stimulant, such as Colace or Senna, 1-2 times a day with plenty of water.  You may take a mild over the counter laxative, such as Miralax or a suppository, as needed.    You may discontinue these medications once you are having regular bowel movements and/or are no longer taking your narcotic pain medication.    RETURN APPOINTMENT  Follow up with your surgeon in 2-4 weeks.  Please call the office at [phone] to schedule your appointment.      CALL OUR OFFICE [phone] IF YOU HAVE:   Chills or fever above 101 F.  Increased redness, warmth, or drainage at your incisions.  Significant bleeding.  Pain not relieved by your pain medication or rest.  Increasing pain after the first 48 hours.  Any other concerns or questions.           Same Day Surgery Discharge Instructions for  Sedation and General Anesthesia     It's not unusual to feel dizzy, light-headed or faint for up to 24 hours after surgery or while taking pain medication.  If you have these symptoms: sit for a few minutes before standing and have someone assist you when you get up to walk or use the bathroom.    You should rest and relax for the next 24 hours. We recommend you make arrangements to have an adult stay with you for at least 24 hours after your discharge.  Avoid hazardous and strenuous activity.    DO NOT DRIVE any vehicle or operate mechanical equipment for 24 hours following the end of your surgery.  Even though you may feel normal, your reactions may be affected by the medication you have received.    Do not drink alcoholic beverages for 24 hours following surgery.     Slowly progress to your regular diet as you feel able. It's not unusual to feel nauseated and/or vomit after receiving anesthesia.  If you develop  these symptoms, drink clear liquids (apple juice, ginger ale, broth, 7-up, etc. ) until you feel better.  If your nausea and vomiting persists for 24 hours, please notify your surgeon.      All narcotic pain medications, along with inactivity and anesthesia, can cause constipation. Drinking plenty of liquids and increasing fiber intake will help.    For any questions of a medical nature, call your surgeon.    Do not make important decisions for 24 hours.    If you had general anesthesia, you may have a sore throat for a couple of days related to the breathing tube used during surgery.  You may use Cepacol lozenges to help with this discomfort.  If it worsens or if you develop a fever, contact your surgeon.     If you feel your pain is not well managed with the pain medications prescribed by your surgeon, please contact your surgeon's office to let them know so they can address your concerns.      **If you have concerns or questions about your procedure,    please contact Dr Yang at  [phone]**

## 2024-08-05 NOTE — ANESTHESIA CARE TRANSFER NOTE
Patient: Marysol Morrell    Procedure: Procedure(s):  right breast tag localized lumpectomy       Diagnosis: Malignant neoplasm of upper-outer quadrant of right breast in female, estrogen receptor positive (H) [C50.411, Z17.0]  Diagnosis Additional Information: No value filed.    Anesthesia Type:   MAC     Note:    Oropharynx: oropharynx clear of all foreign objects and spontaneously breathing  Level of Consciousness: awake  Oxygen Supplementation: room air    Independent Airway: airway patency satisfactory and stable  Dentition: dentition unchanged  Vital Signs Stable: post-procedure vital signs reviewed and stable  Report to RN Given: handoff report given  Patient transferred to: PACU    Handoff Report: Identifed the Patient, Identified the Reponsible Provider, Reviewed the pertinent medical history, Discussed the surgical course, Reviewed Intra-OP anesthesia mangement and issues during anesthesia, Set expectations for post-procedure period and Allowed opportunity for questions and acknowledgement of understanding      Vitals:  Vitals Value Taken Time   BP     Temp     Pulse     Resp     SpO2 94 % 08/05/24 1157   Vitals shown include unfiled device data.    Electronically Signed By: ROXI Akins CRNA  August 5, 2024  11:58 AM

## 2024-08-05 NOTE — ANESTHESIA POSTPROCEDURE EVALUATION
Patient: Marysol Morrell    Procedure: Procedure(s):  right breast tag localized lumpectomy       Anesthesia Type:  MAC    Note:     Postop Pain Control: Uneventful            Sign Out: Well controlled pain   PONV: No   Neuro/Psych: Uneventful            Sign Out: Acceptable/Baseline neuro status   Airway/Respiratory: Uneventful            Sign Out: Acceptable/Baseline resp. status   CV/Hemodynamics: Uneventful            Sign Out: Acceptable CV status; No obvious hypovolemia; No obvious fluid overload   Other NRE: NONE   DID A NON-ROUTINE EVENT OCCUR? No           Last vitals:  Vitals Value Taken Time   /53 08/05/24 1245   Temp 35.9  C (96.7  F) 08/05/24 1200   Pulse 56 08/05/24 1251   Resp 16 08/05/24 1251   SpO2 96 % 08/05/24 1245   Vitals shown include unfiled device data.    Electronically Signed By: Elana Dumont MD  August 5, 2024  12:53 PM

## 2024-08-05 NOTE — OR NURSING
Patient's daughter Lucía called informed Zofran prescription was sent to [facility] pharmacy.

## 2024-08-07 ENCOUNTER — TELEPHONE (OUTPATIENT)
Dept: MEDSURG UNIT | Facility: CLINIC | Age: 74
End: 2024-08-07
Payer: COMMERCIAL

## 2024-08-07 LAB
PATH REPORT.COMMENTS IMP SPEC: ABNORMAL
PATH REPORT.COMMENTS IMP SPEC: ABNORMAL
PATH REPORT.COMMENTS IMP SPEC: YES
PATH REPORT.FINAL DX SPEC: ABNORMAL
PATH REPORT.GROSS SPEC: ABNORMAL
PATH REPORT.INTRAOP OBS SPEC DOC: ABNORMAL
PATH REPORT.MICROSCOPIC SPEC OTHER STN: ABNORMAL
PATH REPORT.RELEVANT HX SPEC: ABNORMAL
PATHOLOGY SYNOPTIC REPORT: ABNORMAL
PHOTO IMAGE: ABNORMAL

## 2024-08-07 PROCEDURE — 88329 PATH CONSLTJ DRG SURG: CPT | Performed by: STUDENT IN AN ORGANIZED HEALTH CARE EDUCATION/TRAINING PROGRAM

## 2024-08-07 PROCEDURE — 88307 TISSUE EXAM BY PATHOLOGIST: CPT | Mod: 26 | Performed by: STUDENT IN AN ORGANIZED HEALTH CARE EDUCATION/TRAINING PROGRAM

## 2024-08-07 NOTE — CONFIDENTIAL NOTE
I called Zuri and discussed her pathology results. I will see her for a post op on 8/20 at 3:15.     Virgie Yang MD  [facility]  [phone]

## 2024-08-08 ENCOUNTER — DOCUMENTATION ONLY (OUTPATIENT)
Dept: ONCOLOGY | Facility: CLINIC | Age: 74
End: 2024-08-08
Payer: COMMERCIAL

## 2024-08-20 ENCOUNTER — OFFICE VISIT (OUTPATIENT)
Dept: SURGERY | Facility: CLINIC | Age: 74
End: 2024-08-20
Payer: COMMERCIAL

## 2024-08-20 DIAGNOSIS — Z09 SURGICAL FOLLOW-UP CARE: Primary | ICD-10-CM

## 2024-08-20 PROCEDURE — 99024 POSTOP FOLLOW-UP VISIT: CPT | Performed by: SURGERY

## 2024-08-20 NOTE — PATIENT INSTRUCTIONS
You are scheduled with Dr. Adrián Vance at [facility] on 9/4/24 at 1pm.

## 2024-08-20 NOTE — LETTER
August 21, 2024          Robson Jaime PA-C  [address]  [place] [address]      RE:   Marysol Morrell 1950      Dear Colleague,    Thank you for referring your patient, Marysol Morrell, to [facility] at [place] location. Please see a copy of my visit note below.    [facility] Breast Surgery Postoperative Note     S: Zuri reports she is doing fine. She continues to wear a very supportive bra which has been helpful.      Breasts: right breast lumpectomy incision is healing well. No erythema or induration.      Pathology: reviewed and copy given to pt     A/P:  Marysol Morrell is recovering from a right breast tag localized lumpectomy on 8/5/2024. Her pathology revealed a 9mm IDC, grade 1, ER/SC +, HER 2 -. All margins widely clear. Oncotype pending. Plan for right mammo in 6 months, bilateral one year. Radiation oncology referral and follow up with Dr. Garcia.      Again, thank you for allowing me to participate in the care of your patient.      Sincerely,      Virgie Yang MD

## 2024-08-21 NOTE — PROGRESS NOTES
[facility] Breast Surgery Postoperative Note    S: Zuri reports she is doing fine. She continues to wear a very supportive bra which has been helpful.     Breasts: right breast lumpectomy incision is healing well. No erythema or induration.     Pathology: reviewed and copy given to pt    A/P  Marysol Morrell is recovering from a right breast tag localized lumpectomy on 8/5/2024. Her pathology revealed a 9mm IDC, grade 1, ER/PA +, HER 2 -. All margins widely clear. Oncotype pending. Plan for right mammo in 6 months, bilateral one year. Radiation oncology referral and follow up with Dr. Garcia.     Thank you for the opportunity to help in her care.    Virgie Yang MD  [facility]  [phone]    Please route or send letter to:  Primary Care Provider (PCP) and Referring Provider

## 2024-09-03 NOTE — PROGRESS NOTES
[facility]    Hematology/Oncology Established Patient Note      Today's Date: 9/11/2024    Reason for visit: Right breast cancer.    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 6/2017: Developed oropharyngeal dysphagia. Found to have indurated area, 2 x 1 cm on left side of anterior 2/3's of tongue and multiple left anterior neck nodes.  2. 7/24/2017: Laryngoscopy showed 2 x 1 cm exophytic lesion of left posterolateral oral tongue and diffuse lobular fullness of tongue base.  Biopsy of left lateral tongue and base of tongue showed CD20-positive diffuse large B-cell lymphoma, germinal center type; Ki-67 = 70%; histologic features and proliferation markers did not indicate double hit lymphoma so FISH not performed.  3. 8/2/2017: PET/CT showed hypermetabolic soft tissue thickening at base of tongue, bilateral abisai metastases at this level with single right hypermetabolic node and multiple left-sided hypermetabolic nodes; hypermetabolic 0.8 cm right thyroid nodule; hypermetabolic nonenlarged right axillary node, nonspecific and measuring 1.2 cm; remaining exam unremarkable.  4. 7/31/2017: Bone marrow biopsy negative for lymphoma.  5. 8/7/2017: Started R-CHOP chemotherapy under care of Dr. Mitchell Wilkins at [facility].  6. 10/12/2017: PET/CT after 3 cycles of chemo showed resolution of enlarged and hypermetabolic lymph nodes of neck and at base of tongue.  7. 12/6/2017: Completed 6 cycles of R-CHOP.  8. 1/16/2018: PET/CT showed complete response.  9. 6/25/2024: Diagnostic bilateral mammogram obtained due to right breast nipple/areolar itching.  This showed spiculated mass at 9:00 in right breast, 10 cm from nipple. Right  breast U/S showed 0.7 x 0.5 x 0.7 cm mass at 9:30, 10 cm from nipple; no right axillary adenopathy.  10.  7/3/2024: Right breast biopsy at 9:30, 10 cm from nipple showed grade 1 invasive ductal carcinoma, ER positive %, NJ moderate positive 5%, HER-2 negative (IHC = 0)  11. 8/05/2024: Right breast lumpectomy with Dr. Virgie Yang.  Pathology showed 9 mm grade 1 invasive ductal carcinoma, margins negative, lymph nodes not excised. Oncotype DX = 18, 9-year risk of distant recurrence of 5% after 5 years of hormone blockade therapy, < 1% absolute chemo benefit.    INTERVAL HISTORY:  Zuri reports healing well.     REVIEW OF SYSTEMS:   14 point ROS was reviewed and is negative other than as noted above in HPI.       HOME MEDICATIONS:  Current Outpatient Medications   Medication Sig Dispense Refill    acetaminophen (TYLENOL) 325 MG tablet Take 2 tablets (650 mg) by mouth every 4 hours as needed for mild pain or other (and adjunct with moderate or severe pain or per patient request)      allopurinol (ZYLOPRIM) 100 MG tablet Take 1 tablet (100 mg) by mouth daily 90 tablet 3    cetirizine (ZYRTEC) 10 MG tablet TAKE ONE TABLET BY MOUTH ONE TIME DAILY IN THE P.M. 90 tablet 3    conjugated estrogens (PREMARIN) cream Place 0.5 g vaginally twice a week (Patient taking differently: Place 0.5 g vaginally twice a week As needed) 30 g 12    hydrochlorothiazide (HYDRODIURIL) 12.5 MG tablet Take 1 tablet (12.5 mg) by mouth daily. 90 tablet 3    levothyroxine (SYNTHROID/LEVOTHROID) 125 MCG tablet Take 1 tablet (125 mcg) by mouth daily 90 tablet 1    losartan (COZAAR) 100 MG tablet TAKE ONE TABLET BY MOUTH EVERY DAY 90 tablet 3    pantoprazole (PROTONIX) 20 MG EC tablet TAKE ONE TABLET BY MOUTH EVERY DAY 90 tablet 3    rivaroxaban ANTICOAGULANT (XARELTO ANTICOAGULANT) 10 MG TABS tablet Take 1 tablet by mouth daily (with dinner) 90 tablet 3    senna-docusate (SENOKOT-S/PERICOLACE) 8.6-50 MG tablet Take 1 tablet by mouth 2 times  daily as needed for constipation (While on oral opioids.) 10 tablet 0    traZODone (DESYREL) 50 MG tablet Take 1 tablet (50 mg) by mouth at bedtime 30 tablet 2    valACYclovir (VALTREX) 500 MG tablet TAKE 1 TABLET BY MOUTH TWICE A DAY 12 tablet 0    Zoledronic Acid (RECLAST IV) Once a year           ALLERGIES:  Allergies   Allergen Reactions    Chlorhexidine Hives    Chlorhexidine Gluconate Hives    Codeine GI Disturbance    Evista [Raloxifene Hydrochloride] Other (See Comments)     Esophagus irritation     Fosamax Other (See Comments)     Esophagus irritation     Vicodin [Hydrocodone-Acetaminophen] GI Disturbance     Can Take oxycodone    Pen Vk [Penicillin V] Rash         PAST MEDICAL HISTORY:  Past Medical History:   Diagnosis Date    Arthritis     Cancer (H) 2017    lymphoma    Cholelithiasis     Colon polyps 2001 and 2006    no polyps 2011    Constipation     Diffuse large B cell lymphoma (H) 2017    tongue, followed by Dr. Wilkins at [facility] and [facility]    Diverticulitis 2010    DVT (deep venous thrombosis) (H)     x3 (one due to OCs, 2 post op)    Genital herpes     GERD (gastroesophageal reflux disease)     Gout     History of depression     History of migraine     HTN (hypertension), benign     Hypothyroidism     hashimoto's thyroiditis    Lichen sclerosus et atrophicus     Osteopenia     Ovarian cyst 2011    Rectocele     Schatzki's ring 2009    EGD     Tibia/fibula fracture 2009    Vasovagal syncope 1966    Vertigo 2000         PAST SURGICAL HISTORY:  Past Surgical History:   Procedure Laterality Date    APPENDECTOMY  1954    ARTHROSCOPY KNEE  1983    BONE MARROW BIOPSY, BONE SPECIMEN, NEEDLE/TROCAR N/A 7/31/2017    Procedure: BIOPSY BONE MARROW;  UNILATERAL BONE MARROW BIPOSY  ;  Surgeon: Jamil Mccarthy MD;  Location:  GI    BREAST BIOPSY, CORE RT/LT  1990    EXCISE GARNETT'S NEUROMA FOOT      HYSTERECTOMY, PAP NO LONGER INDICATED  1984    with bladder repair    LAPAROSCOPIC OOPHORECTOMY       with the colon resection    LARYNGOSCOPY WITH BIOPSY(IES) N/A 2017    Procedure: LARYNGOSCOPY WITH BIOPSY(IES);  DIRECT LARYNGOSCOPY WITH TONGUE BIOPSIES;  Surgeon: Vernell Yang MD;  Location: [facility]    LUMPECTOMY, BREAST, LOCALIZED USING RADIOFREQUENCY IDENTIFICATION Right 2024    Procedure: right breast tag localized lumpectomy;  Surgeon: Virgie Yang MD;  Location:  OR    OPEN REDUCTION INTERNAL FIXATION TIBIA      TONSILLECTOMY & ADENOIDECTOMY      ZC LAP,SURG,COLECTOMY, PARTIAL, W/ANAST      due to recurrent diverticulitis         SOCIAL HISTORY:  Social History     Socioeconomic History    Marital status:      Spouse name: Not on file    Number of children: Not on file    Years of education: Not on file    Highest education level: Not on file   Occupational History    Not on file   Tobacco Use    Smoking status: Former     Current packs/day: 0.00     Types: Cigarettes     Quit date: 2000     Years since quittin.1    Smokeless tobacco: Never    Tobacco comments:     More of a social smoker   Vaping Use    Vaping status: Never Used   Substance and Sexual Activity    Alcohol use: Yes     Alcohol/week: 0.0 standard drinks of alcohol     Comment: 0-3 a day. mixed drinks, wine    Drug use: No    Sexual activity: Not Currently     Partners: Male   Other Topics Concern    Parent/sibling w/ CABG, MI or angioplasty before 65F 55M? Not Asked   Social History Narrative    Works at [employer]    Dtr elaine Castrejon     Social Determinants of Health     Financial Resource Strain: Low Risk  (2024)    Financial Resource Strain     Within the past 12 months, have you or your family members you live with been unable to get utilities (heat, electricity) when it was really needed?: No   Food Insecurity: Low Risk  (2024)    Food Insecurity     Within the past 12 months, did you worry that your food would run out before you got money to buy more?:  No     Within the past 12 months, did the food you bought just not last and you didn t have money to get more?: No   Transportation Needs: Low Risk  (2024)    Transportation Needs     Within the past 12 months, has lack of transportation kept you from medical appointments, getting your medicines, non-medical meetings or appointments, work, or from getting things that you need?: No   Physical Activity: Not on file   Stress: Not on file   Social Connections: Unknown (2022)    [facility] [place]  [facility]    Social Connections     Frequency of Communication with Friends and Family: Not on file   Interpersonal Safety: Low Risk  (2024)    Interpersonal Safety     Do you feel physically and emotionally safe where you currently live?: Yes     Within the past 12 months, have you been hit, slapped, kicked or otherwise physically hurt by someone?: No     Within the past 12 months, have you been humiliated or emotionally abused in other ways by your partner or ex-partner?: No   Housing Stability: Low Risk  (2024)    Housing Stability     Do you have housing? : Yes     Are you worried about losing your housing?: No   ;  passed away in .      FAMILY HISTORY:  Family History   Problem Relation Age of Onset    C.A.D. Mother         and PE    Cancer Father 65        gastric ca         PHYSICAL EXAM:  Vital signs:  /79   Pulse 84   Resp 16   Wt 83.9 kg (185 lb)   SpO2 98%   BMI 31.26 kg/m     ECO  GENERAL: No acute distress.  EYES: No scleral icterus. No overt erythema.  RESPIRATORY: No audible cough, wheezing, or labored breathing.  MUSCULOSKELETAL: Range of motion in the neck, shoulders, and arms appear normal.  SKIN: No overt rashes, discolorations, or lesions over the face and neck.  NEUROLOGIC: Alert.  No overt tremors.  PSYCHIATRIC: Normal affect and mood.  Does not appear anxious.       LABS:  CBC  RESULTS:   Recent Labs   Lab Test 07/26/24  1155   WBC 7.7   RBC 4.21   HGB 13.5   HCT 39.8   MCV 95   MCH 32.1   MCHC 33.9   RDW 11.8        Last Comprehensive Metabolic Panel:  Sodium   Date Value Ref Range Status   07/26/2024 140 135 - 145 mmol/L Final   04/12/2021 135 133 - 144 mmol/L Final     Potassium   Date Value Ref Range Status   07/26/2024 4.3 3.4 - 5.3 mmol/L Final   01/30/2022 3.8 3.4 - 5.3 mmol/L Final   04/12/2021 4.0 3.4 - 5.3 mmol/L Final     Chloride   Date Value Ref Range Status   07/26/2024 103 98 - 107 mmol/L Final   01/30/2022 105 94 - 109 mmol/L Final   04/12/2021 103 94 - 109 mmol/L Final     Carbon Dioxide   Date Value Ref Range Status   04/12/2021 30 20 - 32 mmol/L Final     Carbon Dioxide (CO2)   Date Value Ref Range Status   07/26/2024 29 22 - 29 mmol/L Final   01/30/2022 27 20 - 32 mmol/L Final     Anion Gap   Date Value Ref Range Status   07/26/2024 8 7 - 15 mmol/L Final   01/30/2022 4 3 - 14 mmol/L Final   04/12/2021 2 (L) 3 - 14 mmol/L Final     Glucose   Date Value Ref Range Status   07/26/2024 90 70 - 99 mg/dL Final   01/30/2022 102 (H) 70 - 99 mg/dL Final   04/12/2021 97 70 - 99 mg/dL Final     Comment:     Fasting specimen     Urea Nitrogen   Date Value Ref Range Status   07/26/2024 23.0 8.0 - 23.0 mg/dL Final   01/30/2022 6 (L) 7 - 30 mg/dL Final   04/12/2021 15 7 - 30 mg/dL Final     Creatinine   Date Value Ref Range Status   07/26/2024 0.94 0.51 - 0.95 mg/dL Final   04/12/2021 0.90 0.52 - 1.04 mg/dL Final     GFR Estimate   Date Value Ref Range Status   07/26/2024 63 >60 mL/min/1.73m2 Final     Comment:     eGFR calculated using 2021 CKD-EPI equation.   04/12/2021 64 >60 mL/min/[1.73_m2] Final     Comment:     Non  GFR Calc  Starting 12/18/2018, serum creatinine based estimated GFR (eGFR) will be   calculated using the Chronic Kidney Disease Epidemiology Collaboration   (CKD-EPI) equation.       Calcium   Date Value Ref Range Status   07/26/2024 9.9 8.8 -  10.4 mg/dL Final     Comment:     Reference intervals for this test were updated on 7/16/2024 to reflect our healthy population more accurately. There may be differences in the flagging of prior results with similar values performed with this method. Those prior results can be interpreted in the context of the updated reference intervals.   04/12/2021 10.2 (H) 8.5 - 10.1 mg/dL Final     Bilirubin Total   Date Value Ref Range Status   07/09/2024 0.4 <=1.2 mg/dL Final   11/24/2020 0.4 0.2 - 1.3 mg/dL Final     Alkaline Phosphatase   Date Value Ref Range Status   07/09/2024 69 40 - 150 U/L Final   11/24/2020 80 40 - 150 U/L Final     ALT   Date Value Ref Range Status   07/09/2024 11 0 - 50 U/L Final     Comment:     Reference intervals for this test were updated on 6/12/2023 to more accurately reflect our healthy population. There may be differences in the flagging of prior results with similar values performed with this method. Interpretation of those prior results can be made in the context of the updated reference intervals.     11/24/2020 34 0 - 50 U/L Final     AST   Date Value Ref Range Status   07/09/2024 21 0 - 45 U/L Final     Comment:     Reference intervals for this test were updated on 6/12/2023 to more accurately reflect our healthy population. There may be differences in the flagging of prior results with similar values performed with this method. Interpretation of those prior results can be made in the context of the updated reference intervals.   11/24/2020 30 0 - 45 U/L Final       PATHOLOGY:  Reviewed as per HPI.    IMAGING:  Reviewed as per HPI.    ASSESSMENT/PLAN:  Marysol Morrell is a 73 year old female with the following issues:  1. Stage IA, iT1w-N7-N3, grade 1 invasive ductal carcinoma of right breast overlapping sites, ER positive, KS positive, HER-2 negative, Oncotype DX = 18  --I reviewed Zuri's pathology with her.  She had a small grade 1 IDC, ER/KS positive, HER-2 negative tumor with  negative surgical margins.  [place] lymph node excision was not indicated.  --Discussed Oncotype DX = 18, 9-year risk of distant recurrence of 5% after 5 years of hormone blockade therapy, < 1% absolute chemo benefit.  --Discussed role of radiation following lumpectomy and role of adjuvant endocrine therapy.  --I discussed the potential side effects of anastrozole, including but not limited to: fatigue, myalgias/arthralgias, bone density loss, decrease sexual drive, vaginal dryness, nausea, headache, difficulty sleeping, hot flashes, night sweats, small cardiovascular risk. Would not recommend tamoxifen due to prior history of DVT.  --She would be willing to try anastrozole. She could start this now after radiation oncology consult if she does not undergo radiation or 1 week post-radiation.    2. History of diffuse large B-cell lymphoma of tongue region and neck lymph nodes  --Diagnosed 7/24/2017 and completed 6 cycles of R-CHOP elsewhere on 12/6/2017 with clinical complete response.  --Discussed she can have annual follow-up with me for this.    3. History of right calf DVT/PE  --Has self-reported history of DVT related to OCPs age 30's and also phlebitis.  --[place] to be unprovoked in 2017 although she was diagnosed with diffuse large B-cell lymphoma around that time.  She was placed on long term anticoagulation.  --She will continue with Xarelto but hold for 2 days prior to breast surgery.  No bridging needed.    4. Vaginal atrophy  --Discussed okay for her to continue vaginal estrogen cream twice weekly.    5. Osteopenia  --10/19/2023 DEXA showed osteopenia that improved with Reclast. She did have multiple side effects from Reclast.  She is unable to take oral bisphosphonate.  --Repeat DEXA in 2025.    Return in 3 months.    Jessica Garcia MD  [facility] Hematology/Oncology    Total time spent today: 30 minutes in chart review, patient evaluation, counseling, documentation, test and/or  medication/prescription orders, and coordination of care.      The longitudinal plan of care for the diagnosis(es)/condition(s) as documented were addressed during this visit. Due to the added complexity in care, I will continue to support Zuri in the subsequent management and with ongoing continuity of care.

## 2024-09-08 LAB — SPECIMEN STATUS: NORMAL

## 2024-09-11 ENCOUNTER — ONCOLOGY VISIT (OUTPATIENT)
Dept: ONCOLOGY | Facility: CLINIC | Age: 74
End: 2024-09-11
Attending: INTERNAL MEDICINE
Payer: COMMERCIAL

## 2024-09-11 VITALS
BODY MASS INDEX: 31.26 KG/M2 | SYSTOLIC BLOOD PRESSURE: 125 MMHG | OXYGEN SATURATION: 98 % | DIASTOLIC BLOOD PRESSURE: 79 MMHG | RESPIRATION RATE: 16 BRPM | WEIGHT: 185 LBS | HEART RATE: 84 BPM

## 2024-09-11 DIAGNOSIS — C50.811 MALIGNANT NEOPLASM OF OVERLAPPING SITES OF RIGHT BREAST (H): Primary | ICD-10-CM

## 2024-09-11 DIAGNOSIS — Z85.72 HISTORY OF DIFFUSE LARGE B-CELL LYMPHOMA: ICD-10-CM

## 2024-09-11 DIAGNOSIS — N95.2 VAGINAL ATROPHY: ICD-10-CM

## 2024-09-11 PROCEDURE — 99214 OFFICE O/P EST MOD 30 MIN: CPT | Performed by: INTERNAL MEDICINE

## 2024-09-11 PROCEDURE — G0463 HOSPITAL OUTPT CLINIC VISIT: HCPCS | Performed by: INTERNAL MEDICINE

## 2024-09-11 PROCEDURE — G2211 COMPLEX E/M VISIT ADD ON: HCPCS | Performed by: INTERNAL MEDICINE

## 2024-09-11 RX ORDER — ANASTROZOLE 1 MG/1
1 TABLET ORAL DAILY
Qty: 90 TABLET | Refills: 3 | Status: SHIPPED | OUTPATIENT
Start: 2024-09-11

## 2024-09-11 ASSESSMENT — PAIN SCALES - GENERAL: PAINLEVEL: MILD PAIN (3)

## 2024-09-11 NOTE — PATIENT INSTRUCTIONS
1. Start anastrozole 1 mg orally daily after radiation oncology consult, or, if doing radiation, 1 week after completion of radiation.  2. RTC MD in 3 months.

## 2024-09-11 NOTE — LETTER
9/11/2024      Marysol Morrell  [address] [facility] [address]      Dear Colleague,    Thank you for referring your patient, Marysol Morrell, to the [facility]. Please see a copy of my visit note below.    [facility]    Hematology/Oncology Established Patient Note      Today's Date: 9/11/2024    Reason for visit: Right breast cancer.    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 6/2017: Developed oropharyngeal dysphagia. Found to have indurated area, 2 x 1 cm on left side of anterior 2/3's of tongue and multiple left anterior neck nodes.  2. 7/24/2017: Laryngoscopy showed 2 x 1 cm exophytic lesion of left posterolateral oral tongue and diffuse lobular fullness of tongue base.  Biopsy of left lateral tongue and base of tongue showed CD20-positive diffuse large B-cell lymphoma, germinal center type; Ki-67 = 70%; histologic features and proliferation markers did not indicate double hit lymphoma so FISH not performed.  3. 8/2/2017: PET/CT showed hypermetabolic soft tissue thickening at base of tongue, bilateral abisai metastases at this level with single right hypermetabolic node and multiple left-sided hypermetabolic nodes; hypermetabolic 0.8 cm right thyroid nodule; hypermetabolic nonenlarged right axillary node, nonspecific and measuring 1.2 cm; remaining exam unremarkable.  4. 7/31/2017: Bone marrow biopsy negative for lymphoma.  5. 8/7/2017: Started R-CHOP chemotherapy under care of Dr. Mitchell Wilkins at [facility].  6. 10/12/2017: PET/CT after 3 cycles of chemo showed resolution of enlarged and hypermetabolic lymph nodes of neck and at base of tongue.  7. 12/6/2017: Completed 6  cycles of R-CHOP.  8. 1/16/2018: PET/CT showed complete response.  9. 6/25/2024: Diagnostic bilateral mammogram obtained due to right breast nipple/areolar itching.  This showed spiculated mass at 9:00 in right breast, 10 cm from nipple. Right breast U/S showed 0.7 x 0.5 x 0.7 cm mass at 9:30, 10 cm from nipple; no right axillary adenopathy.  10.  7/3/2024: Right breast biopsy at 9:30, 10 cm from nipple showed grade 1 invasive ductal carcinoma, ER positive %, NH moderate positive 5%, HER-2 negative (IHC = 0)  11. 8/05/2024: Right breast lumpectomy with Dr. Virgie Yang.  Pathology showed 9 mm grade 1 invasive ductal carcinoma, margins negative, lymph nodes not excised. Oncotype DX = 18, 9-year risk of distant recurrence of 5% after 5 years of hormone blockade therapy, < 1% absolute chemo benefit.    INTERVAL HISTORY:  Zuri reports healing well.     REVIEW OF SYSTEMS:   14 point ROS was reviewed and is negative other than as noted above in HPI.       HOME MEDICATIONS:  Current Outpatient Medications   Medication Sig Dispense Refill     acetaminophen (TYLENOL) 325 MG tablet Take 2 tablets (650 mg) by mouth every 4 hours as needed for mild pain or other (and adjunct with moderate or severe pain or per patient request)       allopurinol (ZYLOPRIM) 100 MG tablet Take 1 tablet (100 mg) by mouth daily 90 tablet 3     cetirizine (ZYRTEC) 10 MG tablet TAKE ONE TABLET BY MOUTH ONE TIME DAILY IN THE P.M. 90 tablet 3     conjugated estrogens (PREMARIN) cream Place 0.5 g vaginally twice a week (Patient taking differently: Place 0.5 g vaginally twice a week As needed) 30 g 12     hydrochlorothiazide (HYDRODIURIL) 12.5 MG tablet Take 1 tablet (12.5 mg) by mouth daily. 90 tablet 3     levothyroxine (SYNTHROID/LEVOTHROID) 125 MCG tablet Take 1 tablet (125 mcg) by mouth daily 90 tablet 1     losartan (COZAAR) 100 MG tablet TAKE ONE TABLET BY MOUTH EVERY DAY 90 tablet 3     pantoprazole (PROTONIX) 20 MG EC tablet TAKE ONE  TABLET BY MOUTH EVERY DAY 90 tablet 3     rivaroxaban ANTICOAGULANT (XARELTO ANTICOAGULANT) 10 MG TABS tablet Take 1 tablet by mouth daily (with dinner) 90 tablet 3     senna-docusate (SENOKOT-S/PERICOLACE) 8.6-50 MG tablet Take 1 tablet by mouth 2 times daily as needed for constipation (While on oral opioids.) 10 tablet 0     traZODone (DESYREL) 50 MG tablet Take 1 tablet (50 mg) by mouth at bedtime 30 tablet 2     valACYclovir (VALTREX) 500 MG tablet TAKE 1 TABLET BY MOUTH TWICE A DAY 12 tablet 0     Zoledronic Acid (RECLAST IV) Once a year           ALLERGIES:  Allergies   Allergen Reactions     Chlorhexidine Hives     Chlorhexidine Gluconate Hives     Codeine GI Disturbance     Evista [Raloxifene Hydrochloride] Other (See Comments)     Esophagus irritation      Fosamax Other (See Comments)     Esophagus irritation      Vicodin [Hydrocodone-Acetaminophen] GI Disturbance     Can Take oxycodone     Pen Vk [Penicillin V] Rash         PAST MEDICAL HISTORY:  Past Medical History:   Diagnosis Date     Arthritis      Cancer (H) 2017    lymphoma     Cholelithiasis      Colon polyps 2001 and 2006    no polyps 2011     Constipation      Diffuse large B cell lymphoma (H) 2017    tongue, followed by Dr. Wilkins at [facility] and [facility]     Diverticulitis 2010     DVT (deep venous thrombosis) (H)     x3 (one due to OCs, 2 post op)     Genital herpes      GERD (gastroesophageal reflux disease)      Gout      History of depression      History of migraine      HTN (hypertension), benign      Hypothyroidism     hashimoto's thyroiditis     Lichen sclerosus et atrophicus      Osteopenia      Ovarian cyst 2011     Rectocele      Schatzki's ring 2009    EGD      Tibia/fibula fracture 2009     Vasovagal syncope 1966     Vertigo 2000         PAST SURGICAL HISTORY:  Past Surgical History:   Procedure Laterality Date     APPENDECTOMY  1954     ARTHROSCOPY KNEE  1983     BONE MARROW BIOPSY, BONE SPECIMEN, NEEDLE/TROCAR N/A 7/31/2017     Procedure: BIOPSY BONE MARROW;  UNILATERAL BONE MARROW BIPOSY  ;  Surgeon: Jamil Mccarthy MD;  Location:  GI     BREAST BIOPSY, CORE RT/LT       EXCISE GARNETT'S NEUROMA FOOT       HYSTERECTOMY, PAP NO LONGER INDICATED      with bladder repair     LAPAROSCOPIC OOPHORECTOMY      with the colon resection     LARYNGOSCOPY WITH BIOPSY(IES) N/A 2017    Procedure: LARYNGOSCOPY WITH BIOPSY(IES);  DIRECT LARYNGOSCOPY WITH TONGUE BIOPSIES;  Surgeon: Vernell Yang MD;  Location:  SD     LUMPECTOMY, BREAST, LOCALIZED USING RADIOFREQUENCY IDENTIFICATION Right 2024    Procedure: right breast tag localized lumpectomy;  Surgeon: Virgie Yang MD;  Location:  OR     OPEN REDUCTION INTERNAL FIXATION TIBIA       TONSILLECTOMY & ADENOIDECTOMY       Z LAP,SURG,COLECTOMY, PARTIAL, W/ANAST      due to recurrent diverticulitis         SOCIAL HISTORY:  Social History     Socioeconomic History     Marital status:      Spouse name: Not on file     Number of children: Not on file     Years of education: Not on file     Highest education level: Not on file   Occupational History     Not on file   Tobacco Use     Smoking status: Former     Current packs/day: 0.00     Types: Cigarettes     Quit date: 2000     Years since quittin.1     Smokeless tobacco: Never     Tobacco comments:     More of a social smoker   Vaping Use     Vaping status: Never Used   Substance and Sexual Activity     Alcohol use: Yes     Alcohol/week: 0.0 standard drinks of alcohol     Comment: 0-3 a day. mixed drinks, wine     Drug use: No     Sexual activity: Not Currently     Partners: Male   Other Topics Concern     Parent/sibling w/ CABG, MI or angioplasty before 65F 55M? Not Asked   Social History Narrative    Works at [employer]    Dtr Lucía, elaine sung     Social Determinants of Health     Financial Resource Strain: Low Risk  (2024)    Financial Resource Strain      Within the  past 12 months, have you or your family members you live with been unable to get utilities (heat, electricity) when it was really needed?: No   Food Insecurity: Low Risk  (2024)    Food Insecurity      Within the past 12 months, did you worry that your food would run out before you got money to buy more?: No      Within the past 12 months, did the food you bought just not last and you didn t have money to get more?: No   Transportation Needs: Low Risk  (2024)    Transportation Needs      Within the past 12 months, has lack of transportation kept you from medical appointments, getting your medicines, non-medical meetings or appointments, work, or from getting things that you need?: No   Physical Activity: Not on file   Stress: Not on file   Social Connections: Unknown (2022)    Received from [facility]    Social Connections      Frequency of Communication with Friends and Family: Not on file   Interpersonal Safety: Low Risk  (2024)    Interpersonal Safety      Do you feel physically and emotionally safe where you currently live?: Yes      Within the past 12 months, have you been hit, slapped, kicked or otherwise physically hurt by someone?: No      Within the past 12 months, have you been humiliated or emotionally abused in other ways by your partner or ex-partner?: No   Housing Stability: Low Risk  (2024)    Housing Stability      Do you have housing? : Yes      Are you worried about losing your housing?: No   ;  passed away in .      FAMILY HISTORY:  Family History   Problem Relation Age of Onset     C.A.D. Mother         and PE     Cancer Father 65        gastric ca         PHYSICAL EXAM:  Vital signs:  /79   Pulse 84   Resp 16   Wt 83.9 kg (185 lb)   SpO2 98%   BMI 31.26 kg/m     ECO  GENERAL: No acute distress.  EYES: No scleral icterus. No overt erythema.  RESPIRATORY: No  audible cough, wheezing, or labored breathing.  MUSCULOSKELETAL: Range of motion in the neck, shoulders, and arms appear normal.  SKIN: No overt rashes, discolorations, or lesions over the face and neck.  NEUROLOGIC: Alert.  No overt tremors.  PSYCHIATRIC: Normal affect and mood.  Does not appear anxious.       LABS:  CBC RESULTS:   Recent Labs   Lab Test 07/26/24  1155   WBC 7.7   RBC 4.21   HGB 13.5   HCT 39.8   MCV 95   MCH 32.1   MCHC 33.9   RDW 11.8        Last Comprehensive Metabolic Panel:  Sodium   Date Value Ref Range Status   07/26/2024 140 135 - 145 mmol/L Final   04/12/2021 135 133 - 144 mmol/L Final     Potassium   Date Value Ref Range Status   07/26/2024 4.3 3.4 - 5.3 mmol/L Final   01/30/2022 3.8 3.4 - 5.3 mmol/L Final   04/12/2021 4.0 3.4 - 5.3 mmol/L Final     Chloride   Date Value Ref Range Status   07/26/2024 103 98 - 107 mmol/L Final   01/30/2022 105 94 - 109 mmol/L Final   04/12/2021 103 94 - 109 mmol/L Final     Carbon Dioxide   Date Value Ref Range Status   04/12/2021 30 20 - 32 mmol/L Final     Carbon Dioxide (CO2)   Date Value Ref Range Status   07/26/2024 29 22 - 29 mmol/L Final   01/30/2022 27 20 - 32 mmol/L Final     Anion Gap   Date Value Ref Range Status   07/26/2024 8 7 - 15 mmol/L Final   01/30/2022 4 3 - 14 mmol/L Final   04/12/2021 2 (L) 3 - 14 mmol/L Final     Glucose   Date Value Ref Range Status   07/26/2024 90 70 - 99 mg/dL Final   01/30/2022 102 (H) 70 - 99 mg/dL Final   04/12/2021 97 70 - 99 mg/dL Final     Comment:     Fasting specimen     Urea Nitrogen   Date Value Ref Range Status   07/26/2024 23.0 8.0 - 23.0 mg/dL Final   01/30/2022 6 (L) 7 - 30 mg/dL Final   04/12/2021 15 7 - 30 mg/dL Final     Creatinine   Date Value Ref Range Status   07/26/2024 0.94 0.51 - 0.95 mg/dL Final   04/12/2021 0.90 0.52 - 1.04 mg/dL Final     GFR Estimate   Date Value Ref Range Status   07/26/2024 63 >60 mL/min/1.73m2 Final     Comment:     eGFR calculated using 2021 CKD-EPI equation.    04/12/2021 64 >60 mL/min/[1.73_m2] Final     Comment:     Non  GFR Calc  Starting 12/18/2018, serum creatinine based estimated GFR (eGFR) will be   calculated using the Chronic Kidney Disease Epidemiology Collaboration   (CKD-EPI) equation.       Calcium   Date Value Ref Range Status   07/26/2024 9.9 8.8 - 10.4 mg/dL Final     Comment:     Reference intervals for this test were updated on 7/16/2024 to reflect our healthy population more accurately. There may be differences in the flagging of prior results with similar values performed with this method. Those prior results can be interpreted in the context of the updated reference intervals.   04/12/2021 10.2 (H) 8.5 - 10.1 mg/dL Final     Bilirubin Total   Date Value Ref Range Status   07/09/2024 0.4 <=1.2 mg/dL Final   11/24/2020 0.4 0.2 - 1.3 mg/dL Final     Alkaline Phosphatase   Date Value Ref Range Status   07/09/2024 69 40 - 150 U/L Final   11/24/2020 80 40 - 150 U/L Final     ALT   Date Value Ref Range Status   07/09/2024 11 0 - 50 U/L Final     Comment:     Reference intervals for this test were updated on 6/12/2023 to more accurately reflect our healthy population. There may be differences in the flagging of prior results with similar values performed with this method. Interpretation of those prior results can be made in the context of the updated reference intervals.     11/24/2020 34 0 - 50 U/L Final     AST   Date Value Ref Range Status   07/09/2024 21 0 - 45 U/L Final     Comment:     Reference intervals for this test were updated on 6/12/2023 to more accurately reflect our healthy population. There may be differences in the flagging of prior results with similar values performed with this method. Interpretation of those prior results can be made in the context of the updated reference intervals.   11/24/2020 30 0 - 45 U/L Final       PATHOLOGY:  Reviewed as per HPI.    IMAGING:  Reviewed as per HPI.    ASSESSMENT/PLAN:  Marysol FLORES  Porsche is a 73 year old female with the following issues:  1. Stage IA, [id number], grade 1 invasive ductal carcinoma of right breast overlapping sites, ER positive, KS positive, HER-2 negative, Oncotype DX = 18  --I reviewed Zuri's pathology with her.  She had a small grade 1 IDC, ER/KS positive, HER-2 negative tumor with negative surgical margins.  [place] lymph node excision was not indicated.  --Discussed Oncotype DX = 18, 9-year risk of distant recurrence of 5% after 5 years of hormone blockade therapy, < 1% absolute chemo benefit.  --Discussed role of radiation following lumpectomy and role of adjuvant endocrine therapy.  --I discussed the potential side effects of anastrozole, including but not limited to: fatigue, myalgias/arthralgias, bone density loss, decrease sexual drive, vaginal dryness, nausea, headache, difficulty sleeping, hot flashes, night sweats, small cardiovascular risk. Would not recommend tamoxifen due to prior history of DVT.  --She would be willing to try anastrozole. She could start this now after radiation oncology consult if she does not undergo radiation or 1 week post-radiation.    2. History of diffuse large B-cell lymphoma of tongue region and neck lymph nodes  --Diagnosed 7/24/2017 and completed 6 cycles of R-CHOP elsewhere on 12/6/2017 with clinical complete response.  --Discussed she can have annual follow-up with me for this.    3. History of right calf DVT/PE  --Has self-reported history of DVT related to OCPs age 30's and also phlebitis.  --Wolf to be unprovoked in 2017 although she was diagnosed with diffuse large B-cell lymphoma around that time.  She was placed on long term anticoagulation.  --She will continue with Xarelto but hold for 2 days prior to breast surgery.  No bridging needed.    4. Vaginal atrophy  --Discussed okay for her to continue vaginal estrogen cream twice weekly.    5. Osteopenia  --10/19/2023 DEXA showed osteopenia that improved with Reclast. She  did have multiple side effects from Reclast.  She is unable to take oral bisphosphonate.  --Repeat DEXA in 2025.    Return in 3 months.    Jessica Garcia MD  [facility] Hematology/Oncology    Total time spent today: 30 minutes in chart review, patient evaluation, counseling, documentation, test and/or medication/prescription orders, and coordination of care.      The longitudinal plan of care for the diagnosis(es)/condition(s) as documented were addressed during this visit. Due to the added complexity in care, I will continue to support Zuri in the subsequent management and with ongoing continuity of care.      Again, thank you for allowing me to participate in the care of your patient.        Sincerely,        Jessica Garcia MD

## 2024-09-16 ENCOUNTER — TRANSFERRED RECORDS (OUTPATIENT)
Dept: HEALTH INFORMATION MANAGEMENT | Facility: CLINIC | Age: 74
End: 2024-09-16
Payer: COMMERCIAL

## 2024-09-30 DIAGNOSIS — C50.411 BREAST CANCER OF UPPER-OUTER QUADRANT OF RIGHT FEMALE BREAST (H): Primary | ICD-10-CM

## 2024-09-30 RX ORDER — OXYCODONE AND ACETAMINOPHEN 5; 325 MG/1; MG/1
1-2 TABLET ORAL EVERY 6 HOURS PRN
Qty: 20 TABLET | Refills: 0 | Status: SHIPPED | OUTPATIENT
Start: 2024-09-30

## 2024-10-11 ENCOUNTER — TRANSFERRED RECORDS (OUTPATIENT)
Dept: HEALTH INFORMATION MANAGEMENT | Facility: CLINIC | Age: 74
End: 2024-10-11
Payer: COMMERCIAL

## 2024-10-23 DIAGNOSIS — A60.04 HERPES SIMPLEX VULVOVAGINITIS: ICD-10-CM

## 2024-10-23 NOTE — TELEPHONE ENCOUNTER
Clinic RN: Please investigate patient's chart or contact patient if the information cannot be found because patient should have run out of this medication on 1/15/24. Confirm patient is taking this medication as prescribed. Document findings and route refill encounter to provider for approval or denial.

## 2024-10-24 RX ORDER — VALACYCLOVIR HYDROCHLORIDE 500 MG/1
TABLET, FILM COATED ORAL
Qty: 12 TABLET | Refills: 3 | Status: SHIPPED | OUTPATIENT
Start: 2024-10-24

## 2024-10-24 NOTE — TELEPHONE ENCOUNTER
Patient confirmed that if she has an outbreak she takes the valacyclovir twice a day for 3 days. Patient has used the 12 tablets that were ordered in January. Kaleigh Delaney RN

## 2024-11-14 DIAGNOSIS — I10 HTN (HYPERTENSION), BENIGN: ICD-10-CM

## 2024-11-14 RX ORDER — LOSARTAN POTASSIUM 100 MG/1
TABLET ORAL
Qty: 90 TABLET | Refills: 1 | Status: SHIPPED | OUTPATIENT
Start: 2024-11-14

## 2024-11-14 RX ORDER — HYDROCHLOROTHIAZIDE 12.5 MG/1
12.5 TABLET ORAL DAILY
Qty: 90 TABLET | Refills: 1 | Status: SHIPPED | OUTPATIENT
Start: 2024-11-14

## 2024-11-25 NOTE — PROGRESS NOTES
[facility]    Hematology/Oncology Established Patient Note      Today's Date: 12/4/2024    Reason for visit: Right breast cancer.    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 6/2017: Developed oropharyngeal dysphagia. Found to have indurated area, 2 x 1 cm on left side of anterior 2/3's of tongue and multiple left anterior neck nodes.  2. 7/24/2017: Laryngoscopy showed 2 x 1 cm exophytic lesion of left posterolateral oral tongue and diffuse lobular fullness of tongue base.  Biopsy of left lateral tongue and base of tongue showed CD20-positive diffuse large B-cell lymphoma, germinal center type; Ki-67 = 70%; histologic features and proliferation markers did not indicate double hit lymphoma so FISH not performed.  3. 8/2/2017: PET/CT showed hypermetabolic soft tissue thickening at base of tongue, bilateral abisai metastases at this level with single right hypermetabolic node and multiple left-sided hypermetabolic nodes; hypermetabolic 0.8 cm right thyroid nodule; hypermetabolic nonenlarged right axillary node, nonspecific and measuring 1.2 cm; remaining exam unremarkable.  4. 7/31/2017: Bone marrow biopsy negative for lymphoma.  5. 8/7/2017: Started R-CHOP chemotherapy under care of Dr. Mitchell Wilkins at [facility].  6. 10/12/2017: PET/CT after 3 cycles of chemo showed resolution of enlarged and hypermetabolic lymph nodes of neck and at base of tongue.  7. 12/6/2017: Completed 6 cycles of R-CHOP.  8. 1/16/2018: PET/CT showed complete response.  9. 6/25/2024: Diagnostic bilateral mammogram obtained due to right breast nipple/areolar itching.  This showed spiculated mass at 9:00 in right breast, 10 cm from nipple. Right  breast U/S showed 0.7 x 0.5 x 0.7 cm mass at 9:30, 10 cm from nipple; no right axillary adenopathy.  10.  7/3/2024: Right breast biopsy at 9:30, 10 cm from nipple showed grade 1 invasive ductal carcinoma, ER positive %, LA moderate positive 5%, HER-2 negative (IHC = 0)  11.  8/05/2024: Right breast lumpectomy with Dr. Virgie Yang.  Pathology showed 9 mm grade 1 invasive ductal carcinoma, margins negative, lymph nodes not excised. Oncotype DX = 18, 9-year risk of distant recurrence of 5% after 5 years of hormone blockade therapy, < 1% absolute chemo benefit.  12. 10/2/2024-10/11/2024: Completed adjuvant radiation therapy in 5 fractions.  13. 11/11/2024: Started anastrozole.    INTERVAL HISTORY:  Zuri reports feeling overall well.  She reports her joint pains are actually better despite starting anastrozole.  She feels sleepier than usual -- requiring more sleep daily.     REVIEW OF SYSTEMS:   14 point ROS was reviewed and is negative other than as noted above in HPI.       HOME MEDICATIONS:  Current Outpatient Medications   Medication Sig Dispense Refill    acetaminophen (TYLENOL) 325 MG tablet Take 2 tablets (650 mg) by mouth every 4 hours as needed for mild pain or other (and adjunct with moderate or severe pain or per patient request)      allopurinol (ZYLOPRIM) 100 MG tablet Take 1 tablet (100 mg) by mouth daily 90 tablet 3    anastrozole (ARIMIDEX) 1 MG tablet Take 1 tablet (1 mg) by mouth daily. 90 tablet 3    cetirizine (ZYRTEC) 10 MG tablet TAKE ONE TABLET BY MOUTH ONE TIME DAILY IN THE P.M. 90 tablet 3    hydroCHLOROthiazide 12.5 MG tablet TAKE ONE TABLET BY MOUTH EVERY DAY 90 tablet 1    levothyroxine (SYNTHROID/LEVOTHROID) 125 MCG tablet Take 1 tablet (125 mcg) by mouth daily 90 tablet 1    losartan (COZAAR) 100 MG tablet TAKE ONE TABLET BY MOUTH EVERY DAY 90 tablet 1    oxyCODONE-acetaminophen (PERCOCET) 5-325 MG tablet Take 1-2 tablets by mouth every 6 hours as needed for pain. 20 tablet 0     pantoprazole (PROTONIX) 20 MG EC tablet TAKE ONE TABLET BY MOUTH EVERY DAY 90 tablet 3    rivaroxaban ANTICOAGULANT (XARELTO ANTICOAGULANT) 10 MG TABS tablet Take 1 tablet by mouth daily (with dinner) 90 tablet 3    senna-docusate (SENOKOT-S/PERICOLACE) 8.6-50 MG tablet Take 1 tablet by mouth 2 times daily as needed for constipation (While on oral opioids.) 10 tablet 0    traZODone (DESYREL) 50 MG tablet Take 1 tablet (50 mg) by mouth at bedtime 30 tablet 2    valACYclovir (VALTREX) 500 MG tablet TAKE 1 TABLET BY MOUTH TWICE A DAY 12 tablet 3    Zoledronic Acid (RECLAST IV) Once a year           ALLERGIES:  Allergies   Allergen Reactions    Chlorhexidine Hives    Chlorhexidine Gluconate Hives    Codeine GI Disturbance    Evista [Raloxifene Hydrochloride] Other (See Comments)     Esophagus irritation     Fosamax Other (See Comments)     Esophagus irritation     Vicodin [Hydrocodone-Acetaminophen] GI Disturbance     Can Take oxycodone    Pen Vk [Penicillin V] Rash         PAST MEDICAL HISTORY:  Past Medical History:   Diagnosis Date    Arthritis     Cancer (H) 2017    lymphoma    Cholelithiasis     Colon polyps 2001 and 2006    no polyps 2011    Constipation     Diffuse large B cell lymphoma (H) 2017    tongue, followed by Dr. Wilkins at [facility] and [facility]    Diverticulitis 2010    DVT (deep venous thrombosis) (H)     x3 (one due to OCs, 2 post op)    Genital herpes     GERD (gastroesophageal reflux disease)     Gout     History of depression     History of migraine     HTN (hypertension), benign     Hypothyroidism     hashimoto's thyroiditis    Lichen sclerosus et atrophicus     Osteopenia     Ovarian cyst 2011    Rectocele     Schatzki's ring 2009    EGD     Tibia/fibula fracture 2009    Vasovagal syncope 1966    Vertigo 2000         PAST SURGICAL HISTORY:  Past Surgical History:   Procedure Laterality Date    APPENDECTOMY  1954    ARTHROSCOPY KNEE  1983    BONE MARROW BIOPSY, BONE SPECIMEN, NEEDLE/TROCAR N/A  2017    Procedure: BIOPSY BONE MARROW;  UNILATERAL BONE MARROW BIPOSY  ;  Surgeon: Jamil Mccarthy MD;  Location:  GI    BREAST BIOPSY, CORE RT/LT      EXCISE GARNETT'S NEUROMA FOOT      HYSTERECTOMY, PAP NO LONGER INDICATED      with bladder repair    LAPAROSCOPIC OOPHORECTOMY      with the colon resection    LARYNGOSCOPY WITH BIOPSY(IES) N/A 2017    Procedure: LARYNGOSCOPY WITH BIOPSY(IES);  DIRECT LARYNGOSCOPY WITH TONGUE BIOPSIES;  Surgeon: [facility] MD;  Location:  SD    LUMPECTOMY, BREAST, LOCALIZED USING RADIOFREQUENCY IDENTIFICATION Right 2024    Procedure: right breast tag localized lumpectomy;  Surgeon: Virgie Yang MD;  Location:  OR    OPEN REDUCTION INTERNAL FIXATION TIBIA      TONSILLECTOMY & ADENOIDECTOMY      [facility] LAP,SURG,COLECTOMY, PARTIAL, W/ANAST      due to recurrent diverticulitis         SOCIAL HISTORY:  Social History     Socioeconomic History    Marital status:      Spouse name: Not on file    Number of children: Not on file    Years of education: Not on file    Highest education level: Not on file   Occupational History    Not on file   Tobacco Use    Smoking status: Former     Current packs/day: 0.00     Types: Cigarettes     Quit date: 2000     Years since quittin.3    Smokeless tobacco: Never    Tobacco comments:     More of a social smoker   Vaping Use    Vaping status: Never Used   Substance and Sexual Activity    Alcohol use: Yes     Alcohol/week: 0.0 standard drinks of alcohol     Comment: 0-3 a day. mixed drinks, wine    Drug use: No    Sexual activity: Not Currently     Partners: Male   Other Topics Concern    Parent/sibling w/ CABG, MI or angioplasty before 65F 55M? Not Asked   Social History Narrative    Works at [employer]    Dtr Lucía, has grandkids     Social Drivers of Health     Financial Resource Strain: Low Risk  (2024)    Financial Resource Strain     Within the past 12  "months, have you or your family members you live with been unable to get utilities (heat, electricity) when it was really needed?: No   Food Insecurity: Low Risk  (2024)    Food Insecurity     Within the past 12 months, did you worry that your food would run out before you got money to buy more?: No     Within the past 12 months, did the food you bought just not last and you didn t have money to get more?: No   Transportation Needs: Low Risk  (2024)    Transportation Needs     Within the past 12 months, has lack of transportation kept you from medical appointments, getting your medicines, non-medical meetings or appointments, work, or from getting things that you need?: No   Physical Activity: Not on file   Stress: Not on file   Social Connections: Unknown (2022)    Received from [facility]    Social Connections     Frequency of Communication with Friends and Family: Not on file   Interpersonal Safety: Low Risk  (2024)    Interpersonal Safety     Do you feel physically and emotionally safe where you currently live?: Yes     Within the past 12 months, have you been hit, slapped, kicked or otherwise physically hurt by someone?: No     Within the past 12 months, have you been humiliated or emotionally abused in other ways by your partner or ex-partner?: No   Housing Stability: Low Risk  (2024)    Housing Stability     Do you have housing? : Yes     Are you worried about losing your housing?: No   ;  passed away in .      FAMILY HISTORY:  Family History   Problem Relation Age of Onset    C.A.D. Mother         and PE    Cancer Father 65        gastric ca         PHYSICAL EXAM:  Vital signs:  /84   Pulse 69   Resp 16   Ht 1.638 m (5' 4.5\")   Wt 85.3 kg (188 lb)   SpO2 98%   BMI 31.77 kg/m     ECO  GENERAL/CONSTITUTIONAL: No acute distress.  EYES: No erythema or scleral icterus.  LYMPH: " No cervical, supraclavicular, axillary adenopathy.  BREAST: No palpable masses in either breast. Nipples are everted bilaterally with no discharge. Hyperpigmentation of right breast.  RESPIRATORY: No audible cough wheezing.   GASTROINTESTINAL: No guarding.  No distention.  MUSCULOSKELETAL: Warm and well-perfused, no cyanosis, clubbing, or edema.  NEUROLOGIC: Alert, oriented, answers questions appropriately.  INTEGUMENTARY: No rashes or jaundice.  GAIT: Steady, does not use assistive device       LABS:  CBC RESULTS:   Recent Labs   Lab Test 07/26/24  1155   WBC 7.7   RBC 4.21   HGB 13.5   HCT 39.8   MCV 95   MCH 32.1   MCHC 33.9   RDW 11.8        Last Comprehensive Metabolic Panel:  Sodium   Date Value Ref Range Status   07/26/2024 140 135 - 145 mmol/L Final   04/12/2021 135 133 - 144 mmol/L Final     Potassium   Date Value Ref Range Status   07/26/2024 4.3 3.4 - 5.3 mmol/L Final   01/30/2022 3.8 3.4 - 5.3 mmol/L Final   04/12/2021 4.0 3.4 - 5.3 mmol/L Final     Chloride   Date Value Ref Range Status   07/26/2024 103 98 - 107 mmol/L Final   01/30/2022 105 94 - 109 mmol/L Final   04/12/2021 103 94 - 109 mmol/L Final     Carbon Dioxide   Date Value Ref Range Status   04/12/2021 30 20 - 32 mmol/L Final     Carbon Dioxide (CO2)   Date Value Ref Range Status   07/26/2024 29 22 - 29 mmol/L Final   01/30/2022 27 20 - 32 mmol/L Final     Anion Gap   Date Value Ref Range Status   07/26/2024 8 7 - 15 mmol/L Final   01/30/2022 4 3 - 14 mmol/L Final   04/12/2021 2 (L) 3 - 14 mmol/L Final     Glucose   Date Value Ref Range Status   07/26/2024 90 70 - 99 mg/dL Final   01/30/2022 102 (H) 70 - 99 mg/dL Final   04/12/2021 97 70 - 99 mg/dL Final     Comment:     Fasting specimen     Urea Nitrogen   Date Value Ref Range Status   07/26/2024 23.0 8.0 - 23.0 mg/dL Final   01/30/2022 6 (L) 7 - 30 mg/dL Final   04/12/2021 15 7 - 30 mg/dL Final     Creatinine   Date Value Ref Range Status   07/26/2024 0.94 0.51 - 0.95 mg/dL Final    04/12/2021 0.90 0.52 - 1.04 mg/dL Final     GFR Estimate   Date Value Ref Range Status   07/26/2024 63 >60 mL/min/1.73m2 Final     Comment:     eGFR calculated using 2021 CKD-EPI equation.   04/12/2021 64 >60 mL/min/[1.73_m2] Final     Comment:     Non  GFR Calc  Starting 12/18/2018, serum creatinine based estimated GFR (eGFR) will be   calculated using the Chronic Kidney Disease Epidemiology Collaboration   (CKD-EPI) equation.       Calcium   Date Value Ref Range Status   07/26/2024 9.9 8.8 - 10.4 mg/dL Final     Comment:     Reference intervals for this test were updated on 7/16/2024 to reflect our healthy population more accurately. There may be differences in the flagging of prior results with similar values performed with this method. Those prior results can be interpreted in the context of the updated reference intervals.   04/12/2021 10.2 (H) 8.5 - 10.1 mg/dL Final     Bilirubin Total   Date Value Ref Range Status   07/09/2024 0.4 <=1.2 mg/dL Final   11/24/2020 0.4 0.2 - 1.3 mg/dL Final     Alkaline Phosphatase   Date Value Ref Range Status   07/09/2024 69 40 - 150 U/L Final   11/24/2020 80 40 - 150 U/L Final     ALT   Date Value Ref Range Status   07/09/2024 11 0 - 50 U/L Final     Comment:     Reference intervals for this test were updated on 6/12/2023 to more accurately reflect our healthy population. There may be differences in the flagging of prior results with similar values performed with this method. Interpretation of those prior results can be made in the context of the updated reference intervals.     11/24/2020 34 0 - 50 U/L Final     AST   Date Value Ref Range Status   07/09/2024 21 0 - 45 U/L Final     Comment:     Reference intervals for this test were updated on 6/12/2023 to more accurately reflect our healthy population. There may be differences in the flagging of prior results with similar values performed with this method. Interpretation of those prior results can be made  in the context of the updated reference intervals.   11/24/2020 30 0 - 45 U/L Final       PATHOLOGY:  Reviewed as per HPI.    IMAGING:  Reviewed as per HPI.    ASSESSMENT/PLAN:  Marysol Morrell is a 73 year old female with the following issues:  1. Stage IA, fR4m-A0-G8, grade 1 invasive ductal carcinoma of right breast overlapping sites, ER positive, NV positive, HER-2 negative, Oncotype DX = 18  --Zuri is s/p right lumpectomy and adjuvant radiation therapy completed 10/11/2024.  --Oncotype DX = 18, 9-year risk of distant recurrence of 5% after 5 years of hormone blockade therapy, < 1% absolute chemo benefit.  --Tamoxifen not advised due to prior history of DVT.  --She is on anastrozole and tolerating this relatively well so far albeit with some increase in sleepiness.  --Discussed that the initial side effects of anastrozole may subside over the next several months as the body adapts to lower estrogen state.  --Plan for total 5 years of anastrozole.  --Plan for diagnostic right mammogram in 2/2025 and bilateral screening mammogram in 8/2025.    2. History of diffuse large B-cell lymphoma of tongue region and neck lymph nodes  --Diagnosed 7/24/2017 and completed 6 cycles of R-CHOP elsewhere on 12/6/2017 with clinical complete response.  --She can have annual follow-up with me for this.    3. History of right calf DVT/PE  --Has self-reported history of DVT related to OCPs age 30's and also phlebitis.  --[place] to be unprovoked in 2017 although she was diagnosed with diffuse large B-cell lymphoma around that time.  She was placed on long term anticoagulation.  --She will continue with Xarelto.    4. Vaginal atrophy  --Stable. Continue vaginal estrogen cream twice weekly.    5. Osteopenia  --10/19/2023 DEXA showed osteopenia that improved with Reclast. She did have multiple side effects from Reclast.  She is unable to take oral bisphosphonate.  --Repeat DEXA in 2025.    6. Chronic intermittent insomnia  --She has  tried trazodone with not much benefit.  --Discussed CBT.  Encouraged exercise daily in mornings. Can try some magnesium or THC gummies.    Return in 3-4 months.    Jessica Garcia MD  [facility] Hematology/Oncology    Total time spent today: 30 minutes in chart review, patient evaluation, counseling, documentation, test and/or medication/prescription orders, and coordination of care.      The longitudinal plan of care for the diagnosis(es)/condition(s) as documented were addressed during this visit. Due to the added complexity in care, I will continue to support Zuri in the subsequent management and with ongoing continuity of care.

## 2024-12-04 ENCOUNTER — ONCOLOGY VISIT (OUTPATIENT)
Dept: ONCOLOGY | Facility: CLINIC | Age: 74
End: 2024-12-04
Attending: PHYSICIAN ASSISTANT
Payer: COMMERCIAL

## 2024-12-04 VITALS
DIASTOLIC BLOOD PRESSURE: 84 MMHG | HEART RATE: 69 BPM | SYSTOLIC BLOOD PRESSURE: 134 MMHG | BODY MASS INDEX: 31.32 KG/M2 | WEIGHT: 188 LBS | OXYGEN SATURATION: 98 % | HEIGHT: 65 IN | RESPIRATION RATE: 16 BRPM

## 2024-12-04 DIAGNOSIS — Z86.718 HISTORY OF VENOUS THROMBOEMBOLISM: ICD-10-CM

## 2024-12-04 DIAGNOSIS — Z85.72 HISTORY OF DIFFUSE LARGE B-CELL LYMPHOMA: ICD-10-CM

## 2024-12-04 DIAGNOSIS — N95.2 VAGINAL ATROPHY: ICD-10-CM

## 2024-12-04 DIAGNOSIS — C50.811 MALIGNANT NEOPLASM OF OVERLAPPING SITES OF RIGHT BREAST (H): Primary | ICD-10-CM

## 2024-12-04 DIAGNOSIS — G47.00 PERSISTENT INSOMNIA: ICD-10-CM

## 2024-12-04 PROCEDURE — G0463 HOSPITAL OUTPT CLINIC VISIT: HCPCS | Performed by: INTERNAL MEDICINE

## 2024-12-04 PROCEDURE — 99214 OFFICE O/P EST MOD 30 MIN: CPT | Performed by: INTERNAL MEDICINE

## 2024-12-04 PROCEDURE — G2211 COMPLEX E/M VISIT ADD ON: HCPCS | Performed by: INTERNAL MEDICINE

## 2024-12-04 ASSESSMENT — PAIN SCALES - GENERAL: PAINLEVEL_OUTOF10: NO PAIN (0)

## 2024-12-04 NOTE — LETTER
12/4/2024      Marysol Morrell  [id number] [facility] [address]      Dear Colleague,    Thank you for referring your patient, Marysol Morrell, to the [facility]. Please see a copy of my visit note below.    [facility]    Hematology/Oncology Established Patient Note      Today's Date: 12/4/2024    Reason for visit: Right breast cancer.    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 6/2017: Developed oropharyngeal dysphagia. Found to have indurated area, 2 x 1 cm on left side of anterior 2/3's of tongue and multiple left anterior neck nodes.  2. 7/24/2017: Laryngoscopy showed 2 x 1 cm exophytic lesion of left posterolateral oral tongue and diffuse lobular fullness of tongue base.  Biopsy of left lateral tongue and base of tongue showed CD20-positive diffuse large B-cell lymphoma, germinal center type; Ki-67 = 70%; histologic features and proliferation markers did not indicate double hit lymphoma so FISH not performed.  3. 8/2/2017: PET/CT showed hypermetabolic soft tissue thickening at base of tongue, bilateral abisai metastases at this level with single right hypermetabolic node and multiple left-sided hypermetabolic nodes; hypermetabolic 0.8 cm right thyroid nodule; hypermetabolic nonenlarged right axillary node, nonspecific and measuring 1.2 cm; remaining exam unremarkable.  4. 7/31/2017: Bone marrow biopsy negative for lymphoma.  5. 8/7/2017: Started R-CHOP chemotherapy under care of Dr. Mitchell Wilkins at [facility].  6. 10/12/2017: PET/CT after 3 cycles of chemo showed resolution of enlarged and hypermetabolic lymph nodes of neck and at base of tongue.  7. 12/6/2017: Completed 6  cycles of R-CHOP.  8. 1/16/2018: PET/CT showed complete response.  9. 6/25/2024: Diagnostic bilateral mammogram obtained due to right breast nipple/areolar itching.  This showed spiculated mass at 9:00 in right breast, 10 cm from nipple. Right breast U/S showed 0.7 x 0.5 x 0.7 cm mass at 9:30, 10 cm from nipple; no right axillary adenopathy.  10.  7/3/2024: Right breast biopsy at 9:30, 10 cm from nipple showed grade 1 invasive ductal carcinoma, ER positive %, NC moderate positive 5%, HER-2 negative (IHC = 0)  11.  8/05/2024: Right breast lumpectomy with Dr. Virgie Yang.  Pathology showed 9 mm grade 1 invasive ductal carcinoma, margins negative, lymph nodes not excised. Oncotype DX = 18, 9-year risk of distant recurrence of 5% after 5 years of hormone blockade therapy, < 1% absolute chemo benefit.  12. 10/2/2024-10/11/2024: Completed adjuvant radiation therapy in 5 fractions.  13. 11/11/2024: Started anastrozole.    INTERVAL HISTORY:  Zuri reports feeling overall well.  She reports her joint pains are actually better despite starting anastrozole.  She feels sleepier than usual -- requiring more sleep daily.     REVIEW OF SYSTEMS:   14 point ROS was reviewed and is negative other than as noted above in HPI.       HOME MEDICATIONS:  Current Outpatient Medications   Medication Sig Dispense Refill     acetaminophen (TYLENOL) 325 MG tablet Take 2 tablets (650 mg) by mouth every 4 hours as needed for mild pain or other (and adjunct with moderate or severe pain or per patient request)       allopurinol (ZYLOPRIM) 100 MG tablet Take 1 tablet (100 mg) by mouth daily 90 tablet 3     anastrozole (ARIMIDEX) 1 MG tablet Take 1 tablet (1 mg) by mouth daily. 90 tablet 3     cetirizine (ZYRTEC) 10 MG tablet TAKE ONE TABLET BY MOUTH ONE TIME DAILY IN THE P.M. 90 tablet 3     hydroCHLOROthiazide 12.5 MG tablet TAKE ONE TABLET BY MOUTH EVERY DAY 90 tablet 1     levothyroxine (SYNTHROID/LEVOTHROID) 125 MCG tablet Take 1  tablet (125 mcg) by mouth daily 90 tablet 1     losartan (COZAAR) 100 MG tablet TAKE ONE TABLET BY MOUTH EVERY DAY 90 tablet 1     oxyCODONE-acetaminophen (PERCOCET) 5-325 MG tablet Take 1-2 tablets by mouth every 6 hours as needed for pain. 20 tablet 0     pantoprazole (PROTONIX) 20 MG EC tablet TAKE ONE TABLET BY MOUTH EVERY DAY 90 tablet 3     rivaroxaban ANTICOAGULANT (XARELTO ANTICOAGULANT) 10 MG TABS tablet Take 1 tablet by mouth daily (with dinner) 90 tablet 3     senna-docusate (SENOKOT-S/PERICOLACE) 8.6-50 MG tablet Take 1 tablet by mouth 2 times daily as needed for constipation (While on oral opioids.) 10 tablet 0     traZODone (DESYREL) 50 MG tablet Take 1 tablet (50 mg) by mouth at bedtime 30 tablet 2     valACYclovir (VALTREX) 500 MG tablet TAKE 1 TABLET BY MOUTH TWICE A DAY 12 tablet 3     Zoledronic Acid (RECLAST IV) Once a year           ALLERGIES:  Allergies   Allergen Reactions     Chlorhexidine Hives     Chlorhexidine Gluconate Hives     Codeine GI Disturbance     Evista [Raloxifene Hydrochloride] Other (See Comments)     Esophagus irritation      Fosamax Other (See Comments)     Esophagus irritation      Vicodin [Hydrocodone-Acetaminophen] GI Disturbance     Can Take oxycodone     Pen Vk [Penicillin V] Rash         PAST MEDICAL HISTORY:  Past Medical History:   Diagnosis Date     Arthritis      Cancer (H) 2017    lymphoma     Cholelithiasis      Colon polyps 2001 and 2006    no polyps 2011     Constipation      Diffuse large B cell lymphoma (H) 2017    tongue, followed by Dr. Wilkins at [facility] and [facility]     Diverticulitis 2010     DVT (deep venous thrombosis) (H)     x3 (one due to OCs, 2 post op)     Genital herpes      GERD (gastroesophageal reflux disease)      Gout      History of depression      History of migraine      HTN (hypertension), benign      Hypothyroidism     hashimoto's thyroiditis     Lichen sclerosus et atrophicus      Osteopenia      Ovarian cyst 2011     Rectocele       Schatzki's ring 2009    EGD      Tibia/fibula fracture 2009     Vasovagal syncope 1966     Vertigo 2000         PAST SURGICAL HISTORY:  Past Surgical History:   Procedure Laterality Date     APPENDECTOMY       ARTHROSCOPY KNEE       BONE MARROW BIOPSY, BONE SPECIMEN, NEEDLE/TROCAR N/A 2017    Procedure: BIOPSY BONE MARROW;  UNILATERAL BONE MARROW BIPOSY  ;  Surgeon: Jamil Mccarthy MD;  Location:  GI     BREAST BIOPSY, CORE RT/LT       EXCISE GARNETT'S NEUROMA FOOT       HYSTERECTOMY, PAP NO LONGER INDICATED      with bladder repair     LAPAROSCOPIC OOPHORECTOMY      with the colon resection     LARYNGOSCOPY WITH BIOPSY(IES) N/A 2017    Procedure: LARYNGOSCOPY WITH BIOPSY(IES);  DIRECT LARYNGOSCOPY WITH TONGUE BIOPSIES;  Surgeon: [facility]ell Yang MD;  Location:  SD     LUMPECTOMY, BREAST, LOCALIZED USING RADIOFREQUENCY IDENTIFICATION Right 2024    Procedure: right breast tag localized lumpectomy;  Surgeon: Virgie Yang MD;  Location:  OR     OPEN REDUCTION INTERNAL FIXATION TIBIA       TONSILLECTOMY & ADENOIDECTOMY       Z LAP,SURG,COLECTOMY, PARTIAL, W/ANAST      due to recurrent diverticulitis         SOCIAL HISTORY:  Social History     Socioeconomic History     Marital status:      Spouse name: Not on file     Number of children: Not on file     Years of education: Not on file     Highest education level: Not on file   Occupational History     Not on file   Tobacco Use     Smoking status: Former     Current packs/day: 0.00     Types: Cigarettes     Quit date: 2000     Years since quittin.3     Smokeless tobacco: Never     Tobacco comments:     More of a social smoker   Vaping Use     Vaping status: Never Used   Substance and Sexual Activity     Alcohol use: Yes     Alcohol/week: 0.0 standard drinks of alcohol     Comment: 0-3 a day. mixed drinks, wine     Drug use: No     Sexual activity: Not Currently     Partners: Male    Other Topics Concern     Parent/sibling w/ CABG, MI or angioplasty before 65F 55M? Not Asked   Social History Narrative    Works at [employer]    Dtr Lucía, has grandkids     Social Drivers of Health     Financial Resource Strain: Low Risk  (5/23/2024)    Financial Resource Strain      Within the past 12 months, have you or your family members you live with been unable to get utilities (heat, electricity) when it was really needed?: No   Food Insecurity: Low Risk  (5/23/2024)    Food Insecurity      Within the past 12 months, did you worry that your food would run out before you got money to buy more?: No      Within the past 12 months, did the food you bought just not last and you didn t have money to get more?: No   Transportation Needs: Low Risk  (5/23/2024)    Transportation Needs      Within the past 12 months, has lack of transportation kept you from medical appointments, getting your medicines, non-medical meetings or appointments, work, or from getting things that you need?: No   Physical Activity: Not on file   Stress: Not on file   Social Connections: Unknown (1/1/2022)    [facility]    Social Connections      Frequency of Communication with Friends and Family: Not on file   Interpersonal Safety: Low Risk  (5/23/2024)    Interpersonal Safety      Do you feel physically and emotionally safe where you currently live?: Yes      Within the past 12 months, have you been hit, slapped, kicked or otherwise physically hurt by someone?: No      Within the past 12 months, have you been humiliated or emotionally abused in other ways by your partner or ex-partner?: No   Housing Stability: Low Risk  (5/23/2024)    Housing Stability      Do you have housing? : Yes      Are you worried about losing your housing?: No   ;  passed away in 2023.      FAMILY HISTORY:  Family History   Problem Relation Age of  "Onset     C.A.D. Mother         and PE     Cancer Father 65        gastric ca         PHYSICAL EXAM:  Vital signs:  /84   Pulse 69   Resp 16   Ht 1.638 m (5' 4.5\")   Wt 85.3 kg (188 lb)   SpO2 98%   BMI 31.77 kg/m     ECO  GENERAL/CONSTITUTIONAL: No acute distress.  EYES: No erythema or scleral icterus.  LYMPH: No cervical, supraclavicular, axillary adenopathy.  BREAST: No palpable masses in either breast. Nipples are everted bilaterally with no discharge. Hyperpigmentation of right breast.  RESPIRATORY: No audible cough wheezing.   GASTROINTESTINAL: No guarding.  No distention.  MUSCULOSKELETAL: Warm and well-perfused, no cyanosis, clubbing, or edema.  NEUROLOGIC: Alert, oriented, answers questions appropriately.  INTEGUMENTARY: No rashes or jaundice.  GAIT: Steady, does not use assistive device       LABS:  CBC RESULTS:   Recent Labs   Lab Test 24  1155   WBC 7.7   RBC 4.21   HGB 13.5   HCT 39.8   MCV 95   MCH 32.1   MCHC 33.9   RDW 11.8        Last Comprehensive Metabolic Panel:  Sodium   Date Value Ref Range Status   2024 140 135 - 145 mmol/L Final   2021 135 133 - 144 mmol/L Final     Potassium   Date Value Ref Range Status   2024 4.3 3.4 - 5.3 mmol/L Final   2022 3.8 3.4 - 5.3 mmol/L Final   2021 4.0 3.4 - 5.3 mmol/L Final     Chloride   Date Value Ref Range Status   2024 103 98 - 107 mmol/L Final   2022 105 94 - 109 mmol/L Final   2021 103 94 - 109 mmol/L Final     Carbon Dioxide   Date Value Ref Range Status   2021 30 20 - 32 mmol/L Final     Carbon Dioxide (CO2)   Date Value Ref Range Status   2024 29 22 - 29 mmol/L Final   2022 27 20 - 32 mmol/L Final     Anion Gap   Date Value Ref Range Status   2024 8 7 - 15 mmol/L Final   2022 4 3 - 14 mmol/L Final   2021 2 (L) 3 - 14 mmol/L Final     Glucose   Date Value Ref Range Status   2024 90 70 - 99 mg/dL Final   2022 102 (H) 70 - 99 mg/dL " Final   04/12/2021 97 70 - 99 mg/dL Final     Comment:     Fasting specimen     Urea Nitrogen   Date Value Ref Range Status   07/26/2024 23.0 8.0 - 23.0 mg/dL Final   01/30/2022 6 (L) 7 - 30 mg/dL Final   04/12/2021 15 7 - 30 mg/dL Final     Creatinine   Date Value Ref Range Status   07/26/2024 0.94 0.51 - 0.95 mg/dL Final   04/12/2021 0.90 0.52 - 1.04 mg/dL Final     GFR Estimate   Date Value Ref Range Status   07/26/2024 63 >60 mL/min/1.73m2 Final     Comment:     eGFR calculated using 2021 CKD-EPI equation.   04/12/2021 64 >60 mL/min/[1.73_m2] Final     Comment:     Non  GFR Calc  Starting 12/18/2018, serum creatinine based estimated GFR (eGFR) will be   calculated using the Chronic Kidney Disease Epidemiology Collaboration   (CKD-EPI) equation.       Calcium   Date Value Ref Range Status   07/26/2024 9.9 8.8 - 10.4 mg/dL Final     Comment:     Reference intervals for this test were updated on 7/16/2024 to reflect our healthy population more accurately. There may be differences in the flagging of prior results with similar values performed with this method. Those prior results can be interpreted in the context of the updated reference intervals.   04/12/2021 10.2 (H) 8.5 - 10.1 mg/dL Final     Bilirubin Total   Date Value Ref Range Status   07/09/2024 0.4 <=1.2 mg/dL Final   11/24/2020 0.4 0.2 - 1.3 mg/dL Final     Alkaline Phosphatase   Date Value Ref Range Status   07/09/2024 69 40 - 150 U/L Final   11/24/2020 80 40 - 150 U/L Final     ALT   Date Value Ref Range Status   07/09/2024 11 0 - 50 U/L Final     Comment:     Reference intervals for this test were updated on 6/12/2023 to more accurately reflect our healthy population. There may be differences in the flagging of prior results with similar values performed with this method. Interpretation of those prior results can be made in the context of the updated reference intervals.     11/24/2020 34 0 - 50 U/L Final     AST   Date Value Ref Range  Status   07/09/2024 21 0 - 45 U/L Final     Comment:     Reference intervals for this test were updated on 6/12/2023 to more accurately reflect our healthy population. There may be differences in the flagging of prior results with similar values performed with this method. Interpretation of those prior results can be made in the context of the updated reference intervals.   11/24/2020 30 0 - 45 U/L Final       PATHOLOGY:  Reviewed as per HPI.    IMAGING:  Reviewed as per HPI.    ASSESSMENT/PLAN:  Marysol Morrell is a 73 year old female with the following issues:  1. Stage IA, [id number], grade 1 invasive ductal carcinoma of right breast overlapping sites, ER positive, ID positive, HER-2 negative, Oncotype DX = 18  --Zuri is s/p right lumpectomy and adjuvant radiation therapy completed 10/11/2024.  --Oncotype DX = 18, 9-year risk of distant recurrence of 5% after 5 years of hormone blockade therapy, < 1% absolute chemo benefit.  --Tamoxifen not advised due to prior history of DVT.  --She is on anastrozole and tolerating this relatively well so far albeit with some increase in sleepiness.  --Discussed that the initial side effects of anastrozole may subside over the next several months as the body adapts to lower estrogen state.  --Plan for total 5 years of anastrozole.  --Plan for diagnostic right mammogram in 2/2025 and bilateral screening mammogram in 8/2025.    2. History of diffuse large B-cell lymphoma of tongue region and neck lymph nodes  --Diagnosed 7/24/2017 and completed 6 cycles of R-CHOP elsewhere on 12/6/2017 with clinical complete response.  --She can have annual follow-up with me for this.    3. History of right calf DVT/PE  --Has self-reported history of DVT related to OCPs age 30's and also phlebitis.  --[place] to be unprovoked in 2017 although she was diagnosed with diffuse large B-cell lymphoma around that time.  She was placed on long term anticoagulation.  --She will continue with  "Xarelto.    4. Vaginal atrophy  --Stable. Continue vaginal estrogen cream twice weekly.    5. Osteopenia  --10/19/2023 DEXA showed osteopenia that improved with Reclast. She did have multiple side effects from Reclast.  She is unable to take oral bisphosphonate.  --Repeat DEXA in 2025.    6. Chronic intermittent insomnia  --She has tried trazodone with not much benefit.  --Discussed CBT.  Encouraged exercise daily in mornings. Can try some magnesium or THC gummies.    Return in 3-4 months.    Jessica Garcia MD  [facility] Hematology/Oncology    Total time spent today: 30 minutes in chart review, patient evaluation, counseling, documentation, test and/or medication/prescription orders, and coordination of care.      The longitudinal plan of care for the diagnosis(es)/condition(s) as documented were addressed during this visit. Due to the added complexity in care, I will continue to support Zuri in the subsequent management and with ongoing continuity of care.    Oncology Rooming Note    December 4, 2024 4:06 PM   Marysol Morrell is a 73 year old female who presents for:    Chief Complaint   Patient presents with     Oncology Clinic Visit     Initial Vitals: There were no vitals taken for this visit. Estimated body mass index is 31.26 kg/m  as calculated from the following:    Height as of 7/26/24: 1.638 m (5' 4.5\").    Weight as of 9/11/24: 83.9 kg (185 lb). There is no height or weight on file to calculate BSA.  Data Unavailable Comment: Data Unavailable   No LMP recorded. Patient is postmenopausal.  Allergies reviewed: Yes  Medications reviewed: Yes    Medications: Medication refills not needed today.  Pharmacy name entered into [facility]:    [id number] [place], [place] - [address] [address] [id number] [place], [place] - [address] [place] [address]    Frailty Screening:   Is the patient here for a new oncology consult visit in cancer care? 2. No        Enma Garsia MA              Again, thank you " for allowing me to participate in the care of your patient.        Sincerely,        Jessica Garcia MD

## 2024-12-04 NOTE — PROGRESS NOTES
"Oncology Rooming Note    December 4, 2024 4:06 PM   Marysol Morrell is a 73 year old female who presents for:    Chief Complaint   Patient presents with    Oncology Clinic Visit     Initial Vitals: There were no vitals taken for this visit. Estimated body mass index is 31.26 kg/m  as calculated from the following:    Height as of 7/26/24: 1.638 m (5' 4.5\").    Weight as of 9/11/24: 83.9 kg (185 lb). There is no height or weight on file to calculate BSA.  Data Unavailable Comment: Data Unavailable   No LMP recorded. Patient is postmenopausal.  Allergies reviewed: Yes  Medications reviewed: Yes    Medications: Medication refills not needed today.  Pharmacy name entered into [facility]:    [facility][id number] [place], [place] - [address] [address] [facility][id number] - [place], [place] - [address] [place] [address]    Frailty Screening:   Is the patient here for a new oncology consult visit in cancer care? 2. No        Enma Garsia MA            "

## 2025-01-07 ENCOUNTER — TELEPHONE (OUTPATIENT)
Dept: FAMILY MEDICINE | Facility: CLINIC | Age: 75
End: 2025-01-07
Payer: COMMERCIAL

## 2025-01-07 NOTE — TELEPHONE ENCOUNTER
Requesting orders to hold Xarelto for 3 days starting 2/7/25 for EGD with Colonoscopy that is scheduled for 2/10/25.     If unable to approve 3 day hold, [facility] will require creatinine levels.     Callback to [facility] Nurse line at [phone], okay to leave detailed message.

## 2025-01-09 ENCOUNTER — OFFICE VISIT (OUTPATIENT)
Dept: UROLOGY | Facility: CLINIC | Age: 75
End: 2025-01-09
Attending: PHYSICIAN ASSISTANT
Payer: COMMERCIAL

## 2025-01-09 VITALS
HEIGHT: 64 IN | DIASTOLIC BLOOD PRESSURE: 86 MMHG | WEIGHT: 185 LBS | SYSTOLIC BLOOD PRESSURE: 146 MMHG | BODY MASS INDEX: 31.58 KG/M2

## 2025-01-09 DIAGNOSIS — N81.6 RECTOCELE: ICD-10-CM

## 2025-01-09 DIAGNOSIS — R15.9 INCONTINENCE OF FECES, UNSPECIFIED FECAL INCONTINENCE TYPE: ICD-10-CM

## 2025-01-09 DIAGNOSIS — N39.46 MIXED INCONTINENCE: Primary | ICD-10-CM

## 2025-01-09 DIAGNOSIS — K59.00 CONSTIPATION, UNSPECIFIED CONSTIPATION TYPE: ICD-10-CM

## 2025-01-09 DIAGNOSIS — N95.2 VAGINAL ATROPHY: ICD-10-CM

## 2025-01-09 DIAGNOSIS — R39.15 URINARY URGENCY: ICD-10-CM

## 2025-01-09 LAB
ALBUMIN UR-MCNC: NEGATIVE MG/DL
APPEARANCE UR: CLEAR
BILIRUB UR QL STRIP: NEGATIVE
COLOR UR AUTO: YELLOW
GLUCOSE UR STRIP-MCNC: NEGATIVE MG/DL
HGB UR QL STRIP: NEGATIVE
KETONES UR STRIP-MCNC: NEGATIVE MG/DL
LEUKOCYTE ESTERASE UR QL STRIP: NEGATIVE
NITRATE UR QL: NEGATIVE
PH UR STRIP: 7 [PH] (ref 5–7)
RESIDUAL VOLUME (RV) (EXTERNAL): 20
SP GR UR STRIP: 1.01 (ref 1–1.03)
UROBILINOGEN UR STRIP-ACNC: 0.2 E.U./DL

## 2025-01-09 RX ORDER — VITAMIN B COMPLEX
TABLET ORAL DAILY
COMMUNITY

## 2025-01-09 RX ORDER — CLOBETASOL PROPIONATE 0.5 MG/G
OINTMENT TOPICAL
COMMUNITY
End: 2025-01-09

## 2025-01-09 RX ORDER — BETAMETHASONE DIPROPIONATE 0.05 %
OINTMENT (GRAM) TOPICAL
COMMUNITY
Start: 2020-11-05

## 2025-01-09 ASSESSMENT — PAIN SCALES - GENERAL: PAINLEVEL_OUTOF10: NO PAIN (0)

## 2025-01-09 NOTE — LETTER
1/9/2025       RE: Marysol Morrell  [id number] [facility] [address]     Dear Colleague,    Thank you for referring your patient, Marysol Morrell, to the [facility]OLOG[facility] at [facility]. Please see a copy of my visit note below.    Urology Clinic    Name: Marysol Morrell    MRN: [id number]   YOB: 1950  Accompanied at today's visit by:self              Assessment and Plan:   74 year old female with occasional SDAIE, urinary urgency, constipation, FI, vaginal atrophy, rectocele    - PVR WNL   - UA grossly negative.  - educated patient about rectocele and tx options such as observation, PFPT, pessary and surgical repair. Will refer to PFPT.   - encourage better bowel control to help with constipation and FI. Referred to PFPT for this as well.   - avoid bladder irritants  - avoid fluids before bed.   - discussed OAB medications. Given it is occasional, plan to hold off on this at this time. Consider gemtesa or myrbetriq given hx of constipation and age.   - if bowels do not improve, consider GI vs colorectal consult.   - follow-up in about 4-5 months for symptom check.   - ask oncology about estrogen cream.     Orders Placed This Encounter   Procedures     MEASURE POST-VOID RESIDUAL URINE/BLADDER CAPACITY, US NON-IMAGING (53227)     UA without Microscopic [[id number]]       After discussing the assessment and plan with patient, patient verbalized understanding and agreed to the above plan. All questions answered.     45 minutes were spent today on the date of the encounter in reviewing the EMR, direct patient care, coordination of care and documentation in addition to exam, referral to PFPT, review of labs.     Opal Dejesus PA-C  January 9, 2025    Patient Care Team:  Robson Jaime PA-C as PCP - General (Physician Assistant - Medical)  Robson Jaime PA-C as Assigned PCP  Opal Dejesus PA-C as Physician  Assistant  Jessica Garcia MD as Assigned Cancer Care Provider  Virgie Yang MD as Assigned Surgical Provider  EDILBERTO TOBIAS          Chief Complaint:   rectocele          History of Present Illness:   January 9, 2025    HISTORY: Marysol Morrell is a 74 year old female as a new consultation for concerns of rectocele. Hx is complicated by breast cancer. Follows with oncology; last seen on 12/4/24. She is on anastrozole and vaginal estrogen cream prescribed by oncology. However patient today reports she has not been using estrogen cream. Denies urinary frequency. Reports occasional SADIE. Wears 1 pad per day for percaution. Her UUI is usually at night or first thing in the morning but never during the day. Denies hx of sleep apnea.  CT on 9/20/23 from [facility] was unremarkable from urological standpoint. No UTIs in remote past.  Is bothered by her prolapse and reports splinting to defecate. Not sexually active; . Describes constipation. Constipation has worsened secondary to starting calcium supplement. Takes stool softeners but can make her have worsening FI and diarrhea. PMH includes DVT, colitis, Diffuse large B-cell lymphoma, lichen sclerosus, hypothyroidism, choric gout, HTN, osteopenia, hx of Schatzki's ring, GERD. PSH includes hysterectomy with bladder repair in the 1980s, knee surgery T&A, appendectomy, lumpectomy, colectomy due to recurrent diverticulitis (2011).  Patient voices no other concerns at this time.           Past Medical History:     Past Medical History:   Diagnosis Date     Arthritis      Cancer (H) 2017    lymphoma     Cholelithiasis      Colon polyps 2001 and 2006    no polyps 2011     Constipation      Diffuse large B cell lymphoma (H) 2017    tongue, followed by Dr. Wilkins at [facility] and [facility]     Diverticulitis 2010     DVT (deep venous thrombosis) (H)     x3 (one due to OCs, 2 post op)     Genital herpes      GERD (gastroesophageal reflux disease)      Gout       History of depression      History of migraine      HTN (hypertension), benign      Hypothyroidism     hashimoto's thyroiditis     Lichen sclerosus et atrophicus      Osteopenia      Ovarian cyst      Rectocele      Schatzki's ring     EGD      Tibia/fibula fracture 2009     Vasovagal syncope 1966     Vertigo 2000            Past Surgical History:     Past Surgical History:   Procedure Laterality Date     APPENDECTOMY       ARTHROSCOPY KNEE       BONE MARROW BIOPSY, BONE SPECIMEN, NEEDLE/TROCAR N/A 2017    Procedure: BIOPSY BONE MARROW;  UNILATERAL BONE MARROW BIPOSY  ;  Surgeon: Jamil Mccarthy MD;  Location:  GI     BREAST BIOPSY, CORE RT/LT       EXCISE GARNETT'S NEUROMA FOOT       HYSTERECTOMY, PAP NO LONGER INDICATED      with bladder repair     LAPAROSCOPIC OOPHORECTOMY      with the colon resection     LARYNGOSCOPY WITH BIOPSY(IES) N/A 2017    Procedure: LARYNGOSCOPY WITH BIOPSY(IES);  DIRECT LARYNGOSCOPY WITH TONGUE BIOPSIES;  Surgeon: Vernell Yang MD;  Location:  SD     LUMPECTOMY, BREAST, LOCALIZED USING RADIOFREQUENCY IDENTIFICATION Right 2024    Procedure: right breast tag localized lumpectomy;  Surgeon: Virgie Yang MD;  Location:  OR     OPEN REDUCTION INTERNAL FIXATION TIBIA       TONSILLECTOMY & ADENOIDECTOMY       Z LAP,SURG,COLECTOMY, PARTIAL, W/ANAST      due to recurrent diverticulitis            Social History:     Social History     Tobacco Use     Smoking status: Former     Current packs/day: 0.00     Types: Cigarettes     Quit date: 2000     Years since quittin.4     Smokeless tobacco: Never     Tobacco comments:     More of a social smoker   Substance Use Topics     Alcohol use: Yes     Alcohol/week: 0.0 standard drinks of alcohol     Comment: 0-3 a day. mixed drinks, wine            Family History:     Family History   Problem Relation Age of Onset     C.A.D. Mother         and PE     Cancer  Father 65        gastric ca              Allergies:     Allergies   Allergen Reactions     Chlorhexidine Hives     Chlorhexidine Gluconate Hives     Codeine GI Disturbance     Evista [Raloxifene Hydrochloride] Other (See Comments)     Esophagus irritation      Fosamax Other (See Comments)     Esophagus irritation      Vicodin [Hydrocodone-Acetaminophen] GI Disturbance     Can Take oxycodone     Pen Vk [Penicillin V] Rash            Medications:     Current Outpatient Medications   Medication Sig Dispense Refill     acetaminophen (TYLENOL) 325 MG tablet Take 2 tablets (650 mg) by mouth every 4 hours as needed for mild pain or other (and adjunct with moderate or severe pain or per patient request)       allopurinol (ZYLOPRIM) 100 MG tablet Take 1 tablet (100 mg) by mouth daily 90 tablet 3     anastrozole (ARIMIDEX) 1 MG tablet Take 1 tablet (1 mg) by mouth daily. 90 tablet 3     betamethasone dipropionate (DIPROSONE) 0.05 % external ointment        cetirizine (ZYRTEC) 10 MG tablet TAKE ONE TABLET BY MOUTH ONE TIME DAILY IN THE P.M. 90 tablet 3     hydroCHLOROthiazide 12.5 MG tablet TAKE ONE TABLET BY MOUTH EVERY DAY 90 tablet 1     levothyroxine (SYNTHROID/LEVOTHROID) 125 MCG tablet Take 1 tablet (125 mcg) by mouth daily 90 tablet 1     losartan (COZAAR) 100 MG tablet TAKE ONE TABLET BY MOUTH EVERY DAY 90 tablet 1     pantoprazole (PROTONIX) 20 MG EC tablet TAKE ONE TABLET BY MOUTH EVERY DAY 90 tablet 3     rivaroxaban ANTICOAGULANT (XARELTO ANTICOAGULANT) 10 MG TABS tablet Take 1 tablet by mouth daily (with dinner) 90 tablet 3     senna-docusate (SENOKOT-S/PERICOLACE) 8.6-50 MG tablet Take 1 tablet by mouth 2 times daily as needed for constipation (While on oral opioids.) 10 tablet 0     valACYclovir (VALTREX) 500 MG tablet TAKE 1 TABLET BY MOUTH TWICE A DAY 12 tablet 3     Vitamin D3 (VITAMIN D, CHOLECALCIFEROL,) 25 mcg (1000 units) tablet Take by mouth daily.       No current facility-administered medications for  "this visit.             Review of Systems:    ROS: 14 point ROS neg other than the symptoms noted above in the HPI.          Physical Exam:   Blood pressure 146/86, height 1.638 m (5' 4.49\"), weight 83.9 kg (185 lb), not currently breastfeeding.  5' 4.488\", Body mass index is 31.28 kg/m ., 185 lbs 0 oz  Gen appearance: Age-appropriate appearing female in NAD.   HEENT:  EOMI, conjunctiva clear/white. Normal ROM of neck for age.   Psych:  alert , In no acute distress.  Neuro:  A&Ox3  Skin:  Clear of obvious rashes, ecchymoses.  Resp:  Normal respiratory effort; not in acute respiratory distress.   Vasc:  Regular rate.  lymph:  No obvious LE edema bilaterally.     exam:  Vulva is normal in appearance. Urethra normal.. Negative for AMAN with reduction of speculum when instructed to cough, does have some mobility to urethra with cough. Vaginal mucosa atrophic. No pain to palpation on internal or external exam. Rectocele grade 2.. Strength 2/5. No obvious masses, lesions, ulcers, bleeding noted on internal or external exam.          Data:    PVR  20mL    Labs:  UA RESULTS:  Recent Labs   Lab Test 01/09/25  1443 12/16/16  1441   COLOR Yellow Yellow   APPEARANCE Clear Clear   URINEGLC Negative Negative   URINEBILI Negative Negative   URINEKETONE Negative Negative   SG 1.015 1.010   UBLD Negative Negative   URINEPH 7.0 7.0   PROTEIN Negative Negative   UROBILINOGEN 0.2 >=8.0   NITRITE Negative Negative   LEUKEST Negative Small*   RBCU  --  O - 2   WBCU  --  2-5*         Creatinine   Date Value Ref Range Status   07/26/2024 0.94 0.51 - 0.95 mg/dL Final   04/12/2021 0.90 0.52 - 1.04 mg/dL Final       UA RESULTS:  Recent Labs   Lab Test 12/16/16  1441   COLOR Yellow   APPEARANCE Clear   URINEGLC Negative   URINEBILI Negative   URINEKETONE Negative   SG 1.010   UBLD Negative   URINEPH 7.0   PROTEIN Negative   UROBILINOGEN >=8.0   NITRITE Negative   LEUKEST Small*   RBCU O - 2   WBCU 2-5*       Admission on 08/05/2024, " Discharged on 08/05/2024   Component Date Value Ref Range Status     Case Report 08/05/2024    Final                    Value:Surgical Pathology Report                         Case: [id number]                                  Authorizing Provider:  Virgie Yang MD       Collected:           08/05/2024 11:25 AM          Ordering Location:     [facility]          Received:            08/05/2024 11:33 AM                                 [facility] OR                                                            Pathologist:           Irene Galeana MD                                                          Intraop:               Irene Galeana MD                                                          Specimens:   A) - Breast, Right, RIght beast tag localized lumpectomy                                            B) - Breast, Right, RIGHT BREAST NEW MEDIAL AND INFERIOR MARGINS                            Final Diagnosis 08/05/2024    Final                    Value:A.  Breast, right, lumpectomy:                          -INVASIVE DUCTAL CARCINOMA, GRADE 1, size 9 mm.                          -In situ carcinoma is not identified.                          -Margins are uninvolved by invasive carcinoma.                          -See tumor synoptic report.                                                    B.  Breast, right, new medial and inferior margin, excision:                          -Benign breast tissue with usual duct hyperplasia.     Synoptic Checklist 08/05/2024    Final                    Value:INVASIVE CARCINOMA OF THE BREAST: Resection                            INVASIVE CARCINOMA OF THE BREAST: RESECTION - All Specimens                            8th Edition - Protocol posted: 12/13/2023                                                        SPECIMEN                               Procedure:    Excision (less than total mastectomy)                                Specimen Laterality:    Left                                                          TUMOR                               Histologic Type:    Invasive carcinoma of no special type (ductal)                                Histologic Grade (Marion Histologic Score):                                     Glandular (Acinar) / Tubular Differentiation:    Score 2                                  Nuclear Pleomorphism:    Score 2                                  Mitotic Rate:    Score 1                                  Overall Grade:    Grade 1 (scores of 3, 4 or 5)                                Tumor Size:    Greatest dimension of largest invasive focus (Millimeters): 9  mm                               Tumor Focality:    Single focus of invasive carcinoma                                Ductal Carcinoma In Situ (DCIS):    Not identified                                Lobular Carcinoma In Situ (LCIS):    Not identified                                Lymphatic and / or Vascular Invasion:    Not identified                                Dermal Lymphatic and / or Vascular Invasion:    No skin present                                Microcalcifications:    Not identified                                Treatment Effect in the Breast:    No known presurgical therapy                                                         MARGINS                               Margin Status for Invasive Carcinoma:    All margins negative for invasive carcinoma                                  Distance from Invasive Carcinoma to Closest Margin:    Greater than: 5 mm                                 Closest Margin(s) to Invasive Carcinoma:    All margins are greater than 5 mm from tumor.                                                         REGIONAL LYMPH NODES                               Regional Lymph Node Status:    Not applicable (no regional lymph nodes submitted or found)                                                         pTNM CLASSIFICATION (AJCC 8th Edition)                                Reporting of pT, pN, and (when applicable) pM categories is based on information available to the pathologist at the time the report is issued. As per the AJCC (Chapter 1, 8th Ed.) it is the managing physician s responsibility to establish the final pathologic stage based upon all pertinent information, including but potentially not limited to this pathology report.                               pT Category:    pT1b                                pN Category:    pN not assigned (no nodes submitted or found)                                                         ADDITIONAL FINDINGS                               Additional Findings:    Usual duct hyperplasia, fibroadenomatoid change, and fibrocystic changes including sclerosing adenosis, apocrine metaplasia and microcysts.                                                         SPECIAL STUDIES                               Estrogen Receptor (ER) Status:    Positive (greater than 10% of cells demonstrate nuclear positivity)                                  Percentage of Cells with Nuclear Positivity:    %                                Progesterone Receptor (PgR) Status:    Positive                                  Percentage of Cells with Nuclear Positivity:    5 %                               HER2 (by immunohistochemistry):    Negative (Score 0)                                Testing Performed on Case Number:    [id number]        Clinical Information 08/05/2024    Final                    Value:Procedure:                          right breast tag localized lumpectomy - Right                          Pre-op Diagnosis: Malignant neoplasm of upper-outer quadrant of right                           breast in female, estrogen receptor positive (H) [C50.411, Z17.0]                          Post-op Diagnosis: C50.411, Z17.0 - Malignant neoplasm of upper-outer                           quadrant of right breast in female, estrogen receptor  "positive (H)                           [ICD-10-CM]     Intraoperative Consultation 08/05/2024    Final                    Value:A(1). Breast, Right, RIght beast tag localized lumpectomy:                          Gross evaluation for margins: 1.2 cm mass is 3.0 mm from the inferior                           margin.  Remaining margins are greater than 5.0 mm from tumor.                                                    Irene Galeana MD on 8/5/2024 at 11:44 AM                          Intra-operative interpretation performed at:  LABORATORY, [facility], [address] [place] [address]. S., 1st floor, Room [address] [id number]                          Results given via phone to Dr. Yang.     Gross Description 08/05/2024    Final                    Value:A(1). Breast, Right, RIght beast tag localized lumpectomy:                          The specimen is received fresh for intraoperative consultation, labeled                           with the patient's name, medical record number and other identifying                           information designated \"right breast tag localized lumpectomy\". It                           consists of a 6.5 cm (superior to inferior) by 5.5 cm (superior to                           inferior) by 2.1 cm (anterior to posterior) lumpectomy specimen.  The                           specimen is received previously inked as follows:                                                    Red - Superior                          Blue - Inferior                          Green - Anterior                          Black - Posterior                          Yellow - Medial                          Orange - Lateral                                                    Sectioned from superior to inferior into 8 slices.  Within slices 6 and 7                           is an ill-defined 1.2 x 1.0 x 0.8 cm rubbery to firm mass.  Within slice 6      " "                     within the mass is an RFID clip and a biopsy marking tag.  The mass is                           located                                                    Inferior-0.3 cm                          Posterior-0.5 cm                          Medial-0.9 cm                          Anterior-0.8 cm                          Lateral-3.1 cm                          Superior-2.4 cm                                                    The remaining breast is comprised of 90% yellow lobulated adipose tissue                           and 10% gray-white fibrous tissue.  Representative sections are submitted                           as follows:                                                    A1-A3-slice 1, superior margin, perpendicular                          A4-A6-slice 4, trisected                          A7-A9-slice 5, trisected                          M52-A01-wlurm 6, sectioned (E93-opje)                          D08-K31-jrmdm 7, trisected (W12-mjhtwxkxo mass)                          V23-F49-npren 8, inferior margin, perpendicular                                                    Per EPIC, the radiographic findings are as follows:                          Imaging reviewed: MRI/Ultrasound                          Findings: 9:30 position, 10 cm from nipple, 0.7 x 0.5 x 0.7 cm-IDC                          Expected Clip/Marker: RFID tag                                                    Time collected: 1125                          Time in formalin: 1150                          B(2). Breast, Right, RIGHT BREAST NEW MEDIAL AND INFERIOR MARGINS:                          The specimen is received fresh, labeled with the patient's name, medical                           record number and other identifying information designated \"right breast                           new medial and inferior margins\". It consists of a 5.1 x 3.1 x 1.5 cm                           portion of fibrofatty tissue.  1 surface is " received inked black                           designated as the new medial and inferior margins.  The opposite surface                           is inked red by the prosector.  Sectioning reveals 99% yellow lobulated                           adipose tissue and 1% gray-white fibrous tissue.  No mass is grossly                           identified.  Entirely submitted in 8 cassettes.                                                    Time collected: 1133                          Time in formalin: 1200     Microscopic Description 08/05/2024    Final                    Value:Microscopic examination was performed.     MCRS 08/05/2024 Yes (A)  N/A Final     Performing Labs 08/05/2024    Final                    Value:The technical component of this testing was completed at [facility].                                                    Stain controls for all stains resulted within this report have been                           reviewed and show appropriate reactivity.      Specimen Status 08/05/2024 See Scanned Result   Final       Imaging:  CT a/p 6/1/23  at [facility]  INDICATION:   Left lower quadrant abdominal pain.     TECHNIQUE:   CT abdomen and pelvis acquired with 80 cc Omnipaque 350 IV contrast.     COMPARISON:   October 11, 2022.     FINDINGS:   Lower chest: Scattered atelectasis. Small hiatal hernia.     Liver: Hepatic steatosis. No suspicious masses.     Gallbladder and bile ducts: Unremarkable. No stones or inflammation. No biliary dilatation.     Pancreas: Unremarkable. No mass or inflammation.     Spleen: Unremarkable. Normal in size. No masses.     Adrenal glands: Unremarkable. No nodules.     Kidneys: Tiny hypodensities, too small to characterize.. No suspicious masses, stones, or hydronephrosis.     GI tract: Acute sigmoid diverticulitis. Prior partial sigmoid colonic resection. Mild colonic stool burden. No  bowel obstruction. Appendix not seen.     Vasculature: Mild aortoiliac arterial calcifications. Abdominal aorta is normal in caliber. Mesenteric arteries are patent.     Lymph nodes: No lymphadenopathy.     Peritoneum/Abdominal Wall: Unremarkable. No sign of mass or infiltration. No free air or significant free fluid.     Pelvis: Mildly distended bladder with circumferential wall thickening. Hysterectomy.     Bones: Unremarkable for age.            Again, thank you for allowing me to participate in the care of your patient.      Sincerely,    DONELL EMERSON PA-C

## 2025-01-09 NOTE — NURSING NOTE
Chief Complaint   Patient presents with    Incontinence     Here to discuss incontinence.    post void residual by bladder scan 20 ml  Fabi Garcia LPN

## 2025-01-09 NOTE — PATIENT INSTRUCTIONS
You have been referred to Pelvic Floor Physical Therapy. They will call you to schedule this appointment  Locations for Pelvic Floor Physical Therapy:  [facility]   [facility] - Francia Saavedra   [facility] - [facility]   [facility])   [facility] - [facility] - [facility] - [facility]:   [facility] - [facility] - [facility] - [place] - [facility]    _________________________________________________     Start metamucil or citrucel. Take miralax every other day to start then daily if need more help with evacuating stool    _____________________________    Below is a list of things that can irritate the bladder and should be eliminated:  Caffeinated soft drinks.  Coffee.  Tea.  Chocolate.  Tomato-based foods.  Acidic juices and fruits. (includes cranberry juice)  Alcohol.  Nicotine  Carbonated drinks.  Aspartame/Nutrasweet.    ________________________________     Ask your oncologist about starting estrogen cream.

## 2025-01-09 NOTE — PROGRESS NOTES
Urology Clinic    Name: Marysol Morrell    MRN: [id number]   YOB: 1950  Accompanied at today's visit by:self              Assessment and Plan:   74 year old female with occasional SADIE, urinary urgency, constipation, FI, vaginal atrophy, rectocele    - PVR WNL   - UA grossly negative.  - educated patient about rectocele and tx options such as observation, PFPT, pessary and surgical repair. Will refer to PFPT.   - encourage better bowel control to help with constipation and FI. Referred to PFPT for this as well.   - avoid bladder irritants  - avoid fluids before bed.   - discussed OAB medications. Given it is occasional, plan to hold off on this at this time. Consider gemtesa or myrbetriq given hx of constipation and age.   - if bowels do not improve, consider GI vs colorectal consult.   - follow-up in about 4-5 months for symptom check.   - ask oncology about estrogen cream.     Orders Placed This Encounter   Procedures    MEASURE POST-VOID RESIDUAL URINE/BLADDER CAPACITY, US NON-IMAGING (81380)    UA without Microscopic [[id number]]       After discussing the assessment and plan with patient, patient verbalized understanding and agreed to the above plan. All questions answered.     45 minutes were spent today on the date of the encounter in reviewing the EMR, direct patient care, coordination of care and documentation in addition to exam, referral to PFPT, review of labs.     Opal Dejesus PA-C  January 9, 2025    Patient Care Team:  Robson Tobias PA-C as PCP - General (Physician Assistant - Medical)  Robson Tobias PA-C as Assigned PCP  Opal Dejesus PA-C as Physician Assistant  Jessica Garcia MD as Assigned Cancer Care Provider  Virgie Yang MD as Assigned Surgical Provider  ROBSON TOBIAS          Chief Complaint:   rectocele          History of Present Illness:   January 9, 2025    HISTORY: Marysol Morrell is a 74 year old female as a new consultation for  concerns of rectocele. Hx is complicated by breast cancer. Follows with oncology; last seen on 12/4/24. She is on anastrozole and vaginal estrogen cream prescribed by oncology. However patient today reports she has not been using estrogen cream. Denies urinary frequency. Reports occasional SADIE. Wears 1 pad per day for percaution. Her UUI is usually at night or first thing in the morning but never during the day. Denies hx of sleep apnea.  CT on 9/20/23 from [facility] was unremarkable from urological standpoint. No UTIs in remote past.  Is bothered by her prolapse and reports splinting to defecate. Not sexually active; . Describes constipation. Constipation has worsened secondary to starting calcium supplement. Takes stool softeners but can make her have worsening FI and diarrhea. PMH includes DVT, colitis, Diffuse large B-cell lymphoma, lichen sclerosus, hypothyroidism, choric gout, HTN, osteopenia, hx of Schatzki's ring, GERD. PSH includes hysterectomy with bladder repair in the 1980s, knee surgery T&A, appendectomy, lumpectomy, colectomy due to recurrent diverticulitis (2011).  Patient voices no other concerns at this time.           Past Medical History:     Past Medical History:   Diagnosis Date    Arthritis     Cancer (H) 2017    lymphoma    Cholelithiasis     Colon polyps 2001 and 2006    no polyps 2011    Constipation     Diffuse large B cell lymphoma (H) 2017    tongue, followed by Dr. Wilkins at [facility] and [facility]    Diverticulitis 2010    DVT (deep venous thrombosis) (H)     x3 (one due to OCs, 2 post op)    Genital herpes     GERD (gastroesophageal reflux disease)     Gout     History of depression     History of migraine     HTN (hypertension), benign     Hypothyroidism     hashimoto's thyroiditis    Lichen sclerosus et atrophicus     Osteopenia     Ovarian cyst 2011    Rectocele     Schatzki's ring 2009    EGD     Tibia/fibula fracture 2009    Vasovagal syncope 1966    Vertigo 2000             Past Surgical History:     Past Surgical History:   Procedure Laterality Date    APPENDECTOMY      ARTHROSCOPY KNEE      BONE MARROW BIOPSY, BONE SPECIMEN, NEEDLE/TROCAR N/A 2017    Procedure: BIOPSY BONE MARROW;  UNILATERAL BONE MARROW BIPOSY  ;  Surgeon: Jamil Mccarthy MD;  Location:  GI    BREAST BIOPSY, CORE RT/LT      EXCISE GARNETT'S NEUROMA FOOT      HYSTERECTOMY, PAP NO LONGER INDICATED      with bladder repair    LAPAROSCOPIC OOPHORECTOMY      with the colon resection    LARYNGOSCOPY WITH BIOPSY(IES) N/A 2017    Procedure: LARYNGOSCOPY WITH BIOPSY(IES);  DIRECT LARYNGOSCOPY WITH TONGUE BIOPSIES;  Surgeon: [facility]ell [facility] MD;  Location:  SD    LUMPECTOMY, BREAST, LOCALIZED USING RADIOFREQUENCY IDENTIFICATION Right 2024    Procedure: right breast tag localized lumpectomy;  Surgeon: Virgie Yang MD;  Location:  OR    OPEN REDUCTION INTERNAL FIXATION TIBIA      TONSILLECTOMY & ADENOIDECTOMY      ZZC LAP,SURG,COLECTOMY, PARTIAL, W/ANAST      due to recurrent diverticulitis            Social History:     Social History     Tobacco Use    Smoking status: Former     Current packs/day: 0.00     Types: Cigarettes     Quit date: 2000     Years since quittin.4    Smokeless tobacco: Never    Tobacco comments:     More of a social smoker   Substance Use Topics    Alcohol use: Yes     Alcohol/week: 0.0 standard drinks of alcohol     Comment: 0-3 a day. mixed drinks, wine            Family History:     Family History   Problem Relation Age of Onset    C.A.D. Mother         and PE    Cancer Father 65        gastric ca              Allergies:     Allergies   Allergen Reactions    Chlorhexidine Hives    Chlorhexidine Gluconate Hives    Codeine GI Disturbance    Evista [Raloxifene Hydrochloride] Other (See Comments)     Esophagus irritation     Fosamax Other (See Comments)     Esophagus irritation     Vicodin [Hydrocodone-Acetaminophen] GI  "Disturbance     Can Take oxycodone    Pen Vk [Penicillin V] Rash            Medications:     Current Outpatient Medications   Medication Sig Dispense Refill    acetaminophen (TYLENOL) 325 MG tablet Take 2 tablets (650 mg) by mouth every 4 hours as needed for mild pain or other (and adjunct with moderate or severe pain or per patient request)      allopurinol (ZYLOPRIM) 100 MG tablet Take 1 tablet (100 mg) by mouth daily 90 tablet 3    anastrozole (ARIMIDEX) 1 MG tablet Take 1 tablet (1 mg) by mouth daily. 90 tablet 3    betamethasone dipropionate (DIPROSONE) 0.05 % external ointment       cetirizine (ZYRTEC) 10 MG tablet TAKE ONE TABLET BY MOUTH ONE TIME DAILY IN THE P.M. 90 tablet 3    hydroCHLOROthiazide 12.5 MG tablet TAKE ONE TABLET BY MOUTH EVERY DAY 90 tablet 1    levothyroxine (SYNTHROID/LEVOTHROID) 125 MCG tablet Take 1 tablet (125 mcg) by mouth daily 90 tablet 1    losartan (COZAAR) 100 MG tablet TAKE ONE TABLET BY MOUTH EVERY DAY 90 tablet 1    pantoprazole (PROTONIX) 20 MG EC tablet TAKE ONE TABLET BY MOUTH EVERY DAY 90 tablet 3    rivaroxaban ANTICOAGULANT (XARELTO ANTICOAGULANT) 10 MG TABS tablet Take 1 tablet by mouth daily (with dinner) 90 tablet 3    senna-docusate (SENOKOT-S/PERICOLACE) 8.6-50 MG tablet Take 1 tablet by mouth 2 times daily as needed for constipation (While on oral opioids.) 10 tablet 0    valACYclovir (VALTREX) 500 MG tablet TAKE 1 TABLET BY MOUTH TWICE A DAY 12 tablet 3    Vitamin D3 (VITAMIN D, CHOLECALCIFEROL,) 25 mcg (1000 units) tablet Take by mouth daily.       No current facility-administered medications for this visit.             Review of Systems:    ROS: 14 point ROS neg other than the symptoms noted above in the HPI.          Physical Exam:   Blood pressure 146/86, height 1.638 m (5' 4.49\"), weight 83.9 kg (185 lb), not currently breastfeeding.  5' 4.488\", Body mass index is 31.28 kg/m ., 185 lbs 0 oz  Gen appearance: Age-appropriate appearing female in NAD.   HEENT:  " EOMI, conjunctiva clear/white. Normal ROM of neck for age.   Psych:  alert , In no acute distress.  Neuro:  A&Ox3  Skin:  Clear of obvious rashes, ecchymoses.  Resp:  Normal respiratory effort; not in acute respiratory distress.   Vasc:  Regular rate.  lymph:  No obvious LE edema bilaterally.     exam:  Vulva is normal in appearance. Urethra normal.. Negative for AMAN with reduction of speculum when instructed to cough, does have some mobility to urethra with cough. Vaginal mucosa atrophic. No pain to palpation on internal or external exam. Rectocele grade 2.. Strength 2/5. No obvious masses, lesions, ulcers, bleeding noted on internal or external exam.          Data:    PVR  20mL    Labs:  UA RESULTS:  Recent Labs   Lab Test 01/09/25  1443 12/16/16  1441   COLOR Yellow Yellow   APPEARANCE Clear Clear   URINEGLC Negative Negative   URINEBILI Negative Negative   URINEKETONE Negative Negative   SG 1.015 1.010   UBLD Negative Negative   URINEPH 7.0 7.0   PROTEIN Negative Negative   UROBILINOGEN 0.2 >=8.0   NITRITE Negative Negative   LEUKEST Negative Small*   RBCU  --  O - 2   WBCU  --  2-5*         Creatinine   Date Value Ref Range Status   07/26/2024 0.94 0.51 - 0.95 mg/dL Final   04/12/2021 0.90 0.52 - 1.04 mg/dL Final       UA RESULTS:  Recent Labs   Lab Test 12/16/16  1441   COLOR Yellow   APPEARANCE Clear   URINEGLC Negative   URINEBILI Negative   URINEKETONE Negative   SG 1.010   UBLD Negative   URINEPH 7.0   PROTEIN Negative   UROBILINOGEN >=8.0   NITRITE Negative   LEUKEST Small*   RBCU O - 2   WBCU 2-5*       Admission on 08/05/2024, Discharged on 08/05/2024   Component Date Value Ref Range Status    Case Report 08/05/2024    Final                    Value:Surgical Pathology Report                         Case: [id number]                                  Authorizing Provider:  Virgie Yang MD       Collected:           08/05/2024 11:25 AM          Ordering Location:     [facility]           Received:            08/05/2024 11:33 AM                                 [facility]                                                            Pathologist:           Irene Galeana MD                                                          Intraop:               Irene Galeana MD                                                          Specimens:   A) - Breast, Right, RIght beast tag localized lumpectomy                                            B) - Breast, Right, RIGHT BREAST NEW MEDIAL AND INFERIOR MARGINS                           Final Diagnosis 08/05/2024    Final                    Value:A.  Breast, right, lumpectomy:                          -INVASIVE DUCTAL CARCINOMA, GRADE 1, size 9 mm.                          -In situ carcinoma is not identified.                          -Margins are uninvolved by invasive carcinoma.                          -See tumor synoptic report.                                                    B.  Breast, right, new medial and inferior margin, excision:                          -Benign breast tissue with usual duct hyperplasia.    Synoptic Checklist 08/05/2024    Final                    Value:INVASIVE CARCINOMA OF THE BREAST: Resection                            INVASIVE CARCINOMA OF THE BREAST: RESECTION - All Specimens                            8th Edition - Protocol posted: 12/13/2023                                                        SPECIMEN                               Procedure:    Excision (less than total mastectomy)                                Specimen Laterality:    Left                                                         TUMOR                               Histologic Type:    Invasive carcinoma of no special type (ductal)                                Histologic Grade ([place] Histologic Score):                                     Glandular (Acinar) / Tubular Differentiation:    Score 2                                  Nuclear  Pleomorphism:    Score 2                                  Mitotic Rate:    Score 1                                  Overall Grade:    Grade 1 (scores of 3, 4 or 5)                                Tumor Size:    Greatest dimension of largest invasive focus (Millimeters): 9  mm                               Tumor Focality:    Single focus of invasive carcinoma                                Ductal Carcinoma In Situ (DCIS):    Not identified                                Lobular Carcinoma In Situ (LCIS):    Not identified                                Lymphatic and / or Vascular Invasion:    Not identified                                Dermal Lymphatic and / or Vascular Invasion:    No skin present                                Microcalcifications:    Not identified                                Treatment Effect in the Breast:    No known presurgical therapy                                                         MARGINS                               Margin Status for Invasive Carcinoma:    All margins negative for invasive carcinoma                                  Distance from Invasive Carcinoma to Closest Margin:    Greater than: 5 mm                                 Closest Margin(s) to Invasive Carcinoma:    All margins are greater than 5 mm from tumor.                                                         REGIONAL LYMPH NODES                               Regional Lymph Node Status:    Not applicable (no regional lymph nodes submitted or found)                                                         pTNM CLASSIFICATION (AJCC 8th Edition)                               Reporting of pT, pN, and (when applicable) pM categories is based on information available to the pathologist at the time the report is issued. As per the AJCC (Chapter 1, 8th Ed.) it is the managing physician s responsibility to establish the final pathologic stage based upon all pertinent information, including but potentially not limited to  this pathology report.                               pT Category:    pT1b                                pN Category:    pN not assigned (no nodes submitted or found)                                                         ADDITIONAL FINDINGS                               Additional Findings:    Usual duct hyperplasia, fibroadenomatoid change, and fibrocystic changes including sclerosing adenosis, apocrine metaplasia and microcysts.                                                         SPECIAL STUDIES                               Estrogen Receptor (ER) Status:    Positive (greater than 10% of cells demonstrate nuclear positivity)                                  Percentage of Cells with Nuclear Positivity:    %                                Progesterone Receptor (PgR) Status:    Positive                                  Percentage of Cells with Nuclear Positivity:    5 %                               HER2 (by immunohistochemistry):    Negative (Score 0)                                Testing Performed on Case Number:    [id number]       Clinical Information 08/05/2024    Final                    Value:Procedure:                          right breast tag localized lumpectomy - Right                          Pre-op Diagnosis: Malignant neoplasm of upper-outer quadrant of right                           breast in female, estrogen receptor positive (H) [C50.411, Z17.0]                          Post-op Diagnosis: C50.411, Z17.0 - Malignant neoplasm of upper-outer                           quadrant of right breast in female, estrogen receptor positive (H)                           [ICD-10-CM]    Intraoperative Consultation 08/05/2024    Final                    Value:A(1). Breast, Right, RIght beast tag localized lumpectomy:                          Gross evaluation for margins: 1.2 cm mass is 3.0 mm from the inferior                           margin.  Remaining margins are greater than 5.0 mm from tumor.         "                                            Irene Galeana MD on 8/5/2024 at 11:44 AM                          Intra-operative interpretation performed at:  LABORATORY, [facility], [address] 1st floor, [address], [facility] [address]                          Results given via phone to Dr. Yang.    Gross Description 08/05/2024    Final                    Value:A(1). Breast, Right, RIght beast tag localized lumpectomy:                          The specimen is received fresh for intraoperative consultation, labeled                           with the patient's name, medical record number and other identifying                           information designated \"right breast tag localized lumpectomy\". It                           consists of a 6.5 cm (superior to inferior) by 5.5 cm (superior to                           inferior) by 2.1 cm (anterior to posterior) lumpectomy specimen.  The                           specimen is received previously inked as follows:                                                    Red - Superior                          Blue - Inferior                          Green - Anterior                          Black - Posterior                          Yellow - Medial                          Orange - Lateral                                                    Sectioned from superior to inferior into 8 slices.  Within slices 6 and 7                           is an ill-defined 1.2 x 1.0 x 0.8 cm rubbery to firm mass.  Within slice 6                           within the mass is an RFID clip and a biopsy marking tag.  The mass is                           located                                                    Inferior-0.3 cm                          Posterior-0.5 cm                          Medial-0.9 cm                          Anterior-0.8 cm                          Lateral-3.1 cm                         " " Superior-2.4 cm                                                    The remaining breast is comprised of 90% yellow lobulated adipose tissue                           and 10% gray-white fibrous tissue.  Representative sections are submitted                           as follows:                                                    A1-A3-slice 1, superior margin, perpendicular                          A4-A6-slice 4, trisected                          A7-A9-slice 5, trisected                          B15-D69-lrdgw 6, sectioned (L39-olsi)                          X75-H81-sarrd 7, trisected (H02-jbfdwjqod mass)                          B34-A46-kpaey 8, inferior margin, perpendicular                                                    Per EPIC, the radiographic findings are as follows:                          Imaging reviewed: MRI/Ultrasound                          Findings: 9:30 position, 10 cm from nipple, 0.7 x 0.5 x 0.7 cm-IDC                          Expected Clip/Marker: RFID tag                                                    Time collected: 1125                          Time in formalin: 1150                          B(2). Breast, Right, RIGHT BREAST NEW MEDIAL AND INFERIOR MARGINS:                          The specimen is received fresh, labeled with the patient's name, medical                           record number and other identifying information designated \"right breast                           new medial and inferior margins\". It consists of a 5.1 x 3.1 x 1.5 cm                           portion of fibrofatty tissue.  1 surface is received inked black                           designated as the new medial and inferior margins.  The opposite surface                           is inked red by the prosector.  Sectioning reveals 99% yellow lobulated                           adipose tissue and 1% gray-white fibrous tissue.  No mass is grossly                           identified.  Entirely submitted in 8 " cassettes.                                                    Time collected: 1133                          Time in formalin: 1200    Microscopic Description 08/05/2024    Final                    Value:Microscopic examination was performed.    MCRS 08/05/2024 Yes (A)  N/A Final    Performing Labs 08/05/2024    Final                    Value:The technical component of this testing was completed at [facility].                                                    Stain controls for all stains resulted within this report have been                           reviewed and show appropriate reactivity.     Specimen Status 08/05/2024 See Scanned Result   Final       Imaging:  CT a/p 6/1/23  at [facility]  INDICATION:   Left lower quadrant abdominal pain.     TECHNIQUE:   CT abdomen and pelvis acquired with 80 cc Omnipaque 350 IV contrast.     COMPARISON:   October 11, 2022.     FINDINGS:   Lower chest: Scattered atelectasis. Small hiatal hernia.     Liver: Hepatic steatosis. No suspicious masses.     Gallbladder and bile ducts: Unremarkable. No stones or inflammation. No biliary dilatation.     Pancreas: Unremarkable. No mass or inflammation.     Spleen: Unremarkable. Normal in size. No masses.     Adrenal glands: Unremarkable. No nodules.     Kidneys: Tiny hypodensities, too small to characterize.. No suspicious masses, stones, or hydronephrosis.     GI tract: Acute sigmoid diverticulitis. Prior partial sigmoid colonic resection. Mild colonic stool burden. No bowel obstruction. Appendix not seen.     Vasculature: Mild aortoiliac arterial calcifications. Abdominal aorta is normal in caliber. Mesenteric arteries are patent.     Lymph nodes: No lymphadenopathy.     Peritoneum/Abdominal Wall: Unremarkable. No sign of mass or infiltration. No free air or significant free fluid.     Pelvis: Mildly distended bladder with circumferential wall  thickening. Hysterectomy.     Bones: Unremarkable for age.

## 2025-02-05 ENCOUNTER — THERAPY VISIT (OUTPATIENT)
Dept: PHYSICAL THERAPY | Facility: CLINIC | Age: 75
End: 2025-02-05
Attending: PHYSICIAN ASSISTANT
Payer: COMMERCIAL

## 2025-02-05 DIAGNOSIS — R39.15 URINARY URGENCY: ICD-10-CM

## 2025-02-05 DIAGNOSIS — N39.46 MIXED INCONTINENCE: ICD-10-CM

## 2025-02-05 DIAGNOSIS — R15.9 INCONTINENCE OF FECES, UNSPECIFIED FECAL INCONTINENCE TYPE: ICD-10-CM

## 2025-02-05 DIAGNOSIS — K59.00 CONSTIPATION, UNSPECIFIED CONSTIPATION TYPE: ICD-10-CM

## 2025-02-05 PROCEDURE — 97161 PT EVAL LOW COMPLEX 20 MIN: CPT | Mod: GP | Performed by: PHYSICAL THERAPIST

## 2025-02-05 PROCEDURE — 97535 SELF CARE MNGMENT TRAINING: CPT | Mod: GP | Performed by: PHYSICAL THERAPIST

## 2025-02-05 NOTE — PROGRESS NOTES
PHYSICAL THERAPY EVALUATION  Type of Visit: Evaluation       Fall Risk Screen:  Fall screen completed by: PT  Have you fallen 2 or more times in the past year?: No  Have you fallen and had an injury in the past year?: Yes  Is patient a fall risk?: No    Subjective     Pt reporting AMAN with sneezing > coughing when her bladder is full, has urge incontinence especially in the AM trying to make it to the toilet for her first void. This happens >50% of the AMs, generally only when her bladder is really full. Leaking urine most days, maybe not every day. 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 Drinking about 40 ounces of water daily and up to 2 cups of coffee. Sometimes drinks joseph mix during PM.    Pt reports history of constipation for the majority of her life. Pt reports she generally doesn't get urge to go unless taking dulcolax but this would cause her pain/cramping the next day and loose stools. Does have some FI if stool is quite loose. Reports stool is generally Type 1, often needs manual extraction as she doesn't have urge. Does have more FI when stool is looser (Type 4 and below, sometimes even type 1).     Pt started taking Miralax daily and stool softener but then stool was getting too mushy. Having BMs 2x per week currently but only with manual extraction. Has used toilet stool in the past but didn't help  Pt does have a rectocele. Pt goes to [place] on occasion a few times a week.         Presenting condition or subjective complaint: urinary stress and urge incontinence and constipation and fecal incontinence. inability to have normal BM .Need to empty rectum manually.  Date of onset: 01/09/25 (MD Order)    Relevant medical history: Arthritis; Cancer; Depression; Dizziness; DVT (blood clot); Hearing problems; High blood pressure; History of fractures; Incontinence; Menopause; Osteoarthritis; Overweight; Radiation treatment; Thyroid  problems; Vision problems   Dates & types of surgery: 1954 appy, 1958 tonsils, 1984 hyst, 2011 sigmoid resection & oopherectomy    Prior diagnostic imaging/testing results:       Prior therapy history for the same diagnosis, illness or injury: No      Living Environment  Social support: With family members   Type of home: Basement   Stairs to enter the home: Yes 2 Is there a railing: Yes     Ramp: No   Stairs inside the home: Yes 10 Is there a railing: Yes     Help at home: None  Equipment owned:       Employment: No    Hobbies/Interests: keon li    Patient goals for therapy: Wear normal panties when having loose stools. Sleep without panties and pad (for health)without fear ofleaking on the way to the bathroom.         Objective      PELVIC EVALUATION  ADDITIONAL HISTORY:  Sex assigned at birth: Female  Gender identity: Female    Pronouns: She/Her Hers      Bladder History:  Feels bladder filling: No  Triggers for feeling of inability to wait to go to the bathroom: No    How long can you wait to urinate: 2 to 6 hours  Gets up at night to urinate: Yes 1-2  Can stop the flow of urine when urinating: No  Volume of urine usually released: Medium   Other issues: Slow or hesitant urine stream  Number of bladder infections in last 12 months:    Fluid intake per day: 40oz 16oz 0 to 1 oz  Medications taken for bladder: No     Activities causing urine leak: Cough; Sneeze; Hurrying to the bathroom due to a strong urge to urinate (pee)    Amount of urine typically leaked: squirts?  Pads used to help with leaking: Yes I use this many pads per day: one   I use this type/brand: long, thin for urine leaks #4      Bowel History:  Frequency of bowel movement: 1-2x/wk  Consistency of stool: Hard    Ignores the urge to defecate: No  Other bowel issues: Loss of stool; Straining to have bowel movement  Length of time spent trying to have a bowel movement: must evacuate manually if hard. no urge unless diarrhea    Sexual  Function History:  Sexual orientation: Straight    Sexually active: No  Lubrication used: No No  Pelvic pain:      Pain or difficulty with orgasms/erection/ejaculation: No    State of menopause: Post-menopause (I am done with menopause)  Hormone medications: Yes premarin cream    Are you currently pregnant: No  Number of previous pregnancies: 2  Number of deliveries: 2  If you have delivered before, did you have any of these issues during delivery: Episiotomy; Vaginal delivery  Have you been diagnosed with pelvic prolapse or abdominal separation: Yes  Do you get regular exercise: No  Have you tried pelvic floor strengthening exercises for 4 weeks: No  Do you have any history of trauma that is relevant to your care that you d like to share: No      Discussed reason for referral regarding pelvic health needs and external/internal pelvic floor muscle examination with patient/guardian.  Opportunity provided to ask questions and verbal consent for assessment and intervention was given.    Pelvic exam not completed today due to time constraints, discussed completion at next visit and pt in agreement.     Assessment & Plan   CLINICAL IMPRESSIONS  Medical Diagnosis: mixed incontinence, fecal incontinence    Treatment Diagnosis: SADIE   Impression/Assessment: Patient is a 74 year old female with mixed incontinence complaints.  The following significant findings have been identified: Decreased ROM/flexibility, Decreased strength, Impaired muscle performance, and Decreased activity tolerance. These impairments interfere with their ability to perform self care tasks as compared to previous level of function.     Clinical Decision Making (Complexity):  Clinical Presentation: Stable/Uncomplicated  Clinical Presentation Rationale: based on medical and personal factors listed in PT evaluation  Clinical Decision Making (Complexity): Low complexity    PLAN OF CARE  Treatment Interventions:  Interventions: Manual Therapy, Neuromuscular  Re-education, Therapeutic Activity, Therapeutic Exercise, Self-Care/Home Management    Long Term Goals     PT Goal 1  Goal Identifier: urinary incontinence  Goal Description: pt will reduce urinary incontinence from almost daily to 1x per week  Rationale: to maximize safety and independence with performance of ADLs and functional tasks  Target Date: 04/30/25  PT Goal 2  Goal Identifier: bowel movements  Goal Description: pt will be able to have 5+ BMS per week without manual extraction  Rationale: to maximize safety and independence with performance of ADLs and functional tasks  Target Date: 04/30/25      Frequency of Treatment: once per week  Duration of Treatment: 12 weeks    Recommended Referrals to Other Professionals:  NA  Education Assessment:   Learner/Method: Patient  Education Comments: Pt educated on PT role and plan of care    Risks and benefits of evaluation/treatment have been explained.   Patient/Family/caregiver agrees with Plan of Care.     Evaluation Time:     PT Eval, Low Complexity Minutes (85588): 30     Signing Clinician: Kyra Parra, PT        [facility]                                                                                   OUTPATIENT PHYSICAL THERAPY      PLAN OF TREATMENT FOR OUTPATIENT REHABILITATION   Patient's Last Name, First Name, Marysol Menchaca YOB: 1950   Provider's Name   [facility]   Medical Record No.  [id number]     Onset Date: 01/09/25 (MD Order)  Start of Care Date: 02/05/25     Medical Diagnosis:  mixed incontinence, fecal incontinence      PT Treatment Diagnosis:  SADIE Plan of Treatment  Frequency/Duration: once per week/ 12 weeks    Certification date from 02/05/25 to 04/30/25         See note for plan of treatment details and functional goals     Kyra Parra, PT                         I CERTIFY THE NEED FOR THESE SERVICES FURNISHED UNDER        THIS PLAN OF TREATMENT  AND WHILE UNDER MY CARE     (Physician attestation of this document indicates review and certification of the therapy plan).              Referring Provider:  Opal Dejesus    Initial Assessment  See Epic Evaluation- Start of Care Date: 02/05/25

## 2025-02-18 ENCOUNTER — HOSPITAL ENCOUNTER (OUTPATIENT)
Dept: MAMMOGRAPHY | Facility: CLINIC | Age: 75
Discharge: HOME | End: 2025-02-18
Attending: INTERNAL MEDICINE
Payer: COMMERCIAL

## 2025-02-18 DIAGNOSIS — C50.811 MALIGNANT NEOPLASM OF OVERLAPPING SITES OF RIGHT BREAST (H): ICD-10-CM

## 2025-02-18 PROCEDURE — 77065 DX MAMMO INCL CAD UNI: CPT | Mod: RT

## 2025-02-25 DIAGNOSIS — Z87.39 HISTORY OF GOUT: ICD-10-CM

## 2025-02-25 RX ORDER — ALLOPURINOL 100 MG/1
100 TABLET ORAL DAILY
Qty: 90 TABLET | Refills: 1 | Status: SHIPPED | OUTPATIENT
Start: 2025-02-25

## 2025-02-26 ENCOUNTER — THERAPY VISIT (OUTPATIENT)
Dept: PHYSICAL THERAPY | Facility: CLINIC | Age: 75
End: 2025-02-26
Payer: COMMERCIAL

## 2025-02-26 DIAGNOSIS — N39.46 MIXED INCONTINENCE: Primary | ICD-10-CM

## 2025-02-26 PROCEDURE — 97110 THERAPEUTIC EXERCISES: CPT | Mod: GP | Performed by: PHYSICAL THERAPIST

## 2025-02-26 PROCEDURE — 97535 SELF CARE MNGMENT TRAINING: CPT | Mod: GP | Performed by: PHYSICAL THERAPIST

## 2025-03-25 NOTE — PROGRESS NOTES
[facility]    Hematology/Oncology Established Patient Note      Today's Date: 4/9/2025    Reason for visit: Right breast cancer.    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 6/2017: Developed oropharyngeal dysphagia. Found to have indurated area, 2 x 1 cm on left side of anterior 2/3's of tongue and multiple left anterior neck nodes.  2. 7/24/2017: Laryngoscopy showed 2 x 1 cm exophytic lesion of left posterolateral oral tongue and diffuse lobular fullness of tongue base.  Biopsy of left lateral tongue and base of tongue showed CD20-positive diffuse large B-cell lymphoma, germinal center type; Ki-67 = 70%; histologic features and proliferation markers did not indicate double hit lymphoma so FISH not performed.  3. 8/2/2017: PET/CT showed hypermetabolic soft tissue thickening at base of tongue, bilateral abisai metastases at this level with single right hypermetabolic node and multiple left-sided hypermetabolic nodes; hypermetabolic 0.8 cm right thyroid nodule; hypermetabolic nonenlarged right axillary node, nonspecific and measuring 1.2 cm; remaining exam unremarkable.  4. 7/31/2017: Bone marrow biopsy negative for lymphoma.  5. 8/7/2017: Started R-CHOP chemotherapy under care of Dr. Mitchell Wilkins at [facility].  6. 10/12/2017: PET/CT after 3 cycles of chemo showed resolution of enlarged and hypermetabolic lymph nodes of neck and at base of tongue.  7. 12/6/2017: Completed 6 cycles of R-CHOP.  8. 1/16/2018: PET/CT showed complete response.  9. 6/25/2024: Diagnostic bilateral mammogram obtained due to right breast nipple/areolar itching.  This showed spiculated mass at 9:00 in right breast, 10 cm from nipple. Right  breast U/S showed 0.7 x 0.5 x 0.7 cm mass at 9:30, 10 cm from nipple; no right axillary adenopathy.  10.  7/3/2024: Right breast biopsy at 9:30, 10 cm from nipple showed grade 1 invasive ductal carcinoma, ER positive %, TN moderate positive 5%, HER-2 negative (IHC = 0)  11.  8/05/2024: Right breast lumpectomy with Dr. Virgie Yang.  Pathology showed 9 mm grade 1 invasive ductal carcinoma, margins negative, lymph nodes not excised. Oncotype DX = 18, 9-year risk of distant recurrence of 5% after 5 years of hormone blockade therapy, < 1% absolute chemo benefit.  12. 10/2/2024-10/11/2024: Completed adjuvant radiation therapy in 5 fractions.  13. 11/11/2024: Started anastrozole.    INTERVAL HISTORY:  Zuri reports some breast soreness but tolerable.    REVIEW OF SYSTEMS:   14 point ROS was reviewed and is negative other than as noted above in HPI.       HOME MEDICATIONS:  Current Outpatient Medications   Medication Sig Dispense Refill    acetaminophen (TYLENOL) 325 MG tablet Take 2 tablets (650 mg) by mouth every 4 hours as needed for mild pain or other (and adjunct with moderate or severe pain or per patient request)      allopurinol (ZYLOPRIM) 100 MG tablet Take 1 tablet (100 mg) by mouth daily 90 tablet 1    anastrozole (ARIMIDEX) 1 MG tablet Take 1 tablet (1 mg) by mouth daily. 90 tablet 3    betamethasone dipropionate (DIPROSONE) 0.05 % external ointment       cetirizine (ZYRTEC) 10 MG tablet TAKE ONE TABLET BY MOUTH ONE TIME DAILY IN THE P.M. 90 tablet 3    hydroCHLOROthiazide 12.5 MG tablet TAKE ONE TABLET BY MOUTH EVERY DAY 90 tablet 1    levothyroxine (SYNTHROID/LEVOTHROID) 125 MCG tablet TAKE 1 TABLET (125 MCG) BY MOUTH DAILY 90 tablet 0    losartan (COZAAR) 100 MG tablet TAKE ONE TABLET BY MOUTH EVERY DAY 90 tablet 1    pantoprazole (PROTONIX) 20 MG EC tablet TAKE ONE TABLET BY MOUTH EVERY DAY 90 tablet 3    rivaroxaban ANTICOAGULANT (XARELTO ANTICOAGULANT) 10 MG TABS tablet Take 1 tablet by mouth  daily (with dinner) 90 tablet 3    senna-docusate (SENOKOT-S/PERICOLACE) 8.6-50 MG tablet Take 1 tablet by mouth 2 times daily as needed for constipation (While on oral opioids.) 10 tablet 0    valACYclovir (VALTREX) 500 MG tablet TAKE 1 TABLET BY MOUTH TWICE A DAY 12 tablet 3    Vitamin D3 (VITAMIN D, CHOLECALCIFEROL,) 25 mcg (1000 units) tablet Take by mouth daily.           ALLERGIES:  Allergies   Allergen Reactions    Chlorhexidine Hives    Chlorhexidine Gluconate Hives    Codeine GI Disturbance    Evista [Raloxifene Hydrochloride] Other (See Comments)     Esophagus irritation     Fosamax Other (See Comments)     Esophagus irritation     Vicodin [Hydrocodone-Acetaminophen] GI Disturbance     Can Take oxycodone    Pen Vk [Penicillin V] Rash         PAST MEDICAL HISTORY:  Past Medical History:   Diagnosis Date    Arthritis     Cancer (H) 2017    lymphoma    Cholelithiasis     Colon polyps 2001 and 2006    no polyps 2011    Constipation     Diffuse large B cell lymphoma (H) 2017    tongue, followed by Dr. [facility]lkins at [facility] and [facility]    Diverticulitis 2010    DVT (deep venous thrombosis) (H)     x3 (one due to OCs, 2 post op)    Genital herpes     GERD (gastroesophageal reflux disease)     Gout     History of depression     History of migraine     HTN (hypertension), benign     Hypothyroidism     hashimoto's thyroiditis    Lichen sclerosus et atrophicus     Osteopenia     Ovarian cyst 2011    Rectocele     Schatzki's ring 2009    EGD     Tibia/fibula fracture 2009    Vasovagal syncope 1966    Vertigo 2000         PAST SURGICAL HISTORY:  Past Surgical History:   Procedure Laterality Date    APPENDECTOMY  1954    ARTHROSCOPY KNEE  1983    BONE MARROW BIOPSY, BONE SPECIMEN, NEEDLE/TROCAR N/A 7/31/2017    Procedure: BIOPSY BONE MARROW;  UNILATERAL BONE MARROW BIPOSY  ;  Surgeon: Jamil Mccarthy MD;  Location:  GI    BREAST BIOPSY, CORE RT/LT  1990    EXCISE GARNETT'S NEUROMA FOOT      HYSTERECTOMY, PAP  NO LONGER INDICATED      with bladder repair    LAPAROSCOPIC OOPHORECTOMY      with the colon resection    LARYNGOSCOPY WITH BIOPSY(IES) N/A 2017    Procedure: LARYNGOSCOPY WITH BIOPSY(IES);  DIRECT LARYNGOSCOPY WITH TONGUE BIOPSIES;  Surgeon: Vernell Yang MD;  Location:  SD    LUMPECTOMY, BREAST, LOCALIZED USING RADIOFREQUENCY IDENTIFICATION Right 2024    Procedure: right breast tag localized lumpectomy;  Surgeon: Virgie Yang MD;  Location:  OR    OPEN REDUCTION INTERNAL FIXATION TIBIA      TONSILLECTOMY & ADENOIDECTOMY      ZZC LAP,SURG,COLECTOMY, PARTIAL, W/ANAST      due to recurrent diverticulitis         SOCIAL HISTORY:  Social History     Socioeconomic History    Marital status:      Spouse name: Not on file    Number of children: Not on file    Years of education: Not on file    Highest education level: Not on file   Occupational History    Not on file   Tobacco Use    Smoking status: Former     Current packs/day: 0.00     Types: Cigarettes     Quit date: 2000     Years since quittin.6    Smokeless tobacco: Never    Tobacco comments:     More of a social smoker   Vaping Use    Vaping status: Never Used   Substance and Sexual Activity    Alcohol use: Yes     Alcohol/week: 0.0 standard drinks of alcohol     Comment: 0-3 a day. mixed drinks, wine    Drug use: No    Sexual activity: Not Currently     Partners: Male   Other Topics Concern    Parent/sibling w/ CABG, MI or angioplasty before 65F 55M? 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ía, has grandlizzetteds     Social Drivers of Health     Financial Resource Strain: Low Risk  (2024)    Financial Resource Strain     Within the past 12 months, have you or your family members you live with been unable to get utilities (heat, electricity) when it was really needed?: No   Food Insecurity: Low Risk  (2024)    Food Insecurity     Within the past 12 months, did  "you worry that your food would run out before you got money to buy more?: No     Within the past 12 months, did the food you bought just not last and you didn t have money to get more?: No   Transportation Needs: Low Risk  (2024)    Transportation Needs     Within the past 12 months, has lack of transportation kept you from medical appointments, getting your medicines, non-medical meetings or appointments, work, or from getting things that you need?: No   Physical Activity: Not on file   Stress: Not on file   Social Connections: Unknown (2022)    [facility]    Social Connections     Frequency of Communication with Friends and Family: Not on file   Interpersonal Safety: Low Risk  (2024)    Interpersonal Safety     Do you feel physically and emotionally safe where you currently live?: Yes     Within the past 12 months, have you been hit, slapped, kicked or otherwise physically hurt by someone?: No     Within the past 12 months, have you been humiliated or emotionally abused in other ways by your partner or ex-partner?: No   Housing Stability: Low Risk  (2024)    Housing Stability     Do you have housing? : Yes     Are you worried about losing your housing?: No   ;  passed away in .      FAMILY HISTORY:  Family History   Problem Relation Age of Onset    C.A.D. Mother         and PE    Cancer Father 65        gastric ca         PHYSICAL EXAM:  Vital signs:  /62   Pulse 62   Resp 16   Ht 1.626 m (5' 4\")   Wt 87.4 kg (192 lb 9.6 oz)   SpO2 99%   BMI 33.06 kg/m     ECO  GENERAL/CONSTITUTIONAL: No acute distress.  EYES: No erythema or scleral icterus.  LYMPH: No cervical, supraclavicular, axillary adenopathy.  BREAST: No palpable masses in either breast. Nipples are everted bilaterally with no discharge.   RESPIRATORY: No audible cough wheezing.   GASTROINTESTINAL: No guarding.  " No distention.  MUSCULOSKELETAL: Warm and well-perfused, no cyanosis, clubbing, or edema.  NEUROLOGIC: Alert, oriented, answers questions appropriately.  INTEGUMENTARY: No rashes or jaundice.  GAIT: Steady, does not use assistive device       LABS:  CBC RESULTS:   Recent Labs   Lab Test 07/26/24  1155   WBC 7.7   RBC 4.21   HGB 13.5   HCT 39.8   MCV 95   MCH 32.1   MCHC 33.9   RDW 11.8        Last Comprehensive Metabolic Panel:  Sodium   Date Value Ref Range Status   07/26/2024 140 135 - 145 mmol/L Final   04/12/2021 135 133 - 144 mmol/L Final     Potassium   Date Value Ref Range Status   07/26/2024 4.3 3.4 - 5.3 mmol/L Final   01/30/2022 3.8 3.4 - 5.3 mmol/L Final   04/12/2021 4.0 3.4 - 5.3 mmol/L Final     Chloride   Date Value Ref Range Status   07/26/2024 103 98 - 107 mmol/L Final   01/30/2022 105 94 - 109 mmol/L Final   04/12/2021 103 94 - 109 mmol/L Final     Carbon Dioxide   Date Value Ref Range Status   04/12/2021 30 20 - 32 mmol/L Final     Carbon Dioxide (CO2)   Date Value Ref Range Status   07/26/2024 29 22 - 29 mmol/L Final   01/30/2022 27 20 - 32 mmol/L Final     Anion Gap   Date Value Ref Range Status   07/26/2024 8 7 - 15 mmol/L Final   01/30/2022 4 3 - 14 mmol/L Final   04/12/2021 2 (L) 3 - 14 mmol/L Final     Glucose   Date Value Ref Range Status   07/26/2024 90 70 - 99 mg/dL Final   01/30/2022 102 (H) 70 - 99 mg/dL Final   04/12/2021 97 70 - 99 mg/dL Final     Comment:     Fasting specimen     Urea Nitrogen   Date Value Ref Range Status   07/26/2024 23.0 8.0 - 23.0 mg/dL Final   01/30/2022 6 (L) 7 - 30 mg/dL Final   04/12/2021 15 7 - 30 mg/dL Final     Creatinine   Date Value Ref Range Status   07/26/2024 0.94 0.51 - 0.95 mg/dL Final   04/12/2021 0.90 0.52 - 1.04 mg/dL Final     GFR Estimate   Date Value Ref Range Status   07/26/2024 63 >60 mL/min/1.73m2 Final     Comment:     eGFR calculated using 2021 CKD-EPI equation.   04/12/2021 64 >60 mL/min/[1.73_m2] Final     Comment:     Non   American GFR Calc  Starting 12/18/2018, serum creatinine based estimated GFR (eGFR) will be   calculated using the Chronic Kidney Disease Epidemiology Collaboration   (CKD-EPI) equation.       Calcium   Date Value Ref Range Status   07/26/2024 9.9 8.8 - 10.4 mg/dL Final     Comment:     Reference intervals for this test were updated on 7/16/2024 to reflect our healthy population more accurately. There may be differences in the flagging of prior results with similar values performed with this method. Those prior results can be interpreted in the context of the updated reference intervals.   04/12/2021 10.2 (H) 8.5 - 10.1 mg/dL Final     Bilirubin Total   Date Value Ref Range Status   07/09/2024 0.4 <=1.2 mg/dL Final   11/24/2020 0.4 0.2 - 1.3 mg/dL Final     Alkaline Phosphatase   Date Value Ref Range Status   07/09/2024 69 40 - 150 U/L Final   11/24/2020 80 40 - 150 U/L Final     ALT   Date Value Ref Range Status   07/09/2024 11 0 - 50 U/L Final     Comment:     Reference intervals for this test were updated on 6/12/2023 to more accurately reflect our healthy population. There may be differences in the flagging of prior results with similar values performed with this method. Interpretation of those prior results can be made in the context of the updated reference intervals.     11/24/2020 34 0 - 50 U/L Final     AST   Date Value Ref Range Status   07/09/2024 21 0 - 45 U/L Final     Comment:     Reference intervals for this test were updated on 6/12/2023 to more accurately reflect our healthy population. There may be differences in the flagging of prior results with similar values performed with this method. Interpretation of those prior results can be made in the context of the updated reference intervals.   11/24/2020 30 0 - 45 U/L Final       PATHOLOGY:  Reviewed as per HPI.    IMAGING:  Reviewed as per HPI.    ASSESSMENT/PLAN:  Marysol Morrell is a 74 year old female with the following issues:  1. Stage IA,  [id number], grade 1 invasive ductal carcinoma of right breast overlapping sites, ER positive, MN positive, HER-2 negative, Oncotype DX = 18  --Zuri is s/p right lumpectomy and adjuvant radiation therapy completed 10/11/2024.  --Oncotype DX = 18, 9-year risk of distant recurrence of 5% after 5 years of hormone blockade therapy, < 1% absolute chemo benefit.  --Tamoxifen not advised due to prior history of DVT.  --She is on anastrozole and tolerating this relatively well so far albeit with some increase in sleepiness.  --She has no clinical evidence for recurrent breast cancer by physical exam from today or diagnostic right mammogram reviewed from 2/18/2025.  --Plan for total 5 years of anastrozole, tolerating well so far.  --Plan for bilateral screening mammogram in 8/2025.    2. History of diffuse large B-cell lymphoma of tongue region and neck lymph nodes  --Diagnosed 7/24/2017 and completed 6 cycles of R-CHOP elsewhere on 12/6/2017 with clinical complete response.  --She can have annual follow-up with me for this.  --Will check CBC and CMP prior to next visit.    3. History of right calf DVT/PE  --Has self-reported history of DVT related to OCPs age 30's and also phlebitis.  --Sabine to be unprovoked in 2017 although she was diagnosed with diffuse large B-cell lymphoma around that time.  She was placed on long term anticoagulation.  --She will continue with Xarelto.    4. Vaginal atrophy  --Stable. Continue vaginal estrogen cream twice weekly.    5. Osteopenia  --10/19/2023 DEXA showed osteopenia that improved with Reclast. She did have multiple side effects from Reclast.  She is unable to take oral bisphosphonate.  --Repeat DEXA in 2025.    6. Chronic intermittent insomnia  --Improved without intervention.    Return in 4 months.    Jessica Garcia MD  [facility] Hematology/Oncology    Total time spent today: 30 minutes in chart review, patient evaluation, counseling, documentation, test and/or  medication/prescription orders, and coordination of care.      The longitudinal plan of care for the diagnosis(es)/condition(s) as documented were addressed during this visit. Due to the added complexity in care, I will continue to support Zuri in the subsequent management and with ongoing continuity of care.

## 2025-04-09 ENCOUNTER — ONCOLOGY VISIT (OUTPATIENT)
Dept: ONCOLOGY | Facility: CLINIC | Age: 75
End: 2025-04-09
Attending: INTERNAL MEDICINE
Payer: COMMERCIAL

## 2025-04-09 VITALS
WEIGHT: 192.6 LBS | BODY MASS INDEX: 32.88 KG/M2 | HEART RATE: 62 BPM | RESPIRATION RATE: 16 BRPM | SYSTOLIC BLOOD PRESSURE: 135 MMHG | OXYGEN SATURATION: 99 % | DIASTOLIC BLOOD PRESSURE: 62 MMHG | HEIGHT: 64 IN

## 2025-04-09 DIAGNOSIS — C50.811 MALIGNANT NEOPLASM OF OVERLAPPING SITES OF RIGHT BREAST (H): Primary | ICD-10-CM

## 2025-04-09 DIAGNOSIS — Z85.72 HISTORY OF DIFFUSE LARGE B-CELL LYMPHOMA: ICD-10-CM

## 2025-04-09 DIAGNOSIS — Z12.31 ENCOUNTER FOR SCREENING MAMMOGRAM FOR BREAST CANCER: ICD-10-CM

## 2025-04-09 DIAGNOSIS — G47.00 PERSISTENT INSOMNIA: ICD-10-CM

## 2025-04-09 DIAGNOSIS — N95.2 VAGINAL ATROPHY: ICD-10-CM

## 2025-04-09 PROCEDURE — G0463 HOSPITAL OUTPT CLINIC VISIT: HCPCS | Performed by: INTERNAL MEDICINE

## 2025-04-09 PROCEDURE — 99214 OFFICE O/P EST MOD 30 MIN: CPT | Performed by: INTERNAL MEDICINE

## 2025-04-09 PROCEDURE — G2211 COMPLEX E/M VISIT ADD ON: HCPCS | Performed by: INTERNAL MEDICINE

## 2025-04-09 ASSESSMENT — PAIN SCALES - GENERAL: PAINLEVEL_OUTOF10: MILD PAIN (2)

## 2025-04-09 NOTE — PROGRESS NOTES
"Oncology Rooming Note    April 9, 2025 2:24 PM   Marysol Morrell is a 74 year old female who presents for:    Chief Complaint   Patient presents with    Oncology Clinic Visit     Initial Vitals: /62   Pulse 62   Resp 16   Ht 1.626 m (5' 4\")   Wt 87.4 kg (192 lb 9.6 oz)   SpO2 99%   BMI 33.06 kg/m   Estimated body mass index is 33.06 kg/m  as calculated from the following:    Height as of this encounter: 1.626 m (5' 4\").    Weight as of this encounter: 87.4 kg (192 lb 9.6 oz). Body surface area is 1.99 meters squared.  Mild Pain (2) Comment: Data Unavailable   No LMP recorded. Patient is postmenopausal.  Allergies reviewed: Yes  Medications reviewed: Yes    Medications: Medication refills not needed today.  Pharmacy name entered into Cryoport:    [facility][id number] [place], [place] - [address] [place] [address] PHARMACY #[id number] [place], [place] - [address] [address]    Frailty Screening:   Is the patient here for a new oncology consult visit in cancer care? 2. No    PHQ9:  Did this patient require a PHQ9?: No        Enma Garsia MA            "

## 2025-04-09 NOTE — LETTER
4/9/2025      Marysol Morrell  [address] [facility] [id number]      Dear Colleague,    Thank you for referring your patient, Marysol Morrell, to the [facility]. Please see a copy of my visit note below.    [facility]    Hematology/Oncology Established Patient Note      Today's Date: 4/9/2025    Reason for visit: Right breast cancer.    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 6/2017: Developed oropharyngeal dysphagia. Found to have indurated area, 2 x 1 cm on left side of anterior 2/3's of tongue and multiple left anterior neck nodes.  2. 7/24/2017: Laryngoscopy showed 2 x 1 cm exophytic lesion of left posterolateral oral tongue and diffuse lobular fullness of tongue base.  Biopsy of left lateral tongue and base of tongue showed CD20-positive diffuse large B-cell lymphoma, germinal center type; Ki-67 = 70%; histologic features and proliferation markers did not indicate double hit lymphoma so FISH not performed.  3. 8/2/2017: PET/CT showed hypermetabolic soft tissue thickening at base of tongue, bilateral abisai metastases at this level with single right hypermetabolic node and multiple left-sided hypermetabolic nodes; hypermetabolic 0.8 cm right thyroid nodule; hypermetabolic nonenlarged right axillary node, nonspecific and measuring 1.2 cm; remaining exam unremarkable.  4. 7/31/2017: Bone marrow biopsy negative for lymphoma.  5. 8/7/2017: Started R-CHOP chemotherapy under care of Dr. Mitchell Wilkins at [facility].  6. 10/12/2017: PET/CT after 3 cycles of chemo showed resolution of enlarged and hypermetabolic lymph nodes of neck and at base of tongue.  7. 12/6/2017: Completed  6 cycles of R-CHOP.  8. 1/16/2018: PET/CT showed complete response.  9. 6/25/2024: Diagnostic bilateral mammogram obtained due to right breast nipple/areolar itching.  This showed spiculated mass at 9:00 in right breast, 10 cm from nipple. Right breast U/S showed 0.7 x 0.5 x 0.7 cm mass at 9:30, 10 cm from nipple; no right axillary adenopathy.  10.  7/3/2024: Right breast biopsy at 9:30, 10 cm from nipple showed grade 1 invasive ductal carcinoma, ER positive %, NV moderate positive 5%, HER-2 negative (IHC = 0)  11.  8/05/2024: Right breast lumpectomy with Dr. Virgie Yang.  Pathology showed 9 mm grade 1 invasive ductal carcinoma, margins negative, lymph nodes not excised. Oncotype DX = 18, 9-year risk of distant recurrence of 5% after 5 years of hormone blockade therapy, < 1% absolute chemo benefit.  12. 10/2/2024-10/11/2024: Completed adjuvant radiation therapy in 5 fractions.  13. 11/11/2024: Started anastrozole.    INTERVAL HISTORY:  Zuri reports some breast soreness but tolerable.    REVIEW OF SYSTEMS:   14 point ROS was reviewed and is negative other than as noted above in HPI.       HOME MEDICATIONS:  Current Outpatient Medications   Medication Sig Dispense Refill     acetaminophen (TYLENOL) 325 MG tablet Take 2 tablets (650 mg) by mouth every 4 hours as needed for mild pain or other (and adjunct with moderate or severe pain or per patient request)       allopurinol (ZYLOPRIM) 100 MG tablet Take 1 tablet (100 mg) by mouth daily 90 tablet 1     anastrozole (ARIMIDEX) 1 MG tablet Take 1 tablet (1 mg) by mouth daily. 90 tablet 3     betamethasone dipropionate (DIPROSONE) 0.05 % external ointment        cetirizine (ZYRTEC) 10 MG tablet TAKE ONE TABLET BY MOUTH ONE TIME DAILY IN THE P.M. 90 tablet 3     hydroCHLOROthiazide 12.5 MG tablet TAKE ONE TABLET BY MOUTH EVERY DAY 90 tablet 1     levothyroxine (SYNTHROID/LEVOTHROID) 125 MCG tablet TAKE 1 TABLET (125 MCG) BY MOUTH DAILY 90 tablet 0     losartan  (COZAAR) 100 MG tablet TAKE ONE TABLET BY MOUTH EVERY DAY 90 tablet 1     pantoprazole (PROTONIX) 20 MG EC tablet TAKE ONE TABLET BY MOUTH EVERY DAY 90 tablet 3     rivaroxaban ANTICOAGULANT (XARELTO ANTICOAGULANT) 10 MG TABS tablet Take 1 tablet by mouth daily (with dinner) 90 tablet 3     senna-docusate (SENOKOT-S/PERICOLACE) 8.6-50 MG tablet Take 1 tablet by mouth 2 times daily as needed for constipation (While on oral opioids.) 10 tablet 0     valACYclovir (VALTREX) 500 MG tablet TAKE 1 TABLET BY MOUTH TWICE A DAY 12 tablet 3     Vitamin D3 (VITAMIN D, CHOLECALCIFEROL,) 25 mcg (1000 units) tablet Take by mouth daily.           ALLERGIES:  Allergies   Allergen Reactions     Chlorhexidine Hives     Chlorhexidine Gluconate Hives     Codeine GI Disturbance     Evista [Raloxifene Hydrochloride] Other (See Comments)     Esophagus irritation      Fosamax Other (See Comments)     Esophagus irritation      Vicodin [Hydrocodone-Acetaminophen] GI Disturbance     Can Take oxycodone     Pen Vk [Penicillin V] Rash         PAST MEDICAL HISTORY:  Past Medical History:   Diagnosis Date     Arthritis      Cancer (H) 2017    lymphoma     Cholelithiasis      Colon polyps 2001 and 2006    no polyps 2011     Constipation      Diffuse large B cell lymphoma (H) 2017    tongue, followed by Dr. Wilkins at [facility] and [facility]     Diverticulitis 2010     DVT (deep venous thrombosis) (H)     x3 (one due to OCs, 2 post op)     Genital herpes      GERD (gastroesophageal reflux disease)      Gout      History of depression      History of migraine      HTN (hypertension), benign      Hypothyroidism     hashimoto's thyroiditis     Lichen sclerosus et atrophicus      Osteopenia      Ovarian cyst 2011     Rectocele      Schatzki's ring 2009    EGD      Tibia/fibula fracture 2009     Vasovagal syncope 1966     Vertigo 2000         PAST SURGICAL HISTORY:  Past Surgical History:   Procedure Laterality Date     APPENDECTOMY  1954     ARTHROSCOPY  KNEE       BONE MARROW BIOPSY, BONE SPECIMEN, NEEDLE/TROCAR N/A 2017    Procedure: BIOPSY BONE MARROW;  UNILATERAL BONE MARROW BIPOSY  ;  Surgeon: Jamil Mccarthy MD;  Location:  GI     BREAST BIOPSY, CORE RT/LT       EXCISE GARNETT'S NEUROMA FOOT       HYSTERECTOMY, PAP NO LONGER INDICATED      with bladder repair     LAPAROSCOPIC OOPHORECTOMY      with the colon resection     LARYNGOSCOPY WITH BIOPSY(IES) N/A 2017    Procedure: LARYNGOSCOPY WITH BIOPSY(IES);  DIRECT LARYNGOSCOPY WITH TONGUE BIOPSIES;  Surgeon: Vernell Yang MD;  Location:  SD     LUMPECTOMY, BREAST, LOCALIZED USING RADIOFREQUENCY IDENTIFICATION Right 2024    Procedure: right breast tag localized lumpectomy;  Surgeon: Virgie Yang MD;  Location:  OR     OPEN REDUCTION INTERNAL FIXATION TIBIA       TONSILLECTOMY & ADENOIDECTOMY       ZZC LAP,SURG,COLECTOMY, PARTIAL, W/ANAST      due to recurrent diverticulitis         SOCIAL HISTORY:  Social History     Socioeconomic History     Marital status:      Spouse name: Not on file     Number of children: Not on file     Years of education: Not on file     Highest education level: Not on file   Occupational History     Not on file   Tobacco Use     Smoking status: Former     Current packs/day: 0.00     Types: Cigarettes     Quit date: 2000     Years since quittin.6     Smokeless tobacco: Never     Tobacco comments:     More of a social smoker   Vaping Use     Vaping status: Never Used   Substance and Sexual Activity     Alcohol use: Yes     Alcohol/week: 0.0 standard drinks of alcohol     Comment: 0-3 a day. mixed drinks, wine     Drug use: No     Sexual activity: Not Currently     Partners: Male   Other Topics Concern     Parent/sibling w/ CABG, MI or angioplasty before 65F 55M? 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, has grandkids     Social Drivers of Health     Financial  "Resource Strain: Low Risk  (5/23/2024)    Financial Resource Strain      Within the past 12 months, have you or your family members you live with been unable to get utilities (heat, electricity) when it was really needed?: No   Food Insecurity: Low Risk  (5/23/2024)    Food Insecurity      Within the past 12 months, did you worry that your food would run out before you got money to buy more?: No      Within the past 12 months, did the food you bought just not last and you didn t have money to get more?: No   Transportation Needs: Low Risk  (5/23/2024)    Transportation Needs      Within the past 12 months, has lack of transportation kept you from medical appointments, getting your medicines, non-medical meetings or appointments, work, or from getting things that you need?: No   Physical Activity: Not on file   Stress: Not on file   Social Connections: Unknown (1/1/2022)    Received from [facility], [facility]    Social Connections      Frequency of Communication with Friends and Family: Not on file   Interpersonal Safety: Low Risk  (5/23/2024)    Interpersonal Safety      Do you feel physically and emotionally safe where you currently live?: Yes      Within the past 12 months, have you been hit, slapped, kicked or otherwise physically hurt by someone?: No      Within the past 12 months, have you been humiliated or emotionally abused in other ways by your partner or ex-partner?: No   Housing Stability: Low Risk  (5/23/2024)    Housing Stability      Do you have housing? : Yes      Are you worried about losing your housing?: No   ;  passed away in 2023.      FAMILY HISTORY:  Family History   Problem Relation Age of Onset     C.A.D. Mother         and PE     Cancer Father 65        gastric ca         PHYSICAL EXAM:  Vital signs:  /62   Pulse 62   Resp 16   Ht 1.626 m (5' 4\")   Wt 87.4 kg (192 lb 9.6 oz)   SpO2 99%   BMI 33.06 " kg/m     ECO  GENERAL/CONSTITUTIONAL: No acute distress.  EYES: No erythema or scleral icterus.  LYMPH: No cervical, supraclavicular, axillary adenopathy.  BREAST: No palpable masses in either breast. Nipples are everted bilaterally with no discharge.   RESPIRATORY: No audible cough wheezing.   GASTROINTESTINAL: No guarding.  No distention.  MUSCULOSKELETAL: Warm and well-perfused, no cyanosis, clubbing, or edema.  NEUROLOGIC: Alert, oriented, answers questions appropriately.  INTEGUMENTARY: No rashes or jaundice.  GAIT: Steady, does not use assistive device       LABS:  CBC RESULTS:   Recent Labs   Lab Test 24  1155   WBC 7.7   RBC 4.21   HGB 13.5   HCT 39.8   MCV 95   MCH 32.1   MCHC 33.9   RDW 11.8        Last Comprehensive Metabolic Panel:  Sodium   Date Value Ref Range Status   2024 140 135 - 145 mmol/L Final   2021 135 133 - 144 mmol/L Final     Potassium   Date Value Ref Range Status   2024 4.3 3.4 - 5.3 mmol/L Final   2022 3.8 3.4 - 5.3 mmol/L Final   2021 4.0 3.4 - 5.3 mmol/L Final     Chloride   Date Value Ref Range Status   2024 103 98 - 107 mmol/L Final   2022 105 94 - 109 mmol/L Final   2021 103 94 - 109 mmol/L Final     Carbon Dioxide   Date Value Ref Range Status   2021 30 20 - 32 mmol/L Final     Carbon Dioxide (CO2)   Date Value Ref Range Status   2024 29 22 - 29 mmol/L Final   2022 27 20 - 32 mmol/L Final     Anion Gap   Date Value Ref Range Status   2024 8 7 - 15 mmol/L Final   2022 4 3 - 14 mmol/L Final   2021 2 (L) 3 - 14 mmol/L Final     Glucose   Date Value Ref Range Status   2024 90 70 - 99 mg/dL Final   2022 102 (H) 70 - 99 mg/dL Final   2021 97 70 - 99 mg/dL Final     Comment:     Fasting specimen     Urea Nitrogen   Date Value Ref Range Status   2024 23.0 8.0 - 23.0 mg/dL Final   2022 6 (L) 7 - 30 mg/dL Final   2021 15 7 - 30 mg/dL Final     Creatinine    Date Value Ref Range Status   07/26/2024 0.94 0.51 - 0.95 mg/dL Final   04/12/2021 0.90 0.52 - 1.04 mg/dL Final     GFR Estimate   Date Value Ref Range Status   07/26/2024 63 >60 mL/min/1.73m2 Final     Comment:     eGFR calculated using 2021 CKD-EPI equation.   04/12/2021 64 >60 mL/min/[1.73_m2] Final     Comment:     Non  GFR Calc  Starting 12/18/2018, serum creatinine based estimated GFR (eGFR) will be   calculated using the Chronic Kidney Disease Epidemiology Collaboration   (CKD-EPI) equation.       Calcium   Date Value Ref Range Status   07/26/2024 9.9 8.8 - 10.4 mg/dL Final     Comment:     Reference intervals for this test were updated on 7/16/2024 to reflect our healthy population more accurately. There may be differences in the flagging of prior results with similar values performed with this method. Those prior results can be interpreted in the context of the updated reference intervals.   04/12/2021 10.2 (H) 8.5 - 10.1 mg/dL Final     Bilirubin Total   Date Value Ref Range Status   07/09/2024 0.4 <=1.2 mg/dL Final   11/24/2020 0.4 0.2 - 1.3 mg/dL Final     Alkaline Phosphatase   Date Value Ref Range Status   07/09/2024 69 40 - 150 U/L Final   11/24/2020 80 40 - 150 U/L Final     ALT   Date Value Ref Range Status   07/09/2024 11 0 - 50 U/L Final     Comment:     Reference intervals for this test were updated on 6/12/2023 to more accurately reflect our healthy population. There may be differences in the flagging of prior results with similar values performed with this method. Interpretation of those prior results can be made in the context of the updated reference intervals.     11/24/2020 34 0 - 50 U/L Final     AST   Date Value Ref Range Status   07/09/2024 21 0 - 45 U/L Final     Comment:     Reference intervals for this test were updated on 6/12/2023 to more accurately reflect our healthy population. There may be differences in the flagging of prior results with similar values  performed with this method. Interpretation of those prior results can be made in the context of the updated reference intervals.   11/24/2020 30 0 - 45 U/L Final       PATHOLOGY:  Reviewed as per HPI.    IMAGING:  Reviewed as per HPI.    ASSESSMENT/PLAN:  Marysol Morrell is a 74 year old female with the following issues:  1. Stage IA, [id number], grade 1 invasive ductal carcinoma of right breast overlapping sites, ER positive, NH positive, HER-2 negative, Oncotype DX = 18  --Zuri is s/p right lumpectomy and adjuvant radiation therapy completed 10/11/2024.  --Oncotype DX = 18, 9-year risk of distant recurrence of 5% after 5 years of hormone blockade therapy, < 1% absolute chemo benefit.  --Tamoxifen not advised due to prior history of DVT.  --She is on anastrozole and tolerating this relatively well so far albeit with some increase in sleepiness.  --She has no clinical evidence for recurrent breast cancer by physical exam from today or diagnostic right mammogram reviewed from 2/18/2025.  --Plan for total 5 years of anastrozole, tolerating well so far.  --Plan for bilateral screening mammogram in 8/2025.    2. History of diffuse large B-cell lymphoma of tongue region and neck lymph nodes  --Diagnosed 7/24/2017 and completed 6 cycles of R-CHOP elsewhere on 12/6/2017 with clinical complete response.  --She can have annual follow-up with me for this.  --Will check CBC and CMP prior to next visit.    3. History of right calf DVT/PE  --Has self-reported history of DVT related to OCPs age 30's and also phlebitis.  --Ione to be unprovoked in 2017 although she was diagnosed with diffuse large B-cell lymphoma around that time.  She was placed on long term anticoagulation.  --She will continue with Xarelto.    4. Vaginal atrophy  --Stable. Continue vaginal estrogen cream twice weekly.    5. Osteopenia  --10/19/2023 DEXA showed osteopenia that improved with Reclast. She did have multiple side effects from Reclast.  She is  "unable to take oral bisphosphonate.  --Repeat DEXA in 2025.    6. Chronic intermittent insomnia  --Improved without intervention.    Return in 4 months.    Jessica Garcia MD  [facility] Hematology/Oncology    Total time spent today: 30 minutes in chart review, patient evaluation, counseling, documentation, test and/or medication/prescription orders, and coordination of care.      The longitudinal plan of care for the diagnosis(es)/condition(s) as documented were addressed during this visit. Due to the added complexity in care, I will continue to support Zuri in the subsequent management and with ongoing continuity of care.    Oncology Rooming Note    April 9, 2025 2:24 PM   Marysol Morrell is a 74 year old female who presents for:    Chief Complaint   Patient presents with     Oncology Clinic Visit     Initial Vitals: /62   Pulse 62   Resp 16   Ht 1.626 m (5' 4\")   Wt 87.4 kg (192 lb 9.6 oz)   SpO2 99%   BMI 33.06 kg/m   Estimated body mass index is 33.06 kg/m  as calculated from the following:    Height as of this encounter: 1.626 m (5' 4\").    Weight as of this encounter: 87.4 kg (192 lb 9.6 oz). Body surface area is 1.99 meters squared.  Mild Pain (2) Comment: Data Unavailable   No LMP recorded. Patient is postmenopausal.  Allergies reviewed: Yes  Medications reviewed: Yes    Medications: Medication refills not needed today.  Pharmacy name entered into bizsol:    [facility][id number] [place], [place] - [address] [place] [address] [facility]ARMACY #[id number] [place], [place] - [address] [address]    Frailty Screening:   Is the patient here for a new oncology consult visit in cancer care? 2. No    PHQ9:  Did this patient require a PHQ9?: No        Enma Garsia MA              Again, thank you for allowing me to participate in the care of your patient.        Sincerely,        Jessica Garcia MD    Electronically signed"

## 2025-04-14 PROBLEM — N39.46 MIXED INCONTINENCE: Status: RESOLVED | Noted: 2025-02-05 | Resolved: 2025-04-14

## 2025-04-24 DIAGNOSIS — I10 HTN (HYPERTENSION), BENIGN: ICD-10-CM

## 2025-04-24 DIAGNOSIS — E03.9 HYPOTHYROIDISM, UNSPECIFIED TYPE: ICD-10-CM

## 2025-04-24 RX ORDER — LOSARTAN POTASSIUM 100 MG/1
100 TABLET ORAL
Qty: 90 TABLET | Refills: 0 | Status: SHIPPED | OUTPATIENT
Start: 2025-04-24

## 2025-04-24 RX ORDER — LEVOTHYROXINE SODIUM 125 UG/1
125 TABLET ORAL
Qty: 90 TABLET | Refills: 0 | Status: SHIPPED | OUTPATIENT
Start: 2025-04-24

## 2025-04-24 RX ORDER — HYDROCHLOROTHIAZIDE 12.5 MG/1
12.5 TABLET ORAL DAILY
Qty: 90 TABLET | Refills: 0 | Status: SHIPPED | OUTPATIENT
Start: 2025-04-24

## 2025-08-18 ENCOUNTER — PATIENT OUTREACH (OUTPATIENT)
Dept: CARE COORDINATION | Facility: CLINIC | Age: 75
End: 2025-08-18
Payer: COMMERCIAL

## (undated) DEVICE — DRAPE BREAST/CHEST 29420

## (undated) DEVICE — SU VICRYL 2-0 SH 27" J317H

## (undated) DEVICE — SUCTION TIP YANKAUER STR K87

## (undated) DEVICE — DRSG STERI STRIP 1/2X4" R1547

## (undated) DEVICE — SU MONOCRYL 4-0 PS-2 18" UND Y496G

## (undated) DEVICE — SUCTION CANISTER MEDIVAC LINER 3000ML W/LID 65651-530

## (undated) DEVICE — LINEN TOWEL PACK X5 5464

## (undated) DEVICE — SU SILK 2-0 FSL 18" 677G

## (undated) DEVICE — SOL NACL 0.9% IRRIG 1000ML BOTTLE 07138-09

## (undated) DEVICE — SOL WATER IRRIG 1000ML BOTTLE 2F7114

## (undated) DEVICE — SPONGE COTTONOID 1/2X3" 80-1407

## (undated) DEVICE — GOWN XLG DISP 9545

## (undated) DEVICE — GLOVE BIOGEL PI MICRO INDICATOR UNDERGLOVE SZ 6.5 48965

## (undated) DEVICE — ESU ELEC BLADE 2.75" COATED/INSULATED E1455

## (undated) DEVICE — Device

## (undated) DEVICE — SYR BULB IRRIG 50ML LATEX FREE 0035280

## (undated) DEVICE — PACK SET-UP STD 9102

## (undated) DEVICE — PACK MINOR SBA15MIFSE

## (undated) DEVICE — GLOVE PROTEXIS MICRO 6.5  2D73PM65

## (undated) DEVICE — SPONGE RAY-TEC 4X4" 7317

## (undated) DEVICE — GOWN IMPERVIOUS BREATHABLE SMART LG 89015

## (undated) DEVICE — NDL 19GA 1.5"

## (undated) DEVICE — DRAPE SPLIT EENT 76X124" 3X28" 9447

## (undated) DEVICE — PEN MARKING SKIN

## (undated) DEVICE — SPECIMEN CONTAINER 60MLW/10% FORMALIN 59601

## (undated) DEVICE — CLIP ETHICON LIGACLIP SM BLUE LT100

## (undated) DEVICE — PAD CHUX UNDERPAD 23X24" 7136

## (undated) DEVICE — MARKER MARGIN MARKER STD 6 COLOR SGL USE MMS6

## (undated) DEVICE — BASIN SET MINOR DISP

## (undated) DEVICE — BNDG ELASTIC 6" DBL LENGTH UNSTERILE 6611-16

## (undated) DEVICE — ESU GROUND PAD UNIVERSAL W/O CORD

## (undated) DEVICE — DRSG TELFA 3X8" 1238

## (undated) DEVICE — SET BREAST BIOPSY LOCALIZER 20 PROBE 8MM PENCIL 09-0006

## (undated) DEVICE — GLOVE BIOGEL PI MICRO SZ 6.0 48560

## (undated) DEVICE — SU VICRYL 3-0 SH 27" J316H

## (undated) DEVICE — ESU PENCIL W/SMOKE EVAC NEPTUNE STRYKER 0703-046-000

## (undated) DEVICE — ANTIFOG SOLUTION W/FOAM PAD 31142527

## (undated) RX ORDER — DEXAMETHASONE SODIUM PHOSPHATE 4 MG/ML
INJECTION, SOLUTION INTRA-ARTICULAR; INTRALESIONAL; INTRAMUSCULAR; INTRAVENOUS; SOFT TISSUE
Status: DISPENSED
Start: 2017-07-24

## (undated) RX ORDER — ONDANSETRON 2 MG/ML
INJECTION INTRAMUSCULAR; INTRAVENOUS
Status: DISPENSED
Start: 2017-07-24

## (undated) RX ORDER — ONDANSETRON 2 MG/ML
INJECTION INTRAMUSCULAR; INTRAVENOUS
Status: DISPENSED
Start: 2024-08-05

## (undated) RX ORDER — FENTANYL CITRATE 50 UG/ML
INJECTION, SOLUTION INTRAMUSCULAR; INTRAVENOUS
Status: DISPENSED
Start: 2024-08-05

## (undated) RX ORDER — PROPOFOL 10 MG/ML
INJECTION, EMULSION INTRAVENOUS
Status: DISPENSED
Start: 2024-08-05

## (undated) RX ORDER — FENTANYL CITRATE 0.05 MG/ML
INJECTION, SOLUTION INTRAMUSCULAR; INTRAVENOUS
Status: DISPENSED
Start: 2024-08-05

## (undated) RX ORDER — OXYCODONE AND ACETAMINOPHEN 5; 325 MG/1; MG/1
TABLET ORAL
Status: DISPENSED
Start: 2017-07-24

## (undated) RX ORDER — BUPIVACAINE HYDROCHLORIDE 2.5 MG/ML
INJECTION, SOLUTION EPIDURAL; INFILTRATION; INTRACAUDAL
Status: DISPENSED
Start: 2024-08-05

## (undated) RX ORDER — CLINDAMYCIN PHOSPHATE 900 MG/50ML
INJECTION, SOLUTION INTRAVENOUS
Status: DISPENSED
Start: 2017-08-04

## (undated) RX ORDER — CLINDAMYCIN PHOSPHATE 900 MG/50ML
INJECTION, SOLUTION INTRAVENOUS
Status: DISPENSED
Start: 2024-08-05

## (undated) RX ORDER — FENTANYL CITRATE 50 UG/ML
INJECTION, SOLUTION INTRAMUSCULAR; INTRAVENOUS
Status: DISPENSED
Start: 2017-07-24

## (undated) RX ORDER — ACETAMINOPHEN 325 MG/1
TABLET ORAL
Status: DISPENSED
Start: 2017-08-04

## (undated) RX ORDER — REGADENOSON 0.08 MG/ML
INJECTION, SOLUTION INTRAVENOUS
Status: DISPENSED
Start: 2020-07-24

## (undated) RX ORDER — ONDANSETRON 2 MG/ML
INJECTION INTRAMUSCULAR; INTRAVENOUS
Status: DISPENSED
Start: 2017-07-31

## (undated) RX ORDER — HYDROXYZINE HYDROCHLORIDE 25 MG/1
TABLET, FILM COATED ORAL
Status: DISPENSED
Start: 2024-08-05

## (undated) RX ORDER — PROPOFOL 10 MG/ML
INJECTION, EMULSION INTRAVENOUS
Status: DISPENSED
Start: 2017-07-24

## (undated) RX ORDER — CLINDAMYCIN PHOSPHATE 900 MG/50ML
INJECTION, SOLUTION INTRAVENOUS
Status: DISPENSED
Start: 2018-02-02

## (undated) RX ORDER — CLINDAMYCIN PHOSPHATE 900 MG/50ML
INJECTION, SOLUTION INTRAVENOUS
Status: DISPENSED
Start: 2017-07-24

## (undated) RX ORDER — OXYCODONE HYDROCHLORIDE 5 MG/1
TABLET ORAL
Status: DISPENSED
Start: 2024-08-05

## (undated) RX ORDER — FENTANYL CITRATE 50 UG/ML
INJECTION, SOLUTION INTRAMUSCULAR; INTRAVENOUS
Status: DISPENSED
Start: 2017-07-31

## (undated) RX ORDER — DEXAMETHASONE SODIUM PHOSPHATE 4 MG/ML
INJECTION, SOLUTION INTRA-ARTICULAR; INTRALESIONAL; INTRAMUSCULAR; INTRAVENOUS; SOFT TISSUE
Status: DISPENSED
Start: 2024-08-05

## (undated) RX ORDER — LIDOCAINE HYDROCHLORIDE 20 MG/ML
INJECTION, SOLUTION EPIDURAL; INFILTRATION; INTRACAUDAL; PERINEURAL
Status: DISPENSED
Start: 2017-07-24

## (undated) RX ORDER — LIDOCAINE HYDROCHLORIDE 10 MG/ML
INJECTION, SOLUTION INFILTRATION; PERINEURAL
Status: DISPENSED
Start: 2024-08-05